# Patient Record
Sex: FEMALE | Race: WHITE | NOT HISPANIC OR LATINO | URBAN - METROPOLITAN AREA
[De-identification: names, ages, dates, MRNs, and addresses within clinical notes are randomized per-mention and may not be internally consistent; named-entity substitution may affect disease eponyms.]

---

## 2017-02-24 ENCOUNTER — INPATIENT (INPATIENT)
Facility: HOSPITAL | Age: 62
LOS: 4 days | Discharge: HOME | DRG: 872 | End: 2017-03-01
Attending: FAMILY MEDICINE | Admitting: FAMILY MEDICINE
Payer: COMMERCIAL

## 2017-02-24 VITALS
OXYGEN SATURATION: 100 % | HEART RATE: 112 BPM | RESPIRATION RATE: 16 BRPM | HEIGHT: 64 IN | SYSTOLIC BLOOD PRESSURE: 131 MMHG | TEMPERATURE: 99 F | DIASTOLIC BLOOD PRESSURE: 73 MMHG | WEIGHT: 240.08 LBS

## 2017-02-24 DIAGNOSIS — Z87.09 PERSONAL HISTORY OF OTHER DISEASES OF THE RESPIRATORY SYSTEM: Chronic | ICD-10-CM

## 2017-02-24 DIAGNOSIS — Z90.13 ACQUIRED ABSENCE OF BILATERAL BREASTS AND NIPPLES: Chronic | ICD-10-CM

## 2017-02-24 DIAGNOSIS — L03.114 CELLULITIS OF LEFT UPPER LIMB: ICD-10-CM

## 2017-02-24 LAB
ALBUMIN SERPL ELPH-MCNC: 3.8 G/DL — SIGNIFICANT CHANGE UP (ref 3.5–5)
ALP SERPL-CCNC: 65 U/L — SIGNIFICANT CHANGE UP (ref 40–120)
ALT FLD-CCNC: 19 U/L DA — SIGNIFICANT CHANGE UP (ref 10–60)
ANION GAP SERPL CALC-SCNC: 9 MMOL/L — SIGNIFICANT CHANGE UP (ref 5–17)
AST SERPL-CCNC: 10 U/L — SIGNIFICANT CHANGE UP (ref 10–40)
BILIRUB SERPL-MCNC: 1.4 MG/DL — HIGH (ref 0.2–1.2)
BUN SERPL-MCNC: 11 MG/DL — SIGNIFICANT CHANGE UP (ref 7–18)
CALCIUM SERPL-MCNC: 9 MG/DL — SIGNIFICANT CHANGE UP (ref 8.4–10.5)
CHLORIDE SERPL-SCNC: 104 MMOL/L — SIGNIFICANT CHANGE UP (ref 96–108)
CO2 SERPL-SCNC: 28 MMOL/L — SIGNIFICANT CHANGE UP (ref 22–31)
CREAT SERPL-MCNC: 0.74 MG/DL — SIGNIFICANT CHANGE UP (ref 0.5–1.3)
EOSINOPHIL NFR BLD AUTO: 2 % — SIGNIFICANT CHANGE UP (ref 0–6)
GLUCOSE SERPL-MCNC: 104 MG/DL — HIGH (ref 70–99)
HCT VFR BLD CALC: 40.6 % — SIGNIFICANT CHANGE UP (ref 34.5–45)
HGB BLD-MCNC: 13.3 G/DL — SIGNIFICANT CHANGE UP (ref 11.5–15.5)
LACTATE SERPL-SCNC: 2.5 MMOL/L — HIGH (ref 0.7–2)
LYMPHOCYTES # BLD AUTO: 18 % — SIGNIFICANT CHANGE UP (ref 13–44)
MCHC RBC-ENTMCNC: 32.8 GM/DL — SIGNIFICANT CHANGE UP (ref 32–36)
MCHC RBC-ENTMCNC: 34 PG — SIGNIFICANT CHANGE UP (ref 27–34)
MCV RBC AUTO: 103.4 FL — HIGH (ref 80–100)
MONOCYTES NFR BLD AUTO: 2 % — SIGNIFICANT CHANGE UP (ref 2–14)
NEUTROPHILS NFR BLD AUTO: 78 % — HIGH (ref 43–77)
PLATELET # BLD AUTO: 236 K/UL — SIGNIFICANT CHANGE UP (ref 150–400)
POTASSIUM SERPL-MCNC: 3.8 MMOL/L — SIGNIFICANT CHANGE UP (ref 3.5–5.3)
POTASSIUM SERPL-SCNC: 3.8 MMOL/L — SIGNIFICANT CHANGE UP (ref 3.5–5.3)
PROT SERPL-MCNC: 8.2 G/DL — SIGNIFICANT CHANGE UP (ref 6–8.3)
RBC # BLD: 3.93 M/UL — SIGNIFICANT CHANGE UP (ref 3.8–5.2)
RBC # FLD: 13.2 % — SIGNIFICANT CHANGE UP (ref 10.3–14.5)
SODIUM SERPL-SCNC: 141 MMOL/L — SIGNIFICANT CHANGE UP (ref 135–145)
WBC # BLD: 2.1 K/UL — LOW (ref 3.8–10.5)
WBC # FLD AUTO: 2.1 K/UL — LOW (ref 3.8–10.5)

## 2017-02-24 PROCEDURE — 93971 EXTREMITY STUDY: CPT | Mod: 26,LT

## 2017-02-24 PROCEDURE — 99285 EMERGENCY DEPT VISIT HI MDM: CPT

## 2017-02-24 RX ORDER — PIPERACILLIN AND TAZOBACTAM 4; .5 G/20ML; G/20ML
3.38 INJECTION, POWDER, LYOPHILIZED, FOR SOLUTION INTRAVENOUS ONCE
Qty: 0 | Refills: 0 | Status: COMPLETED | OUTPATIENT
Start: 2017-02-24 | End: 2017-02-24

## 2017-02-24 RX ORDER — MORPHINE SULFATE 50 MG/1
6 CAPSULE, EXTENDED RELEASE ORAL ONCE
Qty: 0 | Refills: 0 | Status: DISCONTINUED | OUTPATIENT
Start: 2017-02-24 | End: 2017-02-24

## 2017-02-24 RX ORDER — SODIUM CHLORIDE 9 MG/ML
3 INJECTION INTRAMUSCULAR; INTRAVENOUS; SUBCUTANEOUS ONCE
Qty: 0 | Refills: 0 | Status: COMPLETED | OUTPATIENT
Start: 2017-02-24 | End: 2017-02-24

## 2017-02-24 RX ORDER — VANCOMYCIN HCL 1 G
1000 VIAL (EA) INTRAVENOUS ONCE
Qty: 0 | Refills: 0 | Status: COMPLETED | OUTPATIENT
Start: 2017-02-24 | End: 2017-02-24

## 2017-02-24 RX ORDER — ACETAMINOPHEN 500 MG
650 TABLET ORAL ONCE
Qty: 0 | Refills: 0 | Status: COMPLETED | OUTPATIENT
Start: 2017-02-24 | End: 2017-02-24

## 2017-02-24 RX ADMIN — Medication 250 MILLIGRAM(S): at 21:50

## 2017-02-24 RX ADMIN — Medication 650 MILLIGRAM(S): at 20:57

## 2017-02-24 RX ADMIN — SODIUM CHLORIDE 3 MILLILITER(S): 9 INJECTION INTRAMUSCULAR; INTRAVENOUS; SUBCUTANEOUS at 20:38

## 2017-02-24 RX ADMIN — PIPERACILLIN AND TAZOBACTAM 200 GRAM(S): 4; .5 INJECTION, POWDER, LYOPHILIZED, FOR SOLUTION INTRAVENOUS at 20:56

## 2017-02-24 NOTE — ED ADULT NURSE NOTE - OBJECTIVE STATEMENT
C/O PAIN AND SWELLING TO LEFT ARM TODAY.DENIES INJURY TO LEFT ARM. LEFT ARM WITH REDNESS.EDEMA.SEEN ANDEXAMINED BY DR. IBRAHIM

## 2017-02-24 NOTE — ED PROVIDER NOTE - OBJECTIVE STATEMENT
Pt presents with left arm redness, swelling, tenderness and  warm to palpation started today. Pt states had previous hx of arm cellulitis. No reported fever, no chest pain, no shortness of breath.   Pt has hx of breast CA states on Ibrance.

## 2017-02-24 NOTE — ED PROVIDER NOTE - PMH
Breast cancer    Hypothyroidism    Lymphedema of arm  leftarm lymphedema since 20 years , after the breast reconstruction surgery  S/P mastectomy, bilateral    Thyroid condition

## 2017-02-24 NOTE — ED PROVIDER NOTE - MEDICAL DECISION MAKING DETAILS
MAR and Dr. Hein endorsed. Pt agrees with admission for IV abx. I had a detailed discussion with the patient and/or guardian regarding the historical points, exam findings, and any diagnostic results supporting the admit diagnosis.

## 2017-02-24 NOTE — ED ADULT NURSE NOTE - ED STAT RN HANDOFF DETAILS
ENDORSED TO MS. ANNIE JORGE, REPORTS GIVEN, NO COMPLAINTS AT PRESENT. LEFT REMAINS RED , WITH SWELLING.

## 2017-02-25 DIAGNOSIS — C50.919 MALIGNANT NEOPLASM OF UNSPECIFIED SITE OF UNSPECIFIED FEMALE BREAST: ICD-10-CM

## 2017-02-25 DIAGNOSIS — A41.9 SEPSIS, UNSPECIFIED ORGANISM: ICD-10-CM

## 2017-02-25 DIAGNOSIS — Z41.8 ENCOUNTER FOR OTHER PROCEDURES FOR PURPOSES OTHER THAN REMEDYING HEALTH STATE: ICD-10-CM

## 2017-02-25 DIAGNOSIS — E03.9 HYPOTHYROIDISM, UNSPECIFIED: ICD-10-CM

## 2017-02-25 LAB
LACTATE SERPL-SCNC: 1.7 MMOL/L — SIGNIFICANT CHANGE UP (ref 0.7–2)
LACTATE SERPL-SCNC: 2.7 MMOL/L — HIGH (ref 0.7–2)

## 2017-02-25 RX ORDER — SODIUM CHLORIDE 9 MG/ML
1000 INJECTION INTRAMUSCULAR; INTRAVENOUS; SUBCUTANEOUS
Qty: 0 | Refills: 0 | Status: COMPLETED | OUTPATIENT
Start: 2017-02-25 | End: 2017-02-26

## 2017-02-25 RX ORDER — SODIUM CHLORIDE 9 MG/ML
500 INJECTION INTRAMUSCULAR; INTRAVENOUS; SUBCUTANEOUS ONCE
Qty: 0 | Refills: 0 | Status: COMPLETED | OUTPATIENT
Start: 2017-02-25 | End: 2017-02-25

## 2017-02-25 RX ORDER — CEFAZOLIN SODIUM 1 G
2000 VIAL (EA) INJECTION EVERY 8 HOURS
Qty: 0 | Refills: 0 | Status: DISCONTINUED | OUTPATIENT
Start: 2017-02-25 | End: 2017-03-01

## 2017-02-25 RX ORDER — ACETAMINOPHEN 500 MG
325 TABLET ORAL EVERY 4 HOURS
Qty: 0 | Refills: 0 | Status: DISCONTINUED | OUTPATIENT
Start: 2017-02-25 | End: 2017-02-26

## 2017-02-25 RX ORDER — CEFAZOLIN SODIUM 1 G
VIAL (EA) INJECTION
Qty: 0 | Refills: 0 | Status: DISCONTINUED | OUTPATIENT
Start: 2017-02-25 | End: 2017-03-01

## 2017-02-25 RX ORDER — ONDANSETRON 8 MG/1
4 TABLET, FILM COATED ORAL EVERY 6 HOURS
Qty: 0 | Refills: 0 | Status: DISCONTINUED | OUTPATIENT
Start: 2017-02-25 | End: 2017-03-01

## 2017-02-25 RX ORDER — LEVOTHYROXINE SODIUM 125 MCG
50 TABLET ORAL DAILY
Qty: 0 | Refills: 0 | Status: DISCONTINUED | OUTPATIENT
Start: 2017-02-25 | End: 2017-03-01

## 2017-02-25 RX ORDER — SODIUM CHLORIDE 9 MG/ML
1000 INJECTION INTRAMUSCULAR; INTRAVENOUS; SUBCUTANEOUS ONCE
Qty: 0 | Refills: 0 | Status: COMPLETED | OUTPATIENT
Start: 2017-02-25 | End: 2017-02-25

## 2017-02-25 RX ORDER — ALBUTEROL 90 UG/1
2 AEROSOL, METERED ORAL EVERY 6 HOURS
Qty: 0 | Refills: 0 | Status: DISCONTINUED | OUTPATIENT
Start: 2017-02-25 | End: 2017-03-01

## 2017-02-25 RX ORDER — VANCOMYCIN HCL 1 G
1000 VIAL (EA) INTRAVENOUS EVERY 12 HOURS
Qty: 0 | Refills: 0 | Status: DISCONTINUED | OUTPATIENT
Start: 2017-02-25 | End: 2017-02-25

## 2017-02-25 RX ORDER — ALPRAZOLAM 0.25 MG
0.25 TABLET ORAL
Qty: 0 | Refills: 0 | Status: DISCONTINUED | OUTPATIENT
Start: 2017-02-25 | End: 2017-03-01

## 2017-02-25 RX ORDER — MORPHINE SULFATE 50 MG/1
2 CAPSULE, EXTENDED RELEASE ORAL EVERY 6 HOURS
Qty: 0 | Refills: 0 | Status: DISCONTINUED | OUTPATIENT
Start: 2017-02-25 | End: 2017-02-25

## 2017-02-25 RX ORDER — PIPERACILLIN AND TAZOBACTAM 4; .5 G/20ML; G/20ML
3.38 INJECTION, POWDER, LYOPHILIZED, FOR SOLUTION INTRAVENOUS EVERY 8 HOURS
Qty: 0 | Refills: 0 | Status: DISCONTINUED | OUTPATIENT
Start: 2017-02-25 | End: 2017-02-25

## 2017-02-25 RX ORDER — CEFAZOLIN SODIUM 1 G
2000 VIAL (EA) INJECTION ONCE
Qty: 0 | Refills: 0 | Status: COMPLETED | OUTPATIENT
Start: 2017-02-25 | End: 2017-02-25

## 2017-02-25 RX ORDER — ENOXAPARIN SODIUM 100 MG/ML
40 INJECTION SUBCUTANEOUS DAILY
Qty: 0 | Refills: 0 | Status: DISCONTINUED | OUTPATIENT
Start: 2017-02-25 | End: 2017-03-01

## 2017-02-25 RX ADMIN — ALBUTEROL 2 PUFF(S): 90 AEROSOL, METERED ORAL at 17:25

## 2017-02-25 RX ADMIN — ALBUTEROL 2 PUFF(S): 90 AEROSOL, METERED ORAL at 09:09

## 2017-02-25 RX ADMIN — Medication 0.25 MILLIGRAM(S): at 05:48

## 2017-02-25 RX ADMIN — PIPERACILLIN AND TAZOBACTAM 25 GRAM(S): 4; .5 INJECTION, POWDER, LYOPHILIZED, FOR SOLUTION INTRAVENOUS at 13:09

## 2017-02-25 RX ADMIN — Medication 0.25 MILLIGRAM(S): at 13:37

## 2017-02-25 RX ADMIN — SODIUM CHLORIDE 80 MILLILITER(S): 9 INJECTION INTRAMUSCULAR; INTRAVENOUS; SUBCUTANEOUS at 05:57

## 2017-02-25 RX ADMIN — Medication 50 MICROGRAM(S): at 05:49

## 2017-02-25 RX ADMIN — ONDANSETRON 4 MILLIGRAM(S): 8 TABLET, FILM COATED ORAL at 16:02

## 2017-02-25 RX ADMIN — SODIUM CHLORIDE 80 MILLILITER(S): 9 INJECTION INTRAMUSCULAR; INTRAVENOUS; SUBCUTANEOUS at 17:25

## 2017-02-25 RX ADMIN — Medication 250 MILLIGRAM(S): at 05:49

## 2017-02-25 RX ADMIN — ALBUTEROL 2 PUFF(S): 90 AEROSOL, METERED ORAL at 21:24

## 2017-02-25 RX ADMIN — SODIUM CHLORIDE 1000 MILLILITER(S): 9 INJECTION INTRAMUSCULAR; INTRAVENOUS; SUBCUTANEOUS at 09:23

## 2017-02-25 RX ADMIN — PIPERACILLIN AND TAZOBACTAM 25 GRAM(S): 4; .5 INJECTION, POWDER, LYOPHILIZED, FOR SOLUTION INTRAVENOUS at 05:48

## 2017-02-25 RX ADMIN — Medication 100 MILLIGRAM(S): at 21:26

## 2017-02-25 RX ADMIN — SODIUM CHLORIDE 4000 MILLILITER(S): 9 INJECTION INTRAMUSCULAR; INTRAVENOUS; SUBCUTANEOUS at 01:51

## 2017-02-25 RX ADMIN — ENOXAPARIN SODIUM 40 MILLIGRAM(S): 100 INJECTION SUBCUTANEOUS at 12:11

## 2017-02-25 RX ADMIN — Medication 325 MILLIGRAM(S): at 22:08

## 2017-02-25 RX ADMIN — Medication 100 MILLIGRAM(S): at 17:24

## 2017-02-25 NOTE — H&P ADULT. - PROBLEM SELECTOR PLAN 1
Sepsis:  wbc count 2.1 tachy cardiac  on chemo immunocompromised  will start on vancomycin and zosyn  f/u blood cx c/w iv fluids  elevated left arm to aid in healing vascular checks q4 as arm is very swollen and risk of compartment syndrome  lactate 2.5  pain mgmt with morphine  id consult Dr Wolfe

## 2017-02-25 NOTE — H&P ADULT. - PSH
H/O pleural empyema  pleurodesis  S/P bilateral mastectomy    S/P mastectomy, bilateral  s/p implants b/l, no chemo or radiation therapy

## 2017-02-25 NOTE — ED ADULT NURSE REASSESSMENT NOTE - NS ED NURSE REASSESS COMMENT FT1
Diet order updated. Meal provided to patient, well tolerated. Lactate pending collection. Collection attempted but patient refused . Urine specimen also pending. Patient made aware.

## 2017-02-25 NOTE — H&P ADULT. - ASSESSMENT
62 y/o F from home  w/ PMHx of hypothyroidism, HLD and breast CA s/p bilateral mastectomy with augmentation surgery 22 years ago on chemo currently po history of recurrant left arm celluitis VATS procedure 1014 for plurodesis for recurrant malignant pleural effusions p/w L arm swelling today is being admitted for sepsis secondary to left arm cellulitis.  ( med rec not done [phone])

## 2017-02-26 LAB
ALBUMIN SERPL ELPH-MCNC: 2.6 G/DL — LOW (ref 3.5–5)
ALP SERPL-CCNC: 53 U/L — SIGNIFICANT CHANGE UP (ref 40–120)
ALT FLD-CCNC: 15 U/L DA — SIGNIFICANT CHANGE UP (ref 10–60)
ANION GAP SERPL CALC-SCNC: 11 MMOL/L — SIGNIFICANT CHANGE UP (ref 5–17)
AST SERPL-CCNC: 11 U/L — SIGNIFICANT CHANGE UP (ref 10–40)
BASOPHILS # BLD AUTO: 0.2 K/UL — SIGNIFICANT CHANGE UP (ref 0–0.2)
BASOPHILS NFR BLD AUTO: 2.1 % — HIGH (ref 0–2)
BILIRUB SERPL-MCNC: 1.4 MG/DL — HIGH (ref 0.2–1.2)
BUN SERPL-MCNC: 8 MG/DL — SIGNIFICANT CHANGE UP (ref 7–18)
CALCIUM SERPL-MCNC: 8.2 MG/DL — LOW (ref 8.4–10.5)
CHLORIDE SERPL-SCNC: 103 MMOL/L — SIGNIFICANT CHANGE UP (ref 96–108)
CHOLEST SERPL-MCNC: 113 MG/DL — SIGNIFICANT CHANGE UP (ref 10–199)
CO2 SERPL-SCNC: 24 MMOL/L — SIGNIFICANT CHANGE UP (ref 22–31)
CREAT SERPL-MCNC: 0.71 MG/DL — SIGNIFICANT CHANGE UP (ref 0.5–1.3)
EOSINOPHIL # BLD AUTO: 0 K/UL — SIGNIFICANT CHANGE UP (ref 0–0.5)
EOSINOPHIL NFR BLD AUTO: 0.5 % — SIGNIFICANT CHANGE UP (ref 0–6)
GLUCOSE SERPL-MCNC: 131 MG/DL — HIGH (ref 70–99)
HBA1C BLD-MCNC: 6.1 % — HIGH (ref 4–5.6)
HCT VFR BLD CALC: 31.3 % — LOW (ref 34.5–45)
HDLC SERPL-MCNC: 33 MG/DL — LOW (ref 40–125)
HGB BLD-MCNC: 10.8 G/DL — LOW (ref 11.5–15.5)
LACTATE SERPL-SCNC: 1 MMOL/L — SIGNIFICANT CHANGE UP (ref 0.7–2)
LIPID PNL WITH DIRECT LDL SERPL: 64 MG/DL — SIGNIFICANT CHANGE UP
LYMPHOCYTES # BLD AUTO: 0.4 K/UL — LOW (ref 1–3.3)
LYMPHOCYTES # BLD AUTO: 5.9 % — LOW (ref 13–44)
MAGNESIUM SERPL-MCNC: 2 MG/DL — SIGNIFICANT CHANGE UP (ref 1.8–2.4)
MCHC RBC-ENTMCNC: 34.3 GM/DL — SIGNIFICANT CHANGE UP (ref 32–36)
MCHC RBC-ENTMCNC: 34.8 PG — HIGH (ref 27–34)
MCV RBC AUTO: 101.5 FL — HIGH (ref 80–100)
MONOCYTES # BLD AUTO: 0.8 K/UL — SIGNIFICANT CHANGE UP (ref 0–0.9)
MONOCYTES NFR BLD AUTO: 10.2 % — SIGNIFICANT CHANGE UP (ref 2–14)
NEUTROPHILS # BLD AUTO: 6.1 K/UL — SIGNIFICANT CHANGE UP (ref 1.8–7.4)
NEUTROPHILS NFR BLD AUTO: 81.3 % — HIGH (ref 43–77)
PHOSPHATE SERPL-MCNC: 2.3 MG/DL — LOW (ref 2.5–4.5)
PLATELET # BLD AUTO: 188 K/UL — SIGNIFICANT CHANGE UP (ref 150–400)
POTASSIUM SERPL-MCNC: 3.1 MMOL/L — LOW (ref 3.5–5.3)
POTASSIUM SERPL-SCNC: 3.1 MMOL/L — LOW (ref 3.5–5.3)
PROT SERPL-MCNC: 6.7 G/DL — SIGNIFICANT CHANGE UP (ref 6–8.3)
RBC # BLD: 3.09 M/UL — LOW (ref 3.8–5.2)
RBC # FLD: 13.4 % — SIGNIFICANT CHANGE UP (ref 10.3–14.5)
SODIUM SERPL-SCNC: 138 MMOL/L — SIGNIFICANT CHANGE UP (ref 135–145)
TOTAL CHOLESTEROL/HDL RATIO MEASUREMENT: 3.4 RATIO — SIGNIFICANT CHANGE UP (ref 3.3–7.1)
TRIGL SERPL-MCNC: 82 MG/DL — SIGNIFICANT CHANGE UP (ref 10–149)
WBC # BLD: 7.5 K/UL — SIGNIFICANT CHANGE UP (ref 3.8–10.5)
WBC # FLD AUTO: 7.5 K/UL — SIGNIFICANT CHANGE UP (ref 3.8–10.5)

## 2017-02-26 PROCEDURE — 71010: CPT | Mod: 26

## 2017-02-26 RX ORDER — LACTULOSE 10 G/15ML
20 SOLUTION ORAL ONCE
Qty: 0 | Refills: 0 | Status: COMPLETED | OUTPATIENT
Start: 2017-02-26 | End: 2017-02-27

## 2017-02-26 RX ORDER — ACETAMINOPHEN 500 MG
650 TABLET ORAL EVERY 4 HOURS
Qty: 0 | Refills: 0 | Status: DISCONTINUED | OUTPATIENT
Start: 2017-02-26 | End: 2017-03-01

## 2017-02-26 RX ORDER — POTASSIUM CHLORIDE 20 MEQ
40 PACKET (EA) ORAL ONCE
Qty: 0 | Refills: 0 | Status: COMPLETED | OUTPATIENT
Start: 2017-02-26 | End: 2017-02-26

## 2017-02-26 RX ADMIN — Medication 0.25 MILLIGRAM(S): at 17:16

## 2017-02-26 RX ADMIN — Medication 40 MILLIEQUIVALENT(S): at 12:25

## 2017-02-26 RX ADMIN — SODIUM CHLORIDE 80 MILLILITER(S): 9 INJECTION INTRAMUSCULAR; INTRAVENOUS; SUBCUTANEOUS at 13:20

## 2017-02-26 RX ADMIN — Medication 100 MILLIGRAM(S): at 05:25

## 2017-02-26 RX ADMIN — Medication 650 MILLIGRAM(S): at 05:26

## 2017-02-26 RX ADMIN — ENOXAPARIN SODIUM 40 MILLIGRAM(S): 100 INJECTION SUBCUTANEOUS at 12:25

## 2017-02-26 RX ADMIN — ALBUTEROL 2 PUFF(S): 90 AEROSOL, METERED ORAL at 15:17

## 2017-02-26 RX ADMIN — ALBUTEROL 2 PUFF(S): 90 AEROSOL, METERED ORAL at 09:21

## 2017-02-26 RX ADMIN — Medication 50 MICROGRAM(S): at 05:25

## 2017-02-26 RX ADMIN — Medication 0.25 MILLIGRAM(S): at 05:25

## 2017-02-26 RX ADMIN — ALBUTEROL 2 PUFF(S): 90 AEROSOL, METERED ORAL at 21:01

## 2017-02-26 RX ADMIN — Medication 100 MILLIGRAM(S): at 13:20

## 2017-02-26 RX ADMIN — Medication 100 MILLIGRAM(S): at 21:01

## 2017-02-26 RX ADMIN — Medication 650 MILLIGRAM(S): at 20:55

## 2017-02-27 LAB
APPEARANCE UR: CLEAR — SIGNIFICANT CHANGE UP
BILIRUB UR-MCNC: NEGATIVE — SIGNIFICANT CHANGE UP
COLOR SPEC: YELLOW — SIGNIFICANT CHANGE UP
DIFF PNL FLD: ABNORMAL
GLUCOSE UR QL: NEGATIVE — SIGNIFICANT CHANGE UP
KETONES UR-MCNC: NEGATIVE — SIGNIFICANT CHANGE UP
LACTATE SERPL-SCNC: 0.8 MMOL/L — SIGNIFICANT CHANGE UP (ref 0.7–2)
LEUKOCYTE ESTERASE UR-ACNC: ABNORMAL
NITRITE UR-MCNC: NEGATIVE — SIGNIFICANT CHANGE UP
PH UR: 6 — SIGNIFICANT CHANGE UP (ref 4.8–8)
PROT UR-MCNC: 30 MG/DL
SP GR SPEC: 1.01 — SIGNIFICANT CHANGE UP (ref 1.01–1.02)
UROBILINOGEN FLD QL: NEGATIVE — SIGNIFICANT CHANGE UP

## 2017-02-27 RX ADMIN — Medication 100 MILLIGRAM(S): at 06:39

## 2017-02-27 RX ADMIN — Medication 0.25 MILLIGRAM(S): at 09:10

## 2017-02-27 RX ADMIN — Medication 0.25 MILLIGRAM(S): at 20:12

## 2017-02-27 RX ADMIN — Medication 100 MILLIGRAM(S): at 22:11

## 2017-02-27 RX ADMIN — Medication 100 MILLIGRAM(S): at 14:05

## 2017-02-27 RX ADMIN — ALBUTEROL 2 PUFF(S): 90 AEROSOL, METERED ORAL at 08:58

## 2017-02-27 RX ADMIN — LACTULOSE 20 GRAM(S): 10 SOLUTION ORAL at 00:24

## 2017-02-27 RX ADMIN — ALBUTEROL 2 PUFF(S): 90 AEROSOL, METERED ORAL at 14:09

## 2017-02-27 RX ADMIN — Medication 50 MICROGRAM(S): at 06:40

## 2017-02-27 RX ADMIN — ENOXAPARIN SODIUM 40 MILLIGRAM(S): 100 INJECTION SUBCUTANEOUS at 12:10

## 2017-02-28 ENCOUNTER — TRANSCRIPTION ENCOUNTER (OUTPATIENT)
Age: 62
End: 2017-02-28

## 2017-02-28 LAB
ANION GAP SERPL CALC-SCNC: 13 MMOL/L — SIGNIFICANT CHANGE UP (ref 5–17)
BUN SERPL-MCNC: 8 MG/DL — SIGNIFICANT CHANGE UP (ref 7–18)
CALCIUM SERPL-MCNC: 8.9 MG/DL — SIGNIFICANT CHANGE UP (ref 8.4–10.5)
CHLORIDE SERPL-SCNC: 103 MMOL/L — SIGNIFICANT CHANGE UP (ref 96–108)
CO2 SERPL-SCNC: 26 MMOL/L — SIGNIFICANT CHANGE UP (ref 22–31)
CREAT SERPL-MCNC: 0.64 MG/DL — SIGNIFICANT CHANGE UP (ref 0.5–1.3)
GLUCOSE SERPL-MCNC: 144 MG/DL — HIGH (ref 70–99)
HCT VFR BLD CALC: 34.2 % — LOW (ref 34.5–45)
HGB BLD-MCNC: 11.7 G/DL — SIGNIFICANT CHANGE UP (ref 11.5–15.5)
MCHC RBC-ENTMCNC: 34.1 GM/DL — SIGNIFICANT CHANGE UP (ref 32–36)
MCHC RBC-ENTMCNC: 34.9 PG — HIGH (ref 27–34)
MCV RBC AUTO: 102.2 FL — HIGH (ref 80–100)
PLATELET # BLD AUTO: 283 K/UL — SIGNIFICANT CHANGE UP (ref 150–400)
POTASSIUM SERPL-MCNC: 3.3 MMOL/L — LOW (ref 3.5–5.3)
POTASSIUM SERPL-SCNC: 3.3 MMOL/L — LOW (ref 3.5–5.3)
RBC # BLD: 3.35 M/UL — LOW (ref 3.8–5.2)
RBC # FLD: 13.3 % — SIGNIFICANT CHANGE UP (ref 10.3–14.5)
SODIUM SERPL-SCNC: 142 MMOL/L — SIGNIFICANT CHANGE UP (ref 135–145)
WBC # BLD: 5.2 K/UL — SIGNIFICANT CHANGE UP (ref 3.8–10.5)
WBC # FLD AUTO: 5.2 K/UL — SIGNIFICANT CHANGE UP (ref 3.8–10.5)

## 2017-02-28 RX ORDER — POTASSIUM CHLORIDE 20 MEQ
40 PACKET (EA) ORAL ONCE
Qty: 0 | Refills: 0 | Status: COMPLETED | OUTPATIENT
Start: 2017-02-28 | End: 2017-02-28

## 2017-02-28 RX ADMIN — Medication 100 MILLIGRAM(S): at 22:06

## 2017-02-28 RX ADMIN — Medication 50 MICROGRAM(S): at 05:27

## 2017-02-28 RX ADMIN — Medication 40 MILLIEQUIVALENT(S): at 13:55

## 2017-02-28 RX ADMIN — Medication 100 MILLIGRAM(S): at 13:57

## 2017-02-28 RX ADMIN — Medication 100 MILLIGRAM(S): at 06:42

## 2017-02-28 RX ADMIN — ALBUTEROL 2 PUFF(S): 90 AEROSOL, METERED ORAL at 22:06

## 2017-02-28 RX ADMIN — Medication 0.25 MILLIGRAM(S): at 22:06

## 2017-02-28 RX ADMIN — ALBUTEROL 2 PUFF(S): 90 AEROSOL, METERED ORAL at 09:01

## 2017-02-28 RX ADMIN — ENOXAPARIN SODIUM 40 MILLIGRAM(S): 100 INJECTION SUBCUTANEOUS at 12:29

## 2017-02-28 RX ADMIN — ALBUTEROL 2 PUFF(S): 90 AEROSOL, METERED ORAL at 17:20

## 2017-02-28 NOTE — DISCHARGE NOTE ADULT - HOSPITAL COURSE
62 y/o F from home  w/ PMHx of hypothyroidism, HLD and breast CA s/p bilateral mastectomy with augmentation surgery 22 years ago, hx of malignent pleural effusion s/p VATS procedure on oral chemo. p/w L arm swelling, erythema extending to axilla and chest wall. Patient had prior admission in Septmeber 2016 for similar complaints     Admitted for Sepsis secondary to left arm cellulitis   initially treated with vancomycin  and zosyn  ID dr Wolfe consulted  Antibiotic changed to Kefzol IV 2 grams every 8 hours   Left arm kept elevated, no signs of neurovascular deficit, no signs of compartment syndrome  Blood culture negative   WBC 5.2   venous doppler negative for DVT     hypothyroid:  c/w synthroid     breast ca: on chemo   holding chemo now as patient has acute infection  resume chemo as per oncologist recommendation      2/28 left arm swelling and erythema significantly improved   Patient afebrile, verbalizes feeling much better   Cleared to be discharged home with recommendation to continue antibiotics 60 y/o F from home  w/ PMHx of hypothyroidism, HLD and breast CA s/p bilateral mastectomy with augmentation surgery 22 years ago, hx of malignent pleural effusion s/p VATS procedure on oral chemo. p/w L arm swelling, erythema extending to axilla and chest wall. Patient had prior admission in Septmeber 2016 for similar complaints     Admitted for Sepsis secondary to left arm cellulitis   initially treated with vancomycin  and zosyn  ID dr Wolfe consulted  Antibiotic changed to Kefzol IV 2 grams every 8 hours   Left arm kept elevated, no signs of neurovascular deficit, no signs of compartment syndrome  Blood culture negative   WBC 5.2   venous doppler negative for DVT   at discharge continue taking oral antibiotics Keflex     hypothyroid:  c/w synthroid     breast ca: on chemo   holding chemo now as patient has acute infection  resume chemo as per oncologist recommendation      2/28 left arm swelling and erythema significantly improved   Patient afebrile, verbalizes feeling much better   Cleared to be discharged home with recommendation to continue antibiotics 60 y/o F from home  w/ PMHx of hypothyroidism, HLD and breast CA s/p bilateral mastectomy with augmentation surgery 22 years ago, hx of malignent pleural effusion s/p VATS procedure on oral chemo. p/w L arm swelling, erythema extending to axilla and chest wall. Patient had prior admission in Septmeber 2016 for similar complaints     Admitted for Sepsis secondary to left arm cellulitis   initially treated with vancomycin  and zosyn  ID dr Wolfe consulted  Antibiotic changed to Kefzol IV 2 grams every 8 hours   Left arm kept elevated, no signs of neurovascular deficit, no signs of compartment syndrome  Blood culture negative   WBC 5.2   venous doppler negative for DVT   at discharge continue taking oral antibiotics Keflex     hypothyroid:  c/w synthroid     breast ca: on chemo   holding chemo now as patient has acute infection  resume chemo as per oncologist recommendation      2/28 left arm swelling and erythema significantly improved   Patient afebrile, verbalizes feeling much better   Cleared to be discharged home with recommendation to continue antibiotics     DVT & GI prophylaxis continued daily

## 2017-02-28 NOTE — DISCHARGE NOTE ADULT - CARE PLAN
Principal Discharge DX:	Cellulitis of arm, left  Goal:	resolution of infection  Instructions for follow-up, activity and diet:	Please keep your left arm elevated while at rest to decrease swelling and promote healing. Continue the antibiotic therapy as ordered. Please follow up with your doctor in 2-3 days for reevaluation and further management. Please see your doctor immidiately or return to the Emergency Room for high fever, worsening pain, worsening redness, dark or bluish discoloration of your skin, numbness, weakness of the left arm or for any new or concerning symptoms  Secondary Diagnosis:	Hypothyroidism  Instructions for follow-up, activity and diet:	Continue synthroid as prescribed by your doctor  Secondary Diagnosis:	Breast cancer  Instructions for follow-up, activity and diet:	Continue chemotherapy as ordered by your doctor. Follow up with your oncologist for further management Principal Discharge DX:	Cellulitis of arm, left  Goal:	resolution of infection  Instructions for follow-up, activity and diet:	Please keep your left arm elevated while at rest to decrease swelling and promote healing. Continue the antibiotic therapy as ordered. Please follow up with your doctor in 2-3 days for reevaluation and further management. Please see your doctor immediately or return to the Emergency Room for high fever, worsening pain, worsening redness, dark or bluish discoloration of your skin, numbness, weakness of the left arm or for any new or concerning symptoms  Secondary Diagnosis:	Hypothyroidism  Goal:	thyroid function within normal limits  Instructions for follow-up, activity and diet:	Continue synthroid as prescribed by your doctor  Secondary Diagnosis:	Breast cancer  Goal:	Maintain healthy lifestyle, Void of cancerous process  Instructions for follow-up, activity and diet:	Continue chemotherapy as ordered by your doctor.   Follow up with your oncologist for further management

## 2017-02-28 NOTE — DISCHARGE NOTE ADULT - MEDICATION SUMMARY - MEDICATIONS TO STOP TAKING
I will STOP taking the medications listed below when I get home from the hospital:    Keflex 500 mg oral capsule  -- 1 cap(s) by mouth 4 times a day  -- Finish all this medication unless otherwise directed by prescriber.    ibuprofen 600 mg oral tablet  -- 1 tab(s) by mouth every 6 hours as needed for pain. TAKE WITH FOOD.  -- Do not take this drug if you are pregnant.  It is very important that you take or use this exactly as directed.  Do not skip doses or discontinue unless directed by your doctor.  May cause drowsiness or dizziness.  Obtain medical advice before taking any non-prescription drugs as some may affect the action of this medication.  Take with food or milk.

## 2017-02-28 NOTE — DISCHARGE NOTE ADULT - PATIENT PORTAL LINK FT
“You can access the FollowHealth Patient Portal, offered by [facility], by registering with the following website: [email or web address]”

## 2017-02-28 NOTE — DISCHARGE NOTE ADULT - CARE PROVIDER_API CALL
Andrew Hein (DO), Medicine  [address] [place] [address] [address]  [place] [address]  Phone: [phone]  Fax: [phone]

## 2017-02-28 NOTE — DISCHARGE NOTE ADULT - MEDICATION SUMMARY - MEDICATIONS TO TAKE
I will START or STAY ON the medications listed below when I get home from the hospital:    aspirin 325 mg oral tablet  -- 1 tab(s) by mouth once a day  -- Indication: For Need for prophylactic measure    acetaminophen 325 mg oral tablet  -- 2 tab(s) by mouth every 6 hours, As Needed -For Temp greater than 38 C (100.4 F)  -- Indication: For Cellulitis of arm, left    atorvastatin 40 mg oral tablet  -- 1 tab(s) by mouth once a day (at bedtime)  -- Indication: For Need for prophylactic measure    ALPRAZolam 0.25 mg oral tablet  -- 1 tab(s) by mouth 2 times a day, As needed, anxiety  -- Indication: For anxiety    albuterol-ipratropium 2.5 mg-0.5 mg/3 mL inhalation solution  -- 3 milliliter(s) inhaled every 6 hours  -- Indication: For Cough and wheezing    cephalexin 500 mg oral capsule  -- 1 cap(s) by mouth 4 times a day  -- Finish all this medication unless otherwise directed by prescriber.    -- Indication: For Cellulitis of left upper extremity    levothyroxine 50 mcg (0.05 mg) oral tablet  -- 1 tab(s) by mouth once a day  -- Indication: For Hypothyroidism

## 2017-02-28 NOTE — DISCHARGE NOTE ADULT - PLAN OF CARE
resolution of infection Please keep your left arm elevated while at rest to decrease swelling and promote healing. Continue the antibiotic therapy as ordered. Please follow up with your doctor in 2-3 days for reevaluation and further management. Please see your doctor immidiately or return to the Emergency Room for high fever, worsening pain, worsening redness, dark or bluish discoloration of your skin, numbness, weakness of the left arm or for any new or concerning symptoms Continue synthroid as prescribed by your doctor Continue chemotherapy as ordered by your doctor. Follow up with your oncologist for further management thyroid function within normal limits Maintain healthy lifestyle, Void of cancerous process Please keep your left arm elevated while at rest to decrease swelling and promote healing. Continue the antibiotic therapy as ordered. Please follow up with your doctor in 2-3 days for reevaluation and further management. Please see your doctor immediately or return to the Emergency Room for high fever, worsening pain, worsening redness, dark or bluish discoloration of your skin, numbness, weakness of the left arm or for any new or concerning symptoms Continue chemotherapy as ordered by your doctor.   Follow up with your oncologist for further management

## 2017-02-28 NOTE — DISCHARGE NOTE ADULT - INSTRUCTIONS
Eat healthy diet, plenty of fruits and vegetables,   Avoid fried foods  Low salt and low cholesterol - DASH diet    Continue with your out patient physical theraphy Keep your arm hand elevated when possible, avoid trauma to your arm and hand, no heavy lifting on your left arm or hand, do not carry your bag on your  left arm or hand

## 2017-03-01 VITALS
RESPIRATION RATE: 16 BRPM | SYSTOLIC BLOOD PRESSURE: 119 MMHG | DIASTOLIC BLOOD PRESSURE: 65 MMHG | TEMPERATURE: 98 F | OXYGEN SATURATION: 96 % | HEART RATE: 68 BPM

## 2017-03-01 LAB
ANION GAP SERPL CALC-SCNC: 10 MMOL/L — SIGNIFICANT CHANGE UP (ref 5–17)
BUN SERPL-MCNC: 5 MG/DL — LOW (ref 7–18)
CALCIUM SERPL-MCNC: 9.1 MG/DL — SIGNIFICANT CHANGE UP (ref 8.4–10.5)
CHLORIDE SERPL-SCNC: 103 MMOL/L — SIGNIFICANT CHANGE UP (ref 96–108)
CO2 SERPL-SCNC: 29 MMOL/L — SIGNIFICANT CHANGE UP (ref 22–31)
CREAT SERPL-MCNC: 0.62 MG/DL — SIGNIFICANT CHANGE UP (ref 0.5–1.3)
GLUCOSE SERPL-MCNC: 114 MG/DL — HIGH (ref 70–99)
HCT VFR BLD CALC: 34.6 % — SIGNIFICANT CHANGE UP (ref 34.5–45)
HGB BLD-MCNC: 11.6 G/DL — SIGNIFICANT CHANGE UP (ref 11.5–15.5)
MCHC RBC-ENTMCNC: 33.6 GM/DL — SIGNIFICANT CHANGE UP (ref 32–36)
MCHC RBC-ENTMCNC: 34.4 PG — HIGH (ref 27–34)
MCV RBC AUTO: 102.3 FL — HIGH (ref 80–100)
PLATELET # BLD AUTO: 351 K/UL — SIGNIFICANT CHANGE UP (ref 150–400)
POTASSIUM SERPL-MCNC: 3.6 MMOL/L — SIGNIFICANT CHANGE UP (ref 3.5–5.3)
POTASSIUM SERPL-SCNC: 3.6 MMOL/L — SIGNIFICANT CHANGE UP (ref 3.5–5.3)
RBC # BLD: 3.38 M/UL — LOW (ref 3.8–5.2)
RBC # FLD: 13.4 % — SIGNIFICANT CHANGE UP (ref 10.3–14.5)
SODIUM SERPL-SCNC: 142 MMOL/L — SIGNIFICANT CHANGE UP (ref 135–145)
WBC # BLD: 4.8 K/UL — SIGNIFICANT CHANGE UP (ref 3.8–10.5)
WBC # FLD AUTO: 4.8 K/UL — SIGNIFICANT CHANGE UP (ref 3.8–10.5)

## 2017-03-01 PROCEDURE — 87040 BLOOD CULTURE FOR BACTERIA: CPT

## 2017-03-01 PROCEDURE — 80048 BASIC METABOLIC PNL TOTAL CA: CPT

## 2017-03-01 PROCEDURE — 83605 ASSAY OF LACTIC ACID: CPT

## 2017-03-01 PROCEDURE — 80061 LIPID PANEL: CPT

## 2017-03-01 PROCEDURE — 93971 EXTREMITY STUDY: CPT

## 2017-03-01 PROCEDURE — 85027 COMPLETE CBC AUTOMATED: CPT

## 2017-03-01 PROCEDURE — 96375 TX/PRO/DX INJ NEW DRUG ADDON: CPT

## 2017-03-01 PROCEDURE — 71045 X-RAY EXAM CHEST 1 VIEW: CPT

## 2017-03-01 PROCEDURE — 94640 AIRWAY INHALATION TREATMENT: CPT

## 2017-03-01 PROCEDURE — 84100 ASSAY OF PHOSPHORUS: CPT

## 2017-03-01 PROCEDURE — 83735 ASSAY OF MAGNESIUM: CPT

## 2017-03-01 PROCEDURE — 99285 EMERGENCY DEPT VISIT HI MDM: CPT | Mod: 25

## 2017-03-01 PROCEDURE — 83036 HEMOGLOBIN GLYCOSYLATED A1C: CPT

## 2017-03-01 PROCEDURE — 93005 ELECTROCARDIOGRAM TRACING: CPT

## 2017-03-01 PROCEDURE — 81001 URINALYSIS AUTO W/SCOPE: CPT

## 2017-03-01 PROCEDURE — 96374 THER/PROPH/DIAG INJ IV PUSH: CPT

## 2017-03-01 PROCEDURE — 80053 COMPREHEN METABOLIC PANEL: CPT

## 2017-03-01 RX ORDER — CEPHALEXIN 500 MG
1 CAPSULE ORAL
Qty: 28 | Refills: 0
Start: 2017-03-01 | End: 2017-03-08

## 2017-03-01 RX ORDER — CEPHALEXIN 500 MG
1 CAPSULE ORAL
Qty: 28 | Refills: 0
Start: 2017-03-01 | End: 2019-07-22

## 2017-03-01 RX ORDER — ACETAMINOPHEN 500 MG
2 TABLET ORAL
Qty: 0 | Refills: 0 | DISCHARGE
Start: 2017-03-01

## 2017-03-01 RX ADMIN — Medication 100 MILLIGRAM(S): at 05:51

## 2017-03-01 RX ADMIN — Medication 50 MICROGRAM(S): at 05:51

## 2017-03-01 RX ADMIN — ENOXAPARIN SODIUM 40 MILLIGRAM(S): 100 INJECTION SUBCUTANEOUS at 12:04

## 2017-03-01 RX ADMIN — ALBUTEROL 2 PUFF(S): 90 AEROSOL, METERED ORAL at 09:16

## 2017-03-01 RX ADMIN — Medication 100 MILLIGRAM(S): at 12:30

## 2017-03-02 LAB
CULTURE RESULTS: SIGNIFICANT CHANGE UP
CULTURE RESULTS: SIGNIFICANT CHANGE UP
SPECIMEN SOURCE: SIGNIFICANT CHANGE UP
SPECIMEN SOURCE: SIGNIFICANT CHANGE UP

## 2017-03-03 DIAGNOSIS — A41.9 SEPSIS, UNSPECIFIED ORGANISM: ICD-10-CM

## 2017-03-03 DIAGNOSIS — C50.911 MALIGNANT NEOPLASM OF UNSPECIFIED SITE OF RIGHT FEMALE BREAST: ICD-10-CM

## 2017-03-03 DIAGNOSIS — C50.912 MALIGNANT NEOPLASM OF UNSPECIFIED SITE OF LEFT FEMALE BREAST: ICD-10-CM

## 2017-03-03 DIAGNOSIS — L03.114 CELLULITIS OF LEFT UPPER LIMB: ICD-10-CM

## 2017-03-03 DIAGNOSIS — E03.9 HYPOTHYROIDISM, UNSPECIFIED: ICD-10-CM

## 2017-03-03 DIAGNOSIS — I89.0 LYMPHEDEMA, NOT ELSEWHERE CLASSIFIED: ICD-10-CM

## 2017-07-03 ENCOUNTER — EMERGENCY (EMERGENCY)
Facility: HOSPITAL | Age: 62
LOS: 1 days | Discharge: HOME | End: 2017-07-03
Attending: EMERGENCY MEDICINE
Payer: COMMERCIAL

## 2017-07-03 VITALS
OXYGEN SATURATION: 97 % | SYSTOLIC BLOOD PRESSURE: 105 MMHG | TEMPERATURE: 98 F | HEART RATE: 78 BPM | RESPIRATION RATE: 18 BRPM | WEIGHT: 240.08 LBS | DIASTOLIC BLOOD PRESSURE: 67 MMHG

## 2017-07-03 DIAGNOSIS — Z90.13 ACQUIRED ABSENCE OF BILATERAL BREASTS AND NIPPLES: Chronic | ICD-10-CM
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
## 2017-07-03 DIAGNOSIS — S60.221A CONTUSION OF RIGHT HAND, INITIAL ENCOUNTER: ICD-10-CM

## 2017-07-03 DIAGNOSIS — I97.2 POSTMASTECTOMY LYMPHEDEMA SYNDROME: ICD-10-CM

## 2017-07-03 DIAGNOSIS — E03.9 HYPOTHYROIDISM, UNSPECIFIED: ICD-10-CM

## 2017-07-03 DIAGNOSIS — Z87.09 PERSONAL HISTORY OF OTHER DISEASES OF THE RESPIRATORY SYSTEM: Chronic | ICD-10-CM

## 2017-07-03 DIAGNOSIS — S62.316A DISPLACED FRACTURE OF BASE OF FIFTH METACARPAL BONE, RIGHT HAND, INITIAL ENCOUNTER FOR CLOSED FRACTURE: ICD-10-CM

## 2017-07-03 DIAGNOSIS — Z79.82 LONG TERM (CURRENT) USE OF ASPIRIN: ICD-10-CM

## 2017-07-03 DIAGNOSIS — S00.531A CONTUSION OF LIP, INITIAL ENCOUNTER: ICD-10-CM

## 2017-07-03 DIAGNOSIS — Z79.51 LONG TERM (CURRENT) USE OF INHALED STEROIDS: ICD-10-CM
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
## 2017-07-03 DIAGNOSIS — Z79.2 LONG TERM (CURRENT) USE OF ANTIBIOTICS: ICD-10-CM

## 2017-07-03 DIAGNOSIS — S09.90XA UNSPECIFIED INJURY OF HEAD, INITIAL ENCOUNTER: ICD-10-CM

## 2017-07-03 DIAGNOSIS — S00.83XA CONTUSION OF OTHER PART OF HEAD, INITIAL ENCOUNTER: ICD-10-CM

## 2017-07-03 DIAGNOSIS — Z85.3 PERSONAL HISTORY OF MALIGNANT NEOPLASM OF BREAST: ICD-10-CM

## 2017-07-03 PROCEDURE — 70450 CT HEAD/BRAIN W/O DYE: CPT | Mod: 26

## 2017-07-03 PROCEDURE — 73130 X-RAY EXAM OF HAND: CPT | Mod: 26,RT

## 2017-07-03 PROCEDURE — 99284 EMERGENCY DEPT VISIT MOD MDM: CPT | Mod: 25

## 2017-07-03 PROCEDURE — 70450 CT HEAD/BRAIN W/O DYE: CPT

## 2017-07-03 PROCEDURE — 71045 X-RAY EXAM CHEST 1 VIEW: CPT

## 2017-07-03 PROCEDURE — 70486 CT MAXILLOFACIAL W/O DYE: CPT

## 2017-07-03 PROCEDURE — 71010: CPT | Mod: 26

## 2017-07-03 PROCEDURE — 99285 EMERGENCY DEPT VISIT HI MDM: CPT

## 2017-07-03 PROCEDURE — 70486 CT MAXILLOFACIAL W/O DYE: CPT | Mod: 26

## 2017-07-03 PROCEDURE — 73130 X-RAY EXAM OF HAND: CPT

## 2017-07-03 RX ORDER — IBUPROFEN 200 MG
600 TABLET ORAL ONCE
Qty: 0 | Refills: 0 | Status: COMPLETED | OUTPATIENT
Start: 2017-07-03 | End: 2017-07-03

## 2017-07-03 RX ORDER — ACETAMINOPHEN 500 MG
650 TABLET ORAL ONCE
Qty: 0 | Refills: 0 | Status: COMPLETED | OUTPATIENT
Start: 2017-07-03 | End: 2017-07-03

## 2017-07-03 RX ADMIN — Medication 650 MILLIGRAM(S): at 20:54

## 2017-07-03 RX ADMIN — Medication 600 MILLIGRAM(S): at 22:01

## 2017-07-03 NOTE — ED ADULT NURSE NOTE - OBJECTIVE STATEMENT
pt from home c/o of Rt hand pain s/p trip and fall on street today pt sustained Rt eyebrow bruise, broken Rt upper partial denture and Rt hand pain pt is alert awake oriented x3 denies any headache  Rt hand with limited ROM ambulatory with steady gait

## 2017-07-03 NOTE — ED PROVIDER NOTE - PHYSICAL EXAMINATION
contusion to R forehead with supraorbital ecchymosis.  inner lip contusion midline.  R dorsum of hand contusion with tenderness over 4th metarcarpal area.  No breast contusion (exam with RN).  L arm lymphadema.  No bony td of face or arms.

## 2017-07-03 NOTE — ED PROVIDER NOTE - CARE PLAN
Principal Discharge DX:	Contusion of right hand, initial encounter  Secondary Diagnosis:	Closed head injury, initial encounter Principal Discharge DX:	Fracture of metacarpal base of right hand, closed, initial encounter  Goal:	base of 5th metacarpal right hand fracture  Secondary Diagnosis:	Closed head injury, initial encounter

## 2017-07-03 NOTE — ED PROVIDER NOTE - OBJECTIVE STATEMENT
Dr. Hoover Note: 62F with slip and fall, landed on R side of chest and R hand, hit R side of face, no LOC, has bruise to above R eye, cracked the bridge of lower teeth (removed by pt), and bruise of hand and R breast pain with silicone implant s/p breast cancer surgery.

## 2017-07-03 NOTE — ED PROVIDER NOTE - PRINCIPAL DIAGNOSIS
Contusion of right hand, initial encounter Fracture of metacarpal base of right hand, closed, initial encounter

## 2018-06-21 ENCOUNTER — EMERGENCY (EMERGENCY)
Age: 63
LOS: 1 days | Discharge: HOME | End: 2018-06-21
Attending: EMERGENCY MEDICINE | Admitting: EMERGENCY MEDICINE
Payer: COMMERCIAL

## 2018-06-21 VITALS
DIASTOLIC BLOOD PRESSURE: 69 MMHG | OXYGEN SATURATION: 100 % | HEART RATE: 74 BPM | RESPIRATION RATE: 20 BRPM | SYSTOLIC BLOOD PRESSURE: 155 MMHG | TEMPERATURE: 98 F

## 2018-06-21 DIAGNOSIS — Z90.13 ACQUIRED ABSENCE OF BILATERAL BREASTS AND NIPPLES: Chronic | ICD-10-CM

## 2018-06-21 DIAGNOSIS — Z87.09 PERSONAL HISTORY OF OTHER DISEASES OF THE RESPIRATORY SYSTEM: Chronic | ICD-10-CM

## 2018-06-21 LAB
ALBUMIN SERPL ELPH-MCNC: 4.2 G/DL — SIGNIFICANT CHANGE UP (ref 3.3–5)
ALP SERPL-CCNC: 64 U/L — SIGNIFICANT CHANGE UP (ref 40–120)
ALT FLD-CCNC: 16 U/L — SIGNIFICANT CHANGE UP (ref 4–33)
AST SERPL-CCNC: 33 U/L — HIGH (ref 4–32)
BASE EXCESS BLDV CALC-SCNC: 3.2 MMOL/L — SIGNIFICANT CHANGE UP
BASOPHILS # BLD AUTO: 0.07 K/UL — SIGNIFICANT CHANGE UP (ref 0–0.2)
BASOPHILS NFR BLD AUTO: 1.1 % — SIGNIFICANT CHANGE UP (ref 0–2)
BILIRUB SERPL-MCNC: 0.7 MG/DL — SIGNIFICANT CHANGE UP (ref 0.2–1.2)
BLOOD GAS VENOUS - CREATININE: SIGNIFICANT CHANGE UP MG/DL (ref 0.5–1.3)
BUN SERPL-MCNC: 12 MG/DL — SIGNIFICANT CHANGE UP (ref 7–23)
CALCIUM SERPL-MCNC: 9.3 MG/DL — SIGNIFICANT CHANGE UP (ref 8.4–10.5)
CHLORIDE BLDV-SCNC: 102 MMOL/L — SIGNIFICANT CHANGE UP (ref 96–108)
CHLORIDE SERPL-SCNC: 100 MMOL/L — SIGNIFICANT CHANGE UP (ref 98–107)
CO2 SERPL-SCNC: 21 MMOL/L — LOW (ref 22–31)
CREAT SERPL-MCNC: 0.6 MG/DL — SIGNIFICANT CHANGE UP (ref 0.5–1.3)
EOSINOPHIL # BLD AUTO: 0.11 K/UL — SIGNIFICANT CHANGE UP (ref 0–0.5)
EOSINOPHIL NFR BLD AUTO: 1.8 % — SIGNIFICANT CHANGE UP (ref 0–6)
GAS PNL BLDV: 136 MMOL/L — SIGNIFICANT CHANGE UP (ref 136–146)
GLUCOSE BLDV-MCNC: 119 — HIGH (ref 70–99)
GLUCOSE SERPL-MCNC: 114 MG/DL — HIGH (ref 70–99)
HCO3 BLDV-SCNC: 26 MMOL/L — SIGNIFICANT CHANGE UP (ref 20–27)
HCT VFR BLD CALC: 37.8 % — SIGNIFICANT CHANGE UP (ref 34.5–45)
HCT VFR BLDV CALC: 38.7 % — SIGNIFICANT CHANGE UP (ref 34.5–45)
HGB BLD-MCNC: 12.5 G/DL — SIGNIFICANT CHANGE UP (ref 11.5–15.5)
HGB BLDV-MCNC: 12.6 G/DL — SIGNIFICANT CHANGE UP (ref 11.5–15.5)
IMM GRANULOCYTES # BLD AUTO: 0.03 # — SIGNIFICANT CHANGE UP
IMM GRANULOCYTES NFR BLD AUTO: 0.5 % — SIGNIFICANT CHANGE UP (ref 0–1.5)
LACTATE BLDV-MCNC: 2.3 MMOL/L — HIGH (ref 0.5–2)
LIDOCAIN IGE QN: 21.7 U/L — SIGNIFICANT CHANGE UP (ref 7–60)
LYMPHOCYTES # BLD AUTO: 1.63 K/UL — SIGNIFICANT CHANGE UP (ref 1–3.3)
LYMPHOCYTES # BLD AUTO: 26 % — SIGNIFICANT CHANGE UP (ref 13–44)
MCHC RBC-ENTMCNC: 33.1 % — SIGNIFICANT CHANGE UP (ref 32–36)
MCHC RBC-ENTMCNC: 34.2 PG — HIGH (ref 27–34)
MCV RBC AUTO: 103.6 FL — HIGH (ref 80–100)
MONOCYTES # BLD AUTO: 0.88 K/UL — SIGNIFICANT CHANGE UP (ref 0–0.9)
MONOCYTES NFR BLD AUTO: 14 % — SIGNIFICANT CHANGE UP (ref 2–14)
NEUTROPHILS # BLD AUTO: 3.55 K/UL — SIGNIFICANT CHANGE UP (ref 1.8–7.4)
NEUTROPHILS NFR BLD AUTO: 56.6 % — SIGNIFICANT CHANGE UP (ref 43–77)
NRBC # FLD: 0 — SIGNIFICANT CHANGE UP
PCO2 BLDV: 49 MMHG — SIGNIFICANT CHANGE UP (ref 41–51)
PH BLDV: 7.38 PH — SIGNIFICANT CHANGE UP (ref 7.32–7.43)
PLATELET # BLD AUTO: 311 K/UL — SIGNIFICANT CHANGE UP (ref 150–400)
PMV BLD: 9.7 FL — SIGNIFICANT CHANGE UP (ref 7–13)
PO2 BLDV: 43 MMHG — HIGH (ref 35–40)
POTASSIUM BLDV-SCNC: 4.3 MMOL/L — SIGNIFICANT CHANGE UP (ref 3.4–4.5)
POTASSIUM SERPL-MCNC: 5.5 MMOL/L — HIGH (ref 3.5–5.3)
POTASSIUM SERPL-SCNC: 5.5 MMOL/L — HIGH (ref 3.5–5.3)
PROT SERPL-MCNC: 7.3 G/DL — SIGNIFICANT CHANGE UP (ref 6–8.3)
RBC # BLD: 3.65 M/UL — LOW (ref 3.8–5.2)
RBC # FLD: 13.4 % — SIGNIFICANT CHANGE UP (ref 10.3–14.5)
SAO2 % BLDV: 78 % — SIGNIFICANT CHANGE UP (ref 60–85)
SODIUM SERPL-SCNC: 137 MMOL/L — SIGNIFICANT CHANGE UP (ref 135–145)
TROPONIN T, HIGH SENSITIVITY: < 6 NG/L — SIGNIFICANT CHANGE UP (ref ?–14)
WBC # BLD: 6.27 K/UL — SIGNIFICANT CHANGE UP (ref 3.8–10.5)
WBC # FLD AUTO: 6.27 K/UL — SIGNIFICANT CHANGE UP (ref 3.8–10.5)

## 2018-06-21 PROCEDURE — 73080 X-RAY EXAM OF ELBOW: CPT | Mod: 26,RT

## 2018-06-21 PROCEDURE — 73060 X-RAY EXAM OF HUMERUS: CPT | Mod: 26,RT

## 2018-06-21 PROCEDURE — 99284 EMERGENCY DEPT VISIT MOD MDM: CPT

## 2018-06-21 RX ORDER — OXYCODONE AND ACETAMINOPHEN 5; 325 MG/1; MG/1
1 TABLET ORAL ONCE
Qty: 0 | Refills: 0 | Status: DISCONTINUED | OUTPATIENT
Start: 2018-06-21 | End: 2018-06-21

## 2018-06-21 RX ORDER — MORPHINE SULFATE 50 MG/1
4 CAPSULE, EXTENDED RELEASE ORAL ONCE
Qty: 0 | Refills: 0 | Status: DISCONTINUED | OUTPATIENT
Start: 2018-06-21 | End: 2018-06-21

## 2018-06-21 RX ORDER — ACETAMINOPHEN 500 MG
650 TABLET ORAL ONCE
Qty: 0 | Refills: 0 | Status: DISCONTINUED | OUTPATIENT
Start: 2018-06-21 | End: 2018-06-21

## 2018-06-21 RX ADMIN — OXYCODONE AND ACETAMINOPHEN 1 TABLET(S): 5; 325 TABLET ORAL at 22:25

## 2018-06-21 NOTE — ED PROVIDER NOTE - PMH
Breast cancer    HLD (hyperlipidemia)    Hypothyroidism    Lymphedema of arm  leftarm lymphedema since 20 years , after the breast reconstruction surgery  S/P mastectomy, bilateral    Thyroid condition

## 2018-06-21 NOTE — ED PROVIDER NOTE - PHYSICAL EXAMINATION
obese, non-toxic appearing  (++) TTP over Lt lower ribs  RUE - (++) ecchymoses and TTP over distal radiu with FROM of the elbow joint, also TTP over proximal humerus, no deformity noted. FROM of the shoulder joint, good , nvi, 2+ radial pulse  (+0 LUE lymphedema, unchanged as per pt, no focal MSK TTP
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
## 2018-06-22 VITALS
TEMPERATURE: 97 F | SYSTOLIC BLOOD PRESSURE: 108 MMHG | DIASTOLIC BLOOD PRESSURE: 67 MMHG | HEART RATE: 70 BPM | OXYGEN SATURATION: 99 % | RESPIRATION RATE: 12 BRPM

## 2018-06-22 PROCEDURE — 71260 CT THORAX DX C+: CPT | Mod: 26

## 2018-06-22 PROCEDURE — 72125 CT NECK SPINE W/O DYE: CPT | Mod: 26

## 2018-06-22 PROCEDURE — 74177 CT ABD & PELVIS W/CONTRAST: CPT | Mod: 26

## 2018-06-22 PROCEDURE — 70450 CT HEAD/BRAIN W/O DYE: CPT | Mod: 26

## 2018-06-22 RX ORDER — IBUPROFEN 200 MG
1 TABLET ORAL
Qty: 20 | Refills: 0
Start: 2018-06-22 | End: 2018-06-26

## 2018-06-22 RX ORDER — IBUPROFEN 200 MG
600 TABLET ORAL ONCE
Qty: 0 | Refills: 0 | Status: COMPLETED | OUTPATIENT
Start: 2018-06-22 | End: 2018-06-22

## 2018-06-22 RX ADMIN — Medication 600 MILLIGRAM(S): at 03:55

## 2018-06-22 NOTE — ED ADULT NURSE REASSESSMENT NOTE - NS ED NURSE REASSESS COMMENT FT1
Pt received on stretcher from intake to 20A. Awake, A&Ox3, ambulatory without assistance, received with C-collar in place, respirations even and unlabored on room air. Pt awaiting CT scan results.

## 2018-07-27 PROBLEM — E78.5 HYPERLIPIDEMIA, UNSPECIFIED: Chronic | Status: ACTIVE | Noted: 2018-06-21

## 2018-08-15 ENCOUNTER — OUTPATIENT (OUTPATIENT)
Dept: OUTPATIENT SERVICES | Facility: HOSPITAL | Age: 63
LOS: 1 days | End: 2018-08-15
Payer: COMMERCIAL

## 2018-08-15 ENCOUNTER — APPOINTMENT (OUTPATIENT)
Dept: ULTRASOUND IMAGING | Facility: HOSPITAL | Age: 63
End: 2018-08-15
Payer: COMMERCIAL

## 2018-08-15 DIAGNOSIS — Z90.13 ACQUIRED ABSENCE OF BILATERAL BREASTS AND NIPPLES: Chronic | ICD-10-CM

## 2018-08-15 DIAGNOSIS — E78.5 HYPERLIPIDEMIA, UNSPECIFIED: ICD-10-CM

## 2018-08-15 DIAGNOSIS — Z87.09 PERSONAL HISTORY OF OTHER DISEASES OF THE RESPIRATORY SYSTEM: Chronic | ICD-10-CM

## 2018-08-15 DIAGNOSIS — C50.912 MALIGNANT NEOPLASM OF UNSPECIFIED SITE OF LEFT FEMALE BREAST: ICD-10-CM

## 2018-08-15 PROCEDURE — 76536 US EXAM OF HEAD AND NECK: CPT

## 2018-08-15 PROCEDURE — 76536 US EXAM OF HEAD AND NECK: CPT | Mod: 26

## 2018-12-20 NOTE — H&P ADULT. - HISTORY OF PRESENT ILLNESS
independent
62 y/o F from home  w/ PMHx of hypothyroidism, HLD and breast CA s/p bilateral mastectomy with augmentation surgery 22 years ago on chemo currently po history of recurrant left arm celluitis VATS procedure 1014 for plurodesis for recurrant malignant pleural effusions p/w L arm swelling today. Patient had prior admission in Septmeber 2016 for similar complaints presented with left arm swelling and erythema that started today , patient states she has been advised by her doctor to not lift heavy things and keep arm elevated but she is not compliant with this , today when she was shopping for groceries she felt pain in her left arm , she rolled up her sleeve and noticed the area was very red and swollen , she also had subjective fever with chills and decided to come to the hospital for iv antibiotics.patient denies any diarrhea , tick bite or trauma to the site .ROS is negative except above.    In the ED patient VS 99.2 , hr 112 , /73 RR 16 pulse 100 , patient on labs was found to have wbc count of 2.1 , with elevated lactate of 2.5 ,was given one dose of vancomycin and 1 dose of zosyn. Us doppler done showed no evidence of any DVT

## 2019-01-01 ENCOUNTER — EMERGENCY (EMERGENCY)
Facility: HOSPITAL | Age: 64
LOS: 1 days | Discharge: HOME | End: 2019-01-01
Attending: EMERGENCY MEDICINE
Payer: COMMERCIAL

## 2019-01-01 VITALS
OXYGEN SATURATION: 98 % | DIASTOLIC BLOOD PRESSURE: 82 MMHG | WEIGHT: 240.08 LBS | TEMPERATURE: 98 F | HEART RATE: 76 BPM | SYSTOLIC BLOOD PRESSURE: 137 MMHG | RESPIRATION RATE: 18 BRPM | HEIGHT: 63 IN

## 2019-01-01 DIAGNOSIS — Z87.09 PERSONAL HISTORY OF OTHER DISEASES OF THE RESPIRATORY SYSTEM: Chronic | ICD-10-CM

## 2019-01-01 DIAGNOSIS — Z90.13 ACQUIRED ABSENCE OF BILATERAL BREASTS AND NIPPLES: Chronic | ICD-10-CM

## 2019-01-01 PROCEDURE — 99283 EMERGENCY DEPT VISIT LOW MDM: CPT

## 2019-01-01 RX ORDER — CEPHALEXIN 500 MG
1 CAPSULE ORAL
Qty: 40 | Refills: 0
Start: 2019-01-01 | End: 2019-01-10

## 2019-01-01 NOTE — ED PROVIDER NOTE - OBJECTIVE STATEMENT
62 y/o F pt with PMHx chronic lymphedema presents to ED with lymphedema of the LUE that began a few hours PTA. Last infection was 1-2years ago that was treated with Keflex. Pt reports that she has not been wearing the sleeve. Patient denies fever.

## 2019-01-30 ENCOUNTER — OUTPATIENT (OUTPATIENT)
Dept: OUTPATIENT SERVICES | Facility: HOSPITAL | Age: 64
LOS: 1 days | End: 2019-01-30
Payer: COMMERCIAL

## 2019-01-30 ENCOUNTER — APPOINTMENT (OUTPATIENT)
Dept: ULTRASOUND IMAGING | Facility: HOSPITAL | Age: 64
End: 2019-01-30
Payer: COMMERCIAL

## 2019-01-30 DIAGNOSIS — Z90.13 ACQUIRED ABSENCE OF BILATERAL BREASTS AND NIPPLES: Chronic | ICD-10-CM

## 2019-01-30 DIAGNOSIS — C50.912 MALIGNANT NEOPLASM OF UNSPECIFIED SITE OF LEFT FEMALE BREAST: ICD-10-CM

## 2019-01-30 DIAGNOSIS — C50.911 MALIGNANT NEOPLASM OF UNSPECIFIED SITE OF RIGHT FEMALE BREAST: ICD-10-CM

## 2019-01-30 DIAGNOSIS — Z87.09 PERSONAL HISTORY OF OTHER DISEASES OF THE RESPIRATORY SYSTEM: Chronic | ICD-10-CM

## 2019-01-30 PROCEDURE — 76536 US EXAM OF HEAD AND NECK: CPT | Mod: 26,59

## 2019-01-30 PROCEDURE — 76536 US EXAM OF HEAD AND NECK: CPT

## 2019-04-22 ENCOUNTER — OUTPATIENT (OUTPATIENT)
Dept: OUTPATIENT SERVICES | Facility: HOSPITAL | Age: 64
LOS: 1 days | End: 2019-04-22
Payer: COMMERCIAL

## 2019-04-22 ENCOUNTER — APPOINTMENT (OUTPATIENT)
Dept: ULTRASOUND IMAGING | Facility: HOSPITAL | Age: 64
End: 2019-04-22

## 2019-04-22 DIAGNOSIS — Z87.09 PERSONAL HISTORY OF OTHER DISEASES OF THE RESPIRATORY SYSTEM: Chronic | ICD-10-CM

## 2019-04-22 DIAGNOSIS — Z90.13 ACQUIRED ABSENCE OF BILATERAL BREASTS AND NIPPLES: Chronic | ICD-10-CM

## 2019-04-22 DIAGNOSIS — C50.912 MALIGNANT NEOPLASM OF UNSPECIFIED SITE OF LEFT FEMALE BREAST: ICD-10-CM

## 2019-04-22 PROCEDURE — 76536 US EXAM OF HEAD AND NECK: CPT

## 2019-04-22 PROCEDURE — 76536 US EXAM OF HEAD AND NECK: CPT | Mod: 26

## 2019-04-25 NOTE — ED ADULT NURSE NOTE - NS ED NOTE  TALK SOMEONE YN
FAMILY HISTORY:  Father  Still living? No  Family history of emphysema, Age at diagnosis: Age Unknown  Family history of heart disease, Age at diagnosis: Age Unknown    Mother  Still living? No  Family history of diabetes mellitus, Age at diagnosis: Age Unknown  Family history of uterine cancer, Age at diagnosis: Age Unknown    Sibling  Still living? No  Family history of uterine cancer, Age at diagnosis: Age Unknown    Child  Still living? Yes, Estimated age: 61-70  Family history of uterine cancer, Age at diagnosis: Age Unknown no

## 2019-07-16 ENCOUNTER — EMERGENCY (EMERGENCY)
Facility: HOSPITAL | Age: 64
LOS: 1 days | Discharge: HOME | End: 2019-07-16
Attending: EMERGENCY MEDICINE
Payer: COMMERCIAL

## 2019-07-16 VITALS
HEIGHT: 63.5 IN | TEMPERATURE: 98 F | DIASTOLIC BLOOD PRESSURE: 81 MMHG | WEIGHT: 229.94 LBS | RESPIRATION RATE: 20 BRPM | SYSTOLIC BLOOD PRESSURE: 125 MMHG | OXYGEN SATURATION: 98 % | HEART RATE: 88 BPM

## 2019-07-16 DIAGNOSIS — Z87.09 PERSONAL HISTORY OF OTHER DISEASES OF THE RESPIRATORY SYSTEM: Chronic | ICD-10-CM

## 2019-07-16 DIAGNOSIS — Z90.13 ACQUIRED ABSENCE OF BILATERAL BREASTS AND NIPPLES: Chronic | ICD-10-CM

## 2019-07-16 LAB
ALBUMIN SERPL ELPH-MCNC: 3.7 G/DL — SIGNIFICANT CHANGE UP (ref 3.5–5)
ALP SERPL-CCNC: 72 U/L — SIGNIFICANT CHANGE UP (ref 40–120)
ALT FLD-CCNC: 21 U/L DA — SIGNIFICANT CHANGE UP (ref 10–60)
ANION GAP SERPL CALC-SCNC: 7 MMOL/L — SIGNIFICANT CHANGE UP (ref 5–17)
AST SERPL-CCNC: 18 U/L — SIGNIFICANT CHANGE UP (ref 10–40)
BASOPHILS # BLD AUTO: 0.07 K/UL — SIGNIFICANT CHANGE UP (ref 0–0.2)
BASOPHILS NFR BLD AUTO: 1.8 % — SIGNIFICANT CHANGE UP (ref 0–2)
BILIRUB SERPL-MCNC: 0.8 MG/DL — SIGNIFICANT CHANGE UP (ref 0.2–1.2)
BUN SERPL-MCNC: 17 MG/DL — SIGNIFICANT CHANGE UP (ref 7–18)
CALCIUM SERPL-MCNC: 9 MG/DL — SIGNIFICANT CHANGE UP (ref 8.4–10.5)
CHLORIDE SERPL-SCNC: 107 MMOL/L — SIGNIFICANT CHANGE UP (ref 96–108)
CO2 SERPL-SCNC: 27 MMOL/L — SIGNIFICANT CHANGE UP (ref 22–31)
CREAT SERPL-MCNC: 0.84 MG/DL — SIGNIFICANT CHANGE UP (ref 0.5–1.3)
EOSINOPHIL # BLD AUTO: 0.11 K/UL — SIGNIFICANT CHANGE UP (ref 0–0.5)
EOSINOPHIL NFR BLD AUTO: 2.8 % — SIGNIFICANT CHANGE UP (ref 0–6)
GLUCOSE SERPL-MCNC: 111 MG/DL — HIGH (ref 70–99)
HCT VFR BLD CALC: 35.6 % — SIGNIFICANT CHANGE UP (ref 34.5–45)
HGB BLD-MCNC: 11.9 G/DL — SIGNIFICANT CHANGE UP (ref 11.5–15.5)
IMM GRANULOCYTES NFR BLD AUTO: 0.5 % — SIGNIFICANT CHANGE UP (ref 0–1.5)
LYMPHOCYTES # BLD AUTO: 1.3 K/UL — SIGNIFICANT CHANGE UP (ref 1–3.3)
LYMPHOCYTES # BLD AUTO: 33 % — SIGNIFICANT CHANGE UP (ref 13–44)
MACROCYTES BLD QL: SLIGHT — SIGNIFICANT CHANGE UP
MANUAL SMEAR VERIFICATION: SIGNIFICANT CHANGE UP
MCHC RBC-ENTMCNC: 33.4 GM/DL — SIGNIFICANT CHANGE UP (ref 32–36)
MCHC RBC-ENTMCNC: 35.1 PG — HIGH (ref 27–34)
MCV RBC AUTO: 105 FL — HIGH (ref 80–100)
MONOCYTES # BLD AUTO: 0.48 K/UL — SIGNIFICANT CHANGE UP (ref 0–0.9)
MONOCYTES NFR BLD AUTO: 12.2 % — SIGNIFICANT CHANGE UP (ref 2–14)
NEUTROPHILS # BLD AUTO: 1.96 K/UL — SIGNIFICANT CHANGE UP (ref 1.8–7.4)
NEUTROPHILS NFR BLD AUTO: 49.7 % — SIGNIFICANT CHANGE UP (ref 43–77)
NRBC # BLD: 0 /100 WBCS — SIGNIFICANT CHANGE UP (ref 0–0)
OVALOCYTES BLD QL SMEAR: SLIGHT — SIGNIFICANT CHANGE UP
PLAT MORPH BLD: NORMAL — SIGNIFICANT CHANGE UP
PLATELET # BLD AUTO: 312 K/UL — SIGNIFICANT CHANGE UP (ref 150–400)
POTASSIUM SERPL-MCNC: 3.9 MMOL/L — SIGNIFICANT CHANGE UP (ref 3.5–5.3)
POTASSIUM SERPL-SCNC: 3.9 MMOL/L — SIGNIFICANT CHANGE UP (ref 3.5–5.3)
PROT SERPL-MCNC: 8.5 G/DL — HIGH (ref 6–8.3)
RBC # BLD: 3.39 M/UL — LOW (ref 3.8–5.2)
RBC # FLD: 13.8 % — SIGNIFICANT CHANGE UP (ref 10.3–14.5)
RBC BLD AUTO: ABNORMAL
SODIUM SERPL-SCNC: 141 MMOL/L — SIGNIFICANT CHANGE UP (ref 135–145)
WBC # BLD: 4.11 K/UL — SIGNIFICANT CHANGE UP (ref 3.8–10.5)
WBC # FLD AUTO: 4.11 K/UL — SIGNIFICANT CHANGE UP (ref 3.8–10.5)

## 2019-07-16 PROCEDURE — 93971 EXTREMITY STUDY: CPT | Mod: 26,LT

## 2019-07-16 PROCEDURE — 36415 COLL VENOUS BLD VENIPUNCTURE: CPT

## 2019-07-16 PROCEDURE — 80053 COMPREHEN METABOLIC PANEL: CPT

## 2019-07-16 PROCEDURE — 73130 X-RAY EXAM OF HAND: CPT

## 2019-07-16 PROCEDURE — 99284 EMERGENCY DEPT VISIT MOD MDM: CPT

## 2019-07-16 PROCEDURE — 96374 THER/PROPH/DIAG INJ IV PUSH: CPT

## 2019-07-16 PROCEDURE — 85027 COMPLETE CBC AUTOMATED: CPT

## 2019-07-16 PROCEDURE — 93971 EXTREMITY STUDY: CPT

## 2019-07-16 PROCEDURE — 73130 X-RAY EXAM OF HAND: CPT | Mod: 26,LT

## 2019-07-16 PROCEDURE — 99284 EMERGENCY DEPT VISIT MOD MDM: CPT | Mod: 25

## 2019-07-16 PROCEDURE — 93005 ELECTROCARDIOGRAM TRACING: CPT

## 2019-07-16 RX ORDER — OXYCODONE AND ACETAMINOPHEN 5; 325 MG/1; MG/1
1 TABLET ORAL ONCE
Refills: 0 | Status: DISCONTINUED | OUTPATIENT
Start: 2019-07-16 | End: 2019-07-16

## 2019-07-16 RX ORDER — CEFTRIAXONE 500 MG/1
1000 INJECTION, POWDER, FOR SOLUTION INTRAMUSCULAR; INTRAVENOUS ONCE
Refills: 0 | Status: COMPLETED | OUTPATIENT
Start: 2019-07-16 | End: 2019-07-16

## 2019-07-16 RX ADMIN — OXYCODONE AND ACETAMINOPHEN 1 TABLET(S): 5; 325 TABLET ORAL at 22:15

## 2019-07-16 RX ADMIN — CEFTRIAXONE 100 MILLIGRAM(S): 500 INJECTION, POWDER, FOR SOLUTION INTRAMUSCULAR; INTRAVENOUS at 23:21

## 2019-07-16 NOTE — ED PROVIDER NOTE - OBJECTIVE STATEMENT
h/o Lt breast reconstruction with chronic left UE lymphedema p/w slightly worsening swelling x 2 days accompanied with left hand pain. Pain at thumb area with sl redness noted. Pt has required PO abx for early cellulitis in the past. No trauma, fever, chills, cough, SOB, dizziness or chest pain. Took motrin to little relief.

## 2019-07-16 NOTE — ED ADULT TRIAGE NOTE - NSWEIGHTCALCTOOLDRUG_GEN_A_CORE
bilateral upper extremity ROM was WFL (within functional limits)/bilateral lower extremity ROM was WFL (within functional limits)  used

## 2019-07-16 NOTE — ED PROVIDER NOTE - PHYSICAL EXAMINATION
generalized nonpitting edema to left arm  sl ttp 1st MCP left with trace erythema to dorsal aspect  radial/ulnar pulses 2+  5/5 /biceps

## 2019-07-16 NOTE — ED PROVIDER NOTE - CLINICAL SUMMARY MEDICAL DECISION MAKING FREE TEXT BOX
chronic UE lymphedema here for hand pain and increased swelling  concern for cellulitis, DVT  will do labs, doppler LUE, pain control and reassess

## 2019-07-16 NOTE — ED PROVIDER NOTE - PROGRESS NOTE DETAILS
Educated pt on results, plan. Pt insisting that her pain is not controlled. Will provide limited amount of narcotics for breakthrough pain until pt can f/u PMD .

## 2019-07-17 VITALS
DIASTOLIC BLOOD PRESSURE: 78 MMHG | SYSTOLIC BLOOD PRESSURE: 120 MMHG | OXYGEN SATURATION: 99 % | TEMPERATURE: 98 F | RESPIRATION RATE: 18 BRPM | HEART RATE: 77 BPM

## 2019-07-17 NOTE — ED ADULT NURSE NOTE - NSIMPLEMENTINTERV_GEN_ALL_ED
Implemented All Universal Safety Interventions:  [place] to call system. Call bell, personal items and telephone within reach. Instruct patient to call for assistance. Room bathroom lighting operational. Non-slip footwear when patient is off stretcher. Physically safe environment: no spills, clutter or unnecessary equipment. Stretcher in lowest position, wheels locked, appropriate side rails in place.

## 2019-10-24 ENCOUNTER — APPOINTMENT (OUTPATIENT)
Dept: ULTRASOUND IMAGING | Facility: HOSPITAL | Age: 64
End: 2019-10-24
Payer: COMMERCIAL

## 2019-11-18 ENCOUNTER — APPOINTMENT (OUTPATIENT)
Dept: ULTRASOUND IMAGING | Facility: HOSPITAL | Age: 64
End: 2019-11-18

## 2019-11-29 ENCOUNTER — EMERGENCY (EMERGENCY)
Facility: HOSPITAL | Age: 64
LOS: 1 days | Discharge: HOME | End: 2019-11-29
Attending: EMERGENCY MEDICINE
Payer: COMMERCIAL

## 2019-11-29 VITALS
TEMPERATURE: 97 F | SYSTOLIC BLOOD PRESSURE: 111 MMHG | WEIGHT: 233.03 LBS | OXYGEN SATURATION: 98 % | HEART RATE: 101 BPM | RESPIRATION RATE: 16 BRPM | DIASTOLIC BLOOD PRESSURE: 72 MMHG | HEIGHT: 62 IN

## 2019-11-29 DIAGNOSIS — Z87.09 PERSONAL HISTORY OF OTHER DISEASES OF THE RESPIRATORY SYSTEM: Chronic | ICD-10-CM

## 2019-11-29 DIAGNOSIS — Z90.13 ACQUIRED ABSENCE OF BILATERAL BREASTS AND NIPPLES: Chronic | ICD-10-CM

## 2019-11-29 LAB
ALBUMIN SERPL ELPH-MCNC: 3.5 G/DL — SIGNIFICANT CHANGE UP (ref 3.5–5)
ALP SERPL-CCNC: 70 U/L — SIGNIFICANT CHANGE UP (ref 40–120)
ALT FLD-CCNC: 20 U/L DA — SIGNIFICANT CHANGE UP (ref 10–60)
ANION GAP SERPL CALC-SCNC: 5 MMOL/L — SIGNIFICANT CHANGE UP (ref 5–17)
APPEARANCE UR: CLEAR — SIGNIFICANT CHANGE UP
AST SERPL-CCNC: 13 U/L — SIGNIFICANT CHANGE UP (ref 10–40)
BASOPHILS # BLD AUTO: 0.06 K/UL — SIGNIFICANT CHANGE UP (ref 0–0.2)
BASOPHILS NFR BLD AUTO: 1.6 % — SIGNIFICANT CHANGE UP (ref 0–2)
BILIRUB SERPL-MCNC: 0.8 MG/DL — SIGNIFICANT CHANGE UP (ref 0.2–1.2)
BILIRUB UR-MCNC: NEGATIVE — SIGNIFICANT CHANGE UP
BUN SERPL-MCNC: 16 MG/DL — SIGNIFICANT CHANGE UP (ref 7–18)
CALCIUM SERPL-MCNC: 9.1 MG/DL — SIGNIFICANT CHANGE UP (ref 8.4–10.5)
CHLORIDE SERPL-SCNC: 104 MMOL/L — SIGNIFICANT CHANGE UP (ref 96–108)
CO2 SERPL-SCNC: 29 MMOL/L — SIGNIFICANT CHANGE UP (ref 22–31)
COLOR SPEC: YELLOW — SIGNIFICANT CHANGE UP
CREAT SERPL-MCNC: 0.8 MG/DL — SIGNIFICANT CHANGE UP (ref 0.5–1.3)
DIFF PNL FLD: ABNORMAL
EOSINOPHIL # BLD AUTO: 0.08 K/UL — SIGNIFICANT CHANGE UP (ref 0–0.5)
EOSINOPHIL NFR BLD AUTO: 2.1 % — SIGNIFICANT CHANGE UP (ref 0–6)
EPI CELLS # UR: SIGNIFICANT CHANGE UP /HPF
GLUCOSE SERPL-MCNC: 98 MG/DL — SIGNIFICANT CHANGE UP (ref 70–99)
GLUCOSE UR QL: NEGATIVE — SIGNIFICANT CHANGE UP
HCT VFR BLD CALC: 36.6 % — SIGNIFICANT CHANGE UP (ref 34.5–45)
HGB BLD-MCNC: 12 G/DL — SIGNIFICANT CHANGE UP (ref 11.5–15.5)
IMM GRANULOCYTES NFR BLD AUTO: 0.3 % — SIGNIFICANT CHANGE UP (ref 0–1.5)
KETONES UR-MCNC: NEGATIVE — SIGNIFICANT CHANGE UP
LEUKOCYTE ESTERASE UR-ACNC: ABNORMAL
LYMPHOCYTES # BLD AUTO: 0.83 K/UL — LOW (ref 1–3.3)
LYMPHOCYTES # BLD AUTO: 22.2 % — SIGNIFICANT CHANGE UP (ref 13–44)
MCHC RBC-ENTMCNC: 32.8 GM/DL — SIGNIFICANT CHANGE UP (ref 32–36)
MCHC RBC-ENTMCNC: 34.9 PG — HIGH (ref 27–34)
MCV RBC AUTO: 106.4 FL — HIGH (ref 80–100)
MONOCYTES # BLD AUTO: 0.32 K/UL — SIGNIFICANT CHANGE UP (ref 0–0.9)
MONOCYTES NFR BLD AUTO: 8.6 % — SIGNIFICANT CHANGE UP (ref 2–14)
NEUTROPHILS # BLD AUTO: 2.44 K/UL — SIGNIFICANT CHANGE UP (ref 1.8–7.4)
NEUTROPHILS NFR BLD AUTO: 65.2 % — SIGNIFICANT CHANGE UP (ref 43–77)
NITRITE UR-MCNC: NEGATIVE — SIGNIFICANT CHANGE UP
NRBC # BLD: 0 /100 WBCS — SIGNIFICANT CHANGE UP (ref 0–0)
PH UR: 5 — SIGNIFICANT CHANGE UP (ref 5–8)
PLATELET # BLD AUTO: 338 K/UL — SIGNIFICANT CHANGE UP (ref 150–400)
POTASSIUM SERPL-MCNC: 4.4 MMOL/L — SIGNIFICANT CHANGE UP (ref 3.5–5.3)
POTASSIUM SERPL-SCNC: 4.4 MMOL/L — SIGNIFICANT CHANGE UP (ref 3.5–5.3)
PROT SERPL-MCNC: 8.4 G/DL — HIGH (ref 6–8.3)
PROT UR-MCNC: 15
RBC # BLD: 3.44 M/UL — LOW (ref 3.8–5.2)
RBC # FLD: 12.8 % — SIGNIFICANT CHANGE UP (ref 10.3–14.5)
RBC CASTS # UR COMP ASSIST: SIGNIFICANT CHANGE UP /HPF (ref 0–2)
SODIUM SERPL-SCNC: 138 MMOL/L — SIGNIFICANT CHANGE UP (ref 135–145)
SP GR SPEC: 1.02 — SIGNIFICANT CHANGE UP (ref 1.01–1.02)
UROBILINOGEN FLD QL: NEGATIVE — SIGNIFICANT CHANGE UP
WBC # BLD: 3.74 K/UL — LOW (ref 3.8–10.5)
WBC # FLD AUTO: 3.74 K/UL — LOW (ref 3.8–10.5)
WBC UR QL: SIGNIFICANT CHANGE UP /HPF (ref 0–5)

## 2019-11-29 PROCEDURE — 74176 CT ABD & PELVIS W/O CONTRAST: CPT | Mod: 26

## 2019-11-29 PROCEDURE — 74176 CT ABD & PELVIS W/O CONTRAST: CPT

## 2019-11-29 PROCEDURE — 87086 URINE CULTURE/COLONY COUNT: CPT

## 2019-11-29 PROCEDURE — 99285 EMERGENCY DEPT VISIT HI MDM: CPT

## 2019-11-29 PROCEDURE — 96374 THER/PROPH/DIAG INJ IV PUSH: CPT

## 2019-11-29 PROCEDURE — 80053 COMPREHEN METABOLIC PANEL: CPT

## 2019-11-29 PROCEDURE — 85027 COMPLETE CBC AUTOMATED: CPT

## 2019-11-29 PROCEDURE — 81001 URINALYSIS AUTO W/SCOPE: CPT

## 2019-11-29 PROCEDURE — 99284 EMERGENCY DEPT VISIT MOD MDM: CPT | Mod: 25

## 2019-11-29 PROCEDURE — 36415 COLL VENOUS BLD VENIPUNCTURE: CPT

## 2019-11-29 RX ORDER — FLUCONAZOLE 150 MG/1
150 TABLET ORAL ONCE
Refills: 0 | Status: COMPLETED | OUTPATIENT
Start: 2019-11-29 | End: 2019-11-29

## 2019-11-29 RX ORDER — CEFTRIAXONE 500 MG/1
1000 INJECTION, POWDER, FOR SOLUTION INTRAMUSCULAR; INTRAVENOUS ONCE
Refills: 0 | Status: COMPLETED | OUTPATIENT
Start: 2019-11-29 | End: 2019-11-29

## 2019-11-29 RX ORDER — IBUPROFEN 200 MG
1 TABLET ORAL
Qty: 15 | Refills: 0
Start: 2019-11-29 | End: 2019-12-03

## 2019-11-29 RX ORDER — SODIUM CHLORIDE 9 MG/ML
1000 INJECTION INTRAMUSCULAR; INTRAVENOUS; SUBCUTANEOUS ONCE
Refills: 0 | Status: COMPLETED | OUTPATIENT
Start: 2019-11-29 | End: 2019-11-29

## 2019-11-29 RX ADMIN — SODIUM CHLORIDE 1000 MILLILITER(S): 9 INJECTION INTRAMUSCULAR; INTRAVENOUS; SUBCUTANEOUS at 11:38

## 2019-11-29 RX ADMIN — CEFTRIAXONE 100 MILLIGRAM(S): 500 INJECTION, POWDER, FOR SOLUTION INTRAMUSCULAR; INTRAVENOUS at 11:39

## 2019-11-29 NOTE — ED PROVIDER NOTE - OBJECTIVE STATEMENT
Pertinent PMH/PSH/FHx/SHx and Review of Systems contained within:  64f hx breast ca on ibrance pw urinary freq and left flank pain. notes that symptoms began x1 day. no fever, chills, nausea, vomiting, dysuria. denies trauma or strain. ROS neg for ha, vision loss, cp, sob, rash, bleeding, numbness, rhinorrhea. she has not taken anything for symptoms  Fh and Sh not otherwise contributory  ROS otherwise negative

## 2019-11-29 NOTE — ED ADULT NURSE NOTE - OBJECTIVE STATEMENT
pt is here for  symptoms.  pt stated that frequent urination and lower  back pain, burning sensation with urination, denied N/V/D or fever, pt calm at this time.

## 2019-11-29 NOTE — ED PROVIDER NOTE - PATIENT PORTAL LINK FT
You can access the FollowMyHealth Patient Portal offered by [facility] by registering at the following website: [email or web address]. By joining [employer]’s FollowMyHealth portal, you will also be able to view your health information using other applications (apps) compatible with our system.

## 2019-11-29 NOTE — ED PROVIDER NOTE - CLINICAL SUMMARY MEDICAL DECISION MAKING FREE TEXT BOX
pt pw left flank pain and polyuria. likely pyelo. she is on Palbociclib and is therefore immunocompromised. will check labs. give iv abx. pt pw left flank pain and polyuria. likely pyelo. she is on Palbociclib and is therefore immunocompromised. will check labs. give iv abx.   lab, ct and ua does not show evidence of pyelo. ok for dc home as pain is more likely msk. will give fluconazole as pt now worried she might have a yeast infection. ok for dc home.

## 2019-11-29 NOTE — ED ADULT NURSE NOTE - NS ED NURSE LEVEL OF CONSCIOUSNESS AFFECT
Jenna Mims is 3 months post rectovaginal fistula repair and doing really well. She'll follow with you for gyne. She can see me prn. I appreciate the opportunity to participate in her care.  Thank you, Carlee Emerson Calm

## 2019-12-01 LAB
CULTURE RESULTS: SIGNIFICANT CHANGE UP
SPECIMEN SOURCE: SIGNIFICANT CHANGE UP

## 2020-01-31 ENCOUNTER — OUTPATIENT (OUTPATIENT)
Dept: OUTPATIENT SERVICES | Facility: HOSPITAL | Age: 65
LOS: 1 days | End: 2020-01-31
Payer: COMMERCIAL

## 2020-01-31 ENCOUNTER — APPOINTMENT (OUTPATIENT)
Dept: ULTRASOUND IMAGING | Facility: HOSPITAL | Age: 65
End: 2020-01-31

## 2020-01-31 DIAGNOSIS — Z87.09 PERSONAL HISTORY OF OTHER DISEASES OF THE RESPIRATORY SYSTEM: Chronic | ICD-10-CM

## 2020-01-31 DIAGNOSIS — Z90.13 ACQUIRED ABSENCE OF BILATERAL BREASTS AND NIPPLES: Chronic | ICD-10-CM

## 2020-01-31 DIAGNOSIS — C50.912 MALIGNANT NEOPLASM OF UNSPECIFIED SITE OF LEFT FEMALE BREAST: ICD-10-CM

## 2020-01-31 DIAGNOSIS — C50.911 MALIGNANT NEOPLASM OF UNSPECIFIED SITE OF RIGHT FEMALE BREAST: ICD-10-CM

## 2020-01-31 PROCEDURE — 76536 US EXAM OF HEAD AND NECK: CPT

## 2020-01-31 PROCEDURE — 76536 US EXAM OF HEAD AND NECK: CPT | Mod: 26

## 2020-02-14 ENCOUNTER — INPATIENT (INPATIENT)
Facility: HOSPITAL | Age: 65
LOS: 5 days | Discharge: HOME | DRG: 194 | End: 2020-02-20
Attending: FAMILY MEDICINE | Admitting: FAMILY MEDICINE
Payer: MEDICARE

## 2020-02-14 VITALS
SYSTOLIC BLOOD PRESSURE: 135 MMHG | RESPIRATION RATE: 24 BRPM | HEIGHT: 63 IN | HEART RATE: 92 BPM | DIASTOLIC BLOOD PRESSURE: 82 MMHG | WEIGHT: 229.94 LBS | OXYGEN SATURATION: 100 % | TEMPERATURE: 100 F

## 2020-02-14 DIAGNOSIS — Z29.9 ENCOUNTER FOR PROPHYLACTIC MEASURES, UNSPECIFIED: ICD-10-CM

## 2020-02-14 DIAGNOSIS — R68.83 CHILLS (WITHOUT FEVER): ICD-10-CM

## 2020-02-14 DIAGNOSIS — Z90.13 ACQUIRED ABSENCE OF BILATERAL BREASTS AND NIPPLES: Chronic | ICD-10-CM

## 2020-02-14 DIAGNOSIS — Z87.09 PERSONAL HISTORY OF OTHER DISEASES OF THE RESPIRATORY SYSTEM: Chronic | ICD-10-CM

## 2020-02-14 DIAGNOSIS — E03.9 HYPOTHYROIDISM, UNSPECIFIED: ICD-10-CM

## 2020-02-14 DIAGNOSIS — E78.5 HYPERLIPIDEMIA, UNSPECIFIED: ICD-10-CM

## 2020-02-14 DIAGNOSIS — C50.919 MALIGNANT NEOPLASM OF UNSPECIFIED SITE OF UNSPECIFIED FEMALE BREAST: ICD-10-CM

## 2020-02-14 DIAGNOSIS — J18.9 PNEUMONIA, UNSPECIFIED ORGANISM: ICD-10-CM

## 2020-02-14 LAB
ALBUMIN SERPL ELPH-MCNC: 3.8 G/DL — SIGNIFICANT CHANGE UP (ref 3.5–5)
ALP SERPL-CCNC: 75 U/L — SIGNIFICANT CHANGE UP (ref 40–120)
ALT FLD-CCNC: 25 U/L DA — SIGNIFICANT CHANGE UP (ref 10–60)
ANION GAP SERPL CALC-SCNC: 7 MMOL/L — SIGNIFICANT CHANGE UP (ref 5–17)
APPEARANCE UR: CLEAR — SIGNIFICANT CHANGE UP
AST SERPL-CCNC: 28 U/L — SIGNIFICANT CHANGE UP (ref 10–40)
BACTERIA # UR AUTO: ABNORMAL /HPF
BASOPHILS # BLD AUTO: 0.02 K/UL — SIGNIFICANT CHANGE UP (ref 0–0.2)
BASOPHILS NFR BLD AUTO: 0.4 % — SIGNIFICANT CHANGE UP (ref 0–2)
BILIRUB SERPL-MCNC: 0.9 MG/DL — SIGNIFICANT CHANGE UP (ref 0.2–1.2)
BILIRUB UR-MCNC: NEGATIVE — SIGNIFICANT CHANGE UP
BUN SERPL-MCNC: 10 MG/DL — SIGNIFICANT CHANGE UP (ref 7–18)
CALCIUM SERPL-MCNC: 8.9 MG/DL — SIGNIFICANT CHANGE UP (ref 8.4–10.5)
CHLORIDE SERPL-SCNC: 101 MMOL/L — SIGNIFICANT CHANGE UP (ref 96–108)
CO2 SERPL-SCNC: 27 MMOL/L — SIGNIFICANT CHANGE UP (ref 22–31)
COLOR SPEC: YELLOW — SIGNIFICANT CHANGE UP
CREAT SERPL-MCNC: 0.67 MG/DL — SIGNIFICANT CHANGE UP (ref 0.5–1.3)
DIFF PNL FLD: ABNORMAL
EOSINOPHIL # BLD AUTO: 0.03 K/UL — SIGNIFICANT CHANGE UP (ref 0–0.5)
EOSINOPHIL NFR BLD AUTO: 0.6 % — SIGNIFICANT CHANGE UP (ref 0–6)
EPI CELLS # UR: ABNORMAL /HPF
FLU A RESULT: DETECTED
FLU A RESULT: DETECTED
FLUAV AG NPH QL: DETECTED
FLUBV AG NPH QL: SIGNIFICANT CHANGE UP
GLUCOSE SERPL-MCNC: 93 MG/DL — SIGNIFICANT CHANGE UP (ref 70–99)
GLUCOSE UR QL: NEGATIVE — SIGNIFICANT CHANGE UP
HCT VFR BLD CALC: 36.8 % — SIGNIFICANT CHANGE UP (ref 34.5–45)
HGB BLD-MCNC: 12.5 G/DL — SIGNIFICANT CHANGE UP (ref 11.5–15.5)
IMM GRANULOCYTES NFR BLD AUTO: 0.6 % — SIGNIFICANT CHANGE UP (ref 0–1.5)
KETONES UR-MCNC: NEGATIVE — SIGNIFICANT CHANGE UP
LACTATE SERPL-SCNC: 0.8 MMOL/L — SIGNIFICANT CHANGE UP (ref 0.7–2)
LEUKOCYTE ESTERASE UR-ACNC: NEGATIVE — SIGNIFICANT CHANGE UP
LYMPHOCYTES # BLD AUTO: 0.63 K/UL — LOW (ref 1–3.3)
LYMPHOCYTES # BLD AUTO: 12.2 % — LOW (ref 13–44)
MAGNESIUM SERPL-MCNC: 2.3 MG/DL — SIGNIFICANT CHANGE UP (ref 1.6–2.6)
MCHC RBC-ENTMCNC: 34 GM/DL — SIGNIFICANT CHANGE UP (ref 32–36)
MCHC RBC-ENTMCNC: 34.9 PG — HIGH (ref 27–34)
MCV RBC AUTO: 102.8 FL — HIGH (ref 80–100)
MONOCYTES # BLD AUTO: 0.63 K/UL — SIGNIFICANT CHANGE UP (ref 0–0.9)
MONOCYTES NFR BLD AUTO: 12.2 % — SIGNIFICANT CHANGE UP (ref 2–14)
NEUTROPHILS # BLD AUTO: 3.84 K/UL — SIGNIFICANT CHANGE UP (ref 1.8–7.4)
NEUTROPHILS NFR BLD AUTO: 74 % — SIGNIFICANT CHANGE UP (ref 43–77)
NITRITE UR-MCNC: NEGATIVE — SIGNIFICANT CHANGE UP
NRBC # BLD: 0 /100 WBCS — SIGNIFICANT CHANGE UP (ref 0–0)
PH UR: 7 — SIGNIFICANT CHANGE UP (ref 5–8)
PLATELET # BLD AUTO: 223 K/UL — SIGNIFICANT CHANGE UP (ref 150–400)
POTASSIUM SERPL-MCNC: 4.1 MMOL/L — SIGNIFICANT CHANGE UP (ref 3.5–5.3)
POTASSIUM SERPL-SCNC: 4.1 MMOL/L — SIGNIFICANT CHANGE UP (ref 3.5–5.3)
PROT SERPL-MCNC: 8.7 G/DL — HIGH (ref 6–8.3)
PROT UR-MCNC: 30 MG/DL
RBC # BLD: 3.58 M/UL — LOW (ref 3.8–5.2)
RBC # FLD: 13.1 % — SIGNIFICANT CHANGE UP (ref 10.3–14.5)
RBC CASTS # UR COMP ASSIST: ABNORMAL /HPF (ref 0–2)
RSV RESULT: SIGNIFICANT CHANGE UP
RSV RNA RESP QL NAA+PROBE: SIGNIFICANT CHANGE UP
SODIUM SERPL-SCNC: 135 MMOL/L — SIGNIFICANT CHANGE UP (ref 135–145)
SP GR SPEC: 1.01 — SIGNIFICANT CHANGE UP (ref 1.01–1.02)
UROBILINOGEN FLD QL: NEGATIVE — SIGNIFICANT CHANGE UP
WBC # BLD: 5.18 K/UL — SIGNIFICANT CHANGE UP (ref 3.8–10.5)
WBC # FLD AUTO: 5.18 K/UL — SIGNIFICANT CHANGE UP (ref 3.8–10.5)
WBC UR QL: SIGNIFICANT CHANGE UP /HPF (ref 0–5)

## 2020-02-14 PROCEDURE — 93010 ELECTROCARDIOGRAM REPORT: CPT

## 2020-02-14 PROCEDURE — 99285 EMERGENCY DEPT VISIT HI MDM: CPT

## 2020-02-14 PROCEDURE — 71045 X-RAY EXAM CHEST 1 VIEW: CPT | Mod: 26

## 2020-02-14 RX ORDER — AZITHROMYCIN 500 MG/1
500 TABLET, FILM COATED ORAL ONCE
Refills: 0 | Status: COMPLETED | OUTPATIENT
Start: 2020-02-14 | End: 2020-02-14

## 2020-02-14 RX ORDER — PALBOCICLIB 100 MG/1
125 CAPSULE ORAL DAILY
Refills: 0 | Status: DISCONTINUED | OUTPATIENT
Start: 2020-02-14 | End: 2020-02-14

## 2020-02-14 RX ORDER — AZITHROMYCIN 500 MG/1
500 TABLET, FILM COATED ORAL EVERY 24 HOURS
Refills: 0 | Status: DISCONTINUED | OUTPATIENT
Start: 2020-02-15 | End: 2020-02-16

## 2020-02-14 RX ORDER — LETROZOLE 2.5 MG/1
2.5 TABLET, FILM COATED ORAL DAILY
Refills: 0 | Status: COMPLETED | OUTPATIENT
Start: 2020-02-14 | End: 2020-02-17

## 2020-02-14 RX ORDER — SODIUM CHLORIDE 9 MG/ML
1000 INJECTION INTRAMUSCULAR; INTRAVENOUS; SUBCUTANEOUS ONCE
Refills: 0 | Status: COMPLETED | OUTPATIENT
Start: 2020-02-14 | End: 2020-02-14

## 2020-02-14 RX ORDER — CEFTRIAXONE 500 MG/1
1000 INJECTION, POWDER, FOR SOLUTION INTRAMUSCULAR; INTRAVENOUS ONCE
Refills: 0 | Status: COMPLETED | OUTPATIENT
Start: 2020-02-14 | End: 2020-02-14

## 2020-02-14 RX ORDER — CEFTRIAXONE 500 MG/1
1000 INJECTION, POWDER, FOR SOLUTION INTRAMUSCULAR; INTRAVENOUS EVERY 24 HOURS
Refills: 0 | Status: DISCONTINUED | OUTPATIENT
Start: 2020-02-15 | End: 2020-02-16

## 2020-02-14 RX ORDER — ENOXAPARIN SODIUM 100 MG/ML
40 INJECTION SUBCUTANEOUS DAILY
Refills: 0 | Status: DISCONTINUED | OUTPATIENT
Start: 2020-02-14 | End: 2020-02-20

## 2020-02-14 RX ORDER — LEVOTHYROXINE SODIUM 125 MCG
50 TABLET ORAL DAILY
Refills: 0 | Status: DISCONTINUED | OUTPATIENT
Start: 2020-02-14 | End: 2020-02-20

## 2020-02-14 RX ORDER — ACETAMINOPHEN 500 MG
650 TABLET ORAL DAILY
Refills: 0 | Status: DISCONTINUED | OUTPATIENT
Start: 2020-02-14 | End: 2020-02-15

## 2020-02-14 RX ADMIN — Medication 75 MILLIGRAM(S): at 22:04

## 2020-02-14 RX ADMIN — SODIUM CHLORIDE 1000 MILLILITER(S): 9 INJECTION INTRAMUSCULAR; INTRAVENOUS; SUBCUTANEOUS at 19:58

## 2020-02-14 RX ADMIN — AZITHROMYCIN 255 MILLIGRAM(S): 500 TABLET, FILM COATED ORAL at 19:58

## 2020-02-14 RX ADMIN — CEFTRIAXONE 100 MILLIGRAM(S): 500 INJECTION, POWDER, FOR SOLUTION INTRAMUSCULAR; INTRAVENOUS at 19:58

## 2020-02-14 NOTE — H&P ADULT - NSHPLABSRESULTS_GEN_ALL_CORE
LABS:                        12.5   5.18  )-----------( 223      ( 2020 19:03 )             36.8     02-14    135  |  101  |  10  ----------------------------<  93  4.1   |  27  |  0.67    Ca    8.9      2020 19:03  Mg     2.3         TPro  8.7<H>  /  Alb  3.8  /  TBili  0.9  /  DBili  x   /  AST  28  /  ALT  25  /  AlkPhos  75        Urinalysis Basic - ( 2020 20:12 )    Color: Yellow / Appearance: Clear / S.010 / pH: x  Gluc: x / Ketone: Negative  / Bili: Negative / Urobili: Negative   Blood: x / Protein: 30 mg/dL / Nitrite: Negative   Leuk Esterase: Negative / RBC: 10-25 /HPF / WBC 0-2 /HPF   Sq Epi: x / Non Sq Epi: Occasional /HPF / Bacteria: Trace /HPF      LIVER FUNCTIONS - ( 2020 19:03 )  Alb: 3.8 g/dL / Pro: 8.7 g/dL / ALK PHOS: 75 U/L / ALT: 25 U/L DA / AST: 28 U/L / GGT: x           Lactate, Blood: 0.8 mmol/L (20 @ 19:37)

## 2020-02-14 NOTE — H&P ADULT - PROBLEM SELECTOR PLAN 3
Breast cancer responded well to medications.  c/w Ibrance and Letrozole  [facility]eme onc Dr Lala is consulted

## 2020-02-14 NOTE — ED PROVIDER NOTE - CARE PLAN
Principal Discharge DX:	Chills Principal Discharge DX:	Chills  Secondary Diagnosis:	CAP (community acquired pneumonia)

## 2020-02-14 NOTE — H&P ADULT - NSHPREVIEWOFSYSTEMS_GEN_ALL_CORE
REVIEW OF SYSTEMS:    CONSTITUTIONAL: No weakness, fevers or chills  EYES/ENT: No visual changes;  No vertigo or throat pain   NECK: No pain or stiffness  RESPIRATORY: Cough is present  CARDIOVASCULAR: No chest pain or palpitations  GASTROINTESTINAL: No abdominal or epigastric pain. No nausea, vomiting, or hematemesis; No diarrhea or constipation. No melena or hematochezia.  GENITOURINARY: No dysuria, frequency or hematuria  NEUROLOGICAL: No numbness or weakness  SKIN: No itching, burning, rashes, or lesions   All other review of systems is negative unless indicated above.

## 2020-02-14 NOTE — H&P ADULT - ASSESSMENT
64 y/o F from home, walks independently, lives with  with PMH of (Breast ca, s/p Bilateral mastectomy on ibrance), Malignant pleural effusion s/p VATS procedure, Chronic Left upper extremity Lymphedema, Hypothyroidism presented to ED with Subjective fever, chills x2 days.   ED course: Vitals are stable except for  Fever of 100.4,  Flu A positive, CXR showed Patchy air opacity in the left upper lobe suspicious for pneumonia. Admitted to medicine for Pneumonia.

## 2020-02-14 NOTE — H&P ADULT - HISTORY OF PRESENT ILLNESS
64 y/o F from home, walks independently, lives with  with PMH of (Breast ca, s/p Bilateral mastectomy on ibrance), Malignant pleural effusion s/p VATS procedure, Chronic Left upper extremity Lymphedema, Hypothyroidism presented to ED with Subjective fever, chills x2 days. .It is a/w Non Productive Cough, Headache and  some weakness. Pt denies  cp, sob, ha, vision loss, rhinorrhea, dysuria, numbness, rash, bleeding, sick contacts, Recent travel.  [facility] Full code

## 2020-02-14 NOTE — ED ADULT NURSE NOTE - OBJECTIVE STATEMENT
Pt presented to the ED with c/o chills , fever, and cough, Pt states the symptoms started 2 days ago.  Pt states she has past medical history of bresast cancer B/L breast. Pt presented to the ED with c/o chills , fever, and cough, Pt states the symptoms started 2 days ago.  Pt states she has past medical history of bresast cancer B/L breast, Pt also has hx of pneumonia.  Left arm swollen pt states it has been like that forever.

## 2020-02-14 NOTE — H&P ADULT - ATTENDING COMMENTS
Patient seen and examined earlier after admission.  Case fully discussed with  ER attending and medical resident. Reviewed chief complaint. Reviewed review of systems. Reviewed list of medications.  Reviewed physical exam.  Reviewed assessment and plan. agree with full H and P. Will follow up clinically. Will continue antibiotics for pneumonia. Will follow up with pulmonary. Follow up with oncology, Dr. Lala.

## 2020-02-14 NOTE — H&P ADULT - NSHPPHYSICALEXAM_GEN_ALL_CORE
CONSTITUTIONAL: Well appearing, well nourished, awake, alert and in no apparent distress  ENT: Airway patent, Nasal mucosa clear. Mouth with normal mucosa. Throat has no vesicles, no oropharyngeal exudates and uvula is midline.  EYES: Clear bilaterally, pupils equal, round and reactive to light. EOMI.  CARDIAC: Normal rate, regular rhythm.  Heart sounds S1, S2.  No murmurs, rubs or gallops   RESPIRATORY: Breath sounds clear and equal bilaterally. No wheezes, rales or rhonchi  MUSCULOSKELETAL: Spine appears normal, range of motion is not limited, no muscle or joint tenderness  Gastrointestinal Abdomen is distended.  EXTREMITIES: RIGHT UPPER EXTREMITY IS SWOLLEN  NEUROLOGICAL: Alert and oriented, no focal deficits, no motor or sensory deficits.  SKIN: No rash, skin turgor

## 2020-02-14 NOTE — H&P ADULT - NSICDXPASTSURGICALHX_GEN_ALL_CORE_FT
PAST SURGICAL HISTORY:  H/O pleural empyema pleurodesis    S/P bilateral mastectomy     S/P mastectomy, bilateral s/p implants b/l, no chemo or radiation therapy

## 2020-02-14 NOTE — ED PROVIDER NOTE - OBJECTIVE STATEMENT
Pertinent PMH/PSH/FHx/SHx and Review of Systems contained within:  65F hx breast ca on ibrance and hypothyroid pw chills x2 days. pt notes subjective fevers and chills in the past couple of days. assd with cough, non prod. notes some weakness. no cp, sob, ha, vision loss, rhinorrhea, dysuria, numbness, rash, bleeding. has swelling of the lue which is chronic. didn't take anything for symptoms. has been tolerating po   Fh and Sh not otherwise contributory  ROS otherwise negative

## 2020-02-14 NOTE — ED PROVIDER NOTE - PHYSICAL EXAMINATION
Gen: Alert, NAD, obese  Head: NC, AT   Eyes: PERRL, EOMI, normal lids/conjunctiva  ENT: normal hearing, patent oropharynx without erythema/exudate, uvula midline  Neck: supple, no tenderness, Trachea midline  Pulm: Bilateral BS, normal resp effort, no wheeze/stridor/retractions  CV: RRR, no M/R/G, 2+ radial and dp pulses bl, lue edema   Abd: soft, NT/ND, +BS, no hepatosplenomegaly  Mskel: extremities x4 with normal ROM and no joint effusions. no ctl spine ttp.   Skin: no rash, no bruising   Neuro: AAOx3, no sensory/motor deficits, CN 2-12 intact

## 2020-02-14 NOTE — H&P ADULT - NSICDXPASTMEDICALHX_GEN_ALL_CORE_FT
PAST MEDICAL HISTORY:  Breast cancer     HLD (hyperlipidemia)     Hypothyroidism     Lymphedema of arm leftarm lymphedema since 20 years , after the breast reconstruction surgery    S/P mastectomy, bilateral     Thyroid condition

## 2020-02-14 NOTE — H&P ADULT - PROBLEM SELECTOR PLAN 1
·  Problem: Pneumonia of left lower lobe due to infectious organism.    Plan: p/w productive cough, fever, chills  temp 100.4, WBC 5K  CXR shows new left upper lobe infiltrates  s/p rocephin and zithromax in ED  c/w rocephin and zithromax   Flu positive A   Blood culture, sputum culture

## 2020-02-14 NOTE — ED PROVIDER NOTE - CLINICAL SUMMARY MEDICAL DECISION MAKING FREE TEXT BOX
chills, rule out sepsis, flu, pna chills, rule out sepsis, flu, pna  labs note flu, xray shows pna. treat for both

## 2020-02-15 DIAGNOSIS — I89.0 LYMPHEDEMA, NOT ELSEWHERE CLASSIFIED: ICD-10-CM

## 2020-02-15 LAB
-  STREPTOCOCCUS SP. (NOT GRP A, B OR S PNEUMONIAE): SIGNIFICANT CHANGE UP
ALBUMIN SERPL ELPH-MCNC: 3.3 G/DL — LOW (ref 3.5–5)
ALP SERPL-CCNC: 62 U/L — SIGNIFICANT CHANGE UP (ref 40–120)
ALT FLD-CCNC: 20 U/L DA — SIGNIFICANT CHANGE UP (ref 10–60)
ANION GAP SERPL CALC-SCNC: 8 MMOL/L — SIGNIFICANT CHANGE UP (ref 5–17)
AST SERPL-CCNC: 16 U/L — SIGNIFICANT CHANGE UP (ref 10–40)
BASOPHILS # BLD AUTO: 0 K/UL — SIGNIFICANT CHANGE UP (ref 0–0.2)
BASOPHILS NFR BLD AUTO: 0 % — SIGNIFICANT CHANGE UP (ref 0–2)
BILIRUB SERPL-MCNC: 0.8 MG/DL — SIGNIFICANT CHANGE UP (ref 0.2–1.2)
BUN SERPL-MCNC: 7 MG/DL — SIGNIFICANT CHANGE UP (ref 7–18)
CALCIUM SERPL-MCNC: 8 MG/DL — LOW (ref 8.4–10.5)
CHLORIDE SERPL-SCNC: 101 MMOL/L — SIGNIFICANT CHANGE UP (ref 96–108)
CHOLEST SERPL-MCNC: 141 MG/DL — SIGNIFICANT CHANGE UP (ref 10–199)
CO2 SERPL-SCNC: 25 MMOL/L — SIGNIFICANT CHANGE UP (ref 22–31)
CREAT SERPL-MCNC: 0.62 MG/DL — SIGNIFICANT CHANGE UP (ref 0.5–1.3)
EOSINOPHIL # BLD AUTO: 0 K/UL — SIGNIFICANT CHANGE UP (ref 0–0.5)
EOSINOPHIL NFR BLD AUTO: 0 % — SIGNIFICANT CHANGE UP (ref 0–6)
FOLATE SERPL-MCNC: 14.8 NG/ML — SIGNIFICANT CHANGE UP
GLUCOSE SERPL-MCNC: 124 MG/DL — HIGH (ref 70–99)
GRAM STN FLD: SIGNIFICANT CHANGE UP
HBA1C BLD-MCNC: 6.1 % — HIGH (ref 4–5.6)
HCT VFR BLD CALC: 33.8 % — LOW (ref 34.5–45)
HCV AB S/CO SERPL IA: 0.3 S/CO — SIGNIFICANT CHANGE UP (ref 0–0.99)
HCV AB SERPL-IMP: SIGNIFICANT CHANGE UP
HDLC SERPL-MCNC: 30 MG/DL — LOW
HGB BLD-MCNC: 11.2 G/DL — LOW (ref 11.5–15.5)
LIPID PNL WITH DIRECT LDL SERPL: 98 MG/DL — SIGNIFICANT CHANGE UP
LYMPHOCYTES # BLD AUTO: 0.61 K/UL — LOW (ref 1–3.3)
LYMPHOCYTES # BLD AUTO: 11 % — LOW (ref 13–44)
MAGNESIUM SERPL-MCNC: 2 MG/DL — SIGNIFICANT CHANGE UP (ref 1.6–2.6)
MCHC RBC-ENTMCNC: 33.1 GM/DL — SIGNIFICANT CHANGE UP (ref 32–36)
MCHC RBC-ENTMCNC: 34.1 PG — HIGH (ref 27–34)
MCV RBC AUTO: 103 FL — HIGH (ref 80–100)
METHOD TYPE: SIGNIFICANT CHANGE UP
MONOCYTES # BLD AUTO: 0.66 K/UL — SIGNIFICANT CHANGE UP (ref 0–0.9)
MONOCYTES NFR BLD AUTO: 12 % — SIGNIFICANT CHANGE UP (ref 2–14)
NEUTROPHILS # BLD AUTO: 4.24 K/UL — SIGNIFICANT CHANGE UP (ref 1.8–7.4)
NEUTROPHILS NFR BLD AUTO: 77 % — SIGNIFICANT CHANGE UP (ref 43–77)
PHOSPHATE SERPL-MCNC: 2.6 MG/DL — SIGNIFICANT CHANGE UP (ref 2.5–4.5)
PLATELET # BLD AUTO: 198 K/UL — SIGNIFICANT CHANGE UP (ref 150–400)
POTASSIUM SERPL-MCNC: 3.1 MMOL/L — LOW (ref 3.5–5.3)
POTASSIUM SERPL-SCNC: 3.1 MMOL/L — LOW (ref 3.5–5.3)
PROT SERPL-MCNC: 7.6 G/DL — SIGNIFICANT CHANGE UP (ref 6–8.3)
RBC # BLD: 3.28 M/UL — LOW (ref 3.8–5.2)
RBC # FLD: 13.2 % — SIGNIFICANT CHANGE UP (ref 10.3–14.5)
SODIUM SERPL-SCNC: 134 MMOL/L — LOW (ref 135–145)
SPECIMEN SOURCE: SIGNIFICANT CHANGE UP
SPECIMEN SOURCE: SIGNIFICANT CHANGE UP
TOTAL CHOLESTEROL/HDL RATIO MEASUREMENT: 4.7 RATIO — SIGNIFICANT CHANGE UP (ref 3.3–7.1)
TRIGL SERPL-MCNC: 63 MG/DL — SIGNIFICANT CHANGE UP (ref 10–149)
TSH SERPL-MCNC: 0.92 UU/ML — SIGNIFICANT CHANGE UP (ref 0.34–4.82)
VIT B12 SERPL-MCNC: 234 PG/ML — SIGNIFICANT CHANGE UP (ref 232–1245)
WBC # BLD: 5.5 K/UL — SIGNIFICANT CHANGE UP (ref 3.8–10.5)
WBC # FLD AUTO: 5.5 K/UL — SIGNIFICANT CHANGE UP (ref 3.8–10.5)

## 2020-02-15 RX ORDER — ACETAMINOPHEN 500 MG
1000 TABLET ORAL ONCE
Refills: 0 | Status: COMPLETED | OUTPATIENT
Start: 2020-02-15 | End: 2020-02-15

## 2020-02-15 RX ORDER — ALPRAZOLAM 0.25 MG
0.5 TABLET ORAL ONCE
Refills: 0 | Status: DISCONTINUED | OUTPATIENT
Start: 2020-02-15 | End: 2020-02-15

## 2020-02-15 RX ORDER — ACETAMINOPHEN 500 MG
650 TABLET ORAL EVERY 6 HOURS
Refills: 0 | Status: DISCONTINUED | OUTPATIENT
Start: 2020-02-15 | End: 2020-02-20

## 2020-02-15 RX ORDER — POTASSIUM CHLORIDE 20 MEQ
40 PACKET (EA) ORAL EVERY 4 HOURS
Refills: 0 | Status: DISCONTINUED | OUTPATIENT
Start: 2020-02-15 | End: 2020-02-15

## 2020-02-15 RX ORDER — POTASSIUM CHLORIDE 20 MEQ
40 PACKET (EA) ORAL EVERY 4 HOURS
Refills: 0 | Status: COMPLETED | OUTPATIENT
Start: 2020-02-15 | End: 2020-02-15

## 2020-02-15 RX ADMIN — Medication 650 MILLIGRAM(S): at 17:17

## 2020-02-15 RX ADMIN — Medication 1000 MILLIGRAM(S): at 08:49

## 2020-02-15 RX ADMIN — Medication 650 MILLIGRAM(S): at 02:36

## 2020-02-15 RX ADMIN — CEFTRIAXONE 100 MILLIGRAM(S): 500 INJECTION, POWDER, FOR SOLUTION INTRAMUSCULAR; INTRAVENOUS at 17:44

## 2020-02-15 RX ADMIN — Medication 50 MICROGRAM(S): at 07:10

## 2020-02-15 RX ADMIN — Medication 400 MILLIGRAM(S): at 07:48

## 2020-02-15 RX ADMIN — Medication 40 MILLIEQUIVALENT(S): at 21:48

## 2020-02-15 RX ADMIN — LETROZOLE 2.5 MILLIGRAM(S): 2.5 TABLET, FILM COATED ORAL at 13:51

## 2020-02-15 RX ADMIN — Medication 650 MILLIGRAM(S): at 01:23

## 2020-02-15 RX ADMIN — Medication 0.5 MILLIGRAM(S): at 21:48

## 2020-02-15 RX ADMIN — AZITHROMYCIN 255 MILLIGRAM(S): 500 TABLET, FILM COATED ORAL at 17:30

## 2020-02-15 RX ADMIN — Medication 75 MILLIGRAM(S): at 17:48

## 2020-02-15 RX ADMIN — Medication 75 MILLIGRAM(S): at 07:10

## 2020-02-15 RX ADMIN — Medication 650 MILLIGRAM(S): at 18:24

## 2020-02-15 RX ADMIN — Medication 40 MILLIEQUIVALENT(S): at 17:42

## 2020-02-15 NOTE — CONSULT NOTE ADULT - SKIN COMMENTS
left arm lymph edema , erythema ++ and warmth of left arm from her wrist to beyond her elbow - Cellulitis

## 2020-02-15 NOTE — CHART NOTE - NSCHARTNOTEFT_GEN_A_CORE
Notified by RN for positive BCx for G+cocci in pairs and chains aerobic and anearobic. Identified as Strep sp.   Pt on rocephin azithromycin  Pending sensitivities

## 2020-02-15 NOTE — CONSULT NOTE ADULT - GASTROINTESTINAL DETAILS
no distention/nontender/bowel sounds normal/no organomegaly/no rigidity/no masses palpable/no guarding/soft

## 2020-02-15 NOTE — CONSULT NOTE ADULT - ASSESSMENT
Bacteremia - will likely turn out to be Group C/G from her cellulitis  Left arm cellulitis  Influenza A infection  Fevers  ??pneumonia    Plan - increase Rocephin to 2 gms iv q24hrs  DC azithromycin  Cont Tamiflu 75 mgs po bid x 5 days total   cont isolation  will repeat blood cultures tomorrow.

## 2020-02-15 NOTE — CONSULT NOTE ADULT - SUBJECTIVE AND OBJECTIVE BOX
PULMONARY CONSULT NOTE      ANA JAUREGUI  MRN-[id number]    Patient is a 65y old  Female who presents with a chief complaint of Fever and chills (15 Feb 2020 10:04)    History of Present Illness:  Reason for Admission: Fever and chills	  History of Present Illness: 	  66 y/o F from home, walks independently, lives with  with PMH of (Breast ca, s/p Bilateral mastectomy on ibrance), Malignant pleural effusion s/p VATS procedure, Chronic Left upper extremity Lymphedema, Hypothyroidism presented to ED with Subjective fever, chills x2 days. .It is a/w Non Productive Cough, Headache and  some weakness. Pt denies  cp, sob, ha, vision loss, rhinorrhea, dysuria, numbness, rash, bleeding, sick contacts, Recent travel.  [facility] Full code        HISTORY OF PRESENT ILLNESS: As above. Awake, alert, comfortable in bed in NAD    MEDICATIONS  (STANDING):  azithromycin  IVPB 500 milliGRAM(s) IV Intermittent every 24 hours  cefTRIAXone   IVPB 1000 milliGRAM(s) IV Intermittent every 24 hours  enoxaparin Injectable 40 milliGRAM(s) SubCutaneous daily  letrozole 2.5 milliGRAM(s) Oral daily  levothyroxine 50 MICROGram(s) Oral daily  oseltamivir 75 milliGRAM(s) Oral two times a day  potassium chloride   Powder 40 milliEquivalent(s) Oral every 4 hours      MEDICATIONS  (PRN):  acetaminophen   Tablet .. 650 milliGRAM(s) Oral daily PRN Mild Pain (1 - 3)      Allergies    No Known Allergies    Intolerances        PAST MEDICAL & SURGICAL HISTORY:  HLD (hyperlipidemia)  Breast cancer  Hypothyroidism  S/P mastectomy, bilateral  Lymphedema of arm: leftarm lymphedema since 20 years , after the breast reconstruction surgery  Thyroid condition  H/O pleural empyema: pleurodesis  S/P bilateral mastectomy  S/P mastectomy, bilateral: s/p implants b/l, no chemo or radiation therapy      FAMILY HISTORY:  No pertinent family history in first degree relatives      SOCIAL HISTORY  Smoking History:     REVIEW OF SYSTEMS:    CONSTITUTIONAL:  No fevers, chills, sweats    HEENT:  Eyes:  No diplopia or blurred vision. ENT:  No earache, sore throat or runny nose.    CARDIOVASCULAR:  No pressure, squeezing, tightness, or heaviness about the chest; no palpitations.    RESPIRATORY:  Per HPI    GASTROINTESTINAL:  No abdominal pain, nausea, vomiting or diarrhea.    GENITOURINARY:  No dysuria, frequency or urgency.    NEUROLOGIC:  No paresthesias, fasciculations, seizures or weakness.    PSYCHIATRIC:  No disorder of thought or mood.    Vital Signs Last 24 Hrs  T(C): 38.7 (15 Feb 2020 06:05), Max: 38.7 (15 Feb 2020 06:05)  T(F): 101.6 (15 Feb 2020 06:05), Max: 101.6 (15 Feb 2020 06:05)  HR: 86 (15 Feb 2020 06:05) (86 - 105)  BP: 136/62 (15 Feb 2020 06:05) (133/56 - 143/71)  BP(mean): --  RR: 18 (15 Feb 2020 06:05) (18 - 24)  SpO2: 94% (15 Feb 2020 06:05) (94% - 100%)  I&O's Detail      PHYSICAL EXAMINATION:    GENERAL: The patient is a well-developed, well-nourished _____in no apparent distress.     HEENT: Head is normocephalic and atraumatic. Extraocular muscles are intact. Mucous membranes are moist.     NECK: Supple.     LUNGS: Clear to auscultation without wheezing, rales, or rhonchi. Respirations unlabored    HEART: Regular rate and rhythm without murmur.    ABDOMEN: Soft, nontender, and nondistended.  No hepatosplenomegaly is noted.    EXTREMITIES: Without any cyanosis, clubbing, rash, lesions + edema.    NEUROLOGIC: Grossly intact.      LABS:                        11.2   5.50  )-----------( 198      ( 15 Feb 2020 07:11 )             33.8     02-15    134<L>  |  101  |  7   ----------------------------<  124<H>  3.1<L>   |  25  |  0.62    Ca    8.0<L>      15 Feb 2020 07:11  Phos  2.6     02-15  Mg     2.0     02-15    TPro  7.6  /  Alb  3.3<L>  /  TBili  0.8  /  DBili  x   /  AST  16  /  ALT  20  /  AlkPhos  62  02-15      Urinalysis Basic - ( 2020 20:12 )    Color: Yellow / Appearance: Clear / S.010 / pH: x  Gluc: x / Ketone: Negative  / Bili: Negative / Urobili: Negative   Blood: x / Protein: 30 mg/dL / Nitrite: Negative   Leuk Esterase: Negative / RBC: 10-25 /HPF / WBC 0-2 /HPF   Sq Epi: x / Non Sq Epi: Occasional /HPF / Bacteria: Trace /HPF                Lactate, Blood: 0.8 mmol/L (20 @ 19:37)        MICROBIOLOGY:    RADIOLOGY & ADDITIONAL STUDIES:    CXR:  < from: Xray Chest 1 View-PORTABLE IMMEDIATE (20 @ 18:45) >  IMPRESSION: Patchy airspace opacity is seen in the left upper lobe suspicious for pneumonia.    < end of copied text >    Ct scan chest:    ekg;    echo:

## 2020-02-15 NOTE — CONSULT NOTE ADULT - RS GEN PE MLT RESP DETAILS PC
breath sounds equal/good air movement/no rhonchi/no wheezes/no rales/clear to auscultation bilaterally

## 2020-02-15 NOTE — PROGRESS NOTE ADULT - SUBJECTIVE AND OBJECTIVE BOX
PGY1 Note discussed with Supervising Resident and Primary Attending.    Patient is a 65y old  Female who presents with a chief complaint of Fever and chills (2020 20:25)      INTERVAL HPI/OVERNIGHT EVENTS :    ***********************************************************************************************************    MEDICATIONS  (STANDING):  azithromycin  IVPB 500 milliGRAM(s) IV Intermittent every 24 hours  cefTRIAXone   IVPB 1000 milliGRAM(s) IV Intermittent every 24 hours  enoxaparin Injectable 40 milliGRAM(s) SubCutaneous daily  letrozole 2.5 milliGRAM(s) Oral daily  levothyroxine 50 MICROGram(s) Oral daily  oseltamivir 75 milliGRAM(s) Oral two times a day    MEDICATIONS  (PRN):  acetaminophen   Tablet .. 650 milliGRAM(s) Oral daily PRN Mild Pain (1 - 3)      ***********************************************************************************************************    Allergies    No Known Allergies    Intolerances        ***********************************************************************************************************    REVIEW OF SYSTEMS :  * CONSTITUTIONAL      : No Fever, Weight loss, or Fatigue  * EYES                             : No eye pain , Visual disturbances or Discharge  * RESPIRATORY             : No Cough, Wheezing, Chills or Hemoptysis; No shortness of breath  * CARDIOVASCULAR     : No Chest pain, Palpitations, Dizziness, or Leg swelling  * GASTROINTESTINAL  : No Abdominal or Epigastric pain. No Nausea, Vomiting or Hematemesis; No Diarrhea or Constipation. No Melena or Hematochezia.  * GENITOURINARY        : No Dysuria , Frequency , Haematuria   * NEUROLOGICAL          : No Headaches, Memory loss, Loss of trength, Numbness, or Tremors  * MUSCULOSKELETAL   : No Joint pain  * PSYCHIATRY                 : No Depression or Anxiety   * HEME/LYMPH              : No Easy Bruising or Bleeding gums  * SKIN                               : No Itching, Burning, Rashes, or Lesions     ***********************************************************************************************************    Vital Signs Last 24 Hrs  T(C): 38.7 (15 Feb 2020 06:05), Max: 38.7 (15 Feb 2020 06:05)  T(F): 101.6 (15 Feb 2020 06:05), Max: 101.6 (15 Feb 2020 06:05)  HR: 86 (15 Feb 2020 06:05) (86 - 105)  BP: 136/62 (15 Feb 2020 06:05) (133/56 - 143/71)  BP(mean): --  RR: 18 (15 Feb 2020 06:05) (18 - 24)  SpO2: 94% (15 Feb 2020 06:05) (94% - 100%)    ***********************************************************************************************************    PHYSICAL EXAM :  * GENERAL                 : NAD, Well-groomed, Well-developed  * HEAD                       :  Atraumatic, Normocephalic  * EYES                         : EOMI, PERRLA, Conjunctiva and Sclera clear  * ENT                           : Moist Mucous Membranes  * NECK                         : Supple, No JVD, Normal Thyroid  * CHEST/LUNG           : Clear to Auscultation bilaterally; No Rales, Rhonchi, Wheezing or Rubs  * HEART                       : Regular Rate and Rhythm; No murmurs, Rubs or gallops  * ABDOMEN                : Soft, Non-tender, Non-distended; Bowel Sounds present  * NERVOUS SYSTEM  :  Alert & Oriented X3, Good Concentration; Motor Strength 5/5 B/L UL LL ; DTRs 2+ Intact and Symmetric  * EXTREMITIES            :  2+ Peripheral Pulses, No clubbing, cyanosis, or edema  * SKIN                           : No Rashes or Lesions    **********************************************************************************************************  LABS:                          11.2   5.50  )-----------( 198      ( 15 Feb 2020 07:11 )             33.8     02-15    134<L>  |  101  |  7   ----------------------------<  124<H>  3.1<L>   |  25  |  0.62    Ca    8.0<L>      15 Feb 2020 07:11  Phos  2.6     -15  Mg     2.0     -15    TPro  7.6  /  Alb  3.3<L>  /  TBili  0.8  /  DBili  x   /  AST  16  /  ALT  20  /  AlkPhos  62  02-15      Urinalysis Basic - ( 2020 20:12 )    Color: Yellow / Appearance: Clear / S.010 / pH: x  Gluc: x / Ketone: Negative  / Bili: Negative / Urobili: Negative   Blood: x / Protein: 30 mg/dL / Nitrite: Negative   Leuk Esterase: Negative / RBC: 10-25 /HPF / WBC 0-2 /HPF   Sq Epi: x / Non Sq Epi: Occasional /HPF / Bacteria: Trace /HPF      CAPILLARY BLOOD GLUCOSE          **********************************************************************************************************    RADIOLOGY & ADDITIONAL TESTS:   No radiological imaging was required    Imaging Personally Reviewed   :  [ ] YES  [ ] NO    Consultant(s) Notes Reviewed :  [ ] YES  [ ] NO PGY1 Note discussed with Supervising Resident and Primary Attending.    Patient is a 65y old  Female who presents with a chief complaint of Fever and chills (2020 20:25)      INTERVAL HPI/OVERNIGHT EVENTS :  No acute events reported overnight.    Pt is seen at the bedside. Pt is found resting comfortably in bed in no acute distress, reports feeling well and denies any new complaints today. Patient denies nausea, vomiting, diarrhea, chest pain, SOB, headache, dizziness.   ***********************************************************************************************************    MEDICATIONS  (STANDING):  azithromycin  IVPB 500 milliGRAM(s) IV Intermittent every 24 hours  cefTRIAXone   IVPB 1000 milliGRAM(s) IV Intermittent every 24 hours  enoxaparin Injectable 40 milliGRAM(s) SubCutaneous daily  letrozole 2.5 milliGRAM(s) Oral daily  levothyroxine 50 MICROGram(s) Oral daily  oseltamivir 75 milliGRAM(s) Oral two times a day    MEDICATIONS  (PRN):  acetaminophen   Tablet .. 650 milliGRAM(s) Oral daily PRN Mild Pain (1 - 3)      ***********************************************************************************************************    Allergies    No Known Allergies    Intolerances        ***********************************************************************************************************    REVIEW OF SYSTEMS :  * CONSTITUTIONAL      : No Fever, Weight loss, or Fatigue  * EYES                             : No eye pain , Visual disturbances or Discharge  * RESPIRATORY             : No Cough, Wheezing, Chills or Hemoptysis; No shortness of breath  * CARDIOVASCULAR     : No Chest pain, Palpitations, Dizziness, or Leg swelling  * GASTROINTESTINAL  : No Abdominal or Epigastric pain. No Nausea, Vomiting or Hematemesis; No Diarrhea or Constipation. No Melena or Hematochezia.  * GENITOURINARY        : No Dysuria , Frequency , Haematuria   * NEUROLOGICAL          : No Headaches, Memory loss, Loss of trength, Numbness, or Tremors  * MUSCULOSKELETAL   : No Joint pain  * PSYCHIATRY                 : No Depression or Anxiety   * HEME/LYMPH              : No Easy Bruising or Bleeding gums  * SKIN                               : No Itching, Burning, Rashes, or Lesions     ***********************************************************************************************************    Vital Signs Last 24 Hrs  T(C): 38.7 (15 Feb 2020 06:05), Max: 38.7 (15 Feb 2020 06:05)  T(F): 101.6 (15 Feb 2020 06:05), Max: 101.6 (15 Feb 2020 06:05)  HR: 86 (15 Feb 2020 06:05) (86 - 105)  BP: 136/62 (15 Feb 2020 06:05) (133/56 - 143/71)  BP(mean): --  RR: 18 (15 Feb 2020 06:05) (18 - 24)  SpO2: 94% (15 Feb 2020 06:05) (94% - 100%)    ***********************************************************************************************************    PHYSICAL EXAM :  * GENERAL                 : NAD, Well-groomed, Well-developed  * HEAD                       :  Atraumatic, Normocephalic  * EYES                         : EOMI, PERRLA, Conjunctiva and Sclera clear  * ENT                           : Moist Mucous Membranes  * NECK                         : Supple, No JVD, Normal Thyroid  * CHEST/LUNG           : Clear to Auscultation bilaterally; No Rales, Rhonchi, Wheezing or Rubs  * HEART                       : Regular Rate and Rhythm; No murmurs, Rubs or gallops  * ABDOMEN                : Soft, Non-tender, Non-distended; Bowel Sounds present  * NERVOUS SYSTEM  :  Alert & Oriented X3, Good Concentration; Motor Strength 5/5 B/L UL LL ; DTRs 2+ Intact and Symmetric  * EXTREMITIES            :  2+ Peripheral Pulses, No clubbing, cyanosis, or edema  * SKIN                           : No Rashes or Lesions    **********************************************************************************************************  LABS:                          11.2   5.50  )-----------( 198      ( 15 Feb 2020 07:11 )             33.8     02-15    134<L>  |  101  |  7   ----------------------------<  124<H>  3.1<L>   |  25  |  0.62    Ca    8.0<L>      15 Feb 2020 07:11  Phos  2.6     02-15  Mg     2.0     02-15    TPro  7.6  /  Alb  3.3<L>  /  TBili  0.8  /  DBili  x   /  AST  16  /  ALT  20  /  AlkPhos  62  02-15      Urinalysis Basic - ( 2020 20:12 )    Color: Yellow / Appearance: Clear / S.010 / pH: x  Gluc: x / Ketone: Negative  / Bili: Negative / Urobili: Negative   Blood: x / Protein: 30 mg/dL / Nitrite: Negative   Leuk Esterase: Negative / RBC: 10-25 /HPF / WBC 0-2 /HPF   Sq Epi: x / Non Sq Epi: Occasional /HPF / Bacteria: Trace /HPF      CAPILLARY BLOOD GLUCOSE          **********************************************************************************************************    RADIOLOGY & ADDITIONAL TESTS:   No radiological imaging was required    Imaging Personally Reviewed   :  [ ] YES  [ ] NO    Consultant(s) Notes Reviewed :  [ ] YES  [ ] NO

## 2020-02-16 DIAGNOSIS — R78.81 BACTEREMIA: ICD-10-CM

## 2020-02-16 DIAGNOSIS — L03.114 CELLULITIS OF LEFT UPPER LIMB: ICD-10-CM

## 2020-02-16 LAB
ALBUMIN SERPL ELPH-MCNC: 3.1 G/DL — LOW (ref 3.5–5)
ALP SERPL-CCNC: 60 U/L — SIGNIFICANT CHANGE UP (ref 40–120)
ALT FLD-CCNC: 22 U/L DA — SIGNIFICANT CHANGE UP (ref 10–60)
ANION GAP SERPL CALC-SCNC: 7 MMOL/L — SIGNIFICANT CHANGE UP (ref 5–17)
AST SERPL-CCNC: 15 U/L — SIGNIFICANT CHANGE UP (ref 10–40)
BASOPHILS # BLD AUTO: 0.03 K/UL — SIGNIFICANT CHANGE UP (ref 0–0.2)
BASOPHILS NFR BLD AUTO: 0.6 % — SIGNIFICANT CHANGE UP (ref 0–2)
BILIRUB SERPL-MCNC: 0.7 MG/DL — SIGNIFICANT CHANGE UP (ref 0.2–1.2)
BUN SERPL-MCNC: 8 MG/DL — SIGNIFICANT CHANGE UP (ref 7–18)
CALCIUM SERPL-MCNC: 8.6 MG/DL — SIGNIFICANT CHANGE UP (ref 8.4–10.5)
CHLORIDE SERPL-SCNC: 104 MMOL/L — SIGNIFICANT CHANGE UP (ref 96–108)
CO2 SERPL-SCNC: 28 MMOL/L — SIGNIFICANT CHANGE UP (ref 22–31)
CREAT SERPL-MCNC: 0.6 MG/DL — SIGNIFICANT CHANGE UP (ref 0.5–1.3)
CULTURE RESULTS: SIGNIFICANT CHANGE UP
EOSINOPHIL # BLD AUTO: 0.01 K/UL — SIGNIFICANT CHANGE UP (ref 0–0.5)
EOSINOPHIL NFR BLD AUTO: 0.2 % — SIGNIFICANT CHANGE UP (ref 0–6)
GLUCOSE SERPL-MCNC: 103 MG/DL — HIGH (ref 70–99)
HCT VFR BLD CALC: 37.1 % — SIGNIFICANT CHANGE UP (ref 34.5–45)
HGB BLD-MCNC: 12.4 G/DL — SIGNIFICANT CHANGE UP (ref 11.5–15.5)
IMM GRANULOCYTES NFR BLD AUTO: 0.4 % — SIGNIFICANT CHANGE UP (ref 0–1.5)
LYMPHOCYTES # BLD AUTO: 1.16 K/UL — SIGNIFICANT CHANGE UP (ref 1–3.3)
LYMPHOCYTES # BLD AUTO: 23.3 % — SIGNIFICANT CHANGE UP (ref 13–44)
MAGNESIUM SERPL-MCNC: 2.7 MG/DL — HIGH (ref 1.6–2.6)
MCHC RBC-ENTMCNC: 33.4 GM/DL — SIGNIFICANT CHANGE UP (ref 32–36)
MCHC RBC-ENTMCNC: 34.9 PG — HIGH (ref 27–34)
MCV RBC AUTO: 104.5 FL — HIGH (ref 80–100)
MONOCYTES # BLD AUTO: 0.85 K/UL — SIGNIFICANT CHANGE UP (ref 0–0.9)
MONOCYTES NFR BLD AUTO: 17.1 % — HIGH (ref 2–14)
NEUTROPHILS # BLD AUTO: 2.91 K/UL — SIGNIFICANT CHANGE UP (ref 1.8–7.4)
NEUTROPHILS NFR BLD AUTO: 58.4 % — SIGNIFICANT CHANGE UP (ref 43–77)
NRBC # BLD: 0 /100 WBCS — SIGNIFICANT CHANGE UP (ref 0–0)
PHOSPHATE SERPL-MCNC: 2.6 MG/DL — SIGNIFICANT CHANGE UP (ref 2.5–4.5)
PLATELET # BLD AUTO: 217 K/UL — SIGNIFICANT CHANGE UP (ref 150–400)
POTASSIUM SERPL-MCNC: 3.5 MMOL/L — SIGNIFICANT CHANGE UP (ref 3.5–5.3)
POTASSIUM SERPL-SCNC: 3.5 MMOL/L — SIGNIFICANT CHANGE UP (ref 3.5–5.3)
PROT SERPL-MCNC: 7.8 G/DL — SIGNIFICANT CHANGE UP (ref 6–8.3)
RBC # BLD: 3.55 M/UL — LOW (ref 3.8–5.2)
RBC # FLD: 13 % — SIGNIFICANT CHANGE UP (ref 10.3–14.5)
SODIUM SERPL-SCNC: 139 MMOL/L — SIGNIFICANT CHANGE UP (ref 135–145)
SPECIMEN SOURCE: SIGNIFICANT CHANGE UP
WBC # BLD: 4.98 K/UL — SIGNIFICANT CHANGE UP (ref 3.8–10.5)
WBC # FLD AUTO: 4.98 K/UL — SIGNIFICANT CHANGE UP (ref 3.8–10.5)

## 2020-02-16 RX ORDER — CEFTRIAXONE 500 MG/1
INJECTION, POWDER, FOR SOLUTION INTRAMUSCULAR; INTRAVENOUS
Refills: 0 | Status: DISCONTINUED | OUTPATIENT
Start: 2020-02-16 | End: 2020-02-20

## 2020-02-16 RX ORDER — CEFTRIAXONE 500 MG/1
2000 INJECTION, POWDER, FOR SOLUTION INTRAMUSCULAR; INTRAVENOUS EVERY 24 HOURS
Refills: 0 | Status: DISCONTINUED | OUTPATIENT
Start: 2020-02-17 | End: 2020-02-20

## 2020-02-16 RX ORDER — CEFTRIAXONE 500 MG/1
2000 INJECTION, POWDER, FOR SOLUTION INTRAMUSCULAR; INTRAVENOUS ONCE
Refills: 0 | Status: COMPLETED | OUTPATIENT
Start: 2020-02-16 | End: 2020-02-16

## 2020-02-16 RX ADMIN — LETROZOLE 2.5 MILLIGRAM(S): 2.5 TABLET, FILM COATED ORAL at 12:49

## 2020-02-16 RX ADMIN — Medication 50 MICROGRAM(S): at 05:59

## 2020-02-16 RX ADMIN — Medication 75 MILLIGRAM(S): at 17:35

## 2020-02-16 RX ADMIN — Medication 75 MILLIGRAM(S): at 05:59

## 2020-02-16 RX ADMIN — CEFTRIAXONE 100 MILLIGRAM(S): 500 INJECTION, POWDER, FOR SOLUTION INTRAMUSCULAR; INTRAVENOUS at 14:00

## 2020-02-16 NOTE — CONSULT NOTE ADULT - SUBJECTIVE AND OBJECTIVE BOX
MEDICAL ONCOLOGY CONSULT (for Dr. Lala)    CC: Fever    HPI: Patient is well known to outpatient oncology service. 64 y/o F from home, walks independently, lives with  with PMH of Stage IV breast ca, s/p Bilateral mastectomy on ibrance/letrozole, malignant pleural effusion s/p VATS procedure, chronic LUE Lymphedema, hypothyroidism presented to ED with fever and chills x2 days as well as  non-productive cough, SOB, HA and malaise and fund with influenza A. Pt denies CP, rhinorrhea, dysuria, numbness, rash, bleeding, or sick contacts, or recent travels.    ALL: NKDA  PMH as above  PSH as above  Soc Hx NC; lives at home  FM HX NC  ROS: CONSTITUTIONAL: +Fever, no weight loss, + Fatigue; EYES: No eye pain , Visual disturbances or Discharge  RESPIRATORY: + Cough, no wheezing, + chills, no hemoptysis; no shortness of breath: CARDIOVASCULAR: No Chest pain, Palpitations, Dizziness, or Leg swelling; GASTROINTESTINAL: No Abdominal or Epigastric pain. No Nausea, Vomiting or Hematemesis; No Diarrhea or Constipation. No Melena or Hematochezia.; GENITOURINARY: No Dysuria , Frequency , Haematuria; NEUROLOGICAL: No Headaches, Memory loss, Loss of strength, Numbness, or Tremors; MUSCULOSKELETAL   : No Joint pain; PSYCHIATRY: No Depression or Anxiety; HEME/LYMPH: No Easy Bruising or Bleeding gums; SKIN                          : No Itching, Burning, Rashes, or Lesions    MEDICATIONS  (STANDING):  azithromycin  IVPB 500 milliGRAM(s) IV Intermittent every 24 hours  cefTRIAXone   IVPB 1000 milliGRAM(s) IV Intermittent every 24 hours  enoxaparin Injectable 40 milliGRAM(s) SubCutaneous daily  letrozole 2.5 milliGRAM(s) Oral daily  levothyroxine 50 MICROGram(s) Oral daily  oseltamivir 75 milliGRAM(s) Oral two times a day    MEDICATIONS  (PRN):  acetaminophen   Tablet .. 650 milliGRAM(s) Oral every 6 hours PRN Temp greater or equal to 38C (100.4F), Moderate Pain (4 - 6)    Vital Signs Last 24 Hrs  T(C): 36.8 (16 Feb 2020 05:36), Max: 37.7 (15 Feb 2020 16:25)  T(F): 98.3 (16 Feb 2020 05:36), Max: 99.8 (15 Feb 2020 16:25)  HR: 86 (16 Feb 2020 05:36) (86 - 97)  BP: 128/65 (16 Feb 2020 05:36) (107/71 - 137/58)  RR: 18 (16 Feb 2020 05:36) (18 - 18)  SpO2: 96% (16 Feb 2020 05:36) (95% - 97%)    NAD    LABS             12.4   4.98  )-----------( 217      ( 16 Feb 2020 07:38 )             37.1   02-16    139  |  104  |  8   ----------------------------<  103<H>  3.5   |  28  |  0.60    Ca    8.6      16 Feb 2020 07:38  Phos  2.6     02-16  Mg     2.7     02-16    TPro  7.8  /  Alb  3.1<L>  /  TBili  0.7  /  DBili  x   /  AST  15  /  ALT  22  /  AlkPhos  60  02-16    < from: Xray Chest 1 View-PORTABLE IMMEDIATE (02.14.20 @ 18:45) >  EXAM:  XR CHEST PORTABLE IMMED 1V                        FINDINGS:  Prior study dated 7/3/2017 was available for review.  The lungs demonstrate patchy airspace opacity in the left upper lobe suspicious for pneumonia. Chronic pleural thickening/fibrotic scarring is seen in the left lower lobe. No pleural effusion is seen.  The heart and mediastinum appear intact.  IMPRESSION: Patchy airspace opacity is seen in the left upper lobe suspicious for pneumonia.  < end of copied text > MEDICAL ONCOLOGY CONSULT (for Dr. Lala)    CC: Fever    HPI: Patient is well known to outpatient oncology service. 64 y/o F from home, walks independently, lives with  with PMH of Stage IV breast ca, s/p b/l mastectomy on palbociclib/letrozole, malignant pleural effusion s/p VATS procedure, chronic LUE Lymphedema, hypothyroidism presented to ED with fever and chills x2 days as well as  non-productive cough, SOB, HA and malaise and fund with influenza A and GPC in blood culture. Pt denies CP, rhinorrhea, dysuria, numbness, rash, bleeding, or sick contacts, or recent travels.    Pt currently feels weak.     ALL: NKDA  PMH as above  PSH as above  Soc Hx NC; lives at home  FM HX NC  ROS: CONSTITUTIONAL: +Fever, no weight loss, + Fatigue; EYES: No eye pain , Visual disturbances or Discharge  RESPIRATORY: + Cough, no wheezing, + chills, no hemoptysis; no shortness of breath: CARDIOVASCULAR: No Chest pain, Palpitations, Dizziness, or Leg swelling; GASTROINTESTINAL: No Abdominal or Epigastric pain. No Nausea, Vomiting or Hematemesis; No Diarrhea or Constipation. No Melena or Hematochezia.; GENITOURINARY: No Dysuria , Frequency , Haematuria; NEUROLOGICAL: No Headaches, Memory loss, Loss of strength, Numbness, or Tremors; MUSCULOSKELETAL   : No Joint pain; PSYCHIATRY: No Depression or Anxiety; HEME/LYMPH: No Easy Bruising or Bleeding gums; SKIN                          : No Itching, Burning, Rashes, or Lesions    MEDICATIONS  (STANDING):  azithromycin  IVPB 500 milliGRAM(s) IV Intermittent every 24 hours  cefTRIAXone   IVPB 1000 milliGRAM(s) IV Intermittent every 24 hours  enoxaparin Injectable 40 milliGRAM(s) SubCutaneous daily  letrozole 2.5 milliGRAM(s) Oral daily  levothyroxine 50 MICROGram(s) Oral daily  oseltamivir 75 milliGRAM(s) Oral two times a day    MEDICATIONS  (PRN):  acetaminophen   Tablet .. 650 milliGRAM(s) Oral every 6 hours PRN Temp greater or equal to 38C (100.4F), Moderate Pain (4 - 6)    Vital Signs Last 24 Hrs  T(C): 36.8 (16 Feb 2020 05:36), Max: 37.7 (15 Feb 2020 16:25)  T(F): 98.3 (16 Feb 2020 05:36), Max: 99.8 (15 Feb 2020 16:25)  HR: 86 (16 Feb 2020 05:36) (86 - 97)  BP: 128/65 (16 Feb 2020 05:36) (107/71 - 137/58)  RR: 18 (16 Feb 2020 05:36) (18 - 18)  SpO2: 96% (16 Feb 2020 05:36) (95% - 97%)    Mild distress; PERRLA; EOMI  supple; MMM  A and O x 3; obeys simple commands  + BS b/l; decreased BS on R lung base; no W  Nl S1 S2 RR; no M  Ab obese; no guarding  LUE lymphedema  warm and dry      LABS             12.4   4.98  )-----------( 217      ( 16 Feb 2020 07:38 )             37.1   02-16    139  |  104  |  8   ----------------------------<  103<H>  3.5   |  28  |  0.60    Ca    8.6      16 Feb 2020 07:38  Phos  2.6     02-16  Mg     2.7     02-16    TPro  7.8  /  Alb  3.1<L>  /  TBili  0.7  /  DBili  x   /  AST  15  /  ALT  22  /  AlkPhos  60  02-16    < from: Xray Chest 1 View-PORTABLE IMMEDIATE (02.14.20 @ 18:45) >  EXAM:  XR CHEST PORTABLE IMMED 1V                        FINDINGS:  Prior study dated 7/3/2017 was available for review.  The lungs demonstrate patchy airspace opacity in the left upper lobe suspicious for pneumonia. Chronic pleural thickening/fibrotic scarring is seen in the left lower lobe. No pleural effusion is seen.  The heart and mediastinum appear intact.  IMPRESSION: Patchy airspace opacity is seen in the left upper lobe suspicious for pneumonia.  < end of copied text >

## 2020-02-16 NOTE — PROGRESS NOTE ADULT - SUBJECTIVE AND OBJECTIVE BOX
CHIEF COMPLAINT:Patient is a 65y old  Female who presents with a chief complaint of Fever and chills (2020 14:55)    	  REVIEW OF SYSTEMS:  CONSTITUTIONAL: No fever, weight loss, or fatigue  EYES: No eye pain, visual disturbances, or discharge  ENMT:  No difficulty hearing, tinnitus, vertigo; No sinus or throat pain  NECK: No pain or stiffness  RESPIRATORY: No cough, wheezing, chills or hemoptysis; No Shortness of Breath  CARDIOVASCULAR: No chest pain, palpitations, passing out, dizziness, or leg swelling  GASTROINTESTINAL: No abdominal or epigastric pain. No nausea, vomiting, or hematemesis; No diarrhea or constipation. No melena or hematochezia.  GENITOURINARY: No dysuria, frequency, hematuria, or incontinence  NEUROLOGICAL: No headaches, memory loss, loss of strength, numbness, or tremors  SKIN: No itching, burning, rashes, or lesions   LYMPH Nodes: No enlarged glands  ENDOCRINE: No heat or cold intolerance; No hair loss  MUSCULOSKELETAL: No joint pain or swelling; No muscle, back, or extremity pain  PSYCHIATRIC: No depression, anxiety, mood swings, or difficulty sleeping  HEME/LYMPH: No easy bruising, or bleeding gums  ALLERY AND IMMUNOLOGIC: No hives or eczema	    [ ] All others negative	  [ ] Unable to obtain    PHYSICAL EXAM:  T(C): 36.4 (20 @ 13:30), Max: 37.1 (02-15-20 @ 21:11)  HR: 77 (20 @ 13:30) (77 - 97)  BP: 121/58 (20 @ 13:30) (107/71 - 128/65)  RR: 18 (20 @ 13:30) (18 - 18)  SpO2: 95% (20 @ 13:30) (95% - 96%)  Wt(kg): --  I&O's Summary      Appearance: Normal	  HEENT:   Normal oral mucosa, PERRL, EOMI	  Lymphatic: No lymphadenopathy  Cardiovascular: Normal S1 S2, No JVD, No murmurs, No edema  Respiratory: Lungs clear to auscultation	  Psychiatry: A & O x 3, Mood & affect appropriate  Gastrointestinal:  Soft, Non-tender, + BS	  Skin: No rashes, No ecchymoses, No cyanosis	  Neurologic: Non-focal  Extremities: Normal range of motion, No clubbing, left arm chronic edema with redness and warmth improved  Vascular: Peripheral pulses palpable 2+ bilaterally    MEDICATIONS  (STANDING):  cefTRIAXone   IVPB      enoxaparin Injectable 40 milliGRAM(s) SubCutaneous daily  letrozole 2.5 milliGRAM(s) Oral daily  levothyroxine 50 MICROGram(s) Oral daily  oseltamivir 75 milliGRAM(s) Oral two times a day      TELEMETRY: 	    ECG:  	  RADIOLOGY:  OTHER: 	  	  CBC Full  -  ( 2020 07:38 )  WBC Count : 4.98 K/uL  Hemoglobin : 12.4 g/dL  Hematocrit : 37.1 %  Platelet Count - Automated : 217 K/uL  Mean Cell Volume : 104.5 fl  Mean Cell Hemoglobin : 34.9 pg  Mean Cell Hemoglobin Concentration : 33.4 gm/dL  Auto Neutrophil # : 2.91 K/uL  Auto Lymphocyte # : 1.16 K/uL  Auto Monocyte # : 0.85 K/uL  Auto Eosinophil # : 0.01 K/uL  Auto Basophil # : 0.03 K/uL  Auto Neutrophil % : 58.4 %  Auto Lymphocyte % : 23.3 %  Auto Monocyte % : 17.1 %  Auto Eosinophil % : 0.2 %  Auto Basophil % : 0.6 %        CARDIAC MARKERS:                              12.4   4.98  )-----------( 217      ( 2020 07:38 )             37.1       02-16    139  |  104  |  8   ----------------------------<  103<H>  3.5   |  28  |  0.60    Ca    8.6      2020 07:38  Phos  2.6     02-16  Mg     2.7     02-16    TPro  7.8  /  Alb  3.1<L>  /  TBili  0.7  /  DBili  x   /  AST  15  /  ALT  22  /  AlkPhos  60  02-16          Urinalysis Basic - ( 2020 20:12 )    Color: Yellow / Appearance: Clear / S.010 / pH: x  Gluc: x / Ketone: Negative  / Bili: Negative / Urobili: Negative   Blood: x / Protein: 30 mg/dL / Nitrite: Negative   Leuk Esterase: Negative / RBC: 10-25 /HPF / WBC 0-2 /HPF   Sq Epi: x / Non Sq Epi: Occasional /HPF / Bacteria: Trace /HPF      proBNP:   Lipid Profile: Cholesterol 141  LDL 98  HDL 30  TG 63    HgA1c: Hemoglobin A1C, Whole Blood: 6.1 % (02-15 @ 10:59)    TSH: Thyroid Stimulating Hormone, Serum: 0.92 uU/mL (02-15 @ 07:11)

## 2020-02-16 NOTE — PROGRESS NOTE ADULT - SUBJECTIVE AND OBJECTIVE BOX
65y Female    Meds:  cefTRIAXone   IVPB      oseltamivir 75 milliGRAM(s) Oral two times a day    Allergies    No Known Allergies    Intolerances        VITALS:  Vital Signs Last 24 Hrs  T(C): 36.4 (16 Feb 2020 13:30), Max: 37.7 (15 Feb 2020 16:25)  T(F): 97.6 (16 Feb 2020 13:30), Max: 99.8 (15 Feb 2020 16:25)  HR: 77 (16 Feb 2020 13:30) (77 - 97)  BP: 121/58 (16 Feb 2020 13:30) (107/71 - 137/58)  BP(mean): --  RR: 18 (16 Feb 2020 13:30) (18 - 18)  SpO2: 95% (16 Feb 2020 13:30) (95% - 97%)    LABS/DIAGNOSTIC TESTS:                          12.4   4.98  )-----------( 217      ( 16 Feb 2020 07:38 )             37.1         02-16    139  |  104  |  8   ----------------------------<  103<H>  3.5   |  28  |  0.60    Ca    8.6      16 Feb 2020 07:38  Phos  2.6     02-16  Mg     2.7     02-16    TPro  7.8  /  Alb  3.1<L>  /  TBili  0.7  /  DBili  x   /  AST  15  /  ALT  22  /  AlkPhos  60  02-16      LIVER FUNCTIONS - ( 16 Feb 2020 07:38 )  Alb: 3.1 g/dL / Pro: 7.8 g/dL / ALK PHOS: 60 U/L / ALT: 22 U/L DA / AST: 15 U/L / GGT: x             CULTURES: .Urine  02-15 @ 01:43   >=3 organisms. Probable collection contamination.  --  --      .Blood  02-15 @ 01:22   Growth in aerobic bottle: Gram Positive Cocci in Pairs and Chains  Growth in anaerobic bottle: Gram Positive Cocci in Pairs and Chains  --  Blood Culture PCR      .Urine  11-30 @ 01:07   >=3 organisms. Probable collection contamination.  --  --            RADIOLOGY:      ROS:  [  ] UNABLE TO ELICIT 65y Female who is doing better, her family is at the bedside, she has less coughing and her fevers have improved , her cultures have not been identified as yet but likely will be Strep C/G , her left arm cellulitis is much better today. She denies having any bacteria.    Meds:  cefTRIAXone   IVPB      oseltamivir 75 milliGRAM(s) Oral two times a day    Allergies    No Known Allergies    Intolerances        VITALS:  Vital Signs Last 24 Hrs  T(C): 36.4 (16 Feb 2020 13:30), Max: 37.7 (15 Feb 2020 16:25)  T(F): 97.6 (16 Feb 2020 13:30), Max: 99.8 (15 Feb 2020 16:25)  HR: 77 (16 Feb 2020 13:30) (77 - 97)  BP: 121/58 (16 Feb 2020 13:30) (107/71 - 137/58)  BP(mean): --  RR: 18 (16 Feb 2020 13:30) (18 - 18)  SpO2: 95% (16 Feb 2020 13:30) (95% - 97%)    LABS/DIAGNOSTIC TESTS:                          12.4   4.98  )-----------( 217      ( 16 Feb 2020 07:38 )             37.1         02-16    139  |  104  |  8   ----------------------------<  103<H>  3.5   |  28  |  0.60    Ca    8.6      16 Feb 2020 07:38  Phos  2.6     02-16  Mg     2.7     02-16    TPro  7.8  /  Alb  3.1<L>  /  TBili  0.7  /  DBili  x   /  AST  15  /  ALT  22  /  AlkPhos  60  02-16      LIVER FUNCTIONS - ( 16 Feb 2020 07:38 )  Alb: 3.1 g/dL / Pro: 7.8 g/dL / ALK PHOS: 60 U/L / ALT: 22 U/L DA / AST: 15 U/L / GGT: x             CULTURES: .Urine  02-15 @ 01:43   >=3 organisms. Probable collection contamination.  --  --      .Blood  02-15 @ 01:22   Growth in aerobic bottle: Gram Positive Cocci in Pairs and Chains  Growth in anaerobic bottle: Gram Positive Cocci in Pairs and Chains  --  Blood Culture PCR      .Urine  11-30 @ 01:07   >=3 organisms. Probable collection contamination.  --  --            RADIOLOGY:      ROS:  [  ] UNABLE TO ELICIT

## 2020-02-16 NOTE — CONSULT NOTE ADULT - ASSESSMENT
64 y/o F from home, walks independently, lives with  with PMH of Stage IV breast ca, s/p Bilateral mastectomy on ibrance/letrozole, malignant pleural effusion s/p VATS procedure, chronic LUE Lymphedema, hypothyroidism p/w influenza A.    PROBLEM #1 ID      PROBLEM #2 Breast cancer    Full consult to follow; call with questions [phone] 64 y/o F from home, walks independently, lives with  with PMH of Stage IV breast ca, s/p B/L mastectomy on palbociclib/letrozole, malignant pleural effusion s/p VATS procedure, chronic LUE Lymphedema, hypothyroidism p/w influenza A and GPC bacteremia.    PROBLEM #1 ID - p/w influenza A pneumonia and GPC bacteremia on oseltamivir and antibiotics  -continue with supportive therapy including pulmonary toileting and supplemental O2   -f/up final culture results    PROBLEM #2 Breast cancer - continue with letrozole alone; continue to hold palbociclib during acute active infections given it can cause neutropenia     Full consult to follow; call with questions [phone]; Dr. Lala to return on Tuesday

## 2020-02-16 NOTE — PROGRESS NOTE ADULT - SUBJECTIVE AND OBJECTIVE BOX
Patient is a 65y old  Female who presents with a chief complaint of Fever and chills (2020 08:43)  Awake, alert, comfortable in bed in NAD. NO cough or sob    INTERVAL HPI/OVERNIGHT EVENTS:      VITAL SIGNS:  T(F): 98.3 (20 @ 05:36)  HR: 86 (20 @ 05:36)  BP: 128/65 (20 @ 05:36)  RR: 18 (20 @ 05:36)  SpO2: 96% (20 @ 05:36)  Wt(kg): --  I&O's Detail          REVIEW OF SYSTEMS:    CONSTITUTIONAL:  No fevers, chills, sweats    HEENT:  Eyes:  No diplopia or blurred vision. ENT:  No earache, sore throat or runny nose.    CARDIOVASCULAR:  No pressure, squeezing, tightness, or heaviness about the chest; no palpitations.    RESPIRATORY:  Per HPI    GASTROINTESTINAL:  No abdominal pain, nausea, vomiting or diarrhea.    GENITOURINARY:  No dysuria, frequency or urgency.    NEUROLOGIC:  No paresthesias, fasciculations, seizures or weakness.    PSYCHIATRIC:  No disorder of thought or mood.      PHYSICAL EXAM:    Constitutional: Well developed and nourished  Eyes:Perrla  ENMT: normal  Neck:supple  Respiratory: good air entry  Cardiovascular: S1 S2 regular  Gastrointestinal: Soft, Non tender  Extremities: No edema  Vascular:normal  Neurological:Awake, alert,Ox3  Musculoskeletal:Normal      MEDICATIONS  (STANDING):  cefTRIAXone   IVPB      enoxaparin Injectable 40 milliGRAM(s) SubCutaneous daily  letrozole 2.5 milliGRAM(s) Oral daily  levothyroxine 50 MICROGram(s) Oral daily  oseltamivir 75 milliGRAM(s) Oral two times a day    MEDICATIONS  (PRN):  acetaminophen   Tablet .. 650 milliGRAM(s) Oral every 6 hours PRN Temp greater or equal to 38C (100.4F), Moderate Pain (4 - 6)      Allergies    No Known Allergies    Intolerances        LABS:                        12.4   4.98  )-----------( 217      ( 2020 07:38 )             37.1         139  |  104  |  8   ----------------------------<  103<H>  3.5   |  28  |  0.60    Ca    8.6      2020 07:38  Phos  2.6     02-16  Mg     2.7     02-16    TPro  7.8  /  Alb  3.1<L>  /  TBili  0.7  /  DBili  x   /  AST  15  /  ALT  22  /  AlkPhos  60  02-16      Urinalysis Basic - ( 2020 20:12 )    Color: Yellow / Appearance: Clear / S.010 / pH: x  Gluc: x / Ketone: Negative  / Bili: Negative / Urobili: Negative   Blood: x / Protein: 30 mg/dL / Nitrite: Negative   Leuk Esterase: Negative / RBC: 10-25 /HPF / WBC 0-2 /HPF   Sq Epi: x / Non Sq Epi: Occasional /HPF / Bacteria: Trace /HPF            CAPILLARY BLOOD GLUCOSE            RADIOLOGY & ADDITIONAL TESTS:    CXR:    Ct scan chest:    ekg;    echo:

## 2020-02-17 LAB
-  CEFTRIAXONE: SIGNIFICANT CHANGE UP
-  CLINDAMYCIN: SIGNIFICANT CHANGE UP
-  ERYTHROMYCIN: SIGNIFICANT CHANGE UP
-  LEVOFLOXACIN: SIGNIFICANT CHANGE UP
-  PENICILLIN: SIGNIFICANT CHANGE UP
-  VANCOMYCIN: SIGNIFICANT CHANGE UP
ALBUMIN SERPL ELPH-MCNC: 3.1 G/DL — LOW (ref 3.5–5)
ALP SERPL-CCNC: 57 U/L — SIGNIFICANT CHANGE UP (ref 40–120)
ALT FLD-CCNC: 23 U/L DA — SIGNIFICANT CHANGE UP (ref 10–60)
ANION GAP SERPL CALC-SCNC: 8 MMOL/L — SIGNIFICANT CHANGE UP (ref 5–17)
AST SERPL-CCNC: 18 U/L — SIGNIFICANT CHANGE UP (ref 10–40)
BASOPHILS # BLD AUTO: 0.04 K/UL — SIGNIFICANT CHANGE UP (ref 0–0.2)
BASOPHILS NFR BLD AUTO: 1 % — SIGNIFICANT CHANGE UP (ref 0–2)
BILIRUB SERPL-MCNC: 0.5 MG/DL — SIGNIFICANT CHANGE UP (ref 0.2–1.2)
BUN SERPL-MCNC: 10 MG/DL — SIGNIFICANT CHANGE UP (ref 7–18)
CALCIUM SERPL-MCNC: 8.9 MG/DL — SIGNIFICANT CHANGE UP (ref 8.4–10.5)
CHLORIDE SERPL-SCNC: 104 MMOL/L — SIGNIFICANT CHANGE UP (ref 96–108)
CO2 SERPL-SCNC: 27 MMOL/L — SIGNIFICANT CHANGE UP (ref 22–31)
CREAT SERPL-MCNC: 0.59 MG/DL — SIGNIFICANT CHANGE UP (ref 0.5–1.3)
CULTURE RESULTS: SIGNIFICANT CHANGE UP
EOSINOPHIL # BLD AUTO: 0.07 K/UL — SIGNIFICANT CHANGE UP (ref 0–0.5)
EOSINOPHIL NFR BLD AUTO: 1.8 % — SIGNIFICANT CHANGE UP (ref 0–6)
ERYTHROCYTE [SEDIMENTATION RATE] IN BLOOD: 76 MM/HR — HIGH (ref 0–20)
GLUCOSE SERPL-MCNC: 120 MG/DL — HIGH (ref 70–99)
HCT VFR BLD CALC: 37 % — SIGNIFICANT CHANGE UP (ref 34.5–45)
HGB BLD-MCNC: 12.4 G/DL — SIGNIFICANT CHANGE UP (ref 11.5–15.5)
IMM GRANULOCYTES NFR BLD AUTO: 0.8 % — SIGNIFICANT CHANGE UP (ref 0–1.5)
LEGIONELLA AG UR QL: NEGATIVE — SIGNIFICANT CHANGE UP
LYMPHOCYTES # BLD AUTO: 1.13 K/UL — SIGNIFICANT CHANGE UP (ref 1–3.3)
LYMPHOCYTES # BLD AUTO: 29.7 % — SIGNIFICANT CHANGE UP (ref 13–44)
MAGNESIUM SERPL-MCNC: 2.6 MG/DL — SIGNIFICANT CHANGE UP (ref 1.6–2.6)
MCHC RBC-ENTMCNC: 33.5 GM/DL — SIGNIFICANT CHANGE UP (ref 32–36)
MCHC RBC-ENTMCNC: 34.8 PG — HIGH (ref 27–34)
MCV RBC AUTO: 103.9 FL — HIGH (ref 80–100)
METHOD TYPE: SIGNIFICANT CHANGE UP
METHOD TYPE: SIGNIFICANT CHANGE UP
MONOCYTES # BLD AUTO: 0.73 K/UL — SIGNIFICANT CHANGE UP (ref 0–0.9)
MONOCYTES NFR BLD AUTO: 19.2 % — HIGH (ref 2–14)
NEUTROPHILS # BLD AUTO: 1.81 K/UL — SIGNIFICANT CHANGE UP (ref 1.8–7.4)
NEUTROPHILS NFR BLD AUTO: 47.5 % — SIGNIFICANT CHANGE UP (ref 43–77)
NRBC # BLD: 0 /100 WBCS — SIGNIFICANT CHANGE UP (ref 0–0)
ORGANISM # SPEC MICROSCOPIC CNT: SIGNIFICANT CHANGE UP
PHOSPHATE SERPL-MCNC: 3.6 MG/DL — SIGNIFICANT CHANGE UP (ref 2.5–4.5)
PLATELET # BLD AUTO: 235 K/UL — SIGNIFICANT CHANGE UP (ref 150–400)
POTASSIUM SERPL-MCNC: 3.6 MMOL/L — SIGNIFICANT CHANGE UP (ref 3.5–5.3)
POTASSIUM SERPL-SCNC: 3.6 MMOL/L — SIGNIFICANT CHANGE UP (ref 3.5–5.3)
PROT SERPL-MCNC: 7.7 G/DL — SIGNIFICANT CHANGE UP (ref 6–8.3)
RBC # BLD: 3.56 M/UL — LOW (ref 3.8–5.2)
RBC # FLD: 13 % — SIGNIFICANT CHANGE UP (ref 10.3–14.5)
SODIUM SERPL-SCNC: 139 MMOL/L — SIGNIFICANT CHANGE UP (ref 135–145)
SPECIMEN SOURCE: SIGNIFICANT CHANGE UP
SPECIMEN SOURCE: SIGNIFICANT CHANGE UP
WBC # BLD: 3.81 K/UL — SIGNIFICANT CHANGE UP (ref 3.8–10.5)
WBC # FLD AUTO: 3.81 K/UL — SIGNIFICANT CHANGE UP (ref 3.8–10.5)

## 2020-02-17 RX ORDER — CHLORHEXIDINE GLUCONATE 213 G/1000ML
1 SOLUTION TOPICAL DAILY
Refills: 0 | Status: DISCONTINUED | OUTPATIENT
Start: 2020-02-17 | End: 2020-02-20

## 2020-02-17 RX ADMIN — CEFTRIAXONE 100 MILLIGRAM(S): 500 INJECTION, POWDER, FOR SOLUTION INTRAMUSCULAR; INTRAVENOUS at 12:20

## 2020-02-17 RX ADMIN — Medication 75 MILLIGRAM(S): at 17:31

## 2020-02-17 RX ADMIN — Medication 75 MILLIGRAM(S): at 06:44

## 2020-02-17 RX ADMIN — LETROZOLE 2.5 MILLIGRAM(S): 2.5 TABLET, FILM COATED ORAL at 12:20

## 2020-02-17 RX ADMIN — Medication 50 MICROGRAM(S): at 06:44

## 2020-02-17 RX ADMIN — CHLORHEXIDINE GLUCONATE 1 APPLICATION(S): 213 SOLUTION TOPICAL at 12:20

## 2020-02-17 NOTE — PROGRESS NOTE ADULT - SUBJECTIVE AND OBJECTIVE BOX
CHIEF COMPLAINT:Patient is a 65y old  Female who presents with a chief complaint of Fever and chills (17 Feb 2020 12:56)    	  REVIEW OF SYSTEMS:  CONSTITUTIONAL: No fever, weight loss, or fatigue  EYES: No eye pain, visual disturbances, or discharge  ENMT:  No difficulty hearing, tinnitus, vertigo; No sinus or throat pain  NECK: No pain or stiffness  RESPIRATORY: No cough, wheezing, chills or hemoptysis; No Shortness of Breath  CARDIOVASCULAR: No chest pain, palpitations, passing out, dizziness, or leg swelling  GASTROINTESTINAL: No abdominal or epigastric pain. No nausea, vomiting, or hematemesis; No diarrhea or constipation. No melena or hematochezia.  GENITOURINARY: No dysuria, frequency, hematuria, or incontinence  NEUROLOGICAL: No headaches, memory loss, loss of strength, numbness, or tremors  SKIN: No itching, burning, rashes, or lesions   LYMPH Nodes: No enlarged glands  ENDOCRINE: No heat or cold intolerance; No hair loss  MUSCULOSKELETAL: No joint pain or swelling; No muscle, back, or extremity pain  PSYCHIATRIC: No depression, anxiety, mood swings, or difficulty sleeping  HEME/LYMPH: No easy bruising, or bleeding gums  ALLERY AND IMMUNOLOGIC: No hives or eczema	    [ ] All others negative	  [ ] Unable to obtain    PHYSICAL EXAM:  T(C): 36.8 (02-17-20 @ 13:19), Max: 37.1 (02-17-20 @ 06:34)  HR: 77 (02-17-20 @ 13:19) (75 - 77)  BP: 133/83 (02-17-20 @ 13:19) (130/74 - 133/83)  RR: 18 (02-17-20 @ 13:19) (18 - 18)  SpO2: 95% (02-17-20 @ 13:19) (95% - 98%)  Wt(kg): --  I&O's Summary      Appearance: Normal	  HEENT:   Normal oral mucosa, PERRL, EOMI	  Lymphatic: No lymphadenopathy  Cardiovascular: Normal S1 S2, No JVD, No murmurs, No edema  Respiratory: Lungs clear to auscultation	  Psychiatry: A & O x 3, Mood & affect appropriate  Gastrointestinal:  Soft, Non-tender, + BS	  Skin: No rashes, No ecchymoses, No cyanosis	  Neurologic: Non-focal  Extremities: Normal range of motion, No clubbing, cyanosis or edema  Vascular: Peripheral pulses palpable 2+ bilaterally    MEDICATIONS  (STANDING):  cefTRIAXone   IVPB      cefTRIAXone   IVPB 2000 milliGRAM(s) IV Intermittent every 24 hours  chlorhexidine 2% Cloths 1 Application(s) Topical daily  enoxaparin Injectable 40 milliGRAM(s) SubCutaneous daily  levothyroxine 50 MICROGram(s) Oral daily  oseltamivir 75 milliGRAM(s) Oral two times a day      TELEMETRY: 	    ECG:  	  RADIOLOGY:  OTHER: 	  	  CBC Full  -  ( 17 Feb 2020 07:31 )  WBC Count : 3.81 K/uL  Hemoglobin : 12.4 g/dL  Hematocrit : 37.0 %  Platelet Count - Automated : 235 K/uL  Mean Cell Volume : 103.9 fl  Mean Cell Hemoglobin : 34.8 pg  Mean Cell Hemoglobin Concentration : 33.5 gm/dL  Auto Neutrophil # : 1.81 K/uL  Auto Lymphocyte # : 1.13 K/uL  Auto Monocyte # : 0.73 K/uL  Auto Eosinophil # : 0.07 K/uL  Auto Basophil # : 0.04 K/uL  Auto Neutrophil % : 47.5 %  Auto Lymphocyte % : 29.7 %  Auto Monocyte % : 19.2 %  Auto Eosinophil % : 1.8 %  Auto Basophil % : 1.0 %        CARDIAC MARKERS:                              12.4   3.81  )-----------( 235      ( 17 Feb 2020 07:31 )             37.0       02-17    139  |  104  |  10  ----------------------------<  120<H>  3.6   |  27  |  0.59    Ca    8.9      17 Feb 2020 07:31  Phos  3.6     02-17  Mg     2.6     02-17    TPro  7.7  /  Alb  3.1<L>  /  TBili  0.5  /  DBili  x   /  AST  18  /  ALT  23  /  AlkPhos  57  02-17            proBNP:   Lipid Profile: Cholesterol 141  LDL 98  HDL 30  TG 63    HgA1c: Hemoglobin A1C, Whole Blood: 6.1 % (02-15 @ 10:59)    TSH: Thyroid Stimulating Hormone, Serum: 0.92 uU/mL (02-15 @ 07:11)

## 2020-02-17 NOTE — PROGRESS NOTE ADULT - SUBJECTIVE AND OBJECTIVE BOX
65y Female who is growing out Strep Mitis/Oralis , called core lab to speciate it and they are running sensitivities.     Meds:  cefTRIAXone   IVPB      cefTRIAXone   IVPB 2000 milliGRAM(s) IV Intermittent every 24 hours  oseltamivir 75 milliGRAM(s) Oral two times a day    Allergies    No Known Allergies    Intolerances        VITALS:  Vital Signs Last 24 Hrs  T(C): 37.1 (17 Feb 2020 06:34), Max: 37.1 (17 Feb 2020 06:34)  T(F): 98.8 (17 Feb 2020 06:34), Max: 98.8 (17 Feb 2020 06:34)  HR: 75 (17 Feb 2020 06:34) (75 - 80)  BP: 130/74 (17 Feb 2020 06:34) (121/58 - 131/65)  BP(mean): --  RR: 18 (17 Feb 2020 06:34) (18 - 18)  SpO2: 98% (17 Feb 2020 06:34) (95% - 98%)    LABS/DIAGNOSTIC TESTS:                          12.4   3.81  )-----------( 235      ( 17 Feb 2020 07:31 )             37.0         02-17    139  |  104  |  10  ----------------------------<  120<H>  3.6   |  27  |  0.59    Ca    8.9      17 Feb 2020 07:31  Phos  3.6     02-17  Mg     2.6     02-17    TPro  7.7  /  Alb  3.1<L>  /  TBili  0.5  /  DBili  x   /  AST  18  /  ALT  23  /  AlkPhos  57  02-17      LIVER FUNCTIONS - ( 17 Feb 2020 07:31 )  Alb: 3.1 g/dL / Pro: 7.7 g/dL / ALK PHOS: 57 U/L / ALT: 23 U/L DA / AST: 18 U/L / GGT: x             CULTURES: .Urine  02-15 @ 01:43   >=3 organisms. Probable collection contamination.  --  --      .Blood  02-15 @ 01:22   Growth in aerobic bottle: Gram Positive Cocci in Pairs and Chains  Growth in anaerobic bottle: Gram Positive Cocci in Pairs and Chains  --  Blood Culture PCR      .Urine  11-30 @ 01:07   >=3 organisms. Probable collection contamination.  --  --            RADIOLOGY:      ROS:  [  ] UNABLE TO ELICIT 65y Female who is growing out Strep Mitis/Oralis , called core lab to speciate it and they are running sensitivities. She is clinically much better, her left arm cellulitis is much better, but given that she grew out a Viridans Strep in her blood cultures it will not be coming from her arm and should be from her mouth and so on further examination I see that her bottom teeth both existing and prior broken teeth are horrible and are the likely source of her Bacteremia. I have tried explaining this to her and the serious nature of the organism and that she could have a heart valve infection but seems more interested when she can go home.  Her cough is dramatically better.    Meds:  cefTRIAXone   IVPB      cefTRIAXone   IVPB 2000 milliGRAM(s) IV Intermittent every 24 hours  oseltamivir 75 milliGRAM(s) Oral two times a day    Allergies    No Known Allergies    Intolerances        VITALS:  Vital Signs Last 24 Hrs  T(C): 37.1 (17 Feb 2020 06:34), Max: 37.1 (17 Feb 2020 06:34)  T(F): 98.8 (17 Feb 2020 06:34), Max: 98.8 (17 Feb 2020 06:34)  HR: 75 (17 Feb 2020 06:34) (75 - 80)  BP: 130/74 (17 Feb 2020 06:34) (121/58 - 131/65)  BP(mean): --  RR: 18 (17 Feb 2020 06:34) (18 - 18)  SpO2: 98% (17 Feb 2020 06:34) (95% - 98%)    LABS/DIAGNOSTIC TESTS:                          12.4   3.81  )-----------( 235      ( 17 Feb 2020 07:31 )             37.0         02-17    139  |  104  |  10  ----------------------------<  120<H>  3.6   |  27  |  0.59    Ca    8.9      17 Feb 2020 07:31  Phos  3.6     02-17  Mg     2.6     02-17    TPro  7.7  /  Alb  3.1<L>  /  TBili  0.5  /  DBili  x   /  AST  18  /  ALT  23  /  AlkPhos  57  02-17      LIVER FUNCTIONS - ( 17 Feb 2020 07:31 )  Alb: 3.1 g/dL / Pro: 7.7 g/dL / ALK PHOS: 57 U/L / ALT: 23 U/L DA / AST: 18 U/L / GGT: x             CULTURES: .Urine  02-15 @ 01:43   >=3 organisms. Probable collection contamination.  --  --      .Blood  02-15 @ 01:22   Growth in aerobic bottle: Gram Positive Cocci in Pairs and Chains  Growth in anaerobic bottle: Gram Positive Cocci in Pairs and Chains  --  Blood Culture PCR      .Urine  11-30 @ 01:07   >=3 organisms. Probable collection contamination.  --  --            RADIOLOGY:      ROS:  [  ] UNABLE TO ELICIT

## 2020-02-17 NOTE — CHART NOTE - NSCHARTNOTEFT_GEN_A_CORE
EVENT: blood cultures positive see below      OBJECTIVE:  Vital Signs Last 24 Hrs  T(C): 37.1 (17 Feb 2020 06:34), Max: 37.1 (17 Feb 2020 06:34)  T(F): 98.8 (17 Feb 2020 06:34), Max: 98.8 (17 Feb 2020 06:34)  HR: 75 (17 Feb 2020 06:34) (75 - 80)  BP: 130/74 (17 Feb 2020 06:34) (121/58 - 131/65)  BP(mean): --  RR: 18 (17 Feb 2020 06:34) (18 - 18)  SpO2: 98% (17 Feb 2020 06:34) (95% - 98%)    LABS:                        12.4   3.81  )-----------( 235      ( 17 Feb 2020 07:31 )             37.0     02-17    139  |  104  |  10  ----------------------------<  120<H>  3.6   |  27  |  0.59    Ca    8.9      17 Feb 2020 07:31  Phos  3.6     02-17  Mg     2.6     02-17    TPro  7.7  /  Alb  3.1<L>  /  TBili  0.5  /  DBili  x   /  AST  18  /  ALT  23  /  AlkPhos  57  02-17      Culture - Blood (02.15.20 @ 01:22)    Gram Stain:   Growth in aerobic bottle: Gram Positive Cocci in Pairs and Chains  Growth in anaerobic bottle: Gram Positive Cocci in Pairs and Chains    -  Streptococcus sp. (Not Grp A, B or S pneumoniae): Detec    Specimen Source: .Blood    Organism: Blood Culture PCR    Culture Results:   Growth in aerobic and anaerobic bottles: Streptococcus mitis/oralis group  "Susceptibilities not performed"  ***Blood Panel PCR results on this specimen are available  approximately 3 hours after the Gram stain result.***  Gram stain, PCR, and/or culture results may not always  correspond due to difference in methodologies.  ************************************************************  This PCR assay was performed using [employer].  The following targets are tested for: Enterococcus,  vancomycin resistant enterococci, Listeria monocytogenes,  coagulase negative staphylococci, S. aureus,  methicillin resistant S. aureus, Streptococcus agalactiae  (Group B), S. pneumoniae, S. pyogenes (Group A),  Acinetobacter baumannii, Enterobacter cloacae, E. coli,  Klebsiella oxytoca, K. pneumoniae, Proteus sp.,  Serratia marcescens, Haemophilus influenzae,  Neisseria meningitidis, Pseudomonas aeruginosa, Candida  albicans, C. glabrata, C krusei, C parapsilosis,  C. tropicalis and the KPC resistance gene.    Organism Identification: Blood Culture PCR    Method Type: PCR      EKG:   IMGAGING:    ASSESSMENT:  HPI:  66 y/o F from home, walks independently, lives with  with PMH of (Breast ca, s/p Bilateral mastectomy on ibrance), Malignant pleural effusion s/p VATS procedure, Chronic Left upper extremity Lymphedema, Hypothyroidism presented to ED with Subjective fever, chills x2 days. .It is a/w Non Productive Cough, Headache and  some weakness. Pt denies  cp, sob, ha, vision loss, rhinorrhea, dysuria, numbness, rash, bleeding, sick contacts, Recent travel.  [facility] Full code (14 Feb 2020 20:25)      PLAN:   1. Strep bacteremia  - c/w Rocephin 2gm D#2  - follow up sensitivities  - follow up repeat blood cultures  - ID dr. Wolfe  - TTE to r/o vegetation  -ESR  2. influenza A  -tamiflu  -droplet precautions  - supportive care  rest per primary team

## 2020-02-17 NOTE — PROGRESS NOTE ADULT - SUBJECTIVE AND OBJECTIVE BOX
Patient is a 65y old  Female who presents with a chief complaint of Fever and chills (16 Feb 2020 19:30)  Awake, alert, comfortable in bed in NAD    INTERVAL HPI/OVERNIGHT EVENTS:      VITAL SIGNS:  T(F): 98.8 (02-17-20 @ 06:34)  HR: 75 (02-17-20 @ 06:34)  BP: 130/74 (02-17-20 @ 06:34)  RR: 18 (02-17-20 @ 06:34)  SpO2: 98% (02-17-20 @ 06:34)  Wt(kg): --  I&O's Detail          REVIEW OF SYSTEMS:    CONSTITUTIONAL:  No fevers, chills, sweats    HEENT:  Eyes:  No diplopia or blurred vision. ENT:  No earache, sore throat or runny nose.    CARDIOVASCULAR:  No pressure, squeezing, tightness, or heaviness about the chest; no palpitations.    RESPIRATORY:  Per HPI    GASTROINTESTINAL:  No abdominal pain, nausea, vomiting or diarrhea.    GENITOURINARY:  No dysuria, frequency or urgency.    NEUROLOGIC:  No paresthesias, fasciculations, seizures or weakness.    PSYCHIATRIC:  No disorder of thought or mood.      PHYSICAL EXAM:    Constitutional: Well developed and nourished  Eyes:Perrla  ENMT: normal  Neck:supple  Respiratory: good air entry  Cardiovascular: S1 S2 regular  Gastrointestinal: Soft, Non tender  Extremities: + edema  Vascular:normal  Neurological:Awake, alert,Ox3  Musculoskeletal:Normal      MEDICATIONS  (STANDING):  cefTRIAXone   IVPB      cefTRIAXone   IVPB 2000 milliGRAM(s) IV Intermittent every 24 hours  chlorhexidine 2% Cloths 1 Application(s) Topical daily  enoxaparin Injectable 40 milliGRAM(s) SubCutaneous daily  letrozole 2.5 milliGRAM(s) Oral daily  levothyroxine 50 MICROGram(s) Oral daily  oseltamivir 75 milliGRAM(s) Oral two times a day    MEDICATIONS  (PRN):  acetaminophen   Tablet .. 650 milliGRAM(s) Oral every 6 hours PRN Temp greater or equal to 38C (100.4F), Moderate Pain (4 - 6)      Allergies    No Known Allergies    Intolerances        LABS:                        12.4   3.81  )-----------( 235      ( 17 Feb 2020 07:31 )             37.0     02-17    139  |  104  |  10  ----------------------------<  120<H>  3.6   |  27  |  0.59    Ca    8.9      17 Feb 2020 07:31  Phos  3.6     02-17  Mg     2.6     02-17    TPro  7.7  /  Alb  3.1<L>  /  TBili  0.5  /  DBili  x   /  AST  18  /  ALT  23  /  AlkPhos  57  02-17              CAPILLARY BLOOD GLUCOSE    Culture - Blood (02.15.20 @ 01:22)    -  Streptococcus sp. (Not Grp A, B or S pneumoniae): Detec    Gram Stain:   Growth in aerobic bottle: Gram Positive Cocci in Pairs and Chains  Growth in anaerobic bottle: Gram Positive Cocci in Pairs and Chains    Specimen Source: .Blood    Organism: Blood Culture PCR    Culture Results:   Growth in aerobic and anaerobic bottles: Alpha hemolytic strep  (not Strep. pneumoniae or Enterococcus)  Single set isolate, possible contaminant. Contact  Microbiology if susceptibility testing clinically  indicated.  ***Blood Panel PCR results onthis specimen are available  approximately 3 hours after the Gram stain result.***  Gram stain, PCR, and/or culture results may not always  correspond due to difference in methodologies.  ************************************************************  This PCR assay was performed using [employer].  The following targets are tested for: Enterococcus,  vancomycin resistant enterococci, Listeria monocytogenes,  coagulase negative staphylococci, S. aureus,  methicillin resistant S. aureus, Streptococcus agalactiae  (Group B), S. pneumoniae, S. pyogenes (Group A),  Acinetobacter baumannii, Enterobacter cloacae, E. coli,  Klebsiella oxytoca, K. pneumoniae, Proteus sp.,  Serratia marcescens, Haemophilus influenzae,  Neisseria meningitidis, Pseudomonas aeruginosa, Candida  albicans, C. glabrata, C krusei, C parapsilosis,  C. tropicalis and the KPC resistance gene.    Organism Identification: Blood Culture PCR    Method Type: PCR    Culture - Urine (02.15.20 @ 01:43)    Specimen Source: .Urine    Culture Results:   >=3 organisms. Probable collection contamination.            RADIOLOGY & ADDITIONAL TESTS:    CXR:    Ct scan chest:    ekg;    echo:

## 2020-02-18 DIAGNOSIS — J11.1 INFLUENZA DUE TO UNIDENTIFIED INFLUENZA VIRUS WITH OTHER RESPIRATORY MANIFESTATIONS: ICD-10-CM

## 2020-02-18 DIAGNOSIS — Z02.9 ENCOUNTER FOR ADMINISTRATIVE EXAMINATIONS, UNSPECIFIED: ICD-10-CM

## 2020-02-18 DIAGNOSIS — Z71.89 OTHER SPECIFIED COUNSELING: ICD-10-CM

## 2020-02-18 LAB
ANION GAP SERPL CALC-SCNC: 5 MMOL/L — SIGNIFICANT CHANGE UP (ref 5–17)
BUN SERPL-MCNC: 11 MG/DL — SIGNIFICANT CHANGE UP (ref 7–18)
CALCIUM SERPL-MCNC: 8.8 MG/DL — SIGNIFICANT CHANGE UP (ref 8.4–10.5)
CHLORIDE SERPL-SCNC: 106 MMOL/L — SIGNIFICANT CHANGE UP (ref 96–108)
CO2 SERPL-SCNC: 26 MMOL/L — SIGNIFICANT CHANGE UP (ref 22–31)
CREAT SERPL-MCNC: 0.66 MG/DL — SIGNIFICANT CHANGE UP (ref 0.5–1.3)
GLUCOSE SERPL-MCNC: 133 MG/DL — HIGH (ref 70–99)
HCT VFR BLD CALC: 36.5 % — SIGNIFICANT CHANGE UP (ref 34.5–45)
HGB BLD-MCNC: 11.9 G/DL — SIGNIFICANT CHANGE UP (ref 11.5–15.5)
MCHC RBC-ENTMCNC: 32.6 GM/DL — SIGNIFICANT CHANGE UP (ref 32–36)
MCHC RBC-ENTMCNC: 33.6 PG — SIGNIFICANT CHANGE UP (ref 27–34)
MCV RBC AUTO: 103.1 FL — HIGH (ref 80–100)
MRSA PCR RESULT.: SIGNIFICANT CHANGE UP
NRBC # BLD: 0 /100 WBCS — SIGNIFICANT CHANGE UP (ref 0–0)
PLATELET # BLD AUTO: 316 K/UL — SIGNIFICANT CHANGE UP (ref 150–400)
POTASSIUM SERPL-MCNC: 3.5 MMOL/L — SIGNIFICANT CHANGE UP (ref 3.5–5.3)
POTASSIUM SERPL-SCNC: 3.5 MMOL/L — SIGNIFICANT CHANGE UP (ref 3.5–5.3)
RBC # BLD: 3.54 M/UL — LOW (ref 3.8–5.2)
RBC # FLD: 12.7 % — SIGNIFICANT CHANGE UP (ref 10.3–14.5)
S AUREUS DNA NOSE QL NAA+PROBE: SIGNIFICANT CHANGE UP
SODIUM SERPL-SCNC: 137 MMOL/L — SIGNIFICANT CHANGE UP (ref 135–145)
WBC # BLD: 3.42 K/UL — LOW (ref 3.8–10.5)
WBC # FLD AUTO: 3.42 K/UL — LOW (ref 3.8–10.5)

## 2020-02-18 RX ADMIN — Medication 75 MILLIGRAM(S): at 06:26

## 2020-02-18 RX ADMIN — CEFTRIAXONE 100 MILLIGRAM(S): 500 INJECTION, POWDER, FOR SOLUTION INTRAMUSCULAR; INTRAVENOUS at 13:23

## 2020-02-18 RX ADMIN — Medication 75 MILLIGRAM(S): at 17:12

## 2020-02-18 RX ADMIN — CHLORHEXIDINE GLUCONATE 1 APPLICATION(S): 213 SOLUTION TOPICAL at 13:24

## 2020-02-18 RX ADMIN — Medication 50 MICROGRAM(S): at 06:26

## 2020-02-18 RX ADMIN — Medication 650 MILLIGRAM(S): at 06:57

## 2020-02-18 RX ADMIN — Medication 650 MILLIGRAM(S): at 06:27

## 2020-02-18 NOTE — PROGRESS NOTE ADULT - SUBJECTIVE AND OBJECTIVE BOX
Patient is a 65y old  Female who presents with a chief complaint of Fever and chills (17 Feb 2020 22:16)  Awake, alert, comfortable in bed in NAD    INTERVAL HPI/OVERNIGHT EVENTS:      VITAL SIGNS:  T(F): 98.2 (02-18-20 @ 05:11)  HR: 75 (02-18-20 @ 05:11)  BP: 132/76 (02-18-20 @ 05:11)  RR: 18 (02-18-20 @ 05:11)  SpO2: 95% (02-18-20 @ 05:11)  Wt(kg): --  I&O's Detail          REVIEW OF SYSTEMS:    CONSTITUTIONAL:  No fevers, chills, sweats    HEENT:  Eyes:  No diplopia or blurred vision. ENT:  No earache, sore throat or runny nose.    CARDIOVASCULAR:  No pressure, squeezing, tightness, or heaviness about the chest; no palpitations.    RESPIRATORY:  Per HPI    GASTROINTESTINAL:  No abdominal pain, nausea, vomiting or diarrhea.    GENITOURINARY:  No dysuria, frequency or urgency.    NEUROLOGIC:  No paresthesias, fasciculations, seizures or weakness.    PSYCHIATRIC:  No disorder of thought or mood.      PHYSICAL EXAM:    Constitutional: Well developed and nourished  Eyes:Perrla  ENMT: normal  Neck:supple  Respiratory: good air entry  Cardiovascular: S1 S2 regular  Gastrointestinal: Soft, Non tender  Extremities: + edema  Vascular:normal  Neurological:Awake, alert,Ox3  Musculoskeletal:Normal      MEDICATIONS  (STANDING):  cefTRIAXone   IVPB      cefTRIAXone   IVPB 2000 milliGRAM(s) IV Intermittent every 24 hours  chlorhexidine 2% Cloths 1 Application(s) Topical daily  enoxaparin Injectable 40 milliGRAM(s) SubCutaneous daily  levothyroxine 50 MICROGram(s) Oral daily  oseltamivir 75 milliGRAM(s) Oral two times a day    MEDICATIONS  (PRN):  acetaminophen   Tablet .. 650 milliGRAM(s) Oral every 6 hours PRN Temp greater or equal to 38C (100.4F), Moderate Pain (4 - 6)      Allergies    No Known Allergies    Intolerances        LABS:                        12.4   3.81  )-----------( 235      ( 17 Feb 2020 07:31 )             37.0     02-17    139  |  104  |  10  ----------------------------<  120<H>  3.6   |  27  |  0.59    Ca    8.9      17 Feb 2020 07:31  Phos  3.6     02-17  Mg     2.6     02-17    TPro  7.7  /  Alb  3.1<L>  /  TBili  0.5  /  DBili  x   /  AST  18  /  ALT  23  /  AlkPhos  57  02-17              CAPILLARY BLOOD GLUCOSE            RADIOLOGY & ADDITIONAL TESTS:    CXR:    Ct scan chest:    ekg;    echo:

## 2020-02-18 NOTE — PROGRESS NOTE ADULT - PROBLEM SELECTOR PLAN 10
Discharge planning pending SEAN, repeat bl culture result to be negative   for PICC line to be inserted to complete antibiotics course for prophylaxis/ treatment of endocarditis

## 2020-02-18 NOTE — PROGRESS NOTE ADULT - SUBJECTIVE AND OBJECTIVE BOX
NP Note discussed with  Primary Attending    Patient is a 65y old  Female who presents with a chief complaint of Fever and chills (18 Feb 2020 10:54)    66 y/o F from home, walks independently, lives with  with PMH of (Breast ca, s/p Bilateral mastectomy on ibrance), Malignant pleural effusion s/p VATS procedure, Chronic Left upper extremity Lymphedema, Hypothyroidism presented to ED with Subjective fever, chills x2 days, non Productive Cough, Headache and  some weakness.    2/18/2020 Pt seen and examined. Pt is alert, oriented, nad. Pt refused lab work in am including blood culture, agreed later. Pt and family at bedside were explained about diagnostic tests and treatment plan. Pt and family verbalized understanding.        INTERVAL HPI/OVERNIGHT EVENTS: no new complaints    MEDICATIONS  (STANDING):  cefTRIAXone   IVPB      cefTRIAXone   IVPB 2000 milliGRAM(s) IV Intermittent every 24 hours  chlorhexidine 2% Cloths 1 Application(s) Topical daily  enoxaparin Injectable 40 milliGRAM(s) SubCutaneous daily  levothyroxine 50 MICROGram(s) Oral daily  oseltamivir 75 milliGRAM(s) Oral two times a day    MEDICATIONS  (PRN):  acetaminophen   Tablet .. 650 milliGRAM(s) Oral every 6 hours PRN Temp greater or equal to 38C (100.4F), Moderate Pain (4 - 6)      __________________________________________________  REVIEW OF SYSTEMS:    CONSTITUTIONAL: No fever,   EYES: no acute visual disturbances  NECK: No pain or stiffness  RESPIRATORY: No cough; No shortness of breath  CARDIOVASCULAR: No chest pain, no palpitations  GASTROINTESTINAL: No pain. No nausea or vomiting; No diarrhea   NEUROLOGICAL: No headache or numbness, no tremors  MUSCULOSKELETAL: No joint pain, no muscle pain  GENITOURINARY: no dysuria, no frequency, no hesitancy  PSYCHIATRY: no depression , no anxiety  Pt stated she feels better overall   Vital Signs Last 24 Hrs  T(C): 36.5 (18 Feb 2020 12:55), Max: 36.8 (18 Feb 2020 05:11)  T(F): 97.7 (18 Feb 2020 12:55), Max: 98.2 (18 Feb 2020 05:11)  HR: 68 (18 Feb 2020 12:55) (65 - 75)  BP: 132/84 (18 Feb 2020 12:55) (132/76 - 163/78)  BP(mean): --  RR: 18 (18 Feb 2020 12:55) (18 - 18)  SpO2: 96% (18 Feb 2020 12:55) (95% - 96%)    ________________________________________________  PHYSICAL EXAM:  GENERAL: NAD  HEENT: Normocephalic;  conjunctivae and sclerae clear; moist mucous membranes;   NECK : supple  CHEST/LUNG: Clear to auscultation bilaterally with good air entry   HEART: S1 S2  regular; no murmurs, gallops or rubs  ABDOMEN: Soft, Nontender, Nondistended; Bowel sounds present  EXTREMITIES: Left upper extremety has chronic lymphedema.  SKIN: warm and dry; no rash  NERVOUS SYSTEM:  Awake and alert; Oriented  to place, person and time ; no new deficits    _________________________________________________  LABS:                        11.9   3.42  )-----------( 316      ( 18 Feb 2020 14:15 )             36.5     02-18    137  |  106  |  11  ----------------------------<  133<H>  3.5   |  26  |  0.66    Ca    8.8      18 Feb 2020 14:15  Phos  3.6     02-17  Mg     2.6     02-17    TPro  7.7  /  Alb  3.1<L>  /  TBili  0.5  /  DBili  x   /  AST  18  /  ALT  23  /  AlkPhos  57  02-17        CAPILLARY BLOOD GLUCOSE            RADIOLOGY & ADDITIONAL TESTS:    Imaging  Reviewed:  YES/NO    Consultant(s) Notes Reviewed:   YES/ No      Plan of care was discussed with patient and /or primary care giver; all questions and concerns were addressed

## 2020-02-18 NOTE — CONSULT NOTE ADULT - SUBJECTIVE AND OBJECTIVE BOX
CHIEF COMPLAINT:Patient is a 65y old  Female who presents with a chief complaint of Fever and chills.      HPI:  66 y/o F from home, walks independently, lives with  with PMH of (Breast ca, s/p Bilateral mastectomy on ibrance), Malignant pleural effusion s/p VATS procedure, Chronic Left upper extremity Lymphedema, Hypothyroidism presented to ED with Subjective fever, chills x2 days. .It is a/w Non Productive Cough, Headache and  some weakness. Pt denies  cp, sob, ha, vision loss, rhinorrhea, dysuria, numbness, rash, bleeding, sick contacts, Recent travel. Pt admitted with flu, pneumonia but now positive blood culture for strep oralis, called to evaluate for possible endocarditis.      PAST MEDICAL & SURGICAL HISTORY:  HLD (hyperlipidemia)  Breast cancer  Hypothyroidism  S/P mastectomy, bilateral  Lymphedema of arm: leftarm lymphedema since 20 years , after the breast reconstruction surgery  Thyroid condition  H/O pleural empyema: pleurodesis  S/P bilateral mastectomy  S/P mastectomy, bilateral: s/p implants b/l, no chemo or radiation therapy      MEDICATIONS  (STANDING):  cefTRIAXone   IVPB      cefTRIAXone   IVPB 2000 milliGRAM(s) IV Intermittent every 24 hours  chlorhexidine 2% Cloths 1 Application(s) Topical daily  enoxaparin Injectable 40 milliGRAM(s) SubCutaneous daily  levothyroxine 50 MICROGram(s) Oral daily  oseltamivir 75 milliGRAM(s) Oral two times a day    MEDICATIONS  (PRN):  acetaminophen   Tablet .. 650 milliGRAM(s) Oral every 6 hours PRN Temp greater or equal to 38C (100.4F), Moderate Pain (4 - 6)      FAMILY HISTORY:  No hx of CAD      SOCIAL HISTORY:    [ x] Non-smoker    [x ] Alcohol-denies    Allergies    No Known Allergies    Intolerances    	    REVIEW OF SYSTEMS:  CONSTITUTIONAL: No fever, weight loss, or fatigue  EYES: No eye pain, visual disturbances, or discharge  ENT:  No difficulty hearing, tinnitus, vertigo; No sinus or throat pain  NECK: No pain or stiffness  RESPIRATORY: No cough, wheezing, chills or hemoptysis; No Shortness of Breath  CARDIOVASCULAR: No chest pain, palpitations, passing out, dizziness, or leg swelling  GASTROINTESTINAL: No abdominal or epigastric pain. No nausea, vomiting, or hematemesis; No diarrhea or constipation. No melena or hematochezia.  GENITOURINARY: No dysuria, frequency, hematuria, or incontinence  NEUROLOGICAL: No headaches, memory loss, loss of strength, numbness, or tremors  SKIN: No itching, burning, rashes, or lesions   LYMPH Nodes: No enlarged glands  ENDOCRINE: No heat or cold intolerance; No hair loss  MUSCULOSKELETAL: No joint pain or swelling; No muscle, back, or extremity pain  PSYCHIATRIC: No depression, anxiety, mood swings, or difficulty sleeping  HEME/LYMPH: No easy bruising, or bleeding gums  ALLERGY AND IMMUNOLOGIC: No hives or eczema	      PHYSICAL EXAM:  T(C): 36.8 (02-18-20 @ 05:11), Max: 36.8 (02-17-20 @ 13:19)  HR: 75 (02-18-20 @ 05:11) (65 - 77)  BP: 132/76 (02-18-20 @ 05:11) (132/76 - 163/78)  RR: 18 (02-18-20 @ 05:11) (18 - 18)  SpO2: 95% (02-18-20 @ 05:11) (95% - 95%)    Appearance: Normal	  HEENT:   Normal oral mucosa, PERRL, EOMI	  Lymphatic: No lymphadenopathy  Cardiovascular: Normal S1 S2, No JVD, No murmurs, No edema  Respiratory: Lungs clear to auscultation	  Psychiatry: A & O x 3, Mood & affect appropriate  Gastrointestinal:  Soft, Non-tender, + BS	  Skin: No rashes, No ecchymoses, No cyanosis	  Neurologic: Non-focal  Extremities: Normal range of motion, No clubbing, cyanosis or edema  Vascular: Peripheral pulses palpable 2+ bilaterally    	    ECG:  	Normal sinus rhythm  Minimal voltage criteria for LVH, may be normal variant      	  LABS:	 	                        12.4   3.81  )-----------( 235      ( 17 Feb 2020 07:31 )             37.0     02-17    139  |  104  |  10  ----------------------------<  120<H>  3.6   |  27  |  0.59    Ca    8.9      17 Feb 2020 07:31  Phos  3.6     02-17  Mg     2.6     02-17    TPro  7.7  /  Alb  3.1<L>  /  TBili  0.5  /  DBili  x   /  AST  18  /  ALT  23  /  AlkPhos  57  02-17    Culture - Urine (02.15.20 @ 01:43)    Specimen Source: .Urine    Culture Results:   >=3 organisms. Probable collection contamination.    Culture - Blood (02.15.20 @ 01:22)    Gram Stain:   Growth in aerobic bottle: Gram Positive Cocci in Pairs and Chains  Growth in anaerobic bottle: Gram Positive Cocci in Pairs and Chains    -  Streptococcus sp. (Not Grp A, B or S pneumoniae): Detec    Specimen Source: .Blood    Organism: Blood Culture PCR    Culture Results:   Growth in aerobic and anaerobic bottles: Streptococcus mitis/oralis group  "Susceptibilities not performed"  ***Blood Panel PCR results on this specimen are available  approximately 3 hours after the Gram stain result.***  Gram stain, PCR, and/or culture results may not always  correspond due to difference in methodologies.  FLU A B RSV Detection by PCR (02.14.20 @ 19:22)    Flu A Result: Detected: The Flu A B RSV assay is a Real-Time PCR test for the qualitative  detection and differentiation of Influenza A, Influenza B, and  Respiratory Syncytial Virus on nasopharyngeal swabs. The results should  be interpreted in the context of all clinical and laboratory findings.    Flu B Result: NotDetec    RSV Result: NotDetec    ************************************************************  This PCR assay was performed using [employer].  The following targets are tested for: Enterococcus,  vancomycin resistant enterococci, Listeria monocytogenes,  coagulase negative staphylococci, S. aureus,  methicillin resistant S. aureus, Streptococcus agalactiae  (Group B), S. pneumoniae, S. pyogenes (Group A),  Acinetobacter baumannii, Enterobacter cloacae, E. coli,  Klebsiella oxytoca, K. pneumoniae, Proteus sp.,  Serratia marcescens, Haemophilus influenzae,  Neisseria meningitidis, Pseudomonas aeruginosa, Candida  albicans, C. glabrata, C krusei, C parapsilosis,  C. tropicalis and the KPC resistance gene.    Organism Identification: Blood Culture PCR    Method Type: PCR    EXAM:  XR CHEST PORTABLE IMMED 1V                            PROCEDURE DATE:  02/14/2020          INTERPRETATION:  Chest radiograph (one view)     CPT 83425    CLINICAL INFORMATION:  Patient is unable to communicate.  Short of breath.     TECHNIQUE:  Single frontal view of the chest was obtained.    FINDINGS:  Prior study dated 7/3/2017 was available for review.    The lungs demonstrate patchy airspace opacity in the left upper lobe suspicious for pneumonia. Chronic pleural thickening/fibrotic scarring is seen in the left lower lobe. No pleural effusion is seen.    The heart and mediastinum appear intact.      IMPRESSION: Patchy airspace opacity is seen in the left upper lobe suspicious for pneumonia.

## 2020-02-18 NOTE — PROGRESS NOTE ADULT - SUBJECTIVE AND OBJECTIVE BOX
65y Female    Meds:  cefTRIAXone   IVPB      cefTRIAXone   IVPB 2000 milliGRAM(s) IV Intermittent every 24 hours  oseltamivir 75 milliGRAM(s) Oral two times a day    Allergies    No Known Allergies    Intolerances        VITALS:  Vital Signs Last 24 Hrs  T(C): 36.5 (18 Feb 2020 12:55), Max: 36.8 (18 Feb 2020 05:11)  T(F): 97.7 (18 Feb 2020 12:55), Max: 98.2 (18 Feb 2020 05:11)  HR: 68 (18 Feb 2020 12:55) (65 - 75)  BP: 132/84 (18 Feb 2020 12:55) (132/76 - 163/78)  BP(mean): --  RR: 18 (18 Feb 2020 12:55) (18 - 18)  SpO2: 96% (18 Feb 2020 12:55) (95% - 96%)    LABS/DIAGNOSTIC TESTS:                          11.9   3.42  )-----------( 316      ( 18 Feb 2020 14:15 )             36.5         02-18    137  |  106  |  11  ----------------------------<  133<H>  3.5   |  26  |  0.66    Ca    8.8      18 Feb 2020 14:15  Phos  3.6     02-17  Mg     2.6     02-17    TPro  7.7  /  Alb  3.1<L>  /  TBili  0.5  /  DBili  x   /  AST  18  /  ALT  23  /  AlkPhos  57  02-17      LIVER FUNCTIONS - ( 17 Feb 2020 07:31 )  Alb: 3.1 g/dL / Pro: 7.7 g/dL / ALK PHOS: 57 U/L / ALT: 23 U/L DA / AST: 18 U/L / GGT: x             CULTURES: .Urine  02-15 @ 01:43   >=3 organisms. Probable collection contamination.  --  --      .Blood  02-15 @ 01:22   Growth in aerobic and anaerobic bottles: Streptococcus mitis/oralis group  --  Streptococcus mitis/oralis group      .Urine  11-30 @ 01:07   >=3 organisms. Probable collection contamination.  --  --        < from: Transthoracic Echocardiogram (02.18.20 @ 13:25) >  Patient name: ANA JAUREGUI  YOB: 1955   Age: 65 (F)   MR#: [id number]  Study Date: 2/18/2020  Location: [address] [place], [place] [address]Sonographer: Louie Zhou [facility]  Study quality: Technically difficult  Referring Physician: Andrew Hein MD  Blood Pressure: 132/76 mmHg  Height: 160 cm  Weight: 104 kg  BSA: 2.1 m2  ------------------------------------------------------------------------    PROCEDURE: Transthoracic echocardiogram with 2-D, M-Mode  and complete spectral and color flow Doppler.  INDICATION: Bacteremia (R78.81)  HISTORY:  ------------------------------------------------------------------------  DIMENSIONS:  Dimensions:     Normal Values:  LA:     3.6 cm    2.0 - 4.0 cm  Ao:     3.1 cm    2.0 - 3.8 cm  SEPTUM: 0.9 cm    0.6 - 1.2 cm  PWT:    0.9 cm    0.6 - 1.1 cm  LVIDd:  5.3 cm    3.0 - 5.6 cm  LVIDs:  3.9 cm    1.8 - 4.0 cm      Derived Variables:  LVMI: 85 g/m2  RWT: 0.33  Ejection Fraction Visual Estimate: >55 %    ------------------------------------------------------------------------  OBSERVATIONS:  Mitral Valve: Normal mitral valve. Mild mitral  regurgitation.  Aortic Root: Normal aortic root.  Aortic Valve: Normal trileaflet aortic valve.  Left Atrium: LA volume index = 12 cc/m2.  Left Ventricle: Endocardium not well visualized; grossly  normal left ventricular systolic function. Mild diastolic  dysfunction (stage I). Normal left ventricular internal  dimensions and wall thicknesses.  Right Heart: Normal right atrium. Normal right ventricular  function.   Off axis images preclude accurate assessment of  right ventricular size. There is moderate tricuspid  regurgitation. There is mild pulmonic regurgitation.  Pericardium/PleuraNormal pericardium with no pericardial  effusion.  Hemodynamic: RA Pressure is 8 mmHg. RV systolic pressure  is 35 mm Hg.  ------------------------------------------------------------------------  CONCLUSIONS:  1. Mild mitral regurgitation.  2. Normal left ventricular internal dimensions and wall  thicknesses.  3. Endocardium not well visualized; grossly normal left  ventricular systolic function. Mild diastolic dysfunction  (stage I).  4. Normal right ventricular function.   Off axis images  preclude accurate assessment of right ventricular size.  5. No obvious vegetations noted on this technically  difficult study.  Consider SEAN if clinically appropriate.    ------------------------------------------------------------------------  Confirmed on  2/18/2020 - 12:56:34 by Wesley Klein MD  ------------------------------------------------------------------------    < end of copied text >      RADIOLOGY:      ROS:  [  ] UNABLE TO ELICIT 65y Female who is clinically doing well, she has no fevers or chills, her left arm cellulitis has almost totally resolved, her repeat blood culture was taken today, she has no diarrhea or other complaints. She has a negative transthoracic ECHO and so will get a SEAN tomorrow. Her last dose of Tamiflu is tomorrow am. She has a slight cough still but no other symptoms.    Meds:  cefTRIAXone   IVPB      cefTRIAXone   IVPB 2000 milliGRAM(s) IV Intermittent every 24 hours  oseltamivir 75 milliGRAM(s) Oral two times a day    Allergies    No Known Allergies    Intolerances        VITALS:  Vital Signs Last 24 Hrs  T(C): 36.5 (18 Feb 2020 12:55), Max: 36.8 (18 Feb 2020 05:11)  T(F): 97.7 (18 Feb 2020 12:55), Max: 98.2 (18 Feb 2020 05:11)  HR: 68 (18 Feb 2020 12:55) (65 - 75)  BP: 132/84 (18 Feb 2020 12:55) (132/76 - 163/78)  BP(mean): --  RR: 18 (18 Feb 2020 12:55) (18 - 18)  SpO2: 96% (18 Feb 2020 12:55) (95% - 96%)    LABS/DIAGNOSTIC TESTS:                          11.9   3.42  )-----------( 316      ( 18 Feb 2020 14:15 )             36.5         02-18    137  |  106  |  11  ----------------------------<  133<H>  3.5   |  26  |  0.66    Ca    8.8      18 Feb 2020 14:15  Phos  3.6     02-17  Mg     2.6     02-17    TPro  7.7  /  Alb  3.1<L>  /  TBili  0.5  /  DBili  x   /  AST  18  /  ALT  23  /  AlkPhos  57  02-17      LIVER FUNCTIONS - ( 17 Feb 2020 07:31 )  Alb: 3.1 g/dL / Pro: 7.7 g/dL / ALK PHOS: 57 U/L / ALT: 23 U/L DA / AST: 18 U/L / GGT: x             CULTURES: .Urine  02-15 @ 01:43   >=3 organisms. Probable collection contamination.  --  --      .Blood  02-15 @ 01:22   Growth in aerobic and anaerobic bottles: Streptococcus mitis/oralis group  --  Streptococcus mitis/oralis group      .Urine  11-30 @ 01:07   >=3 organisms. Probable collection contamination.  --  --        < from: Transthoracic Echocardiogram (02.18.20 @ 13:25) >  Patient name: ANA JAUREGUI  YOB: 1955   Age: 65 (F)   MR#: [id number]  Study Date: 2/18/2020  Location: [address] [place], [place] [address]Sonographer: Louie Zhou [facility]  Study quality: Technically difficult  Referring Physician: Andrew Hein MD  Blood Pressure: 132/76 mmHg  Height: 160 cm  Weight: 104 kg  BSA: 2.1 m2  ------------------------------------------------------------------------    PROCEDURE: Transthoracic echocardiogram with 2-D, M-Mode  and complete spectral and color flow Doppler.  INDICATION: Bacteremia (R78.81)  HISTORY:  ------------------------------------------------------------------------  DIMENSIONS:  Dimensions:     Normal Values:  LA:     3.6 cm    2.0 - 4.0 cm  Ao:     3.1 cm    2.0 - 3.8 cm  SEPTUM: 0.9 cm    0.6 - 1.2 cm  PWT:    0.9 cm    0.6 - 1.1 cm  LVIDd:  5.3 cm    3.0 - 5.6 cm  LVIDs:  3.9 cm    1.8 - 4.0 cm      Derived Variables:  LVMI: 85 g/m2  RWT: 0.33  Ejection Fraction Visual Estimate: >55 %    ------------------------------------------------------------------------  OBSERVATIONS:  Mitral Valve: Normal mitral valve. Mild mitral  regurgitation.  Aortic Root: Normal aortic root.  Aortic Valve: Normal trileaflet aortic valve.  Left Atrium: LA volume index = 12 cc/m2.  Left Ventricle: Endocardium not well visualized; grossly  normal left ventricular systolic function. Mild diastolic  dysfunction (stage I). Normal left ventricular internal  dimensions and wall thicknesses.  Right Heart: Normal right atrium. Normal right ventricular  function.   Off axis images preclude accurate assessment of  right ventricular size. There is moderate tricuspid  regurgitation. There is mild pulmonic regurgitation.  Pericardium/PleuraNormal pericardium with no pericardial  effusion.  Hemodynamic: RA Pressure is 8 mmHg. RV systolic pressure  is 35 mm Hg.  ------------------------------------------------------------------------  CONCLUSIONS:  1. Mild mitral regurgitation.  2. Normal left ventricular internal dimensions and wall  thicknesses.  3. Endocardium not well visualized; grossly normal left  ventricular systolic function. Mild diastolic dysfunction  (stage I).  4. Normal right ventricular function.   Off axis images  preclude accurate assessment of right ventricular size.  5. No obvious vegetations noted on this technically  difficult study.  Consider SEAN if clinically appropriate.    ------------------------------------------------------------------------  Confirmed on  2/18/2020 - 12:56:34 by Wesley Klein MD  ------------------------------------------------------------------------    < end of copied text >      RADIOLOGY:      ROS:  [  ] UNABLE TO ELICIT

## 2020-02-19 LAB
ANION GAP SERPL CALC-SCNC: 4 MMOL/L — LOW (ref 5–17)
BUN SERPL-MCNC: 8 MG/DL — SIGNIFICANT CHANGE UP (ref 7–18)
CALCIUM SERPL-MCNC: 8.9 MG/DL — SIGNIFICANT CHANGE UP (ref 8.4–10.5)
CHLORIDE SERPL-SCNC: 105 MMOL/L — SIGNIFICANT CHANGE UP (ref 96–108)
CO2 SERPL-SCNC: 29 MMOL/L — SIGNIFICANT CHANGE UP (ref 22–31)
CREAT SERPL-MCNC: 0.63 MG/DL — SIGNIFICANT CHANGE UP (ref 0.5–1.3)
GLUCOSE SERPL-MCNC: 118 MG/DL — HIGH (ref 70–99)
HCT VFR BLD CALC: 40.3 % — SIGNIFICANT CHANGE UP (ref 34.5–45)
HGB BLD-MCNC: 13.3 G/DL — SIGNIFICANT CHANGE UP (ref 11.5–15.5)
MCHC RBC-ENTMCNC: 33 GM/DL — SIGNIFICANT CHANGE UP (ref 32–36)
MCHC RBC-ENTMCNC: 34.1 PG — HIGH (ref 27–34)
MCV RBC AUTO: 103.3 FL — HIGH (ref 80–100)
NRBC # BLD: 0 /100 WBCS — SIGNIFICANT CHANGE UP (ref 0–0)
PLATELET # BLD AUTO: 331 K/UL — SIGNIFICANT CHANGE UP (ref 150–400)
POTASSIUM SERPL-MCNC: 3.6 MMOL/L — SIGNIFICANT CHANGE UP (ref 3.5–5.3)
POTASSIUM SERPL-SCNC: 3.6 MMOL/L — SIGNIFICANT CHANGE UP (ref 3.5–5.3)
RBC # BLD: 3.9 M/UL — SIGNIFICANT CHANGE UP (ref 3.8–5.2)
RBC # FLD: 12.8 % — SIGNIFICANT CHANGE UP (ref 10.3–14.5)
SODIUM SERPL-SCNC: 138 MMOL/L — SIGNIFICANT CHANGE UP (ref 135–145)
WBC # BLD: 4.27 K/UL — SIGNIFICANT CHANGE UP (ref 3.8–10.5)
WBC # FLD AUTO: 4.27 K/UL — SIGNIFICANT CHANGE UP (ref 3.8–10.5)

## 2020-02-19 RX ADMIN — Medication 50 MICROGRAM(S): at 06:26

## 2020-02-19 RX ADMIN — CEFTRIAXONE 100 MILLIGRAM(S): 500 INJECTION, POWDER, FOR SOLUTION INTRAMUSCULAR; INTRAVENOUS at 12:39

## 2020-02-19 RX ADMIN — CHLORHEXIDINE GLUCONATE 1 APPLICATION(S): 213 SOLUTION TOPICAL at 12:40

## 2020-02-19 RX ADMIN — Medication 75 MILLIGRAM(S): at 05:20

## 2020-02-19 RX ADMIN — Medication 75 MILLIGRAM(S): at 17:51

## 2020-02-19 NOTE — PROGRESS NOTE ADULT - SKIN COMMENTS
left arm cellulitis almost gone, lymphedema of left arm
left arm cellulitis resolved
left arm redness and warmth almost totally gone
left arm is dramatically less red and less warm, lymphedema swelling is unchanged

## 2020-02-19 NOTE — PROGRESS NOTE ADULT - PROBLEM SELECTOR PROBLEM 5
HLD (hyperlipidemia)
Prophylactic measure
HLD (hyperlipidemia)

## 2020-02-19 NOTE — PROGRESS NOTE ADULT - CONSTITUTIONAL DETAILS
Detail Level: Zone
Detail Level: Detailed
no distress
no distress/obese
obese/no distress
no distress

## 2020-02-19 NOTE — PROGRESS NOTE ADULT - SUBJECTIVE AND OBJECTIVE BOX
CHIEF COMPLAINT:Patient is a 65y old  Female who presents with a chief complaint of Fever and chills.Pt appears comfortable.    	  REVIEW OF SYSTEMS:  CONSTITUTIONAL: No fever, weight loss, or fatigue  EYES: No eye pain, visual disturbances, or discharge  ENT:  No difficulty hearing, tinnitus, vertigo; No sinus or throat pain  NECK: No pain or stiffness  RESPIRATORY: No cough, wheezing, chills or hemoptysis; No Shortness of Breath  CARDIOVASCULAR: No chest pain, palpitations, passing out, dizziness, or leg swelling  GASTROINTESTINAL: No abdominal or epigastric pain. No nausea, vomiting, or hematemesis; No diarrhea or constipation. No melena or hematochezia.  GENITOURINARY: No dysuria, frequency, hematuria, or incontinence  NEUROLOGICAL: No headaches, memory loss, loss of strength, numbness, or tremors  SKIN: No itching, burning, rashes, or lesions   LYMPH Nodes: No enlarged glands  ENDOCRINE: No heat or cold intolerance; No hair loss  MUSCULOSKELETAL: No joint pain or swelling; No muscle, back, or extremity pain  PSYCHIATRIC: No depression, anxiety, mood swings, or difficulty sleeping  HEME/LYMPH: No easy bruising, or bleeding gums  ALLERGY AND IMMUNOLOGIC: No hives or eczema	    PHYSICAL EXAM:  T(C): 36.5 (02-19-20 @ 05:28), Max: 36.7 (02-18-20 @ 21:00)  HR: 75 (02-19-20 @ 05:28) (68 - 75)  BP: 118/59 (02-19-20 @ 05:28) (118/59 - 132/84)  RR: 18 (02-19-20 @ 05:28) (18 - 18)  SpO2: 98% (02-19-20 @ 05:28) (96% - 98%)  Wt(kg): --  I&O's Summary      Appearance: Normal	  HEENT:   Normal oral mucosa, PERRL, EOMI	  Lymphatic: No lymphadenopathy  Cardiovascular: Normal S1 S2, No JVD, No murmurs, No edema  Respiratory: Lungs clear to auscultation	  Psychiatry: A & O x 3, Mood & affect appropriate  Gastrointestinal:  Soft, Non-tender, + BS	  Skin: No rashes, No ecchymoses, No cyanosis	  Neurologic: Non-focal  Extremities: Normal range of motion, No clubbing, cyanosis or edema  Vascular: Peripheral pulses palpable 2+ bilaterally    MEDICATIONS  (STANDING):  cefTRIAXone   IVPB      cefTRIAXone   IVPB 2000 milliGRAM(s) IV Intermittent every 24 hours  chlorhexidine 2% Cloths 1 Application(s) Topical daily  enoxaparin Injectable 40 milliGRAM(s) SubCutaneous daily  levothyroxine 50 MICROGram(s) Oral daily  oseltamivir 75 milliGRAM(s) Oral two times a day        	  LABS:	 	                         11.9   3.42  )-----------( 316      ( 18 Feb 2020 14:15 )             36.5     02-18    137  |  106  |  11  ----------------------------<  133<H>  3.5   |  26  |  0.66    Ca    8.8      18 Feb 2020 14:15        Lipid Profile: Cholesterol 141  LDL 98  HDL 30  TG 63    HgA1c: Hemoglobin A1C, Whole Blood: 6.1 % (02-15 @ 10:59)    TSH: Thyroid Stimulating Hormone, Serum: 0.92 uU/mL (02-15 @ 07:11)      	    CONCLUSIONS:  1. Normal mitral valve. Mild mitral regurgitation.  2. Calcified trileaflet aortic valve with normal opening.  3. Aortic Root: 3.3 cm.Normal aortic root, aortic arch and  descending thoracic aorta.Grade I atheroma in descending  aorta.  4.Normal left atrium.  No left atrium or left atrial  appendage thrombus.  5. Normal left ventricular internal dimensions and wall  thicknesses.  6. Normal Left Ventricular Systolic Function,  (EF = 55 to  60%)  7. Normal right atrium.  8. Normal rightventricular size and function.  9. There is mild tricuspid regurgitation.  10. Normal pulmonic valve.  11. Contrast injection demonstrates evidence of a patent  foramen ovale.  12. Normal pericardium with no pericardial effusion.  13.No vegetations seen on this study.

## 2020-02-19 NOTE — PROGRESS NOTE ADULT - PROBLEM SELECTOR PLAN 5
continue with statin
IMPROVE VTE Individual Risk Assessment    RISK                                                                Points  [  ] Previous VTE                                                  3  [  ] Thrombophilia                                               2  [  ] Lower limb paralysis                                      2        (unable to hold up >15 seconds)    [  ] Current Cancer                                              2         (within 6 months)  [x ] Immobilization > 24 hrs                                1  [  ] ICU/CCU stay > 24 hours                              1  [x  ] Age > 60                                                      1  IMPROVE VTE Score _2__DVT ppx Lovenox 40 sub q______  )
continue with statin

## 2020-02-19 NOTE — PROGRESS NOTE ADULT - SUBJECTIVE AND OBJECTIVE BOX
65y Female    Meds:  cefTRIAXone   IVPB      cefTRIAXone   IVPB 2000 milliGRAM(s) IV Intermittent every 24 hours  oseltamivir 75 milliGRAM(s) Oral two times a day    Allergies    No Known Allergies    Intolerances        VITALS:  Vital Signs Last 24 Hrs  T(C): 36.4 (19 Feb 2020 12:49), Max: 36.7 (18 Feb 2020 21:00)  T(F): 97.6 (19 Feb 2020 12:49), Max: 98.1 (18 Feb 2020 21:00)  HR: 80 (19 Feb 2020 12:49) (75 - 80)  BP: 135/63 (19 Feb 2020 12:49) (118/59 - 135/63)  BP(mean): --  RR: 19 (19 Feb 2020 12:49) (18 - 19)  SpO2: 99% (19 Feb 2020 12:49) (97% - 99%)    LABS/DIAGNOSTIC TESTS:                          13.3   4.27  )-----------( 331      ( 19 Feb 2020 10:29 )             40.3         02-19    138  |  105  |  8   ----------------------------<  118<H>  3.6   |  29  |  0.63    Ca    8.9      19 Feb 2020 10:29            CULTURES: .Urine  02-15 @ 01:43   >=3 organisms. Probable collection contamination.  --  --      .Blood  02-15 @ 01:22   Growth in aerobic and anaerobic bottles: Streptococcus mitis/oralis group  --  Streptococcus mitis/oralis group      .Urine  11-30 @ 01:07   >=3 organisms. Probable collection contamination.  --  --            RADIOLOGY:      ROS:  [  ] UNABLE TO ELICIT 65y Female who is doing well , she had her SEAN today and was negative for vegetations, she has no fevers or chills, no diarrhea or other symptoms. She has no redness or warmth of her left all now. Her cough has mostly resolved.    Meds:  cefTRIAXone   IVPB      cefTRIAXone   IVPB 2000 milliGRAM(s) IV Intermittent every 24 hours  oseltamivir 75 milliGRAM(s) Oral two times a day    Allergies    No Known Allergies    Intolerances        VITALS:  Vital Signs Last 24 Hrs  T(C): 36.4 (19 Feb 2020 12:49), Max: 36.7 (18 Feb 2020 21:00)  T(F): 97.6 (19 Feb 2020 12:49), Max: 98.1 (18 Feb 2020 21:00)  HR: 80 (19 Feb 2020 12:49) (75 - 80)  BP: 135/63 (19 Feb 2020 12:49) (118/59 - 135/63)  BP(mean): --  RR: 19 (19 Feb 2020 12:49) (18 - 19)  SpO2: 99% (19 Feb 2020 12:49) (97% - 99%)    LABS/DIAGNOSTIC TESTS:                          13.3   4.27  )-----------( 331      ( 19 Feb 2020 10:29 )             40.3         02-19    138  |  105  |  8   ----------------------------<  118<H>  3.6   |  29  |  0.63    Ca    8.9      19 Feb 2020 10:29            CULTURES: .Urine  02-15 @ 01:43   >=3 organisms. Probable collection contamination.  --  --      .Blood  02-15 @ 01:22   Growth in aerobic and anaerobic bottles: Streptococcus mitis/oralis group  --  Streptococcus mitis/oralis group      .Urine  11-30 @ 01:07   >=3 organisms. Probable collection contamination.  --  --            RADIOLOGY:      ROS:  [  ] UNABLE TO ELICIT

## 2020-02-19 NOTE — PROGRESS NOTE ADULT - ATTENDING COMMENTS
Patient is seen and examined. Case reviewed with the medical team. Above note is appreciated. Will follow up clinically. Continue DVT prophylaxis. Case discussed with pulmonary. Continue antibiotics. Will follow up With ID for bacteremia.
Patient is seen and examined. Case reviewed with the medical team. Above note is appreciated. Will follow up clinically. Continue DVT prophylaxis. Case discussed with Cardiology, and ID. Follow up repeat cultures. Will place picc line when repeat cultures are negative, and treat for likely endocarditis. Follow up SEAN.
Patient is seen and examined. Case reviewed with the medical team. Above note is appreciated. Will follow up clinically. Continue DVT prophylaxis. Case discussed with Cardiology, Pulmonary and ID. For Picc line and IV antibiotics as per ID. SEAN negative for vegatations.

## 2020-02-19 NOTE — PROGRESS NOTE ADULT - PROBLEM SELECTOR PLAN 10
Discharge planning pending SEAN, repeat bl culture result to be negative   for PICC line to be inserted to complete antibiotics course for prophylaxis of   endocarditis Discharge planning pending SEAN, repeat bl culture result to be negative   for PICC line to be inserted to complete antibiotics course for prophylaxis of   endocarditis to continue antibiotics as an outpatient. Discharge planning pending repeat bl culture result to be negative   for PICC line to be inserted to complete antibiotics course for  bacteremia treatment and  prophylaxis of   endocarditis to continue antibiotics as an outpatient.

## 2020-02-19 NOTE — PROGRESS NOTE ADULT - SUBJECTIVE AND OBJECTIVE BOX
Patient is a 65y old  Female who presents with a chief complaint of Fever and chills (19 Feb 2020 11:10)  Awake, alert, comfortable in bed in NAD. S/p SEAN    INTERVAL HPI/OVERNIGHT EVENTS:      VITAL SIGNS:  T(F): 97.7 (02-19-20 @ 05:28)  HR: 75 (02-19-20 @ 05:28)  BP: 118/59 (02-19-20 @ 05:28)  RR: 18 (02-19-20 @ 05:28)  SpO2: 98% (02-19-20 @ 05:28)  Wt(kg): --  I&O's Detail          REVIEW OF SYSTEMS:    CONSTITUTIONAL:  No fevers, chills, sweats    HEENT:  Eyes:  No diplopia or blurred vision. ENT:  No earache, sore throat or runny nose.    CARDIOVASCULAR:  No pressure, squeezing, tightness, or heaviness about the chest; no palpitations.    RESPIRATORY:  Per HPI    GASTROINTESTINAL:  No abdominal pain, nausea, vomiting or diarrhea.    GENITOURINARY:  No dysuria, frequency or urgency.    NEUROLOGIC:  No paresthesias, fasciculations, seizures or weakness.    PSYCHIATRIC:  No disorder of thought or mood.      PHYSICAL EXAM:    Constitutional: Well developed and nourished  Eyes:Perrla  ENMT: normal  Neck:supple  Respiratory: good air entry  Cardiovascular: S1 S2 regular  Gastrointestinal: Soft, Non tender  Extremities: + edema  Vascular:normal  Neurological:Awake, alert,Ox3  Musculoskeletal:Normal      MEDICATIONS  (STANDING):  cefTRIAXone   IVPB      cefTRIAXone   IVPB 2000 milliGRAM(s) IV Intermittent every 24 hours  chlorhexidine 2% Cloths 1 Application(s) Topical daily  enoxaparin Injectable 40 milliGRAM(s) SubCutaneous daily  levothyroxine 50 MICROGram(s) Oral daily  oseltamivir 75 milliGRAM(s) Oral two times a day    MEDICATIONS  (PRN):  acetaminophen   Tablet .. 650 milliGRAM(s) Oral every 6 hours PRN Temp greater or equal to 38C (100.4F), Moderate Pain (4 - 6)      Allergies    No Known Allergies    Intolerances        LABS:                        13.3   4.27  )-----------( 331      ( 19 Feb 2020 10:29 )             40.3     02-19    138  |  105  |  8   ----------------------------<  118<H>  3.6   |  29  |  0.63    Ca    8.9      19 Feb 2020 10:29                CAPILLARY BLOOD GLUCOSE            RADIOLOGY & ADDITIONAL TESTS:    CXR:    Ct scan chest:    ekg;    echo:

## 2020-02-19 NOTE — PROGRESS NOTE ADULT - PROBLEM SELECTOR PLAN 8
IMPROVE VTE Individual Risk Assessment    RISK                                                                Points  [  ] Previous VTE                                                  3  [  ] Thrombophilia                                               2  [  ] Lower limb paralysis                                      2        (unable to hold up >15 seconds)    [  ] Current Cancer                                              2         (within 6 months)  [x ] Immobilization > 24 hrs                                1  [  ] ICU/CCU stay > 24 hours                              1  [x  ] Age > 60                                                      1  IMPROVE VTE Score _2__DVT ppx Lovenox 40 sub q______  )
IMPROVE VTE Individual Risk Assessment    RISK                                                                Points  [  ] Previous VTE                                                  3  [  ] Thrombophilia                                               2  [  ] Lower limb paralysis                                      2        (unable to hold up >15 seconds)    [  ] Current Cancer                                              2         (within 6 months)  [x ] Immobilization > 24 hrs                                1  [  ] ICU/CCU stay > 24 hours                              1  [x  ] Age > 60                                                      1  IMPROVE VTE Score _2__DVT ppx Lovenox 40 sub q______  )

## 2020-02-19 NOTE — PROGRESS NOTE ADULT - MS EXT PE MLT D E PC
no cyanosis/no pedal edema

## 2020-02-19 NOTE — PROGRESS NOTE ADULT - GASTROINTESTINAL DETAILS
no guarding/no masses palpable/bowel sounds normal/no rigidity/nontender/soft/no distention/no organomegaly
nontender/no rigidity/no distention/no masses palpable/bowel sounds normal/no guarding/soft
soft/nontender/no distention/bowel sounds normal/no masses palpable/no guarding/no rebound tenderness/no rigidity
nontender/no guarding/no distention/bowel sounds normal/no organomegaly/soft/no rigidity

## 2020-02-19 NOTE — PROGRESS NOTE ADULT - NEGATIVE GASTROINTESTINAL SYMPTOMS
no nausea/no abdominal pain/no vomiting/no diarrhea
no diarrhea
no diarrhea
no diarrhea/no vomiting/no nausea

## 2020-02-19 NOTE — PROGRESS NOTE ADULT - PROBLEM SELECTOR PLAN 7
c/w home meds synthroid
Improved. continue Rocephin.
Improved. continue Rocephin.
c/w home meds synthroid

## 2020-02-19 NOTE — PROGRESS NOTE ADULT - RS GEN PE MLT RESP DETAILS PC
no wheezes/no rales/no rhonchi/clear to auscultation bilaterally/good air movement
breath sounds equal/good air movement/no rales/no wheezes/clear to auscultation bilaterally/no rhonchi
no rales/clear to auscultation bilaterally/no wheezes/good air movement/no rhonchi
rhonchi/no rales/good air movement/clear to auscultation bilaterally/no wheezes

## 2020-02-19 NOTE — CHART NOTE - NSCHARTNOTEFT_GEN_A_CORE
ANA JAUREGUI  [id number]    After risks, benefits and alternatives of the procedure were explained, consent was signed and placed in the medical record.  Procedural timeout was taken.  Sedation was administered by anesthesia.  SEAN probe inserted without complication and SEAN performed.   Patient tolerated the procedure well without complication.  See full report for findings.

## 2020-02-19 NOTE — PROGRESS NOTE ADULT - PROBLEM SELECTOR PLAN 6
Breast cancer responded well to medications.  c/w Ibrance and Letrozole  Heme onc Dr Lala is consulted
Cultures grew strep Viridans. Likely source can be poor dental care as per ID, Dr. Gonzalez. Need to rule out endocarditis. Will likely need PICC line once cultures are negative and patient is taken off isolation for influenza. Dr. Gonzalez fully explained to patient need for Picc line and IV antibiotics for at least four weeks. Spoke with cardiology, Dr. Wsie who will see the patient and follow up SEAN.
Likely from arm cellulitis as per ID. Will follow up sensativities.
Breast cancer responded well to medications.  c/w Ibrance and Letrozole  Heme onc Dr Lala is consulted  Pt will f/up with oncologist as an outpatient

## 2020-02-19 NOTE — PROGRESS NOTE ADULT - SUBJECTIVE AND OBJECTIVE BOX
NP Note discussed with  Primary Attending    Patient is a 65y old  Female who presents with a chief complaint of Fever and chills (19 Feb 2020 08:33)    64 y/o F from home, walks independently, lives with  with PMH of (Breast ca, s/p Bilateral mastectomy on ibrance), Malignant pleural effusion s/p VATS procedure, Chronic Left upper extremity Lymphedema, Hypothyroidism presented to ED with Subjective fever, chills x2 days. .It is a/w Non Productive Cough, Headache and  some weakness.     Pt seen and examined. Pt is alert, oriented, nad. Contact isolation dc/d. SEAN negative  for endocarditis. Awaiting blood culture result. If negative picc line will be inserted in IR to continue IV antib for few weeks for  prophylaxis of endocarditis.  INTERVAL HPI/OVERNIGHT EVENTS: no new complaints      MEDICATIONS  (STANDING):  cefTRIAXone   IVPB      cefTRIAXone   IVPB 2000 milliGRAM(s) IV Intermittent every 24 hours  chlorhexidine 2% Cloths 1 Application(s) Topical daily  enoxaparin Injectable 40 milliGRAM(s) SubCutaneous daily  levothyroxine 50 MICROGram(s) Oral daily  oseltamivir 75 milliGRAM(s) Oral two times a day    MEDICATIONS  (PRN):  acetaminophen   Tablet .. 650 milliGRAM(s) Oral every 6 hours PRN Temp greater or equal to 38C (100.4F), Moderate Pain (4 - 6)      __________________________________________________  REVIEW OF SYSTEMS:    CONSTITUTIONAL: No fever,   EYES: no acute visual disturbances  NECK: No pain or stiffness  RESPIRATORY: No cough; No shortness of breath  CARDIOVASCULAR: No chest pain, no palpitations  GASTROINTESTINAL: No pain. No nausea or vomiting; No diarrhea   NEUROLOGICAL: No headache or numbness, no tremors  MUSCULOSKELETAL: No joint pain, no muscle pain  GENITOURINARY: no dysuria, no frequency, no hesitancy  PSYCHIATRY: no depression , no anxiety  ALL OTHER  ROS negative        Vital Signs Last 24 Hrs  T(C): 36.5 (19 Feb 2020 05:28), Max: 36.7 (18 Feb 2020 21:00)  T(F): 97.7 (19 Feb 2020 05:28), Max: 98.1 (18 Feb 2020 21:00)  HR: 75 (19 Feb 2020 05:28) (68 - 75)  BP: 118/59 (19 Feb 2020 05:28) (118/59 - 132/84)  BP(mean): --  RR: 18 (19 Feb 2020 05:28) (18 - 18)  SpO2: 98% (19 Feb 2020 05:28) (96% - 98%)    ________________________________________________  PHYSICAL EXAM:  GENERAL: NAD  HEENT: Normocephalic;  conjunctivae and sclerae clear; moist mucous membranes;   NECK : supple  CHEST/LUNG: Clear to auscultation bilaterally with good air entry   HEART: S1 S2  regular; no murmurs, gallops or rubs  ABDOMEN: Soft, Nontender, Nondistended; Bowel sounds present  EXTREMITIES: no cyanosis; no edema; no calf tenderness  SKIN: warm and dry; no rash  NERVOUS SYSTEM:  Awake and alert; Oriented  to place, person and time ; no new deficits    _________________________________________________  LABS:                        13.3   4.27  )-----------( 331      ( 19 Feb 2020 10:29 )             40.3     02-19    138  |  105  |  8   ----------------------------<  118<H>  3.6   |  29  |  0.63    Ca    8.9      19 Feb 2020 10:29          CAPILLARY BLOOD GLUCOSE            RADIOLOGY & ADDITIONAL TESTS:    Imaging  Reviewed:  YES/NO    Consultant(s) Notes Reviewed:   YES/ No      Plan of care was discussed with patient and /or primary care giver; all questions and concerns were addressed NP Note discussed with  Primary Attending    Patient is a 65y old  Female who presents with a chief complaint of Fever and chills (19 Feb 2020 08:33)    64 y/o F from home, walks independently, lives with  with PMH of (Breast ca, s/p Bilateral mastectomy on ibrance), Malignant pleural effusion s/p VATS procedure, Chronic Left upper extremity Lymphedema, Hypothyroidism presented to ED with Subjective fever, chills x2 days. .It is a/w Non Productive Cough, Headache and  some weakness.     Pt seen and examined. Pt is alert, oriented, nad. Contact isolation dc/d. SEAN negative  for endocarditis. Awaiting blood culture result. If negative picc line will be inserted in IR to continue IV antib for few weeks for  prophylaxis of endocarditis.  INTERVAL HPI/OVERNIGHT EVENTS: no new complaints      MEDICATIONS  (STANDING):  cefTRIAXone   IVPB      cefTRIAXone   IVPB 2000 milliGRAM(s) IV Intermittent every 24 hours  chlorhexidine 2% Cloths 1 Application(s) Topical daily  enoxaparin Injectable 40 milliGRAM(s) SubCutaneous daily  levothyroxine 50 MICROGram(s) Oral daily  oseltamivir 75 milliGRAM(s) Oral two times a day    MEDICATIONS  (PRN):  acetaminophen   Tablet .. 650 milliGRAM(s) Oral every 6 hours PRN Temp greater or equal to 38C (100.4F), Moderate Pain (4 - 6)      __________________________________________________  REVIEW OF SYSTEMS:    CONSTITUTIONAL: No fever,   EYES: no acute visual disturbances  NECK: No pain or stiffness  RESPIRATORY: No cough; No shortness of breath  CARDIOVASCULAR: No chest pain, no palpitations  GASTROINTESTINAL: No pain. No nausea or vomiting; No diarrhea   NEUROLOGICAL: No headache or numbness, no tremors  MUSCULOSKELETAL: No joint pain, no muscle pain  GENITOURINARY: no dysuria, no frequency, no hesitancy  PSYCHIATRY: no depression , no anxiety  ALL OTHER  ROS negative        Vital Signs Last 24 Hrs  T(C): 36.5 (19 Feb 2020 05:28), Max: 36.7 (18 Feb 2020 21:00)  T(F): 97.7 (19 Feb 2020 05:28), Max: 98.1 (18 Feb 2020 21:00)  HR: 75 (19 Feb 2020 05:28) (68 - 75)  BP: 118/59 (19 Feb 2020 05:28) (118/59 - 132/84)  BP(mean): --  RR: 18 (19 Feb 2020 05:28) (18 - 18)  SpO2: 98% (19 Feb 2020 05:28) (96% - 98%)    ________________________________________________  PHYSICAL EXAM:  GENERAL: NAD  HEENT: Normocephalic;  conjunctivae and sclerae clear; moist mucous membranes;   NECK : supple  CHEST/LUNG: Clear to auscultation bilaterally with good air entry   HEART: S1 S2  regular; no murmurs, gallops or rubs  ABDOMEN: Soft, Nontender, Nondistended; Bowel sounds present  EXTREMITIES: left arm chronic lymphedema  SKIN: warm and dry; no rash  NERVOUS SYSTEM:  Awake and alert; Oriented  to place, person and time ; no new deficits    _________________________________________________  LABS:                        13.3   4.27  )-----------( 331      ( 19 Feb 2020 10:29 )             40.3     02-19    138  |  105  |  8   ----------------------------<  118<H>  3.6   |  29  |  0.63    Ca    8.9      19 Feb 2020 10:29          CAPILLARY BLOOD GLUCOSE            RADIOLOGY & ADDITIONAL TESTS:    Imaging  Reviewed:  YES    < from: SEAN w/Doppler (02.19.20 @ 10:56) >  CONCLUSIONS:  1. Normal mitral valve. Mild mitral regurgitation.  2. Calcified trileaflet aortic valve with normal opening.  3. Aortic Root: 3.3 cm.Normal aortic root, aortic arch and  descending thoracic aorta.Grade I atheroma in descending  aorta.  4.Normal left atrium.  No left atrium or left atrial  appendage thrombus.  5. Normal left ventricular internal dimensions and wall  thicknesses.  6. Normal Left Ventricular Systolic Function,  (EF = 55 to  60%)  7. Normal right atrium.  8. Normal rightventricular size and function.  9. There is mild tricuspid regurgitation.  10. Normal pulmonic valve.  11. Contrast injection demonstrates evidence of a patent  foramen ovale.  12. Normal pericardium with no pericardial effusion.  13.No vegetations seen on this study.  ------------------------------------------------------------------------  Confirmed on  2/19/2020 - 08:33:18 by Alexandrea Wise MD    < end of copied text >    Consultant(s) Notes Reviewed:   YES    Plan of care was discussed with patient and /or primary care giver; all questions and concerns were addressed

## 2020-02-19 NOTE — PROGRESS NOTE ADULT - PROBLEM SELECTOR PLAN 4
CXR shows new left upper lobe infiltrates, ? pneumonia  Cough resolved, afebrile, no leucocytosis  ID   antibiotic per ID recommendations

## 2020-02-20 ENCOUNTER — TRANSCRIPTION ENCOUNTER (OUTPATIENT)
Age: 65
End: 2020-02-20

## 2020-02-20 VITALS
SYSTOLIC BLOOD PRESSURE: 133 MMHG | HEART RATE: 80 BPM | OXYGEN SATURATION: 98 % | DIASTOLIC BLOOD PRESSURE: 82 MMHG | RESPIRATION RATE: 16 BRPM | TEMPERATURE: 98 F

## 2020-02-20 LAB
ANION GAP SERPL CALC-SCNC: 7 MMOL/L — SIGNIFICANT CHANGE UP (ref 5–17)
BUN SERPL-MCNC: 11 MG/DL — SIGNIFICANT CHANGE UP (ref 7–18)
CALCIUM SERPL-MCNC: 9.1 MG/DL — SIGNIFICANT CHANGE UP (ref 8.4–10.5)
CHLORIDE SERPL-SCNC: 103 MMOL/L — SIGNIFICANT CHANGE UP (ref 96–108)
CO2 SERPL-SCNC: 28 MMOL/L — SIGNIFICANT CHANGE UP (ref 22–31)
CREAT SERPL-MCNC: 0.62 MG/DL — SIGNIFICANT CHANGE UP (ref 0.5–1.3)
GLUCOSE SERPL-MCNC: 115 MG/DL — HIGH (ref 70–99)
HCT VFR BLD CALC: 38.2 % — SIGNIFICANT CHANGE UP (ref 34.5–45)
HGB BLD-MCNC: 12.7 G/DL — SIGNIFICANT CHANGE UP (ref 11.5–15.5)
MCHC RBC-ENTMCNC: 33.2 GM/DL — SIGNIFICANT CHANGE UP (ref 32–36)
MCHC RBC-ENTMCNC: 34 PG — SIGNIFICANT CHANGE UP (ref 27–34)
MCV RBC AUTO: 102.4 FL — HIGH (ref 80–100)
NRBC # BLD: 0 /100 WBCS — SIGNIFICANT CHANGE UP (ref 0–0)
PLATELET # BLD AUTO: 371 K/UL — SIGNIFICANT CHANGE UP (ref 150–400)
POTASSIUM SERPL-MCNC: 3.6 MMOL/L — SIGNIFICANT CHANGE UP (ref 3.5–5.3)
POTASSIUM SERPL-SCNC: 3.6 MMOL/L — SIGNIFICANT CHANGE UP (ref 3.5–5.3)
RBC # BLD: 3.73 M/UL — LOW (ref 3.8–5.2)
RBC # FLD: 12.8 % — SIGNIFICANT CHANGE UP (ref 10.3–14.5)
SODIUM SERPL-SCNC: 138 MMOL/L — SIGNIFICANT CHANGE UP (ref 135–145)
WBC # BLD: 4.71 K/UL — SIGNIFICANT CHANGE UP (ref 3.8–10.5)
WBC # FLD AUTO: 4.71 K/UL — SIGNIFICANT CHANGE UP (ref 3.8–10.5)

## 2020-02-20 PROCEDURE — 96375 TX/PRO/DX INJ NEW DRUG ADDON: CPT

## 2020-02-20 PROCEDURE — 85027 COMPLETE CBC AUTOMATED: CPT

## 2020-02-20 PROCEDURE — 87631 RESP VIRUS 3-5 TARGETS: CPT

## 2020-02-20 PROCEDURE — 93005 ELECTROCARDIOGRAM TRACING: CPT

## 2020-02-20 PROCEDURE — 80053 COMPREHEN METABOLIC PANEL: CPT

## 2020-02-20 PROCEDURE — 84443 ASSAY THYROID STIM HORMONE: CPT

## 2020-02-20 PROCEDURE — 93312 ECHO TRANSESOPHAGEAL: CPT

## 2020-02-20 PROCEDURE — 83735 ASSAY OF MAGNESIUM: CPT

## 2020-02-20 PROCEDURE — 87449 NOS EACH ORGANISM AG IA: CPT

## 2020-02-20 PROCEDURE — 87150 DNA/RNA AMPLIFIED PROBE: CPT

## 2020-02-20 PROCEDURE — 82746 ASSAY OF FOLIC ACID SERUM: CPT

## 2020-02-20 PROCEDURE — 86803 HEPATITIS C AB TEST: CPT

## 2020-02-20 PROCEDURE — 99285 EMERGENCY DEPT VISIT HI MDM: CPT

## 2020-02-20 PROCEDURE — 81001 URINALYSIS AUTO W/SCOPE: CPT

## 2020-02-20 PROCEDURE — 80061 LIPID PANEL: CPT

## 2020-02-20 PROCEDURE — 83036 HEMOGLOBIN GLYCOSYLATED A1C: CPT

## 2020-02-20 PROCEDURE — 85652 RBC SED RATE AUTOMATED: CPT

## 2020-02-20 PROCEDURE — 99238 HOSP IP/OBS DSCHRG MGMT 30/<: CPT

## 2020-02-20 PROCEDURE — 87899 AGENT NOS ASSAY W/OPTIC: CPT

## 2020-02-20 PROCEDURE — 96374 THER/PROPH/DIAG INJ IV PUSH: CPT

## 2020-02-20 PROCEDURE — 87086 URINE CULTURE/COLONY COUNT: CPT

## 2020-02-20 PROCEDURE — 87641 MR-STAPH DNA AMP PROBE: CPT

## 2020-02-20 PROCEDURE — 87640 STAPH A DNA AMP PROBE: CPT

## 2020-02-20 PROCEDURE — 84100 ASSAY OF PHOSPHORUS: CPT

## 2020-02-20 PROCEDURE — 36415 COLL VENOUS BLD VENIPUNCTURE: CPT

## 2020-02-20 PROCEDURE — 71045 X-RAY EXAM CHEST 1 VIEW: CPT

## 2020-02-20 PROCEDURE — 93320 DOPPLER ECHO COMPLETE: CPT

## 2020-02-20 PROCEDURE — 93306 TTE W/DOPPLER COMPLETE: CPT

## 2020-02-20 PROCEDURE — 87040 BLOOD CULTURE FOR BACTERIA: CPT

## 2020-02-20 PROCEDURE — 82607 VITAMIN B-12: CPT

## 2020-02-20 PROCEDURE — 87184 SC STD DISK METHOD PER PLATE: CPT

## 2020-02-20 PROCEDURE — 93325 DOPPLER ECHO COLOR FLOW MAPG: CPT

## 2020-02-20 PROCEDURE — 83605 ASSAY OF LACTIC ACID: CPT

## 2020-02-20 PROCEDURE — 80048 BASIC METABOLIC PNL TOTAL CA: CPT

## 2020-02-20 RX ORDER — CEFTRIAXONE 500 MG/1
2 INJECTION, POWDER, FOR SOLUTION INTRAMUSCULAR; INTRAVENOUS
Qty: 42 | Refills: 0
Start: 2020-02-20 | End: 2020-03-11

## 2020-02-20 RX ORDER — PALBOCICLIB 100 MG/1
1 CAPSULE ORAL
Qty: 0 | Refills: 0 | DISCHARGE

## 2020-02-20 RX ORDER — SODIUM CHLORIDE 9 MG/ML
10 INJECTION INTRAMUSCULAR; INTRAVENOUS; SUBCUTANEOUS
Qty: 210 | Refills: 0
Start: 2020-02-20 | End: 2020-03-11

## 2020-02-20 RX ADMIN — Medication 50 MICROGRAM(S): at 06:02

## 2020-02-20 RX ADMIN — Medication 650 MILLIGRAM(S): at 10:44

## 2020-02-20 RX ADMIN — Medication 650 MILLIGRAM(S): at 11:50

## 2020-02-20 RX ADMIN — CEFTRIAXONE 100 MILLIGRAM(S): 500 INJECTION, POWDER, FOR SOLUTION INTRAMUSCULAR; INTRAVENOUS at 13:57

## 2020-02-20 NOTE — PROGRESS NOTE ADULT - SUBJECTIVE AND OBJECTIVE BOX
Patient was brought to IR suite for extended dwell catheter . Patient has history of bilateral breast cancer with lymph nodes removal . left arm lymphoedema present. Spoke to IR attending  (Jorge Combs ) ok to use right arm for above procedure. 6 FR extended dwell catheter placed in basilic vein . dressing applied . good blood return

## 2020-02-20 NOTE — PROGRESS NOTE ADULT - PROBLEM SELECTOR PROBLEM 4
CAP (community acquired pneumonia)
Lymphedema of arm
Hypothyroidism
CAP (community acquired pneumonia)

## 2020-02-20 NOTE — DIETITIAN INITIAL EVALUATION ADULT. - PERTINENT MEDS FT
MEDICATIONS  (STANDING):  cefTRIAXone   IVPB      cefTRIAXone   IVPB 2000 milliGRAM(s) IV Intermittent every 24 hours  chlorhexidine 2% Cloths 1 Application(s) Topical daily  enoxaparin Injectable 40 milliGRAM(s) SubCutaneous daily  levothyroxine 50 MICROGram(s) Oral daily    MEDICATIONS  (PRN):  acetaminophen   Tablet .. 650 milliGRAM(s) Oral every 6 hours PRN Temp greater or equal to 38C (100.4F), Moderate Pain (4 - 6)

## 2020-02-20 NOTE — PROGRESS NOTE ADULT - PROBLEM SELECTOR PLAN 3
Pt is FLU A+ on 2/14  Continue with tamiflu, last dose on 2/19  afebrile
TFTs  cont meds
Breast cancer responded well to medications.  c/w Ibrance and Letrozole  Heme onc Dr Lala is consulted
Pt is FLU A+ on 2/14  RESOLVED  asymptomatic  afebrile  no leucocytosis  Isolation dc/d  2/19 Tamflu to be completed today

## 2020-02-20 NOTE — PROGRESS NOTE ADULT - PROBLEM SELECTOR PLAN 1
Cultures grew strep Viridans. Likely source can be poor dental care as per ID, Dr. Gonzalez. Need to rule out endocarditis. Will likely need PICC line once cultures are negative and patient is taken off isolation for influenza.  Dr. Wolfe fully explained to patient need for Picc line and IV antibiotics for at least four weeks.   Cardiology  Dr. Wise   Echo results noted as above  SEAN ordered   2/18 Pt and family were explained plan today per family request
cont antibiotics  Check pending cultures  follow up CXR   Monitor CBC and temp  ID follow up
cont antibiotics  Cultures results noted  follow up CXR   Monitor CBC and temp  ID follow up  Repeat Blood culture neg  S/p SEAN  Prolonged antibiotics tx  Picc line as per ID
cont antibiotics  Cultures results noted  follow up CXR   Monitor CBC and temp  ID follow up  Repeat cultures
cont antibiotics  Cultures results noted  follow up CXR   Monitor CBC and temp  ID follow up  Repeat cultures  S/p SEAN  Prolonged antibiotics tx  Picc line as per ID
cont antibiotics  Cultures results noted  follow up CXR   Monitor CBC and temp  ID follow up  Repeat cultures  Will need SEAN to rule out Endocarditis in view of +cultures  Picc line as per ID
? Pneumonia of left lower lobe    productive cough, fever, chills  CXR shows new left upper lobe infiltrates  s/p rocephin and zithromax in ED  c/w rocephin and zithromax   Flu positive A   Blood culture, sputum culture, Continue Tamiflu.
? Pneumonia of left lower lobe    productive cough, fever, chills  CXR shows new left upper lobe infiltrates  s/p rocephin and zithromax in ED  c/w rocephin and zithromax   Flu positive A   Blood culture, sputum culture, Continue Tamiflu.
·  Problem: Pneumonia of left lower lobe due to infectious organism.    Plan: p/w productive cough, fever, chills  temp 100.4, WBC 5K  CXR shows new left upper lobe infiltrates  s/p rocephin and zithromax in ED  c/w rocephin and zithromax   Flu positive A   Blood culture, sputum culture
Cultures grew strep Viridans. Likely source can be poor dental care as per ID, Dr. Gonzalez. Endocarditis ruled out. Will likely need PICC line for prophylaxis of endocarditis once blood cultures are negative.  ECHO, SEAN not showing endocarditis.   Cardiology  Dr. Wise

## 2020-02-20 NOTE — DISCHARGE NOTE PROVIDER - NSDCMRMEDTOKEN_GEN_ALL_CORE_FT
atorvastatin 40 mg oral tablet: 1 tab(s) orally once a day (at bedtime)  cefTRIAXone 2 g injection: 2 gram(s) intravenously once a day   PLEASE REMOVE EXTENDED DWELL CATHETER UPON COMPLETION OF IV ANTIBIOTICS ON 3/14/2020.  THANK YOU  letrozole 2.5 mg oral tablet: 1 tab(s) orally once a day  levothyroxine 50 mcg (0.05 mg) oral tablet: 1 tab(s) orally once a day  Normal Saline Flush 0.9% injectable solution: 10 milliliter(s) injectable once a day   PLEASE FLUSH CATHETER  BEFORE AND AFTER ANTIBIOTIC ADMINISTRATION.

## 2020-02-20 NOTE — PROGRESS NOTE ADULT - SUBJECTIVE AND OBJECTIVE BOX
CHIEF COMPLAINT:Patient is a 65y old  Female who presents with a chief complaint of Fever and chills .Pt appears comfortable.    	  REVIEW OF SYSTEMS:  CONSTITUTIONAL: No fever, weight loss, or fatigue  EYES: No eye pain, visual disturbances, or discharge  ENT:  No difficulty hearing, tinnitus, vertigo; No sinus or throat pain  NECK: No pain or stiffness  RESPIRATORY: No cough, wheezing, chills or hemoptysis; No Shortness of Breath  CARDIOVASCULAR: No chest pain, palpitations, passing out, dizziness, or leg swelling  GASTROINTESTINAL: No abdominal or epigastric pain. No nausea, vomiting, or hematemesis; No diarrhea or constipation. No melena or hematochezia.  GENITOURINARY: No dysuria, frequency, hematuria, or incontinence  NEUROLOGICAL: No headaches, memory loss, loss of strength, numbness, or tremors  SKIN: No itching, burning, rashes, or lesions   LYMPH Nodes: No enlarged glands  ENDOCRINE: No heat or cold intolerance; No hair loss  MUSCULOSKELETAL: No joint pain or swelling; No muscle, back, or extremity pain  PSYCHIATRIC: No depression, anxiety, mood swings, or difficulty sleeping  HEME/LYMPH: No easy bruising, or bleeding gums  ALLERGY AND IMMUNOLOGIC: No hives or eczema	      PHYSICAL EXAM:  T(C): 36.4 (02-20-20 @ 05:38), Max: 36.6 (02-19-20 @ 20:10)  HR: 78 (02-20-20 @ 05:38) (78 - 80)  BP: 131/76 (02-20-20 @ 05:38) (119/68 - 135/63)  RR: 18 (02-20-20 @ 05:38) (18 - 19)  SpO2: 98% (02-20-20 @ 05:38) (97% - 99%)  Wt(kg): --  I&O's Summary      Appearance: Normal	  HEENT:   Normal oral mucosa, PERRL, EOMI	  Lymphatic: No lymphadenopathy  Cardiovascular: Normal S1 S2, No JVD, No murmurs, No edema  Respiratory: Lungs clear to auscultation	  Psychiatry: A & O x 3, Mood & affect appropriate  Gastrointestinal:  Soft, Non-tender, + BS	  Skin: No rashes, No ecchymoses, No cyanosis	  Neurologic: Non-focal  Extremities: Normal range of motion, No clubbing, cyanosis or edema  Vascular: Peripheral pulses palpable 2+ bilaterally    MEDICATIONS  (STANDING):  cefTRIAXone   IVPB      cefTRIAXone   IVPB 2000 milliGRAM(s) IV Intermittent every 24 hours  chlorhexidine 2% Cloths 1 Application(s) Topical daily  enoxaparin Injectable 40 milliGRAM(s) SubCutaneous daily  levothyroxine 50 MICROGram(s) Oral daily      LABS:	 	                        12.7   4.71  )-----------( 371      ( 20 Feb 2020 07:01 )             38.2     02-20    138  |  103  |  11  ----------------------------<  115<H>  3.6   |  28  |  0.62    Ca    9.1      20 Feb 2020 07:01    Lipid Profile: Cholesterol 141  LDL 98  HDL 30  TG 63    HgA1c: Hemoglobin A1C, Whole Blood: 6.1 % (02-15 @ 10:59)    TSH: Thyroid Stimulating Hormone, Serum: 0.92 uU/mL (02-15 @ 07:11)      Culture - Blood (02.18.20 @ 18:45)    Specimen Source: .Blood    Culture Results:   No growth to date.

## 2020-02-20 NOTE — PROGRESS NOTE ADULT - PROBLEM SELECTOR PROBLEM 3
Flu
Hypothyroidism
Breast cancer
Flu

## 2020-02-20 NOTE — PROGRESS NOTE ADULT - ASSESSMENT
64 y/o F from home, walks independently, lives with  with PMH of (Breast ca, s/p Bilateral mastectomy on ibrance), Malignant pleural effusion s/p VATS procedure, Chronic Left upper extremity Lymphedema, Hypothyroidism admitted with FLU, pneumonia, Strep bacteremia.  1.ITEE is negative for vegetations..  2.ABX as per ID.  3.Hypothyroidism-synthroid.  4.Breast ca-Heme/Onc.  5.GI and DVT prophylaxis.
66 y/o F from home, walks independently, lives with  with PMH of (Breast ca, s/p Bilateral mastectomy on ibrance), Malignant pleural effusion s/p VATS procedure, Chronic Left upper extremity Lymphedema, Hypothyroidism admitted with FLU, pneumonia, Strep bacteremia.  1.SEAN is negative for vegetations..  2.ABX as per ID,await PICC.  3.Hypothyroidism-synthroid.  4.Breast ca-Heme/Onc.  5.GI and DVT prophylaxis.
66 y/o F from home, walks independently, lives with  with PMH of (Breast ca, s/p Bilateral mastectomy on ibrance), Malignant pleural effusion s/p VATS procedure, Chronic Left upper extremity Lymphedema, Hypothyroidism presented to ED with Subjective fever, chills x2 days.   ED course: Vitals are stable except for  Fever of 100.4,  Flu A positive, CXR showed Patchy air opacity in the left upper lobe suspicious for pneumonia. Admitted to medicine for Pneumonia.
Bacteremia - with Strep Mitis/Oralis ,  no evidence of  Endocarditis based on SEAN  Left arm Cellulitis  - resolved  Influenza A - improving    Plan - cont Rocephin 2 gms iv q24hrs will need 3 weeks more of abxs , till 3/14/2020 to complete a total of 4 weeks of Rocephin  DC Tamiflu   get Extended Dwell or PICC line on patient (tomorrow) if an extended dwell can not be placed because of her size.
Bacteremia - with Strep Mitis/Oralis , need to R/O Endocarditis  Left arm Cellulitis  - dramatically  better  Influenza A - improving    Plan - cont Rocephin 2 gms iv q24hrs  Cont Tamiflu 75mgs po BID x 5 days in total  Awaiting repeat blood culture results  will get TTE and ESR  will need SEAN  will need A PICC line to treat with IV abxs for 4 weeks most likely.
Bacteremia - with Strep Mitis/Oralis , need to R/O Endocarditis  Left arm Cellulitis  - dramatically  better  Influenza A - improving    Plan - cont Rocephin 2 gms iv q24hrs  Cont Tamiflu 75mgs po BID x 5 days in total (tomorrow am is last dose)  Awaiting repeat blood culture results  will need SEAN  will need A PICC line to treat with IV abxs for 4 weeks most likely.
Bacteremia - will likely turn out to be Group C/G from her cellulitis  Left arm cellulitis - much better  Influenza A infection  Fevers - improved  ??pneumonia - doubt    Plan - cont  Rocephin to 2 gms iv q24hrs  Cont Tamiflu 75 mgs po bid x 5 days total   cont isolation

## 2020-02-20 NOTE — DISCHARGE NOTE NURSING/CASE MANAGEMENT/SOCIAL WORK - PATIENT PORTAL LINK FT
You can access the FollowMyHealth Patient Portal offered by [facility] by registering at the following website: [email or web address]. By joining [facility]’s FollowMyHealth portal, you will also be able to view your health information using other applications (apps) compatible with our system.

## 2020-02-20 NOTE — PROGRESS NOTE ADULT - PROBLEM SELECTOR PROBLEM 1
Bacteremia
CAP (community acquired pneumonia)
Bacteremia

## 2020-02-20 NOTE — PROGRESS NOTE ADULT - PROBLEM SELECTOR PROBLEM 2
Breast cancer
Left arm cellulitis
HLD (hyperlipidemia)
Left arm cellulitis

## 2020-02-20 NOTE — DISCHARGE NOTE NURSING/CASE MANAGEMENT/SOCIAL WORK - NSDCPEEMAIL_GEN_ALL_CORE
[facility] for Tobacco Control email [email or web address]

## 2020-02-20 NOTE — DIETITIAN INITIAL EVALUATION ADULT. - FACTORS AFF FOOD INTAKE
other (specify)/weakness, cough/chills, PNA, HLD, breast cancer, lymphedema of arm, Bacteremia, discharge planning issues

## 2020-02-20 NOTE — DISCHARGE NOTE PROVIDER - HOSPITAL COURSE
66 y/o F from home, walks independently, lives with  with PMH of (Breast ca, s/p Bilateral mastectomy on ibrance), Malignant pleural effusion s/p VATS procedure, Chronic Left upper extremity Lymphedema, Hypothyroidism presented to ED with Subjective fever, chills, non-productive cough.  Pt  was tested flu A positive, CXR showed Patchy air opacity in the left upper lobe suspicious for pneumonia. Admitted to medicine for Pneumonia. Pt also was found to have left arm cellulitis. Pt 's Blood culture showed streptococcus mitis/oralis sensitive to Ceftriaxone. Pt has been 1 week on Ceftriaxone here inpatient. Echo, SEAN was ordered to R/O endocarditis. No endocarditis found. Repeated blood culture is negative on 02/20/2020. Pt will be discharge with extended dwell catheter to continue Ceftriaxone for 3 weeks until 3/14/2020 for continued treatment streptococcus mitis/oralis, for prophylaxis of endocarditis. 64 y/o F from home, walks independently, lives with  with PMH of (Breast ca, s/p Bilateral mastectomy on ibrance), Malignant pleural effusion s/p VATS procedure, Chronic Left upper extremity Lymphedema, Hypothyroidism presented to ED with Subjective fever, chills, non-productive cough.  Pt  was tested flu A positive, CXR showed Patchy air opacity in the left upper lobe suspicious for pneumonia. Admitted to medicine for Pneumonia. Pt also was found to have left arm cellulitis. Pt 's Blood culture showed streptococcus mitis/oralis sensitive to Ceftriaxone. Pt has been 1 week on Ceftriaxone here inpatient. Echo, SEAN was ordered to R/O endocarditis. No vegetations found. Repeated blood culture is negative on 02/20/2020. Pt will be discharge with extended dwell catheter to continue Ceftriaxone for 3 weeks until 3/14/2020 for continued treatment streptococcus mitis/oralis, for prophylaxis of endocarditis.

## 2020-02-20 NOTE — DISCHARGE NOTE PROVIDER - NSDCCPCAREPLAN_GEN_ALL_CORE_FT
PRINCIPAL DISCHARGE DIAGNOSIS  Diagnosis: Bacteremia  Assessment and Plan of Treatment: You  were found to have organizm streptococcus mitis/oralis growing your blood, likely related to your dental problems. This organizm can  cause problem in your heart (endocarditis), you were placed on iv  antibiotic and had imaging tests for your heart done to  exclude  damage to your heart.  Imaging of the heart did not show changes, however you need to continue iv antibiotic for 3 more weeks to  complete treatment of  streptococcus mitis/oralisfor the prophylaxis of endocarditis. You will be discharge with IV access for antibiotic to continue as an outpatient.  You will be having blood work done weekly, follow up with your pcp.      SECONDARY DISCHARGE DIAGNOSES  Diagnosis: Flu  Assessment and Plan of Treatment: You were diagnosed with FLU A+ and completed treatment with tamiflu.    Diagnosis: CAP (community acquired pneumonia)  Assessment and Plan of Treatment: You were diagnosed with community acquired  pneumonia and completed treatment with antibiotics.  Follow up with pcp    Diagnosis: Breast cancer  Assessment and Plan of Treatment: You were consulted by hematologist and recommended to continue latrozole and hold palbociclib when having infection. Please follow up with your oncologist within 1 week regarding your chemotherapy.    Diagnosis: Left arm cellulitis  Assessment and Plan of Treatment: You were found to have left arm cellulitis. You are discharged on IV antibiotics, f/up with your pcp.

## 2020-02-20 NOTE — DISCHARGE NOTE NURSING/CASE MANAGEMENT/SOCIAL WORK - NSDCPEWEB_GEN_ALL_CORE
[place] website --- [email or web address] for Tobacco Control website --- [email or web address]

## 2020-02-20 NOTE — DISCHARGE NOTE PROVIDER - CARE PROVIDER_API CALL
Andrew Hein (DO)  Medicine  [address] [place] [address], [address] [place] [address]  Phone: [phone]  Fax: [phone]  Follow Up Time: 1 week

## 2020-02-20 NOTE — PROGRESS NOTE ADULT - SUBJECTIVE AND OBJECTIVE BOX
Patient is a 65y old  Female who presents with a chief complaint of Fever and chills (19 Feb 2020 14:58)  Awake, alert, comfortable in bed in NAD. Awaiting for Picc line placement    INTERVAL HPI/OVERNIGHT EVENTS:      VITAL SIGNS:  T(F): 97.6 (02-20-20 @ 05:38)  HR: 78 (02-20-20 @ 05:38)  BP: 131/76 (02-20-20 @ 05:38)  RR: 18 (02-20-20 @ 05:38)  SpO2: 98% (02-20-20 @ 05:38)  Wt(kg): --  I&O's Detail          REVIEW OF SYSTEMS:    CONSTITUTIONAL:  No fevers, chills, sweats    HEENT:  Eyes:  No diplopia or blurred vision. ENT:  No earache, sore throat or runny nose.    CARDIOVASCULAR:  No pressure, squeezing, tightness, or heaviness about the chest; no palpitations.    RESPIRATORY:  Per HPI    GASTROINTESTINAL:  No abdominal pain, nausea, vomiting or diarrhea.    GENITOURINARY:  No dysuria, frequency or urgency.    NEUROLOGIC:  No paresthesias, fasciculations, seizures or weakness.    PSYCHIATRIC:  No disorder of thought or mood.      PHYSICAL EXAM:    Constitutional: Well developed and nourished  Eyes:Perrla  ENMT: normal  Neck:supple  Respiratory: good air entry  Cardiovascular: S1 S2 regular  Gastrointestinal: Soft, Non tender  Extremities: + edema  Vascular:normal  Neurological:Awake, alert,Ox3  Musculoskeletal:Normal      MEDICATIONS  (STANDING):  cefTRIAXone   IVPB      cefTRIAXone   IVPB 2000 milliGRAM(s) IV Intermittent every 24 hours  chlorhexidine 2% Cloths 1 Application(s) Topical daily  enoxaparin Injectable 40 milliGRAM(s) SubCutaneous daily  levothyroxine 50 MICROGram(s) Oral daily    MEDICATIONS  (PRN):  acetaminophen   Tablet .. 650 milliGRAM(s) Oral every 6 hours PRN Temp greater or equal to 38C (100.4F), Moderate Pain (4 - 6)      Allergies    No Known Allergies    Intolerances        LABS:                        12.7   4.71  )-----------( 371      ( 20 Feb 2020 07:01 )             38.2     02-20    138  |  103  |  11  ----------------------------<  115<H>  3.6   |  28  |  0.62    Ca    9.1      20 Feb 2020 07:01                CAPILLARY BLOOD GLUCOSE    Culture - Blood (02.18.20 @ 18:45)    Specimen Source: .Blood    Culture Results:   No growth to date.            RADIOLOGY & ADDITIONAL TESTS:    CXR:    Ct scan chest:    ekg;    echo:

## 2020-02-20 NOTE — DIETITIAN INITIAL EVALUATION ADULT. - PERTINENT LABORATORY DATA
02-20 Na138 mmol/L Glu 115 mg/dL<H> K+ 3.6 mmol/L Cr  0.62 mg/dL BUN 11 mg/dL 02-17 Phos 3.6 mg/dL 02-17 Alb 3.1 g/dL<L> 02-15 [id number] 6.1 %<H> 02-15 Chol 141 mg/dL LDL 98 mg/dL HDL 30 mg/dL<L> Trig 63 mg/dL

## 2020-02-20 NOTE — DIETITIAN INITIAL EVALUATION ADULT. - OTHER INFO
Patient from home lives with . Visited pt. alert, reports po intake "fair past 3-4days, was eating 2-3 meals since having the "Flu", denies nausea/vomiting or diarrhea PTA, stated unsure of UBW but has gained "fluids" in her arms? Presently pt. consuming 40-50% of meals & tolerating, provided food preferences, updated kitchen, encouraged po intake, offered pt. nutrition education but presently not interested, awaiting PICC line placement today, left arm edema 3+ noted, discussed with RN.

## 2020-02-23 LAB
CULTURE RESULTS: SIGNIFICANT CHANGE UP
SPECIMEN SOURCE: SIGNIFICANT CHANGE UP

## 2020-03-02 ENCOUNTER — INPATIENT (INPATIENT)
Facility: HOSPITAL | Age: 65
LOS: 10 days | Discharge: HOME | DRG: 315 | End: 2020-03-13
Attending: FAMILY MEDICINE | Admitting: FAMILY MEDICINE
Payer: MEDICARE

## 2020-03-02 VITALS
WEIGHT: 223.11 LBS | TEMPERATURE: 99 F | OXYGEN SATURATION: 99 % | HEART RATE: 84 BPM | SYSTOLIC BLOOD PRESSURE: 137 MMHG | DIASTOLIC BLOOD PRESSURE: 84 MMHG | HEIGHT: 63 IN | RESPIRATION RATE: 20 BRPM

## 2020-03-02 DIAGNOSIS — E03.9 HYPOTHYROIDISM, UNSPECIFIED: ICD-10-CM

## 2020-03-02 DIAGNOSIS — T82.898A OTHER SPECIFIED COMPLICATION OF VASCULAR PROSTHETIC DEVICES, IMPLANTS AND GRAFTS, INITIAL ENCOUNTER: ICD-10-CM

## 2020-03-02 DIAGNOSIS — Z90.13 ACQUIRED ABSENCE OF BILATERAL BREASTS AND NIPPLES: Chronic | ICD-10-CM

## 2020-03-02 DIAGNOSIS — Z87.09 PERSONAL HISTORY OF OTHER DISEASES OF THE RESPIRATORY SYSTEM: Chronic | ICD-10-CM

## 2020-03-02 DIAGNOSIS — C50.919 MALIGNANT NEOPLASM OF UNSPECIFIED SITE OF UNSPECIFIED FEMALE BREAST: ICD-10-CM

## 2020-03-02 DIAGNOSIS — E78.5 HYPERLIPIDEMIA, UNSPECIFIED: ICD-10-CM

## 2020-03-02 DIAGNOSIS — R78.81 BACTEREMIA: ICD-10-CM

## 2020-03-02 DIAGNOSIS — Z29.9 ENCOUNTER FOR PROPHYLACTIC MEASURES, UNSPECIFIED: ICD-10-CM

## 2020-03-02 LAB
ANION GAP SERPL CALC-SCNC: 4 MMOL/L — LOW (ref 5–17)
BASOPHILS # BLD AUTO: 0.06 K/UL — SIGNIFICANT CHANGE UP (ref 0–0.2)
BASOPHILS NFR BLD AUTO: 1.1 % — SIGNIFICANT CHANGE UP (ref 0–2)
BUN SERPL-MCNC: 12 MG/DL — SIGNIFICANT CHANGE UP (ref 7–18)
CALCIUM SERPL-MCNC: 9 MG/DL — SIGNIFICANT CHANGE UP (ref 8.4–10.5)
CHLORIDE SERPL-SCNC: 102 MMOL/L — SIGNIFICANT CHANGE UP (ref 96–108)
CO2 SERPL-SCNC: 30 MMOL/L — SIGNIFICANT CHANGE UP (ref 22–31)
CREAT SERPL-MCNC: 0.66 MG/DL — SIGNIFICANT CHANGE UP (ref 0.5–1.3)
EOSINOPHIL # BLD AUTO: 0.2 K/UL — SIGNIFICANT CHANGE UP (ref 0–0.5)
EOSINOPHIL NFR BLD AUTO: 3.5 % — SIGNIFICANT CHANGE UP (ref 0–6)
GLUCOSE SERPL-MCNC: 106 MG/DL — HIGH (ref 70–99)
HCT VFR BLD CALC: 36.8 % — SIGNIFICANT CHANGE UP (ref 34.5–45)
HGB BLD-MCNC: 12.1 G/DL — SIGNIFICANT CHANGE UP (ref 11.5–15.5)
IMM GRANULOCYTES NFR BLD AUTO: 0.4 % — SIGNIFICANT CHANGE UP (ref 0–1.5)
LYMPHOCYTES # BLD AUTO: 1.33 K/UL — SIGNIFICANT CHANGE UP (ref 1–3.3)
LYMPHOCYTES # BLD AUTO: 23.5 % — SIGNIFICANT CHANGE UP (ref 13–44)
MCHC RBC-ENTMCNC: 32.9 GM/DL — SIGNIFICANT CHANGE UP (ref 32–36)
MCHC RBC-ENTMCNC: 34.1 PG — HIGH (ref 27–34)
MCV RBC AUTO: 103.7 FL — HIGH (ref 80–100)
MONOCYTES # BLD AUTO: 0.5 K/UL — SIGNIFICANT CHANGE UP (ref 0–0.9)
MONOCYTES NFR BLD AUTO: 8.8 % — SIGNIFICANT CHANGE UP (ref 2–14)
NEUTROPHILS # BLD AUTO: 3.54 K/UL — SIGNIFICANT CHANGE UP (ref 1.8–7.4)
NEUTROPHILS NFR BLD AUTO: 62.7 % — SIGNIFICANT CHANGE UP (ref 43–77)
NRBC # BLD: 0 /100 WBCS — SIGNIFICANT CHANGE UP (ref 0–0)
PLATELET # BLD AUTO: 478 K/UL — HIGH (ref 150–400)
POTASSIUM SERPL-MCNC: 4.9 MMOL/L — SIGNIFICANT CHANGE UP (ref 3.5–5.3)
POTASSIUM SERPL-SCNC: 4.9 MMOL/L — SIGNIFICANT CHANGE UP (ref 3.5–5.3)
RBC # BLD: 3.55 M/UL — LOW (ref 3.8–5.2)
RBC # FLD: 12.7 % — SIGNIFICANT CHANGE UP (ref 10.3–14.5)
SODIUM SERPL-SCNC: 136 MMOL/L — SIGNIFICANT CHANGE UP (ref 135–145)
WBC # BLD: 5.65 K/UL — SIGNIFICANT CHANGE UP (ref 3.8–10.5)
WBC # FLD AUTO: 5.65 K/UL — SIGNIFICANT CHANGE UP (ref 3.8–10.5)

## 2020-03-02 PROCEDURE — 99285 EMERGENCY DEPT VISIT HI MDM: CPT

## 2020-03-02 RX ORDER — SODIUM CHLORIDE 9 MG/ML
10 INJECTION INTRAMUSCULAR; INTRAVENOUS; SUBCUTANEOUS DAILY
Refills: 0 | Status: COMPLETED | OUTPATIENT
Start: 2020-03-02 | End: 2020-03-13

## 2020-03-02 RX ORDER — OXYCODONE AND ACETAMINOPHEN 5; 325 MG/1; MG/1
1 TABLET ORAL ONCE
Refills: 0 | Status: DISCONTINUED | OUTPATIENT
Start: 2020-03-02 | End: 2020-03-02

## 2020-03-02 RX ORDER — LETROZOLE 2.5 MG/1
2.5 TABLET, FILM COATED ORAL DAILY
Refills: 0 | Status: DISCONTINUED | OUTPATIENT
Start: 2020-03-02 | End: 2020-03-13

## 2020-03-02 RX ORDER — CEFTRIAXONE 500 MG/1
2000 INJECTION, POWDER, FOR SOLUTION INTRAMUSCULAR; INTRAVENOUS EVERY 24 HOURS
Refills: 0 | Status: COMPLETED | OUTPATIENT
Start: 2020-03-02 | End: 2020-03-13

## 2020-03-02 RX ORDER — LEVOTHYROXINE SODIUM 125 MCG
50 TABLET ORAL DAILY
Refills: 0 | Status: DISCONTINUED | OUTPATIENT
Start: 2020-03-02 | End: 2020-03-13

## 2020-03-02 RX ORDER — ATORVASTATIN CALCIUM 80 MG/1
40 TABLET, FILM COATED ORAL AT BEDTIME
Refills: 0 | Status: DISCONTINUED | OUTPATIENT
Start: 2020-03-02 | End: 2020-03-13

## 2020-03-02 RX ORDER — CEFTRIAXONE 500 MG/1
2000 INJECTION, POWDER, FOR SOLUTION INTRAMUSCULAR; INTRAVENOUS ONCE
Refills: 0 | Status: COMPLETED | OUTPATIENT
Start: 2020-03-02 | End: 2020-03-02

## 2020-03-02 RX ADMIN — CEFTRIAXONE 100 MILLIGRAM(S): 500 INJECTION, POWDER, FOR SOLUTION INTRAMUSCULAR; INTRAVENOUS at 17:06

## 2020-03-02 RX ADMIN — OXYCODONE AND ACETAMINOPHEN 1 TABLET(S): 5; 325 TABLET ORAL at 17:34

## 2020-03-02 RX ADMIN — OXYCODONE AND ACETAMINOPHEN 1 TABLET(S): 5; 325 TABLET ORAL at 16:51

## 2020-03-02 NOTE — ED PROVIDER NOTE - PROGRESS NOTE DETAILS
IR staff were unable to put in the extended catheter. Will put peripheral IV and admit for IV antibiotics and  IR tomorrow for different access. Discussed with Dr Hein and patient.

## 2020-03-02 NOTE — ED PROVIDER NOTE - CONSTITUTIONAL, MLM
Spoke to patient regarding below. Patient verbalized understanding and had no further questions.    - - -

## 2020-03-02 NOTE — ED ADULT NURSE NOTE - OBJECTIVE STATEMENT
Pt. c/o medline removal today at home. Pt. blood culture tested positive and had a medline to take IV antibiotics at home. Today it became dislodge. Pt. denies any pain. Pt. has hx of breast mastectomy.

## 2020-03-02 NOTE — ED PROVIDER NOTE - OBJECTIVE STATEMENT
66 y/o F with PMHx of breast cancer s/p bilateral mastectomy on Ibrance, malignant pleural effusion s/p VATS procedure, chronic left upper extremity lymphedema, hypothyroidism presents to ED complaining of clogged right sided PICC line since this morning. Pt was originally admitted for suspicious pneumonia and left arm cellulitis with positive blood cultures. Pt is sensitive to Ceftriaxone. Pt is currently on Ceftriaxone 2g daily for another 10d. Admitting doctor is Dr. Hein. Pt denies new complaints since discharge. NKDA. 66 y/o F with PMHx of breast cancer s/p bilateral mastectomy on Ibrance, malignant pleural effusion s/p VATS procedure, chronic left upper extremity lymphedema, hypothyroidism presents to ED complaining of clogged right sided extended dwell catheter line since this morning. Pt was originally admitted for suspicious pneumonia and left arm cellulitis with positive blood cultures. Pt is sensitive to Ceftriaxone. Pt is currently on Ceftriaxone 2g daily for another 10d. Admitting doctor is Dr. Hein. Pt denies new complaints since discharge. NKDA.

## 2020-03-02 NOTE — H&P ADULT - PROBLEM SELECTOR PLAN 5
IMPROVE VTE Individual Risk Assessment  RISK                                                                Points  [  ] Previous VTE                                                  3  [  ] Thrombophilia                                               2  [  ] Lower limb paralysis                                      2        (unable to hold up >15 seconds)    [x] Current Cancer                                              2         (within 6 months)  [] Immobilization > 24 hrs                                1    [  ] ICU/CCU stay > 24 hours                              1  [x] Age > 60                                                      1  IMPROVE VTE Score 3  will hold Lovenox for now as pt will need IR procedure tomorrow possibly

## 2020-03-02 NOTE — ED PROVIDER NOTE - ATTENDING CONTRIBUTION TO CARE
pt comes in c/o clogged indwelling catheter      admitted for pneumonia    PIIC line clogged   contact IR

## 2020-03-02 NOTE — H&P ADULT - NSHPLABSRESULTS_GEN_ALL_CORE
12.1   5.65  )-----------( 478      ( 02 Mar 2020 17:06 )             36.8       03-02    136  |  102  |  12  ----------------------------<  106<H>  4.9   |  30  |  0.66    Ca    9.0      02 Mar 2020 17:06                        Lactate Trend            CAPILLARY BLOOD GLUCOSE            Culture Results:   No growth at 5 days. (02-18 @ 18:45)  Culture Results:   >=3 organisms. Probable collection contamination. (02-15 @ 01:43)  Culture Results:   Growth in aerobic and anaerobic bottles: Streptococcus mitis/oralis group (02-15 @ 01:22)  Culture Results:   Growth in aerobic and anaerobic bottles: Streptococcus mitis/oralis group  "Susceptibilities not performed"  ***Blood Panel PCR results on this specimen are available  approximately 3 hours after the Gram stain result.***  Gram stain, PCR, and/or culture results may not always  correspond due to difference in methodologies.  ************************************************************  This PCR assay was performed using [employer].  The following targets are tested for: Enterococcus,  vancomycin resistant enterococci, Listeria monocytogenes,  coagulase negative staphylococci, S. aureus,  methicillin resistant S. aureus, Streptococcus agalactiae  (Group B), S. pneumoniae, S. pyogenes (Group A),  Acinetobacter baumannii, Enterobacter cloacae, E. coli,  Klebsiella oxytoca, K. pneumoniae, Proteus sp.,  Serratia marcescens, Haemophilus influenzae,  Neisseria meningitidis, Pseudomonas aeruginosa, Candida  albicans, C. glabrata, C krusei, C parapsilosis,  C. tropicalis and the KPC resistance gene. (02-15 @ 01:22)

## 2020-03-02 NOTE — H&P ADULT - PROBLEM SELECTOR PLAN 1
Pt has come with infiltration of PICC line.   Pt was recently discharged with PICC line for IV Rocephin till 03/13 but today when the nurse came to give her medications, she was unable to flush  Pt is currently asymptomatic   Will consult IR for PICC line

## 2020-03-02 NOTE — ED ADULT NURSE NOTE - ED STAT RN HANDOFF DETAILS
endorsed to LUISITO Crow in ED hold for continued care. Pt. is refusing EKG at this time. ANTELMO Crook.

## 2020-03-02 NOTE — H&P ADULT - HISTORY OF PRESENT ILLNESS
64 y/o F with PMHx of breast cancer s/p bilateral mastectomy on Ibrance, malignant pleural effusion s/p VATS procedure, chronic left upper extremity lymphedema, hypothyroidism presents to ED complaining of clogged right sided extended dwell catheter line since this morning. Pt was originally recently admitted for suspicious pneumonia and left arm cellulitis with positive blood cultures Streptococcus Mitis. SEAN was negative for endocarditis. Pt was discharged on Ceftriaxone 2gm daily via PICC line. pt is due for  another 10d.   Pt denies any nausea vomiting, skin discoloration, fever, chest pain, bruises  Pt is anxious about the fact the she will have to get IV access in her neck  ED course:  /60  temp 97  HR 71  RR 18  SpO2 99

## 2020-03-02 NOTE — ED PROVIDER NOTE - CLINICAL SUMMARY MEDICAL DECISION MAKING FREE TEXT BOX
64 y/o F presents to ED complaining of clogged PICC line. Will contact IR in attempt to place PICC line today so patient does not need to be admitted. 66 y/o F presents to ED complaining of clogged extended IV cath. Will contact IR in attempt to place PICC line today so patient does not need to be admitted.

## 2020-03-03 DIAGNOSIS — Z02.9 ENCOUNTER FOR ADMINISTRATIVE EXAMINATIONS, UNSPECIFIED: ICD-10-CM

## 2020-03-03 DIAGNOSIS — R78.81 BACTEREMIA: ICD-10-CM

## 2020-03-03 DIAGNOSIS — I89.0 LYMPHEDEMA, NOT ELSEWHERE CLASSIFIED: ICD-10-CM

## 2020-03-03 DIAGNOSIS — R30.0 DYSURIA: ICD-10-CM

## 2020-03-03 LAB
ANION GAP SERPL CALC-SCNC: 7 MMOL/L — SIGNIFICANT CHANGE UP (ref 5–17)
APPEARANCE UR: CLEAR — SIGNIFICANT CHANGE UP
BILIRUB UR-MCNC: NEGATIVE — SIGNIFICANT CHANGE UP
BUN SERPL-MCNC: 12 MG/DL — SIGNIFICANT CHANGE UP (ref 7–18)
CALCIUM SERPL-MCNC: 9.1 MG/DL — SIGNIFICANT CHANGE UP (ref 8.4–10.5)
CHLORIDE SERPL-SCNC: 103 MMOL/L — SIGNIFICANT CHANGE UP (ref 96–108)
CO2 SERPL-SCNC: 28 MMOL/L — SIGNIFICANT CHANGE UP (ref 22–31)
COLOR SPEC: YELLOW — SIGNIFICANT CHANGE UP
CREAT SERPL-MCNC: 0.67 MG/DL — SIGNIFICANT CHANGE UP (ref 0.5–1.3)
DIFF PNL FLD: NEGATIVE — SIGNIFICANT CHANGE UP
GLUCOSE SERPL-MCNC: 112 MG/DL — HIGH (ref 70–99)
GLUCOSE UR QL: NEGATIVE — SIGNIFICANT CHANGE UP
HCT VFR BLD CALC: 36 % — SIGNIFICANT CHANGE UP (ref 34.5–45)
HGB BLD-MCNC: 11.8 G/DL — SIGNIFICANT CHANGE UP (ref 11.5–15.5)
KETONES UR-MCNC: NEGATIVE — SIGNIFICANT CHANGE UP
LEUKOCYTE ESTERASE UR-ACNC: NEGATIVE — SIGNIFICANT CHANGE UP
MAGNESIUM SERPL-MCNC: 2.5 MG/DL — SIGNIFICANT CHANGE UP (ref 1.6–2.6)
MCHC RBC-ENTMCNC: 32.8 GM/DL — SIGNIFICANT CHANGE UP (ref 32–36)
MCHC RBC-ENTMCNC: 33.6 PG — SIGNIFICANT CHANGE UP (ref 27–34)
MCV RBC AUTO: 102.6 FL — HIGH (ref 80–100)
NITRITE UR-MCNC: NEGATIVE — SIGNIFICANT CHANGE UP
NRBC # BLD: 0 /100 WBCS — SIGNIFICANT CHANGE UP (ref 0–0)
PH UR: 8 — SIGNIFICANT CHANGE UP (ref 5–8)
PHOSPHATE SERPL-MCNC: 4.1 MG/DL — SIGNIFICANT CHANGE UP (ref 2.5–4.5)
PLATELET # BLD AUTO: 431 K/UL — HIGH (ref 150–400)
POTASSIUM SERPL-MCNC: 4.1 MMOL/L — SIGNIFICANT CHANGE UP (ref 3.5–5.3)
POTASSIUM SERPL-SCNC: 4.1 MMOL/L — SIGNIFICANT CHANGE UP (ref 3.5–5.3)
PROT UR-MCNC: NEGATIVE — SIGNIFICANT CHANGE UP
RBC # BLD: 3.51 M/UL — LOW (ref 3.8–5.2)
RBC # FLD: 12.7 % — SIGNIFICANT CHANGE UP (ref 10.3–14.5)
SODIUM SERPL-SCNC: 138 MMOL/L — SIGNIFICANT CHANGE UP (ref 135–145)
SP GR SPEC: 1.01 — SIGNIFICANT CHANGE UP (ref 1.01–1.02)
UROBILINOGEN FLD QL: NEGATIVE — SIGNIFICANT CHANGE UP
WBC # BLD: 5.1 K/UL — SIGNIFICANT CHANGE UP (ref 3.8–10.5)
WBC # FLD AUTO: 5.1 K/UL — SIGNIFICANT CHANGE UP (ref 3.8–10.5)

## 2020-03-03 RX ORDER — ALPRAZOLAM 0.25 MG
0.25 TABLET ORAL
Refills: 0 | Status: DISCONTINUED | OUTPATIENT
Start: 2020-03-03 | End: 2020-03-04

## 2020-03-03 RX ORDER — ENOXAPARIN SODIUM 100 MG/ML
40 INJECTION SUBCUTANEOUS DAILY
Refills: 0 | Status: DISCONTINUED | OUTPATIENT
Start: 2020-03-03 | End: 2020-03-13

## 2020-03-03 RX ADMIN — SODIUM CHLORIDE 10 MILLILITER(S): 9 INJECTION INTRAMUSCULAR; INTRAVENOUS; SUBCUTANEOUS at 13:25

## 2020-03-03 RX ADMIN — LETROZOLE 2.5 MILLIGRAM(S): 2.5 TABLET, FILM COATED ORAL at 13:24

## 2020-03-03 RX ADMIN — Medication 50 MICROGRAM(S): at 05:24

## 2020-03-03 RX ADMIN — CEFTRIAXONE 100 MILLIGRAM(S): 500 INJECTION, POWDER, FOR SOLUTION INTRAMUSCULAR; INTRAVENOUS at 13:24

## 2020-03-03 RX ADMIN — Medication 0.25 MILLIGRAM(S): at 17:30

## 2020-03-03 NOTE — PROGRESS NOTE ADULT - SUBJECTIVE AND OBJECTIVE BOX
Extended dwell catheter was placed on 2/20/2020 on right arm . On 3/2/2020 pt presents to ER that extended dwell catheter was dislodged . .Patient was brought to IR suite yesterday to place the extended catheter, upon examining the vessels basilic vein was clotted throughout arm , brachial vein was to small .  Therefore, cephalic vein was prepped however, it was unsuccessful. We are limited to one arm only . She is not a candidate for  Picc line nor for extended catheter at this  time .

## 2020-03-03 NOTE — PROGRESS NOTE ADULT - SUBJECTIVE AND OBJECTIVE BOX
NP Note discussed with  Primary Attending    Patient is a 65y old  Female who presents with a chief complaint of Clogged Picc line (03 Mar 2020 08:48)    HPI    64 y/o F with PMHx of breast cancer s/p bilateral mastectomy on Ibrance, malignant pleural effusion s/p VATS procedure, chronic left upper extremity lymphedema, hypothyroidism, recent hospitalization  for PNA  left arm cellulitis, and Bacteremia for which she was discharged on Ceftriaxone . Pt presented  to ED for IV access for  Ceftriaxone 2gm daily for 10 more days for treatment of bacteremia, Streptococcus Mitis. Pt reports she had RUE extended dwell which was clogged, and "came out." Pt is not a candidate for PICC nor extended dwell catheter in setting of basilic vein clotted throughout arm, small brachial vein, and failed attempt via cephalic vein in IR. Follow-up ID consult with Dr. Wolfe.              INTERVAL HPI/OVERNIGHT EVENTS: Pt seen and examined this morning. Pt reports she is anxious about getting another IV, and suggests (antibiotic) tablets for shorter course of therapy.     MEDICATIONS  (STANDING):  atorvastatin 40 milliGRAM(s) Oral at bedtime  cefTRIAXone   IVPB 2000 milliGRAM(s) IV Intermittent every 24 hours  letrozole 2.5 milliGRAM(s) Oral daily  levothyroxine 50 MICROGram(s) Oral daily  sodium chloride 0.9% lock flush 10 milliLiter(s) IV Push daily    MEDICATIONS  (PRN):      __________________________________________________  REVIEW OF SYSTEMS:    CONSTITUTIONAL: No fever,   EYES: no acute visual disturbances  NECK: No pain or stiffness  RESPIRATORY: No cough; No shortness of breath  CARDIOVASCULAR: No chest pain, no palpitations  GASTROINTESTINAL: No pain. No nausea or vomiting; No diarrhea   NEUROLOGICAL: No headache or numbness, no tremors  MUSCULOSKELETAL: No joint pain, no muscle pain  GENITOURINARY:(+) dysuria, no frequency, no hesitancy  PSYCHIATRY: no depression , no anxiety  ALL OTHER  ROS negative        Vital Signs Last 24 Hrs  T(C): 36.8 (03 Mar 2020 11:23), Max: 37.1 (02 Mar 2020 14:46)  T(F): 98.2 (03 Mar 2020 11:23), Max: 98.7 (02 Mar 2020 14:46)  HR: 69 (03 Mar 2020 11:23) (69 - 84)  BP: 120/56 (03 Mar 2020 11:23) (118/58 - 137/84)  BP(mean): --  RR: 17 (03 Mar 2020 11:23) (17 - 20)  SpO2: 99% (03 Mar 2020 11:23) (96% - 99%)    ________________________________________________  PHYSICAL EXAM:  GENERAL: NAD  HEENT: Normocephalic;  conjunctivae and sclerae clear; moist mucous membranes;   NECK : supple  CHEST/LUNG: Effort normal. Clear to auscultation bilaterally with good air entry   HEART: S1 S2  regular; no murmurs, gallops or rubs  ABDOMEN: Bowel sounds present, Soft, Nontender, Nondistended;  EXTREMITIES : LUE with chronic lympedema, no cyanosis;  no calf tenderness  SKIN: warm and dry; no rash  NERVOUS SYSTEM:  Awake and alert; Oriented  to place, person and time ; no new deficits    _________________________________________________  LABS:                        11.8   5.10  )-----------( 431      ( 03 Mar 2020 05:50 )             36.0     03-03    138  |  103  |  12  ----------------------------<  112<H>  4.1   |  28  |  0.67    Ca    9.1      03 Mar 2020 05:50  Phos  4.1     03-03  Mg     2.5     03-03          CAPILLARY BLOOD GLUCOSE            RADIOLOGY & ADDITIONAL TESTS:        Consultant(s) Notes Reviewed:   YES/ No    Care Discussed with Consultants :     Plan of care was discussed with patient and /or primary care giver; all questions and concerns were addressed and care was aligned with patient's wishes. NP Note discussed with  Primary Attending    Patient is a 65y old  Female who presents with a chief complaint of Clogged Picc line (03 Mar 2020 08:48)    HPI    64 y/o F with PMHx of breast cancer s/p bilateral mastectomy on Ibrance, malignant pleural effusion s/p VATS procedure, chronic left upper extremity lymphedema, hypothyroidism, recent hospitalization  for PNA  left arm cellulitis, and Bacteremia for which she was discharged on Ceftriaxone . Pt presented  to ED for IV access for  Ceftriaxone 2gm daily for 10 more days for treatment of bacteremia, Streptococcus Mitis. Pt reports she had RUE extended dwell which was clogged, and "came out." Pt is not a candidate for PICC nor extended dwell catheter in setting of basilic vein clotted throughout arm, small brachial vein, and failed attempt via cephalic vein in IR. Follow-up ID consult with Dr. Wolfe.          INTERVAL HPI/OVERNIGHT EVENTS: Pt seen and examined this morning. Pt reports she is anxious about getting another IV, and suggests (antibiotic) tablets for shorter course of therapy.     MEDICATIONS  (STANDING):  atorvastatin 40 milliGRAM(s) Oral at bedtime  cefTRIAXone   IVPB 2000 milliGRAM(s) IV Intermittent every 24 hours  letrozole 2.5 milliGRAM(s) Oral daily  levothyroxine 50 MICROGram(s) Oral daily  sodium chloride 0.9% lock flush 10 milliLiter(s) IV Push daily    MEDICATIONS  (PRN):      __________________________________________________  REVIEW OF SYSTEMS:    CONSTITUTIONAL: No fever,   EYES: no acute visual disturbances  NECK: No pain or stiffness  RESPIRATORY: No cough; No shortness of breath  CARDIOVASCULAR: No chest pain, no palpitations  GASTROINTESTINAL: No pain. No nausea or vomiting; No diarrhea   NEUROLOGICAL: No headache or numbness, no tremors  MUSCULOSKELETAL: No joint pain, no muscle pain  GENITOURINARY:(+) dysuria, no frequency, no hesitancy  PSYCHIATRY: no depression , no anxiety  ALL OTHER  ROS negative        Vital Signs Last 24 Hrs  T(C): 36.8 (03 Mar 2020 11:23), Max: 37.1 (02 Mar 2020 14:46)  T(F): 98.2 (03 Mar 2020 11:23), Max: 98.7 (02 Mar 2020 14:46)  HR: 69 (03 Mar 2020 11:23) (69 - 84)  BP: 120/56 (03 Mar 2020 11:23) (118/58 - 137/84)  BP(mean): --  RR: 17 (03 Mar 2020 11:23) (17 - 20)  SpO2: 99% (03 Mar 2020 11:23) (96% - 99%)    ________________________________________________  PHYSICAL EXAM:  GENERAL: NAD  HEENT: Normocephalic;  conjunctivae and sclerae clear; moist mucous membranes;   NECK : supple  CHEST/LUNG: Effort normal. Clear to auscultation bilaterally with good air entry   HEART: S1 S2  regular; no murmurs, gallops or rubs  ABDOMEN: Bowel sounds present, Soft, Nontender, Nondistended;  EXTREMITIES : LUE with chronic lympedema, no cyanosis;  no calf tenderness  SKIN: warm and dry; no rash  NERVOUS SYSTEM:  Awake and alert; Oriented  to place, person and time ; no new deficits    _________________________________________________  LABS:                        11.8   5.10  )-----------( 431      ( 03 Mar 2020 05:50 )             36.0     03-03    138  |  103  |  12  ----------------------------<  112<H>  4.1   |  28  |  0.67    Ca    9.1      03 Mar 2020 05:50  Phos  4.1     03-03  Mg     2.5     03-03          CAPILLARY BLOOD GLUCOSE            RADIOLOGY & ADDITIONAL TESTS:        Consultant(s) Notes Reviewed:   YES/ No    Care Discussed with Consultants :     Plan of care was discussed with patient and /or primary care giver; all questions and concerns were addressed and care was aligned with patient's wishes.

## 2020-03-03 NOTE — PROGRESS NOTE ADULT - PROBLEM SELECTOR PLAN 2
Extended dwell was clogged and became dislodged  Not a candidate for PICC or another extended dwell in setting basilic vein clotted throughout arm, small brachial vein, and failed attempt via cephalic vein in IR.  f/u ID consult with Dr. Wolfe

## 2020-03-04 DIAGNOSIS — R06.02 SHORTNESS OF BREATH: ICD-10-CM

## 2020-03-04 DIAGNOSIS — Z71.89 OTHER SPECIFIED COUNSELING: ICD-10-CM

## 2020-03-04 DIAGNOSIS — J15.9 UNSPECIFIED BACTERIAL PNEUMONIA: ICD-10-CM

## 2020-03-04 DIAGNOSIS — J90 PLEURAL EFFUSION, NOT ELSEWHERE CLASSIFIED: ICD-10-CM

## 2020-03-04 LAB
ANION GAP SERPL CALC-SCNC: 6 MMOL/L — SIGNIFICANT CHANGE UP (ref 5–17)
BUN SERPL-MCNC: 13 MG/DL — SIGNIFICANT CHANGE UP (ref 7–18)
CALCIUM SERPL-MCNC: 9.2 MG/DL — SIGNIFICANT CHANGE UP (ref 8.4–10.5)
CHLORIDE SERPL-SCNC: 105 MMOL/L — SIGNIFICANT CHANGE UP (ref 96–108)
CO2 SERPL-SCNC: 28 MMOL/L — SIGNIFICANT CHANGE UP (ref 22–31)
CREAT SERPL-MCNC: 0.72 MG/DL — SIGNIFICANT CHANGE UP (ref 0.5–1.3)
GLUCOSE SERPL-MCNC: 106 MG/DL — HIGH (ref 70–99)
HCT VFR BLD CALC: 37.6 % — SIGNIFICANT CHANGE UP (ref 34.5–45)
HGB BLD-MCNC: 12.3 G/DL — SIGNIFICANT CHANGE UP (ref 11.5–15.5)
MCHC RBC-ENTMCNC: 32.7 GM/DL — SIGNIFICANT CHANGE UP (ref 32–36)
MCHC RBC-ENTMCNC: 33.9 PG — SIGNIFICANT CHANGE UP (ref 27–34)
MCV RBC AUTO: 103.6 FL — HIGH (ref 80–100)
NRBC # BLD: 0 /100 WBCS — SIGNIFICANT CHANGE UP (ref 0–0)
PLATELET # BLD AUTO: 403 K/UL — HIGH (ref 150–400)
POTASSIUM SERPL-MCNC: 4.3 MMOL/L — SIGNIFICANT CHANGE UP (ref 3.5–5.3)
POTASSIUM SERPL-SCNC: 4.3 MMOL/L — SIGNIFICANT CHANGE UP (ref 3.5–5.3)
RBC # BLD: 3.63 M/UL — LOW (ref 3.8–5.2)
RBC # FLD: 12.6 % — SIGNIFICANT CHANGE UP (ref 10.3–14.5)
SODIUM SERPL-SCNC: 139 MMOL/L — SIGNIFICANT CHANGE UP (ref 135–145)
WBC # BLD: 5.54 K/UL — SIGNIFICANT CHANGE UP (ref 3.8–10.5)
WBC # FLD AUTO: 5.54 K/UL — SIGNIFICANT CHANGE UP (ref 3.8–10.5)

## 2020-03-04 PROCEDURE — 71045 X-RAY EXAM CHEST 1 VIEW: CPT | Mod: 26

## 2020-03-04 PROCEDURE — 99232 SBSQ HOSP IP/OBS MODERATE 35: CPT

## 2020-03-04 RX ORDER — IBUPROFEN 200 MG
400 TABLET ORAL EVERY 6 HOURS
Refills: 0 | Status: DISCONTINUED | OUTPATIENT
Start: 2020-03-04 | End: 2020-03-05

## 2020-03-04 RX ORDER — ACETAMINOPHEN 500 MG
650 TABLET ORAL EVERY 4 HOURS
Refills: 0 | Status: DISCONTINUED | OUTPATIENT
Start: 2020-03-04 | End: 2020-03-13

## 2020-03-04 RX ORDER — OXYCODONE AND ACETAMINOPHEN 5; 325 MG/1; MG/1
1 TABLET ORAL EVERY 6 HOURS
Refills: 0 | Status: DISCONTINUED | OUTPATIENT
Start: 2020-03-04 | End: 2020-03-05

## 2020-03-04 RX ORDER — ALPRAZOLAM 0.25 MG
0.5 TABLET ORAL EVERY 12 HOURS
Refills: 0 | Status: DISCONTINUED | OUTPATIENT
Start: 2020-03-04 | End: 2020-03-11

## 2020-03-04 RX ADMIN — SODIUM CHLORIDE 10 MILLILITER(S): 9 INJECTION INTRAMUSCULAR; INTRAVENOUS; SUBCUTANEOUS at 11:56

## 2020-03-04 RX ADMIN — LETROZOLE 2.5 MILLIGRAM(S): 2.5 TABLET, FILM COATED ORAL at 11:57

## 2020-03-04 RX ADMIN — CEFTRIAXONE 100 MILLIGRAM(S): 500 INJECTION, POWDER, FOR SOLUTION INTRAMUSCULAR; INTRAVENOUS at 13:44

## 2020-03-04 RX ADMIN — Medication 50 MICROGRAM(S): at 06:15

## 2020-03-04 RX ADMIN — ATORVASTATIN CALCIUM 40 MILLIGRAM(S): 80 TABLET, FILM COATED ORAL at 00:19

## 2020-03-04 RX ADMIN — OXYCODONE AND ACETAMINOPHEN 1 TABLET(S): 5; 325 TABLET ORAL at 17:40

## 2020-03-04 RX ADMIN — Medication 0.5 MILLIGRAM(S): at 17:40

## 2020-03-04 RX ADMIN — OXYCODONE AND ACETAMINOPHEN 1 TABLET(S): 5; 325 TABLET ORAL at 23:21

## 2020-03-04 RX ADMIN — OXYCODONE AND ACETAMINOPHEN 1 TABLET(S): 5; 325 TABLET ORAL at 18:40

## 2020-03-04 NOTE — CONSULT NOTE ADULT - SUBJECTIVE AND OBJECTIVE BOX
HPI:  ID consult was called to evaluate this patient for ongoing treatment of bacteremia from prior admission, but presents on this admission due to clotted extended dwell cath.     As per H&P:  64 y/o F with PMHx of breast cancer s/p bilateral mastectomy on Ibrance, malignant pleural effusion s/p VATS procedure, chronic left upper extremity lymphedema, hypothyroidism presents to ED complaining of clogged right sided extended dwell catheter line since this morning. Pt was originally recently admitted for suspicious pneumonia and left arm cellulitis with positive blood cultures Streptococcus Mitis. SEAN was negative for endocarditis. Pt was discharged on Ceftriaxone 2gm daily via PICC line. pt is due for  another 10d.   Pt denies any nausea vomiting, skin discoloration, fever, chest pain, bruises. Pt is anxious about the fact the she will have to get IV access in her neck  (02 Mar 2020 21:53)    REVIEW OF SYSTEMS:  [  ] Not able to illicit  General:	  Chest:	  GI:	  :  Skin:	  Musculoskeletal:	  Neuro:    PAST MEDICAL & SURGICAL HISTORY:  HLD (hyperlipidemia)  Breast cancer  Hypothyroidism  S/P mastectomy, bilateral  Lymphedema of arm: left arm lymphedema since 20 years , after the breast reconstruction surgery  Thyroid condition  H/O pleural empyema: pleurodesis  S/P bilateral mastectomy  S/P mastectomy, bilateral: s/p implants b/l, no chemo or radiation therapy    ALLERGIES: No Known Allergies    MEDS:  ALPRAZolam 0.25 milliGRAM(s) Oral two times a day PRN  atorvastatin 40 milliGRAM(s) Oral at bedtime  cefTRIAXone   IVPB 2000 milliGRAM(s) IV Intermittent every 24 hours  enoxaparin Injectable 40 milliGRAM(s) SubCutaneous daily  letrozole 2.5 milliGRAM(s) Oral daily  levothyroxine 50 MICROGram(s) Oral daily  sodium chloride 0.9% lock flush 10 milliLiter(s) IV Push daily    SOCIAL HISTORY:  Smoker:  none    FAMILY HISTORY:  No pertinent family history in first degree relatives    VITALS:  Vital Signs Last 24 Hrs  T(C): 37 (04 Mar 2020 08:41), Max: 37 (04 Mar 2020 08:41)  T(F): 98.6 (04 Mar 2020 08:41), Max: 98.6 (04 Mar 2020 08:41)  HR: 78 (04 Mar 2020 08:41) (69 - 87)  BP: 152/87 (04 Mar 2020 08:41) (106/61 - 152/87)  BP(mean): 93 (03 Mar 2020 20:00) (82 - 93)  RR: 18 (04 Mar 2020 08:41) (17 - 18)  SpO2: 97% (04 Mar 2020 08:41) (97% - 100%)      PHYSICAL EXAM:  Constitutional:  HEENT:  Neck:  Respiratory:  Cardiovascular:  Gastrointestinal:  Extremities:  Skin:  Ortho:  Neuro:      LABS/DIAGNOSTIC TESTS:                        12.3   5.54  )-----------( 403      ( 04 Mar 2020 06:08 )             37.6     WBC Count: 5.54 K/uL ( @ 06:08)  WBC Count: 5.10 K/uL ( @ 05:50)  WBC Count: 5.65 K/uL ( @ 17:06)    04    139  |  105  |  13  ----------------------------<  106<H>  4.3   |  28  |  0.72    Ca    9.2      04 Mar 2020 06:08  Phos  4.1     03-03  Mg     2.5     03-03    Urinalysis Basic - ( 03 Mar 2020 13:48 )  Color: Yellow / Appearance: Clear / S.010 / pH: x  Gluc: x / Ketone: Negative  / Bili: Negative / Urobili: Negative   Blood: x / Protein: Negative / Nitrite: Negative   Leuk Esterase: Negative / RBC: x / WBC x   Sq Epi: x / Non Sq Epi: x / Bacteria: x        CULTURES:   .Blood   @ 18:45   No growth at 5 days.  --  --      .Urine  02-15 @ 01:43   >=3 organisms. Probable collection contamination.  --  --      .Blood  02-15 @ 01:22   Growth in aerobic and anaerobic bottles: Streptococcus mitis/oralis group  --  Streptococcus mitis/oralis group          RADIOLOGY:  No new studies HPI:  ID consult was called to evaluate this patient for ongoing treatment of bacteremia from prior admission, but presents on this admission due to clotted extended dwell cath. Patient has no new complaints but is eager to go home. IR will attempt placing another extended dwell cath tomorrow.    As per H&P:  66 y/o F with PMHx of breast cancer s/p bilateral mastectomy on Ibrance, malignant pleural effusion s/p VATS procedure, chronic left upper extremity lymphedema, hypothyroidism presents to ED complaining of clogged right sided extended dwell catheter line since this morning. Pt was originally recently admitted for suspicious pneumonia and left arm cellulitis with positive blood cultures Streptococcus Mitis. SEAN was negative for endocarditis. Pt was discharged on Ceftriaxone 2gm daily via PICC line. pt is due for  another 10d.   Pt denies any nausea vomiting, skin discoloration, fever, chest pain, bruises. Pt is anxious about the fact the she will have to get IV access in her neck  (02 Mar 2020 21:53)    REVIEW OF SYSTEMS:  [  ] Not able to illicit  General: no fevers no malaise   Chest: no cough no sob no CP  GI: no nvd no abdominal pain  : no urinary symptoms   Skin: no rashes no cyanosis  Musculoskeletal: no trauma no LBP  Neuro: no ha's no dizziness     PAST MEDICAL & SURGICAL HISTORY:  HLD (hyperlipidemia)  Breast cancer  Hypothyroidism  S/P mastectomy, bilateral  Lymphedema of arm: left arm lymphedema since 20 years , after the breast reconstruction surgery  Thyroid condition  H/O pleural empyema: pleurodesis  S/P bilateral mastectomy  S/P mastectomy, bilateral: s/p implants b/l, no chemo or radiation therapy    ALLERGIES: No Known Allergies    MEDS:  ALPRAZolam 0.25 milliGRAM(s) Oral two times a day PRN  atorvastatin 40 milliGRAM(s) Oral at bedtime  cefTRIAXone   IVPB 2000 milliGRAM(s) IV Intermittent every 24 hours  enoxaparin Injectable 40 milliGRAM(s) SubCutaneous daily  letrozole 2.5 milliGRAM(s) Oral daily  levothyroxine 50 MICROGram(s) Oral daily  sodium chloride 0.9% lock flush 10 milliLiter(s) IV Push daily    SOCIAL HISTORY:  Smoker:  none    FAMILY HISTORY:  No pertinent family history in first degree relatives    VITALS:  Vital Signs Last 24 Hrs  T(C): 37 (04 Mar 2020 08:41), Max: 37 (04 Mar 2020 08:41)  T(F): 98.6 (04 Mar 2020 08:41), Max: 98.6 (04 Mar 2020 08:41)  HR: 78 (04 Mar 2020 08:41) (69 - 87)  BP: 152/87 (04 Mar 2020 08:41) (106/61 - 152/87)  BP(mean): 93 (03 Mar 2020 20:00) (82 - 93)  RR: 18 (04 Mar 2020 08:41) (17 - 18)  SpO2: 97% (04 Mar 2020 08:41) (97% - 100%)      PHYSICAL EXAM:  Constitutional: obese female that shows aggravation and some mild aggression  HEENT: normocephalic with moist oral mucosa  Neck: supple no LN's no JVD  Respiratory: lungs clear no rales no rhonchi  Cardiovascular: S1 S2 reg no murmurs  Gastrointestinal: +BS with soft, large abdominal pannus; nontender  Extremities: Left arm lymphedema no cyanosis  Skin: no rashes  Ortho: no jt swelling  Neuro: AAO x 3      LABS/DIAGNOSTIC TESTS:                        12.3   5.54  )-----------( 403      ( 04 Mar 2020 06:08 )             37.6     WBC Count: 5.54 K/uL ( @ 06:08)  WBC Count: 5.10 K/uL ( @ 05:50)  WBC Count: 5.65 K/uL ( @ 17:06)        139  |  105  |  13  ----------------------------<  106<H>  4.3   |  28  |  0.72    Ca    9.2      04 Mar 2020 06:08  Phos  4.1     -  Mg     2.5     03-03    Urinalysis Basic - ( 03 Mar 2020 13:48 )  Color: Yellow / Appearance: Clear / S.010 / pH: x  Gluc: x / Ketone: Negative  / Bili: Negative / Urobili: Negative   Blood: x / Protein: Negative / Nitrite: Negative   Leuk Esterase: Negative / RBC: x / WBC x   Sq Epi: x / Non Sq Epi: x / Bacteria: x        CULTURES:   .Blood   @ 18:45   No growth at 5 days.  --  --      .Urine  02-15 @ 01:43   >=3 organisms. Probable collection contamination.  --  --      .Blood  02-15 @ 01:22   Growth in aerobic and anaerobic bottles: Streptococcus mitis/oralis group  --  Streptococcus mitis/oralis group          RADIOLOGY:  No new studies

## 2020-03-04 NOTE — PROGRESS NOTE ADULT - SUBJECTIVE AND OBJECTIVE BOX
HPI:  66 y/o F with PMHx of breast cancer s/p bilateral mastectomy on Ibrance, malignant pleural effusion s/p VATS procedure, chronic left upper extremity lymphedema, hypothyroidism, recent hospitalization  for PNA  left arm cellulitis, and Bacteremia for which she was discharged on Ceftriaxone . Pt presented  to ED for IV access for  Ceftriaxone 2gm daily for 10 more days for treatment of bacteremia, Streptococcus Mitis. Pt reports she had RUE extended dwell which was clogged, and "came out." Pt is not a candidate for PICC nor extended dwell catheter in setting of basilic vein clotted throughout arm, small brachial vein, and failed attempt via cephalic vein in IR.  Pt to have IV Abx until 3/14.  Patient prefers ER[facility] over semi-private room.    OVERNIGHT EVENTS:  No new overnight events     REVIEW OF SYSTEMS:    CONSTITUTIONAL: No fever,   EYES: no acute visual disturbances  NECK: No pain or stiffness  RESPIRATORY: No cough; No shortness of breath  CARDIOVASCULAR: No chest pain, no palpitations  GASTROINTESTINAL: No pain. No nausea, vomiting or diarrhea   NEUROLOGICAL: No headache or numbness, no tremors  MUSCULOSKELETAL: No joint pain, no muscle pain  GENITOURINARY: no dysuria, no frequency, no hesitancy  PSYCHIATRY: no depression , no anxiety  ALL OTHER  ROS negative        Vital Signs Last 24 Hrs  T(C): 37 (04 Mar 2020 08:41), Max: 37 (04 Mar 2020 08:41)  T(F): 98.6 (04 Mar 2020 08:41), Max: 98.6 (04 Mar 2020 08:41)  HR: 78 (04 Mar 2020 08:41) (69 - 87)  BP: 152/87 (04 Mar 2020 08:41) (106/61 - 152/87)  BP(mean): 93 (03 Mar 2020 20:00) (82 - 93)  RR: 18 (04 Mar 2020 08:41) (17 - 18)  SpO2: 97% (04 Mar 2020 08:41) (97% - 100%)    ________________________________________________  PHYSICAL EXAM:    GENERAL: NAD  HEENT: Normocephalic; conjunctivae and sclerae clear; moist mucous membranes;   NECK : supple, no JVD  CHEST/LUNG: Clear to auscultation bilaterally   HEART: S1 S2  regular  ABDOMEN: Soft, Nontender, Nondistended; Bowel sounds present  EXTREMITIES: no cyanosis; no LE edema; no calf tenderness  SKIN: warm and dry; no rash  NERVOUS SYSTEM:  Alert & Oriented x3; no new deficits    _________________________________________________  CURRENT MEDICATIONS:    MEDICATIONS  (STANDING):  atorvastatin 40 milliGRAM(s) Oral at bedtime  cefTRIAXone   IVPB 2000 milliGRAM(s) IV Intermittent every 24 hours  enoxaparin Injectable 40 milliGRAM(s) SubCutaneous daily  letrozole 2.5 milliGRAM(s) Oral daily  levothyroxine 50 MICROGram(s) Oral daily  sodium chloride 0.9% lock flush 10 milliLiter(s) IV Push daily    MEDICATIONS  (PRN):  ALPRAZolam 0.25 milliGRAM(s) Oral two times a day PRN anxiety      __________________________________________________  LABS:                          12.3   5.54  )-----------( 403      ( 04 Mar 2020 06:08 )             37.6     03-04    139  |  105  |  13  ----------------------------<  106<H>  4.3   |  28  |  0.72    Ca    9.2      04 Mar 2020 06:08  Phos  4.1     03-03  Mg     2.5     03-03        Urinalysis Basic - ( 03 Mar 2020 13:48 )    Color: Yellow / Appearance: Clear / S.010 / pH: x  Gluc: x / Ketone: Negative  / Bili: Negative / Urobili: Negative   Blood: x / Protein: Negative / Nitrite: Negative   Leuk Esterase: Negative / RBC: x / WBC x   Sq Epi: x / Non Sq Epi: x / Bacteria: x      CAPILLARY BLOOD GLUCOSE          __________________________________________________  RADIOLOGY & ADDITIONAL TESTS:    Imaging Personally Reviewed:  NONE    Consultant(s) Notes Reviewed:   YES     Plan of care was discussed with patient and /or primary care giver; all questions and concerns were addressed and care was aligned with patient's wishes.

## 2020-03-04 NOTE — PATIENT PROFILE ADULT - ABILITY TO HEAR (WITH HEARING AID OR HEARING APPLIANCE IF NORMALLY USED):
Adequate: hears normal conversation without difficulty Mildly to Moderately Impaired: difficulty hearing in some environments or speaker may need to increase volume or speak distinctly/right ear impaired

## 2020-03-04 NOTE — CONSULT NOTE ADULT - SUBJECTIVE AND OBJECTIVE BOX
PULMONARY CONSULT NOTE      ANA JAUREGUI  MRN-[id number]    Patient is a 65y old  Female who presents with a chief complaint of Clogged Picc line (04 Mar 2020 10:05)    History of Present Illness:  Reason for Admission: Clogged Picc line	  History of Present Illness: 	  66 y/o F with PMHx of breast cancer s/p bilateral mastectomy on Ibrance, malignant pleural effusion s/p VATS procedure, chronic left upper extremity lymphedema, hypothyroidism presents to ED complaining of clogged right sided extended dwell catheter line since this morning. Pt was originally recently admitted for suspicious pneumonia and left arm cellulitis with positive blood cultures Streptococcus Mitis. SEAN was negative for endocarditis. Pt was discharged on Ceftriaxone 2gm daily via PICC line. pt is due for  another 10d.   Pt denies any nausea vomiting, skin discoloration, fever, chest pain, bruises  Pt is anxious about the fact the she will have to get IV access in her neck      HISTORY OF PRESENT ILLNESS: As above. Awake, alert, comfortable in bed in NAD. Still has occasional sob and Salazar    MEDICATIONS  (STANDING):  atorvastatin 40 milliGRAM(s) Oral at bedtime  cefTRIAXone   IVPB 2000 milliGRAM(s) IV Intermittent every 24 hours  enoxaparin Injectable 40 milliGRAM(s) SubCutaneous daily  letrozole 2.5 milliGRAM(s) Oral daily  levothyroxine 50 MICROGram(s) Oral daily  sodium chloride 0.9% lock flush 10 milliLiter(s) IV Push daily      MEDICATIONS  (PRN):  ALPRAZolam 0.25 milliGRAM(s) Oral two times a day PRN anxiety      Allergies    No Known Allergies    Intolerances        PAST MEDICAL & SURGICAL HISTORY:  HLD (hyperlipidemia)  Breast cancer  Hypothyroidism  S/P mastectomy, bilateral  Lymphedema of arm: leftarm lymphedema since 20 years , after the breast reconstruction surgery  Thyroid condition  H/O pleural empyema: pleurodesis  S/P bilateral mastectomy  S/P mastectomy, bilateral: s/p implants b/l, no chemo or radiation therapy      FAMILY HISTORY:  No pertinent family history in first degree relatives      SOCIAL HISTORY  Smoking History:     REVIEW OF SYSTEMS:    CONSTITUTIONAL:  No fevers, chills, sweats    HEENT:  Eyes:  No diplopia or blurred vision. ENT:  No earache, sore throat or runny nose.    CARDIOVASCULAR:  No pressure, squeezing, tightness, or heaviness about the chest; no palpitations.    RESPIRATORY:  Per HPI    GASTROINTESTINAL:  No abdominal pain, nausea, vomiting or diarrhea.    GENITOURINARY:  No dysuria, frequency or urgency.    NEUROLOGIC:  No paresthesias, fasciculations, seizures or weakness.    PSYCHIATRIC:  No disorder of thought or mood.    Vital Signs Last 24 Hrs  T(C): 36.3 (04 Mar 2020 11:27), Max: 37 (04 Mar 2020 08:41)  T(F): 97.4 (04 Mar 2020 11:27), Max: 98.6 (04 Mar 2020 08:41)  HR: 78 (04 Mar 2020 11:27) (71 - 87)  BP: 125/63 (04 Mar 2020 11:) (106/61 - 152/87)  BP(mean): 93 (03 Mar 2020 20:00) (82 - 93)  RR: 18 (04 Mar 2020 11:27) (17 - 18)  SpO2: 99% (04 Mar 2020 11:) (97% - 100%)  I&O's Detail      PHYSICAL EXAMINATION:    GENERAL: The patient is a well-developed, well-nourished _____in no apparent distress.     HEENT: Head is normocephalic and atraumatic. Extraocular muscles are intact. Mucous membranes are moist.     NECK: Supple.     LUNGS: Clear to auscultation without wheezing, rales, or rhonchi. Respirations unlabored    HEART: Regular rate and rhythm without murmur.    ABDOMEN: Soft, nontender, and nondistended.  No hepatosplenomegaly is noted.    EXTREMITIES: Without any cyanosis, clubbing, rash, lesions + edema.    NEUROLOGIC: Grossly intact.      LABS:                        12.3   5.54  )-----------( 403      ( 04 Mar 2020 06:08 )             37.6     03-04    139  |  105  |  13  ----------------------------<  106<H>  4.3   |  28  |  0.72    Ca    9.2      04 Mar 2020 06:08  Phos  4.1     03-03  Mg     2.5     03-03        Urinalysis Basic - ( 03 Mar 2020 13:48 )    Color: Yellow / Appearance: Clear / S.010 / pH: x  Gluc: x / Ketone: Negative  / Bili: Negative / Urobili: Negative   Blood: x / Protein: Negative / Nitrite: Negative   Leuk Esterase: Negative / RBC: x / WBC x   Sq Epi: x / Non Sq Epi: x / Bacteria: x                      MICROBIOLOGY:    RADIOLOGY & ADDITIONAL STUDIES:    CXR:    Ct scan chest:    ekg;    echo:

## 2020-03-05 DIAGNOSIS — R35.0 FREQUENCY OF MICTURITION: ICD-10-CM

## 2020-03-05 DIAGNOSIS — M54.5 LOW BACK PAIN: ICD-10-CM
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
## 2020-03-05 LAB
ANION GAP SERPL CALC-SCNC: 4 MMOL/L — LOW (ref 5–17)
BUN SERPL-MCNC: 13 MG/DL — SIGNIFICANT CHANGE UP (ref 7–18)
CALCIUM SERPL-MCNC: 9.5 MG/DL — SIGNIFICANT CHANGE UP (ref 8.4–10.5)
CHLORIDE SERPL-SCNC: 103 MMOL/L — SIGNIFICANT CHANGE UP (ref 96–108)
CO2 SERPL-SCNC: 29 MMOL/L — SIGNIFICANT CHANGE UP (ref 22–31)
CREAT SERPL-MCNC: 0.74 MG/DL — SIGNIFICANT CHANGE UP (ref 0.5–1.3)
GLUCOSE SERPL-MCNC: 112 MG/DL — HIGH (ref 70–99)
HCT VFR BLD CALC: 37.3 % — SIGNIFICANT CHANGE UP (ref 34.5–45)
HGB BLD-MCNC: 12.2 G/DL — SIGNIFICANT CHANGE UP (ref 11.5–15.5)
MCHC RBC-ENTMCNC: 32.7 GM/DL — SIGNIFICANT CHANGE UP (ref 32–36)
MCHC RBC-ENTMCNC: 33.5 PG — SIGNIFICANT CHANGE UP (ref 27–34)
MCV RBC AUTO: 102.5 FL — HIGH (ref 80–100)
MRSA PCR RESULT.: SIGNIFICANT CHANGE UP
NRBC # BLD: 0 /100 WBCS — SIGNIFICANT CHANGE UP (ref 0–0)
PLATELET # BLD AUTO: 408 K/UL — HIGH (ref 150–400)
POTASSIUM SERPL-MCNC: 3.8 MMOL/L — SIGNIFICANT CHANGE UP (ref 3.5–5.3)
POTASSIUM SERPL-SCNC: 3.8 MMOL/L — SIGNIFICANT CHANGE UP (ref 3.5–5.3)
RBC # BLD: 3.64 M/UL — LOW (ref 3.8–5.2)
RBC # FLD: 12.5 % — SIGNIFICANT CHANGE UP (ref 10.3–14.5)
S AUREUS DNA NOSE QL NAA+PROBE: SIGNIFICANT CHANGE UP
SODIUM SERPL-SCNC: 136 MMOL/L — SIGNIFICANT CHANGE UP (ref 135–145)
WBC # BLD: 3.93 K/UL — SIGNIFICANT CHANGE UP (ref 3.8–10.5)
WBC # FLD AUTO: 3.93 K/UL — SIGNIFICANT CHANGE UP (ref 3.8–10.5)

## 2020-03-05 RX ORDER — LIDOCAINE 4 G/100G
1 CREAM TOPICAL EVERY 24 HOURS
Refills: 0 | Status: DISCONTINUED | OUTPATIENT
Start: 2020-03-05 | End: 2020-03-13

## 2020-03-05 RX ORDER — ACETAMINOPHEN 500 MG
650 TABLET ORAL EVERY 4 HOURS
Refills: 0 | Status: DISCONTINUED | OUTPATIENT
Start: 2020-03-05 | End: 2020-03-13

## 2020-03-05 RX ORDER — OXYCODONE AND ACETAMINOPHEN 5; 325 MG/1; MG/1
1 TABLET ORAL EVERY 12 HOURS
Refills: 0 | Status: DISCONTINUED | OUTPATIENT
Start: 2020-03-05 | End: 2020-03-10

## 2020-03-05 RX ORDER — ACETAMINOPHEN 500 MG
1000 TABLET ORAL EVERY 6 HOURS
Refills: 0 | Status: DISCONTINUED | OUTPATIENT
Start: 2020-03-05 | End: 2020-03-13

## 2020-03-05 RX ADMIN — OXYCODONE AND ACETAMINOPHEN 1 TABLET(S): 5; 325 TABLET ORAL at 05:39

## 2020-03-05 RX ADMIN — OXYCODONE AND ACETAMINOPHEN 1 TABLET(S): 5; 325 TABLET ORAL at 06:23

## 2020-03-05 RX ADMIN — OXYCODONE AND ACETAMINOPHEN 1 TABLET(S): 5; 325 TABLET ORAL at 22:08

## 2020-03-05 RX ADMIN — Medication 0.5 MILLIGRAM(S): at 07:23

## 2020-03-05 RX ADMIN — OXYCODONE AND ACETAMINOPHEN 1 TABLET(S): 5; 325 TABLET ORAL at 23:00

## 2020-03-05 RX ADMIN — Medication 0.5 MILLIGRAM(S): at 17:51

## 2020-03-05 RX ADMIN — OXYCODONE AND ACETAMINOPHEN 1 TABLET(S): 5; 325 TABLET ORAL at 11:37

## 2020-03-05 RX ADMIN — CEFTRIAXONE 100 MILLIGRAM(S): 500 INJECTION, POWDER, FOR SOLUTION INTRAMUSCULAR; INTRAVENOUS at 15:51

## 2020-03-05 RX ADMIN — LETROZOLE 2.5 MILLIGRAM(S): 2.5 TABLET, FILM COATED ORAL at 11:38

## 2020-03-05 RX ADMIN — SODIUM CHLORIDE 10 MILLILITER(S): 9 INJECTION INTRAMUSCULAR; INTRAVENOUS; SUBCUTANEOUS at 11:39

## 2020-03-05 RX ADMIN — OXYCODONE AND ACETAMINOPHEN 1 TABLET(S): 5; 325 TABLET ORAL at 00:26

## 2020-03-05 RX ADMIN — OXYCODONE AND ACETAMINOPHEN 1 TABLET(S): 5; 325 TABLET ORAL at 12:35

## 2020-03-05 RX ADMIN — Medication 50 MICROGRAM(S): at 05:39

## 2020-03-05 NOTE — CHART NOTE - NSCHARTNOTEFT_GEN_A_CORE
Informed by RN that pt requested xanax for anxiety. When pt was given the requested medication, as per primary RN, pt pretended she took said pills but quietly slipped them under a paper she had on bedside table. When told by primary RN she didn't take them and she need to pt replied she will take them later; as per RN pt was then instructed she needs to take them now, which she did    ISTOP Reference #: [id number]  patient Name: Ave Lugo Date: 1955  Address: [address] [place] [id number]: Female  Rx Written	Rx Dispensed	Drug	Quantity	Days Supply	Prescriber Name  2020/02/26	2020/02/26	alprazolam 0.5 mg tablet	60	30	Chiki-Deejay, [facility]  2019/12/28	2019/12/28	alprazolam 0.5 mg tablet	60	30	Chiki-Deejay, [facility]  2019/12/02	2019/12/05	alprazolam 0.5 mg tablet	60	30	Chiki-Deejay, [facility]  2019/11/02	2019/11/04	alprazolam 0.5 mg tablet	60	30	Chiki-Deejay, [facility]  2019/10/05	2019/10/05	alprazolam 0.5 mg tablet	60	30	Chiki-Deejay, [facility]  2019/09/07	2019/09/12	alprazolam 0.5 mg tablet	60	30	Chiki-Deejay, [facility]  2019/08/10	2019/08/10	alprazolam 0.5 mg tablet	60	30	Chiki-Deejay, [facility]  2019/07/16	2019/07/17	oxycodone-acetaminophen 5-325 mg tab	12	3	Slim Valle  2019/07/13	2019/07/13	alprazolam 0.5 mg tablet	60	30	Chiki-Deejay, [facility]  2019/06/15	2019/06/15	alprazolam 0.5 mg tablet	60	30	Chiki-Deejay, [facility] Informed by RN that pt requested xanax for anxiety. When pt was given the requested medication, as per primary RN, pt pretended she took said pills but quietly slipped them under a paper she had on bedside table. When told by primary RN she didn't take them and she need to pt replied she will take them later; as per RN pt was then instructed she needs to take them now, which she did.  Staff/ RN is to do tongue/cheek check with medication administration.     ISTOP Reference #: [id number]  patient Name: Ave Lugo Date: 1955  [address] [place], [place] [address]B[place]: Female  Rx Written	Rx Dispensed	Drug	Quantity	Days Supply	Prescriber Name  2020/02/26	2020/02/26	alprazolam 0.5 mg tablet	60	30	Chiki-Deejay, [facility]  2019/12/28	2019/12/28	alprazolam 0.5 mg tablet	60	30	Chiki-Deejay, [facility]  2019/12/02	2019/12/05	alprazolam 0.5 mg tablet	60	30	Chiki-Deejay, [facility]  2019/11/02	2019/11/04	alprazolam 0.5 mg tablet	60	30	Chiki-Deejay, [facility]  2019/10/05	2019/10/05	alprazolam 0.5 mg tablet	60	30	Chiki-Deejay, [facility]  2019/09/07	2019/09/12	alprazolam 0.5 mg tablet	60	30	Chiki-Deejay, [facility]  2019/08/10	2019/08/10	alprazolam 0.5 mg tablet	60	30	Chiki-Deejay, [facility]  2019/07/16	2019/07/17	oxycodone-acetaminophen 5-325 mg tab	12	3	Slim Valle  2019/07/13	2019/07/13	alprazolam 0.5 mg tablet	60	30	Chiki-Deejay, [facility]  2019/06/15	2019/06/15	alprazolam 0.5 mg tablet	60	30	Chiki-Deejay, [facility]

## 2020-03-05 NOTE — PROGRESS NOTE ADULT - SUBJECTIVE AND OBJECTIVE BOX
Chart reviewed.  Patient seen and examined.    CC: Patient is a 65y old  Female who presents with a chief complaint of Clogged Picc line (05 Mar 2020 10:54)    HPI: 64 y/o F with PMHx of breast cancer s/p bilateral mastectomy on Ibrance, malignant pleural effusion s/p VATS procedure, chronic left upper extremity lymphedema, hypothyroidism presents to ED complaining of clogged right sided extended dwell catheter line since this morning. Pt was originally recently admitted for suspicious pneumonia and left arm cellulitis with positive blood cultures Streptococcus Mitis. SEAN was negative for endocarditis. Pt was discharged on Ceftriaxone 2gm daily via PICC line. pt is due for  another 10d.   Pt denies any nausea vomiting, skin discoloration, fever, chest pain, bruises  Pt is anxious about the fact the she will have to get IV access in her neck  ED course:  /60  temp 97  HR 71  RR 18  SpO2 99 (02 Mar 2020 21:53)    SUBJECTIVE/ROS: No significant issues overnight; feels like she is urinating "every hour since down in the ED"; denies urgency/dysuria/fever/chills. Denies CP/palpitation/SOB/HA/dizziness/abd pain/n/v/d. Slowly slided her buttocks down on the floor in Nursing station yesterday - no injury    Allergies  No Known Allergies    MEDICATIONS  (STANDING):  ALPRAZolam 0.5 milliGRAM(s) Oral every 12 hours  atorvastatin 40 milliGRAM(s) Oral at bedtime  cefTRIAXone   IVPB 2000 milliGRAM(s) IV Intermittent every 24 hours  enoxaparin Injectable 40 milliGRAM(s) SubCutaneous daily  letrozole 2.5 milliGRAM(s) Oral daily  levothyroxine 50 MICROGram(s) Oral daily  sodium chloride 0.9% lock flush 10 milliLiter(s) IV Push daily    MEDICATIONS  (PRN):  acetaminophen   Tablet .. 650 milliGRAM(s) Oral every 4 hours PRN Mild Pain (1 - 3)  ibuprofen  Tablet. 400 milliGRAM(s) Oral every 6 hours PRN Moderate Pain (4 - 6)  oxycodone    5 mG/acetaminophen 325 mG 1 Tablet(s) Oral every 6 hours PRN Severe Pain (7 - 10)      VITALS:  Vital Signs Last 24 Hrs  T(C): 36.6 (05 Mar 2020 12:05), Max: 36.9 (04 Mar 2020 13:43)  T(F): 97.9 (05 Mar 2020 12:05), Max: 98.5 (04 Mar 2020 13:43)  HR: 78 (05 Mar 2020 12:05) (71 - 84)  BP: 133/66 (05 Mar 2020 12:05) (113/42 - 133/66)  BP(mean): --  RR: 16 (05 Mar 2020 12:05) (16 - 18)  SpO2: 97% (05 Mar 2020 12:05) (96% - 99%)    PHYSICAL EXAM:    GENERAL: NAD    HEENT:  pupils equal and reactive, EOMI, no oropharyngeal lesions, erythema, exudates, oral thrush    NECK:   supple, no carotid bruits, no palpable lymph nodes, no thyromegaly    CV:  +S1, +S2, regular, no murmurs or rubs    RESP:   lungs clear to auscultation bilaterally, no wheezing, rales, rhonchi, good air entry bilaterally    BREAST:  not examined    GI:  abdomen soft, non-tender, non-distended, normal BS, no bruits, no abdominal masses, no palpable masses    RECTAL:  not examined    :  not examined    MSK:   normal muscle tone, no atrophy, no rigidity, no contractions    EXT: LUE lymphedema otherwise no c/c/e, no calf pain    VASCULAR:  pulses equal and symmetric in the upper and lower extremities    NEURO:  AAOX3, no focal neurological deficits, follows all commands, able to move extremities spontaneously    SKIN:  no bruising/ulcers/ lesions or rashes    LABS:                      12.2   3.93  )-----------( 408      ( 05 Mar 2020 06:47 )             37.3     03-    136  |  103  |  13  ----------------------------<  112<H>  3.8   |  29  |  0.74    Ca    9.5      05 Mar 2020 06:47    Urinalysis Basic - ( 03 Mar 2020 13:48 )    Color: Yellow / Appearance: Clear / S.010 / pH: x  Gluc: x / Ketone: Negative  / Bili: Negative / Urobili: Negative   Blood: x / Protein: Negative / Nitrite: Negative   Leuk Esterase: Negative / RBC: x / WBC x   Sq Epi: x / Non Sq Epi: x / Bacteria: x

## 2020-03-05 NOTE — PROGRESS NOTE ADULT - PROBLEM SELECTOR PLAN 6
UA unremarkable  c/o dysuria w/o other symptoms on admission  now with frequency w/o dysuria or other symptoms  will only monitor for now  pt on IV rocephin

## 2020-03-05 NOTE — PROGRESS NOTE ADULT - SUBJECTIVE AND OBJECTIVE BOX
Pt is awake, alert, lying in bed in NAD. Not a candidate for PICC line, continued on ABX.     INTERVAL HPI/OVERNIGHT EVENTS:      VITAL SIGNS:  T(F): 97.4 (20 @ 05:01)  HR: 71 (20 @ 05:01)  BP: 123/62 (20 @ 05:01)  RR: 18 (20 @ 05:01)  SpO2: 99% (20 @ 05:01)  Wt(kg): --  I&O's Detail          REVIEW OF SYSTEMS:    CONSTITUTIONAL:  No fevers, chills, sweats    HEENT:  Eyes:  No diplopia or blurred vision. ENT:  No earache, sore throat or runny nose.    CARDIOVASCULAR:  No pressure, squeezing, tightness, or heaviness about the chest; no palpitations.    RESPIRATORY:  Per HPI    GASTROINTESTINAL:  No abdominal pain, nausea, vomiting or diarrhea.    GENITOURINARY:  No dysuria, frequency or urgency.    NEUROLOGIC:  No paresthesias, fasciculations, seizures or weakness.    PSYCHIATRIC:  No disorder of thought or mood.      PHYSICAL EXAM:    Constitutional: Well developed and nourished  Eyes: Perrla  ENMT: normal  Neck: supple  Respiratory: good air entry  Cardiovascular: S1 S2 regular  Gastrointestinal: Soft, Non tender  Extremities: No edema  Vascular: normal  Neurological: Awake, alert,Ox3  Musculoskeletal: Normal      MEDICATIONS  (STANDING):  ALPRAZolam 0.5 milliGRAM(s) Oral every 12 hours  atorvastatin 40 milliGRAM(s) Oral at bedtime  cefTRIAXone   IVPB 2000 milliGRAM(s) IV Intermittent every 24 hours  enoxaparin Injectable 40 milliGRAM(s) SubCutaneous daily  letrozole 2.5 milliGRAM(s) Oral daily  levothyroxine 50 MICROGram(s) Oral daily  sodium chloride 0.9% lock flush 10 milliLiter(s) IV Push daily    MEDICATIONS  (PRN):  acetaminophen   Tablet .. 650 milliGRAM(s) Oral every 4 hours PRN Mild Pain (1 - 3)  ibuprofen  Tablet. 400 milliGRAM(s) Oral every 6 hours PRN Moderate Pain (4 - 6)  oxycodone    5 mG/acetaminophen 325 mG 1 Tablet(s) Oral every 6 hours PRN Severe Pain (7 - 10)      Allergies    No Known Allergies    Intolerances        LABS:                        12.2   3.93  )-----------( 408      ( 05 Mar 2020 06:47 )             37.3         136  |  103  |  13  ----------------------------<  112<H>  3.8   |  29  |  0.74    Ca    9.5      05 Mar 2020 06:47        Urinalysis Basic - ( 03 Mar 2020 13:48 )    Color: Yellow / Appearance: Clear / S.010 / pH: x  Gluc: x / Ketone: Negative  / Bili: Negative / Urobili: Negative   Blood: x / Protein: Negative / Nitrite: Negative   Leuk Esterase: Negative / RBC: x / WBC x   Sq Epi: x / Non Sq Epi: x / Bacteria: x            CAPILLARY BLOOD GLUCOSE            RADIOLOGY & ADDITIONAL TESTS:    CXR:  < from: Xray Chest 1 View- PORTABLE-Routine (20 @ 14:11) >  FINDINGS: Heart size appears within normal limits. No hilar or superior mediastinal abnormalities are identified. Linear opacities are identified within the bilateral lower lung field, likely related to linear scarring versus platelike atelectasis. Opacity is redemonstrated at the periphery of the left lower thorax likely corresponding to left-sided pleural thickening and pleural calcifications identified on CT evaluation of the abdomen dated 2019. There is no evidence for interval development of focal infiltrate, lobar consolidation or pulmonary edema. No definite pleural effusion is noted. There is no evidence for pneumothorax. Degenerative change of the midthoracic spine noted.    IMPRESSION: As above.    < end of copied text >    Ct scan chest:    ekg;    echo:

## 2020-03-05 NOTE — PROGRESS NOTE ADULT - SUBJECTIVE AND OBJECTIVE BOX
65y Female is under our care for bacteremia from prior admission but now presents with clogged extended dwell cath. Currently in IR about to have another attempt for an extended dwell. Denies any new complaints. Remains afebrile with normal wbc count.     REVIEW OF SYSTEMS:  [  ] Not able to illicit  General: no fevers no malaise  Chest: no cough no sob  GI: no nvd  Skin: no rashes  Neuro: no ha's no dizziness 	    MEDS:  cefTRIAXone   IVPB 2000 milliGRAM(s) IV Intermittent every 24 hours    ALLERGIES: Allergies    No Known Allergies    Intolerances      VITALS:  Vital Signs Last 24 Hrs  T(C): 36.6 (05 Mar 2020 12:05), Max: 36.8 (04 Mar 2020 20:42)  T(F): 97.9 (05 Mar 2020 12:05), Max: 98.2 (04 Mar 2020 20:42)  HR: 78 (05 Mar 2020 12:05) (71 - 78)  BP: 133/66 (05 Mar 2020 12:05) (116/57 - 133/66)  BP(mean): --  RR: 16 (05 Mar 2020 12:05) (16 - 18)  SpO2: 97% (05 Mar 2020 12:05) (97% - 99%)      PHYSICAL EXAM:  HEENT: n/a  Neck: supple no LN's   Respiratory: lungs clear no rales  Cardiovascular: S1 S2 reg no murmurs  Gastrointestinal: +BS with soft, nondistended abdomen; nontender  Extremities: LUE lymphedema  Skin: no rashes  Ortho: n/a  Neuro: AAO x 3      LABS/DIAGNOSTIC TESTS:                        12.2   3.93  )-----------( 408      ( 05 Mar 2020 06:47 )             37.3     WBC Count: 3.93 K/uL (03-05 @ 06:47)  WBC Count: 5.54 K/uL (03-04 @ 06:08)  WBC Count: 5.10 K/uL (03-03 @ 05:50)  WBC Count: 5.65 K/uL (03-02 @ 17:06)    03-05    136  |  103  |  13  ----------------------------<  112<H>  3.8   |  29  |  0.74    Ca    9.5      05 Mar 2020 06:47        CULTURES:   .Blood  02-18 @ 18:45   No growth at 5 days.  --  --      .Urine  02-15 @ 01:43   >=3 organisms. Probable collection contamination.  --  --      .Blood  02-15 @ 01:22   Growth in aerobic and anaerobic bottles: Streptococcus mitis/oralis group  --  Streptococcus mitis/oralis group        RADIOLOGY:  no new studies

## 2020-03-05 NOTE — PROGRESS NOTE ADULT - ASSESSMENT
1.	Clotted right arm extended dwell  2.	Bacteremia from prior admission  ·	cont rocephin 2gm IV daily until 3/14/20  ·	going for another extended dwell cath placement today

## 2020-03-05 NOTE — PROGRESS NOTE ADULT - ASSESSMENT
Assessment and Recommendation:   Problem/Recommendation - 1:  Problem: Breast cancer. Recommendation: Hem/onc follow up.    Problem/Recommendation - 2:  ·  Problem: Bacteremia.  Recommendation: Cont Antibiotics  Not a candidate for PICC line.   ID follow up.     Problem/Recommendation - 3:  ·  Problem: Pneumonia, bacterial.  Recommendation: Prior CXR showed a FUNMILAYO pneumonia.   F/U CXR   Abx    Problem/Recommendation - 4:  ·  Problem: Shortness of breath.  Recommendation: oxygen supp prn  Bronchodilators prn  PFTs and PSG (morbidly obese. R/O AGUSTIN) as OP.     Problem/Recommendation - 5:  ·  Problem: Hypothyroidism.  Recommendation: cont meds  TFTs.     Problem/Recommendation - 6:  Problem: PICC line infiltration. Recommendation: IR eval.  Not a candidate for PICC     Problem/Recommendation - 7:  Problem: Pleural effusion on left. Recommendation: follow up CXR.

## 2020-03-06 LAB
ANION GAP SERPL CALC-SCNC: 6 MMOL/L — SIGNIFICANT CHANGE UP (ref 5–17)
BUN SERPL-MCNC: 17 MG/DL — SIGNIFICANT CHANGE UP (ref 7–18)
CALCIUM SERPL-MCNC: 9.3 MG/DL — SIGNIFICANT CHANGE UP (ref 8.4–10.5)
CHLORIDE SERPL-SCNC: 104 MMOL/L — SIGNIFICANT CHANGE UP (ref 96–108)
CO2 SERPL-SCNC: 28 MMOL/L — SIGNIFICANT CHANGE UP (ref 22–31)
CREAT SERPL-MCNC: 0.79 MG/DL — SIGNIFICANT CHANGE UP (ref 0.5–1.3)
GLUCOSE SERPL-MCNC: 112 MG/DL — HIGH (ref 70–99)
HCT VFR BLD CALC: 39.3 % — SIGNIFICANT CHANGE UP (ref 34.5–45)
HGB BLD-MCNC: 13.1 G/DL — SIGNIFICANT CHANGE UP (ref 11.5–15.5)
MCHC RBC-ENTMCNC: 33.3 GM/DL — SIGNIFICANT CHANGE UP (ref 32–36)
MCHC RBC-ENTMCNC: 34.6 PG — HIGH (ref 27–34)
MCV RBC AUTO: 103.7 FL — HIGH (ref 80–100)
NRBC # BLD: 0 /100 WBCS — SIGNIFICANT CHANGE UP (ref 0–0)
PLATELET # BLD AUTO: 363 K/UL — SIGNIFICANT CHANGE UP (ref 150–400)
POTASSIUM SERPL-MCNC: 4.2 MMOL/L — SIGNIFICANT CHANGE UP (ref 3.5–5.3)
POTASSIUM SERPL-SCNC: 4.2 MMOL/L — SIGNIFICANT CHANGE UP (ref 3.5–5.3)
RBC # BLD: 3.79 M/UL — LOW (ref 3.8–5.2)
RBC # FLD: 12.5 % — SIGNIFICANT CHANGE UP (ref 10.3–14.5)
SODIUM SERPL-SCNC: 138 MMOL/L — SIGNIFICANT CHANGE UP (ref 135–145)
WBC # BLD: 3.48 K/UL — LOW (ref 3.8–10.5)
WBC # FLD AUTO: 3.48 K/UL — LOW (ref 3.8–10.5)

## 2020-03-06 RX ADMIN — Medication 0.5 MILLIGRAM(S): at 05:58

## 2020-03-06 RX ADMIN — OXYCODONE AND ACETAMINOPHEN 1 TABLET(S): 5; 325 TABLET ORAL at 12:51

## 2020-03-06 RX ADMIN — SODIUM CHLORIDE 10 MILLILITER(S): 9 INJECTION INTRAMUSCULAR; INTRAVENOUS; SUBCUTANEOUS at 12:51

## 2020-03-06 RX ADMIN — LETROZOLE 2.5 MILLIGRAM(S): 2.5 TABLET, FILM COATED ORAL at 12:46

## 2020-03-06 RX ADMIN — Medication 0.5 MILLIGRAM(S): at 21:49

## 2020-03-06 RX ADMIN — CEFTRIAXONE 100 MILLIGRAM(S): 500 INJECTION, POWDER, FOR SOLUTION INTRAMUSCULAR; INTRAVENOUS at 12:46

## 2020-03-06 RX ADMIN — ENOXAPARIN SODIUM 40 MILLIGRAM(S): 100 INJECTION SUBCUTANEOUS at 12:46

## 2020-03-06 RX ADMIN — Medication 50 MICROGRAM(S): at 05:58

## 2020-03-06 NOTE — PROGRESS NOTE ADULT - SUBJECTIVE AND OBJECTIVE BOX
65y Female is under our care for bacteremia from prior admission, s/p removal of clogged extended dwell cath. Was not successful with placement of another extended dwell  in IR yesterday. Patient will likely have to finish antibiotics treatment inhouse. Remains afebrile with normal wbc count.     REVIEW OF SYSTEMS:  [  ] Not able to illicit  General: no fevers no malaise  Chest: no cough no sob  GI: no nvd  Skin: no rashes  Neuro: no ha's no dizziness 	    MEDS:  cefTRIAXone   IVPB 2000 milliGRAM(s) IV Intermittent every 24 hours    ALLERGIES: Allergies    No Known Allergies    Intolerances      VITALS:  Vital Signs Last 24 Hrs  T(C): 36.2 (06 Mar 2020 05:39), Max: 36.5 (05 Mar 2020 20:20)  T(F): 97.2 (06 Mar 2020 05:39), Max: 97.7 (05 Mar 2020 20:20)  HR: 68 (06 Mar 2020 05:39) (65 - 68)  BP: 125/72 (06 Mar 2020 05:39) (103/54 - 125/72)  BP(mean): --  RR: 16 (06 Mar 2020 05:39) (16 - 17)  SpO2: 100% (06 Mar 2020 05:39) (98% - 100%)      PHYSICAL EXAM:  HEENT: n/a  Neck: supple no LN's   Respiratory: lungs clear no rales  Cardiovascular: S1 S2 reg no murmurs  Gastrointestinal: +BS with soft, nondistended abdomen; nontender  Extremities: LUE lymphedema  Skin: no rashes  Ortho: n/a  Neuro: awake and alert      LABS/DIAGNOSTIC TESTS:                         13.1   3.48  )-----------( 363      ( 06 Mar 2020 07:12 )             39.3   WBC Count: 3.48 K/uL (03-06 @ 07:12)  WBC Count: 3.93 K/uL (03-05 @ 06:47)  WBC Count: 5.54 K/uL (03-04 @ 06:08)  WBC Count: 5.10 K/uL (03-03 @ 05:50)  WBC Count: 5.65 K/uL (03-02 @ 17:06)    03-06    138  |  104  |  17  ----------------------------<  112<H>  4.2   |  28  |  0.79    Ca    9.3      06 Mar 2020 07:12        CULTURES:   .Blood  02-18 @ 18:45   No growth at 5 days.  --  --      .Urine  02-15 @ 01:43   >=3 organisms. Probable collection contamination.  --  --      .Blood  02-15 @ 01:22   Growth in aerobic and anaerobic bottles: Streptococcus mitis/oralis group  --  Streptococcus mitis/oralis group        RADIOLOGY:  no new studies

## 2020-03-06 NOTE — PROGRESS NOTE ADULT - ASSESSMENT
Assessment and Recommendation:   Problem/Recommendation - 1:  Problem: Breast cancer. Recommendation: Hem/onc follow up.  Left arm lymphedema.     Problem/Recommendation - 2:  ·  Problem: Bacteremia.  Recommendation: Cont Antibiotics  Not a candidate for PICC line/extended dwell   ID follow up.     Problem/Recommendation - 3:  ·  Problem: Pneumonia, bacterial.  Recommendation: Prior CXR showed a FUNMILAYO pneumonia.   F/U CXR   Abx    Problem/Recommendation - 4:  ·  Problem: Shortness of breath.  Recommendation: oxygen supp prn  Bronchodilators prn  PFTs and PSG (morbidly obese. R/O AGUSTIN) as OP.     Problem/Recommendation - 5:  ·  Problem: Hypothyroidism.  Recommendation: cont meds  TFTs.     Problem/Recommendation - 6:  Problem: PICC line infiltration. Recommendation: IR eval.  Not a candidate for PICC     Problem/Recommendation - 7:  Problem: Pleural effusion on left. Recommendation: follow up CXR.

## 2020-03-06 NOTE — PROGRESS NOTE ADULT - SUBJECTIVE AND OBJECTIVE BOX
Pt is awake, alert, lying in bed in NAD.     INTERVAL HPI/OVERNIGHT EVENTS:    VITAL SIGNS:  T(F): 97.2 (03-06-20 @ 05:39)  HR: 68 (03-06-20 @ 05:39)  BP: 125/72 (03-06-20 @ 05:39)  RR: 16 (03-06-20 @ 05:39)  SpO2: 100% (03-06-20 @ 05:39)  Wt(kg): --  I&O's Detail    REVIEW OF SYSTEMS:    CONSTITUTIONAL:  No fevers, chills, sweats    HEENT:  Eyes:  No diplopia or blurred vision. ENT:  No earache, sore throat or runny nose.    CARDIOVASCULAR:  No pressure, squeezing, tightness, or heaviness about the chest; no palpitations.    RESPIRATORY:  Per HPI    GASTROINTESTINAL:  No abdominal pain, nausea, vomiting or diarrhea.    GENITOURINARY:  No dysuria, frequency or urgency.    NEUROLOGIC:  No paresthesias, fasciculations, seizures or weakness.    PSYCHIATRIC:  No disorder of thought or mood.      PHYSICAL EXAM:    Constitutional: Well developed and nourished  Eyes:Perrla  ENMT: normal  Neck:supple  Respiratory: good air entry  Cardiovascular: S1 S2 regular  Gastrointestinal: Soft, Non tender  Extremities: left upper ext. edema  Vascular:normal  Neurological:Awake, alert,Ox3  Musculoskeletal:Normal      MEDICATIONS  (STANDING):  ALPRAZolam 0.5 milliGRAM(s) Oral every 12 hours  atorvastatin 40 milliGRAM(s) Oral at bedtime  cefTRIAXone   IVPB 2000 milliGRAM(s) IV Intermittent every 24 hours  enoxaparin Injectable 40 milliGRAM(s) SubCutaneous daily  letrozole 2.5 milliGRAM(s) Oral daily  levothyroxine 50 MICROGram(s) Oral daily  lidocaine   Patch 1 Patch Transdermal every 24 hours  sodium chloride 0.9% lock flush 10 milliLiter(s) IV Push daily    MEDICATIONS  (PRN):  acetaminophen   Tablet .. 650 milliGRAM(s) Oral every 4 hours PRN Mild Pain (1 - 3)  acetaminophen   Tablet .. 650 milliGRAM(s) Oral every 4 hours PRN Mild Pain (1 - 3)  acetaminophen   Tablet .. 1000 milliGRAM(s) Oral every 6 hours PRN Moderate Pain (4 - 6)  oxycodone    5 mG/acetaminophen 325 mG 1 Tablet(s) Oral every 12 hours PRN Severe Pain (7 - 10)      Allergies    No Known Allergies    Intolerances        LABS:                        13.1   3.48  )-----------( 363      ( 06 Mar 2020 07:12 )             39.3     03-06    138  |  104  |  17  ----------------------------<  112<H>  4.2   |  28  |  0.79    Ca    9.3      06 Mar 2020 07:12                CAPILLARY BLOOD GLUCOSE            RADIOLOGY & ADDITIONAL TESTS:    CXR:    Ct scan chest:    ekg;    echo:

## 2020-03-06 NOTE — PROGRESS NOTE ADULT - SUBJECTIVE AND OBJECTIVE BOX
Chart reviewed.  Patient seen and examined.    CC: Patient is a 65y old  Female who presents with a chief complaint of Clogged Picc line (05 Mar 2020 10:54)    HPI: 66 y/o F with PMHx of breast cancer s/p bilateral mastectomy on Ibrance, malignant pleural effusion s/p VATS procedure, chronic left upper extremity lymphedema, hypothyroidism presents to ED complaining of clogged right sided extended dwell catheter line since this morning. Pt was originally recently admitted for suspicious pneumonia and left arm cellulitis with positive blood cultures Streptococcus Mitis. SEAN was negative for endocarditis. Pt was discharged on Ceftriaxone 2gm daily via PICC line. pt is due for  another 10d.   Pt denies any nausea vomiting, skin discoloration, fever, chest pain, bruises  Pt is anxious about the fact the she will have to get IV access in her neck  ED course:  /60  temp 97  HR 71  RR 18  SpO2 99 (02 Mar 2020 21:53)    SUBJECTIVE/ROS: No significant issues overnight; denies urinary symptoms today; wants to be "put under" to have PICC/extended dwell catheter placed in IR "because the needle hurts"; has occasional chronic back pain; wants percocet. Denies CP/palpitation/SOB/HA/dizziness/abd pain/n/v/d.     Allergies  No Known Allergies    MEDICATIONS  (STANDING):  ALPRAZolam 0.5 milliGRAM(s) Oral every 12 hours  atorvastatin 40 milliGRAM(s) Oral at bedtime  cefTRIAXone   IVPB 2000 milliGRAM(s) IV Intermittent every 24 hours  enoxaparin Injectable 40 milliGRAM(s) SubCutaneous daily  letrozole 2.5 milliGRAM(s) Oral daily  levothyroxine 50 MICROGram(s) Oral daily  lidocaine   Patch 1 Patch Transdermal every 24 hours  sodium chloride 0.9% lock flush 10 milliLiter(s) IV Push daily    MEDICATIONS  (PRN):  acetaminophen   Tablet .. 650 milliGRAM(s) Oral every 4 hours PRN Mild Pain (1 - 3)  acetaminophen   Tablet .. 650 milliGRAM(s) Oral every 4 hours PRN Mild Pain (1 - 3)  acetaminophen   Tablet .. 1000 milliGRAM(s) Oral every 6 hours PRN Moderate Pain (4 - 6)  oxycodone    5 mG/acetaminophen 325 mG 1 Tablet(s) Oral every 12 hours PRN Severe Pain (7 - 10)    Vital Signs Last 24 Hrs  T(C): 36.2 (06 Mar 2020 05:39), Max: 36.5 (05 Mar 2020 20:20)  T(F): 97.2 (06 Mar 2020 05:39), Max: 97.7 (05 Mar 2020 20:20)  HR: 68 (06 Mar 2020 05:39) (65 - 68)  BP: 125/72 (06 Mar 2020 05:39) (103/54 - 125/72)  BP(mean): --  RR: 16 (06 Mar 2020 05:39) (16 - 17)  SpO2: 100% (06 Mar 2020 05:39) (98% - 100%)    PHYSICAL EXAM:    GENERAL: NAD    HEENT:  pupils equal and reactive, EOMI, no oropharyngeal lesions, erythema, exudates, oral thrush    NECK:   supple, no carotid bruits, no palpable lymph nodes, no thyromegaly    CV:  +S1, +S2, regular, no murmurs or rubs    RESP:   lungs clear to auscultation bilaterally, no wheezing, rales, rhonchi, good air entry bilaterally    BREAST:  not examined    GI:  abdomen soft, non-tender, non-distended, normal BS, no bruits, no abdominal masses, no palpable masses    RECTAL:  not examined    :  not examined    MSK:   normal muscle tone, no atrophy, no rigidity, no contractions    EXT: CHRONIC LUE lymphedema otherwise no c/c/e, no calf pain    VASCULAR:  pulses equal and symmetric in the upper and lower extremities    NEURO:  AAOX3, no focal neurological deficits, follows all commands, able to move extremities spontaneously    SKIN: resolving/yellowish bruise to biceps (not likely from BP cuff as pt's spouse reported)    LABS:                  13.1   3.48  )-----------( 363      ( 06 Mar 2020 07:12 )             39.3     03-06    138  |  104  |  17  ----------------------------<  112<H>  4.2   |  28  |  0.79    Ca    9.3      06 Mar 2020 07:12     ( 05 Mar 2020 06:47 )               12.2   3.93  )-----------( 408                   37.3     03-05    136  |  103  |  13  ----------------------------<  112<H>  3.8   |  29  |  0.74    Ca    9.5      05 Mar 2020 06:47    Urinalysis Basic - ( 03 Mar 2020 13:48 )    Color: Yellow / Appearance: Clear / S.010 / pH: x  Gluc: x / Ketone: Negative  / Bili: Negative / Urobili: Negative   Blood: x / Protein: Negative / Nitrite: Negative   Leuk Esterase: Negative / RBC: x / WBC x   Sq Epi: x / Non Sq Epi: x / Bacteria: x

## 2020-03-06 NOTE — PROGRESS NOTE ADULT - ASSESSMENT
1.	Bacteremia from prior admission  2.	S/p removal of clotted right arm extended dwell  ·	cont rocephin 2gm IV daily until 3/14/20

## 2020-03-07 RX ADMIN — SODIUM CHLORIDE 10 MILLILITER(S): 9 INJECTION INTRAMUSCULAR; INTRAVENOUS; SUBCUTANEOUS at 12:33

## 2020-03-07 RX ADMIN — OXYCODONE AND ACETAMINOPHEN 1 TABLET(S): 5; 325 TABLET ORAL at 12:44

## 2020-03-07 RX ADMIN — LETROZOLE 2.5 MILLIGRAM(S): 2.5 TABLET, FILM COATED ORAL at 12:34

## 2020-03-07 RX ADMIN — CEFTRIAXONE 100 MILLIGRAM(S): 500 INJECTION, POWDER, FOR SOLUTION INTRAMUSCULAR; INTRAVENOUS at 12:35

## 2020-03-07 RX ADMIN — OXYCODONE AND ACETAMINOPHEN 1 TABLET(S): 5; 325 TABLET ORAL at 00:25

## 2020-03-07 RX ADMIN — ENOXAPARIN SODIUM 40 MILLIGRAM(S): 100 INJECTION SUBCUTANEOUS at 12:34

## 2020-03-07 RX ADMIN — Medication 50 MICROGRAM(S): at 06:35

## 2020-03-07 RX ADMIN — Medication 0.5 MILLIGRAM(S): at 07:40

## 2020-03-07 RX ADMIN — OXYCODONE AND ACETAMINOPHEN 1 TABLET(S): 5; 325 TABLET ORAL at 01:19

## 2020-03-07 RX ADMIN — OXYCODONE AND ACETAMINOPHEN 1 TABLET(S): 5; 325 TABLET ORAL at 13:15

## 2020-03-07 RX ADMIN — Medication 0.5 MILLIGRAM(S): at 18:08

## 2020-03-07 NOTE — PROGRESS NOTE ADULT - SUBJECTIVE AND OBJECTIVE BOX
CHIEF COMPLAINT:Patient is a 65y old  Female who presents with a chief complaint of Clogged Picc line (07 Mar 2020 10:47)    	  REVIEW OF SYSTEMS:  CONSTITUTIONAL: No fever, weight loss, or fatigue  EYES: No eye pain, visual disturbances, or discharge  ENMT:  No difficulty hearing, tinnitus, vertigo; No sinus or throat pain  NECK: No pain or stiffness  RESPIRATORY: No cough, wheezing, chills or hemoptysis; No Shortness of Breath  CARDIOVASCULAR: No chest pain, palpitations, passing out, dizziness, or leg swelling  GASTROINTESTINAL: No abdominal or epigastric pain. No nausea, vomiting, or hematemesis; No diarrhea or constipation. No melena or hematochezia.  GENITOURINARY: No dysuria, frequency, hematuria, or incontinence  NEUROLOGICAL: No headaches, memory loss, loss of strength, numbness, or tremors  SKIN: No itching, burning, rashes, or lesions   LYMPH Nodes: No enlarged glands  ENDOCRINE: No heat or cold intolerance; No hair loss  MUSCULOSKELETAL: No joint pain or swelling; No muscle, back, or extremity pain  PSYCHIATRIC: No depression, anxiety, mood swings, or difficulty sleeping  HEME/LYMPH: No easy bruising, or bleeding gums  ALLERY AND IMMUNOLOGIC: No hives or eczema	    [ ] All others negative	  [ ] Unable to obtain    PHYSICAL EXAM:  T(C): 36.6 (03-07-20 @ 20:33), Max: 36.8 (03-07-20 @ 13:20)  HR: 88 (03-07-20 @ 20:33) (73 - 88)  BP: 100/53 (03-07-20 @ 20:33) (100/53 - 126/52)  RR: 17 (03-07-20 @ 20:33) (16 - 18)  SpO2: 97% (03-07-20 @ 20:33) (97% - 98%)  Wt(kg): --  I&O's Summary      Appearance: Normal	  HEENT:   Normal oral mucosa, PERRL, EOMI	  Lymphatic: No lymphadenopathy  Cardiovascular: Normal S1 S2, No JVD, No murmurs, No edema  Respiratory: Lungs clear to auscultation	  Psychiatry: A & O x 3, Mood & affect appropriate  Gastrointestinal:  Soft, Non-tender, + BS	  Skin: No rashes, No ecchymoses, No cyanosis	  Neurologic: Non-focal  Extremities: Normal range of motion, No clubbing, cyanosis, Left arm Lymphydema  Vascular: Peripheral pulses palpable 2+ bilaterally    MEDICATIONS  (STANDING):  ALPRAZolam 0.5 milliGRAM(s) Oral every 12 hours  atorvastatin 40 milliGRAM(s) Oral at bedtime  cefTRIAXone   IVPB 2000 milliGRAM(s) IV Intermittent every 24 hours  enoxaparin Injectable 40 milliGRAM(s) SubCutaneous daily  letrozole 2.5 milliGRAM(s) Oral daily  levothyroxine 50 MICROGram(s) Oral daily  lidocaine   Patch 1 Patch Transdermal every 24 hours  sodium chloride 0.9% lock flush 10 milliLiter(s) IV Push daily      TELEMETRY: 	    ECG:  	  RADIOLOGY:  OTHER: 	  	  CBC Full  -  ( 06 Mar 2020 07:12 )  WBC Count : 3.48 K/uL  Hemoglobin : 13.1 g/dL  Hematocrit : 39.3 %  Platelet Count - Automated : 363 K/uL  Mean Cell Volume : 103.7 fl  Mean Cell Hemoglobin : 34.6 pg  Mean Cell Hemoglobin Concentration : 33.3 gm/dL  Auto Neutrophil # : x  Auto Lymphocyte # : x  Auto Monocyte # : x  Auto Eosinophil # : x  Auto Basophil # : x  Auto Neutrophil % : x  Auto Lymphocyte % : x  Auto Monocyte % : x  Auto Eosinophil % : x  Auto Basophil % : x        CARDIAC MARKERS:                              13.1   3.48  )-----------( 363      ( 06 Mar 2020 07:12 )             39.3       03-06    138  |  104  |  17  ----------------------------<  112<H>  4.2   |  28  |  0.79    Ca    9.3      06 Mar 2020 07:12              proBNP:   Lipid Profile: Cholesterol 141  LDL 98  HDL 30  TG 63    HgA1c: Hemoglobin A1C, Whole Blood: 6.1 % (02-15 @ 10:59)    TSH: Thyroid Stimulating Hormone, Serum: 0.92 uU/mL (02-15 @ 07:11)

## 2020-03-07 NOTE — PROGRESS NOTE ADULT - SUBJECTIVE AND OBJECTIVE BOX
Patient is a 65y old  Female who presents with a chief complaint of Clogged Picc line (06 Mar 2020 13:20)    Patient awake , alert , lying in bed in NAD.     INTERVAL HPI/OVERNIGHT EVENTS:      VITAL SIGNS:  T(F): 97.1 (03-07-20 @ 05:54)  HR: 75 (03-07-20 @ 05:54)  BP: 124/75 (03-07-20 @ 05:54)  RR: 16 (03-07-20 @ 05:54)  SpO2: 98% (03-07-20 @ 05:54)  Wt(kg): --  I&O's Detail          REVIEW OF SYSTEMS:    CONSTITUTIONAL:  No fevers, chills, sweats    HEENT:  Eyes:  No diplopia or blurred vision. ENT:  No earache, sore throat or runny nose.    CARDIOVASCULAR:  No pressure, squeezing, tightness, or heaviness about the chest; no palpitations.    RESPIRATORY:  Per HPI    GASTROINTESTINAL:  No abdominal pain, nausea, vomiting or diarrhea.    GENITOURINARY:  No dysuria, frequency or urgency.    NEUROLOGIC:  No paresthesias, fasciculations, seizures or weakness.    PSYCHIATRIC:  No disorder of thought or mood.      PHYSICAL EXAM:    Constitutional: Well developed and nourished  Eyes:Perrla  ENMT: normal  Neck:supple  Respiratory: good air entry  Cardiovascular: S1 S2 regular  Gastrointestinal: Soft, Non tender  Extremities: left upper ext. edema  Vascular:normal  Neurological:Awake, alert,Ox3  Musculoskeletal:Normal      MEDICATIONS  (STANDING):  ALPRAZolam 0.5 milliGRAM(s) Oral every 12 hours  atorvastatin 40 milliGRAM(s) Oral at bedtime  cefTRIAXone   IVPB 2000 milliGRAM(s) IV Intermittent every 24 hours  enoxaparin Injectable 40 milliGRAM(s) SubCutaneous daily  letrozole 2.5 milliGRAM(s) Oral daily  levothyroxine 50 MICROGram(s) Oral daily  lidocaine   Patch 1 Patch Transdermal every 24 hours  sodium chloride 0.9% lock flush 10 milliLiter(s) IV Push daily    MEDICATIONS  (PRN):  acetaminophen   Tablet .. 650 milliGRAM(s) Oral every 4 hours PRN Mild Pain (1 - 3)  acetaminophen   Tablet .. 650 milliGRAM(s) Oral every 4 hours PRN Mild Pain (1 - 3)  acetaminophen   Tablet .. 1000 milliGRAM(s) Oral every 6 hours PRN Moderate Pain (4 - 6)  oxycodone    5 mG/acetaminophen 325 mG 1 Tablet(s) Oral every 12 hours PRN Severe Pain (7 - 10)      Allergies    No Known Allergies    Intolerances        LABS:                        13.1   3.48  )-----------( 363      ( 06 Mar 2020 07:12 )             39.3     03-06    138  |  104  |  17  ----------------------------<  112<H>  4.2   |  28  |  0.79    Ca    9.3      06 Mar 2020 07:12                CAPILLARY BLOOD GLUCOSE            RADIOLOGY & ADDITIONAL TESTS:    CXR:    Ct scan chest:    ekg;    echo: Patient is a 65y old  Female who presents with a chief complaint of Clogged Picc line (06 Mar 2020 13:20)    Patient awake , alert , lying in bed in NAD.     INTERVAL HPI/OVERNIGHT EVENTS:      VITAL SIGNS:  T(F): 97.1 (03-07-20 @ 05:54)  HR: 75 (03-07-20 @ 05:54)  BP: 124/75 (03-07-20 @ 05:54)  RR: 16 (03-07-20 @ 05:54)  SpO2: 98% (03-07-20 @ 05:54)  Wt(kg): --  I&O's Detail          REVIEW OF SYSTEMS:    CONSTITUTIONAL:  No fevers, chills, sweats    HEENT:  Eyes:  No diplopia or blurred vision. ENT:  No earache, sore throat or runny nose.    CARDIOVASCULAR:  No pressure, squeezing, tightness, or heaviness about the chest; no palpitations.    RESPIRATORY:  Per HPI    GASTROINTESTINAL:  No abdominal pain, nausea, vomiting or diarrhea.    GENITOURINARY:  No dysuria, frequency or urgency.    NEUROLOGIC:  No paresthesias, fasciculations, seizures or weakness.    PSYCHIATRIC:  No disorder of thought or mood.      PHYSICAL EXAM:    Constitutional: Well developed and nourished  Eyes:Perrla  ENMT: normal  Neck:supple  Respiratory: good air entry  Cardiovascular: S1 S2 regular  Gastrointestinal: Soft, Non tender  Extremities: left upper ext. edema  Vascular:normal  Neurological:Awake, alert,Ox3  Musculoskeletal:Normal      MEDICATIONS  (STANDING):  ALPRAZolam 0.5 milliGRAM(s) Oral every 12 hours  atorvastatin 40 milliGRAM(s) Oral at bedtime  cefTRIAXone   IVPB 2000 milliGRAM(s) IV Intermittent every 24 hours  enoxaparin Injectable 40 milliGRAM(s) SubCutaneous daily  letrozole 2.5 milliGRAM(s) Oral daily  levothyroxine 50 MICROGram(s) Oral daily  lidocaine   Patch 1 Patch Transdermal every 24 hours  sodium chloride 0.9% lock flush 10 milliLiter(s) IV Push daily    MEDICATIONS  (PRN):  acetaminophen   Tablet .. 650 milliGRAM(s) Oral every 4 hours PRN Mild Pain (1 - 3)  acetaminophen   Tablet .. 650 milliGRAM(s) Oral every 4 hours PRN Mild Pain (1 - 3)  acetaminophen   Tablet .. 1000 milliGRAM(s) Oral every 6 hours PRN Moderate Pain (4 - 6)  oxycodone    5 mG/acetaminophen 325 mG 1 Tablet(s) Oral every 12 hours PRN Severe Pain (7 - 10)      Allergies    No Known Allergies    Intolerances        LABS:                        13.1   3.48  )-----------( 363      ( 06 Mar 2020 07:12 )             39.3     03-06    138  |  104  |  17  ----------------------------<  112<H>  4.2   |  28  |  0.79    Ca    9.3      06 Mar 2020 07:12                CAPILLARY BLOOD GLUCOSE            RADIOLOGY & ADDITIONAL TESTS:    CXR:  < from: Xray Chest 1 View- PORTABLE-Routine (03.04.20 @ 14:11) >  VIEWS: Portable AP radiography of the chest performed. Evaluation limited secondary to shallow inspiration.    FINDINGS: Heart size appears within normal limits. No hilar or superior mediastinal abnormalities are identified. Linear opacities are identified within the bilateral lower lung field, likely related to linear scarring versus platelike atelectasis. Opacity is redemonstrated at the periphery of the left lower thorax likely corresponding to left-sided pleural thickening and pleural calcifications identified on CT evaluation of the abdomen dated 11/29/2019. There is no evidence for interval development of focal infiltrate, lobar consolidation or pulmonary edema. No definite pleural effusion is noted. There is no evidence for pneumothorax. Degenerative change of the midthoracic spine noted.    IMPRESSION: As above.    < end of copied text >    Ct scan chest:    ekg;    echo:

## 2020-03-07 NOTE — PROGRESS NOTE ADULT - PROBLEM SELECTOR PROBLEM 6
PICC line infiltration PAST SURGICAL HISTORY:  H/O abdominal hysterectomy     History of D&C ABLATION

## 2020-03-07 NOTE — PROGRESS NOTE ADULT - PROBLEM SELECTOR PLAN 6
IR Kaylenal   Not a candidate for PICC Will cont antibiotics via peripheral IV  Not a candidate for PICC

## 2020-03-08 ENCOUNTER — TRANSCRIPTION ENCOUNTER (OUTPATIENT)
Age: 65
End: 2020-03-08

## 2020-03-08 LAB
ALBUMIN SERPL ELPH-MCNC: 3.7 G/DL — SIGNIFICANT CHANGE UP (ref 3.5–5)
ALP SERPL-CCNC: 74 U/L — SIGNIFICANT CHANGE UP (ref 40–120)
ALT FLD-CCNC: 23 U/L DA — SIGNIFICANT CHANGE UP (ref 10–60)
ANION GAP SERPL CALC-SCNC: 6 MMOL/L — SIGNIFICANT CHANGE UP (ref 5–17)
ANION GAP SERPL CALC-SCNC: 7 MMOL/L — SIGNIFICANT CHANGE UP (ref 5–17)
AST SERPL-CCNC: 13 U/L — SIGNIFICANT CHANGE UP (ref 10–40)
BILIRUB SERPL-MCNC: 1.3 MG/DL — HIGH (ref 0.2–1.2)
BUN SERPL-MCNC: 14 MG/DL — SIGNIFICANT CHANGE UP (ref 7–18)
BUN SERPL-MCNC: 16 MG/DL — SIGNIFICANT CHANGE UP (ref 7–18)
CALCIUM SERPL-MCNC: 9.1 MG/DL — SIGNIFICANT CHANGE UP (ref 8.4–10.5)
CALCIUM SERPL-MCNC: 9.6 MG/DL — SIGNIFICANT CHANGE UP (ref 8.4–10.5)
CHLORIDE SERPL-SCNC: 101 MMOL/L — SIGNIFICANT CHANGE UP (ref 96–108)
CHLORIDE SERPL-SCNC: 103 MMOL/L — SIGNIFICANT CHANGE UP (ref 96–108)
CO2 SERPL-SCNC: 27 MMOL/L — SIGNIFICANT CHANGE UP (ref 22–31)
CO2 SERPL-SCNC: 29 MMOL/L — SIGNIFICANT CHANGE UP (ref 22–31)
CREAT SERPL-MCNC: 0.77 MG/DL — SIGNIFICANT CHANGE UP (ref 0.5–1.3)
CREAT SERPL-MCNC: 0.8 MG/DL — SIGNIFICANT CHANGE UP (ref 0.5–1.3)
GLUCOSE SERPL-MCNC: 104 MG/DL — HIGH (ref 70–99)
GLUCOSE SERPL-MCNC: 95 MG/DL — SIGNIFICANT CHANGE UP (ref 70–99)
HCT VFR BLD CALC: 41.6 % — SIGNIFICANT CHANGE UP (ref 34.5–45)
HGB BLD-MCNC: 13.6 G/DL — SIGNIFICANT CHANGE UP (ref 11.5–15.5)
MCHC RBC-ENTMCNC: 32.7 GM/DL — SIGNIFICANT CHANGE UP (ref 32–36)
MCHC RBC-ENTMCNC: 33.8 PG — SIGNIFICANT CHANGE UP (ref 27–34)
MCV RBC AUTO: 103.5 FL — HIGH (ref 80–100)
NRBC # BLD: 0 /100 WBCS — SIGNIFICANT CHANGE UP (ref 0–0)
PLATELET # BLD AUTO: 395 K/UL — SIGNIFICANT CHANGE UP (ref 150–400)
POTASSIUM SERPL-MCNC: 4.1 MMOL/L — SIGNIFICANT CHANGE UP (ref 3.5–5.3)
POTASSIUM SERPL-MCNC: 4.2 MMOL/L — SIGNIFICANT CHANGE UP (ref 3.5–5.3)
POTASSIUM SERPL-SCNC: 4.1 MMOL/L — SIGNIFICANT CHANGE UP (ref 3.5–5.3)
POTASSIUM SERPL-SCNC: 4.2 MMOL/L — SIGNIFICANT CHANGE UP (ref 3.5–5.3)
PROT SERPL-MCNC: 9.3 G/DL — HIGH (ref 6–8.3)
RBC # BLD: 4.02 M/UL — SIGNIFICANT CHANGE UP (ref 3.8–5.2)
RBC # FLD: 12.5 % — SIGNIFICANT CHANGE UP (ref 10.3–14.5)
SODIUM SERPL-SCNC: 136 MMOL/L — SIGNIFICANT CHANGE UP (ref 135–145)
SODIUM SERPL-SCNC: 137 MMOL/L — SIGNIFICANT CHANGE UP (ref 135–145)
WBC # BLD: 5.17 K/UL — SIGNIFICANT CHANGE UP (ref 3.8–10.5)
WBC # FLD AUTO: 5.17 K/UL — SIGNIFICANT CHANGE UP (ref 3.8–10.5)

## 2020-03-08 RX ADMIN — OXYCODONE AND ACETAMINOPHEN 1 TABLET(S): 5; 325 TABLET ORAL at 18:10

## 2020-03-08 RX ADMIN — Medication 0.5 MILLIGRAM(S): at 17:47

## 2020-03-08 RX ADMIN — Medication 50 MICROGRAM(S): at 05:26

## 2020-03-08 RX ADMIN — OXYCODONE AND ACETAMINOPHEN 1 TABLET(S): 5; 325 TABLET ORAL at 06:21

## 2020-03-08 RX ADMIN — LETROZOLE 2.5 MILLIGRAM(S): 2.5 TABLET, FILM COATED ORAL at 12:24

## 2020-03-08 RX ADMIN — ENOXAPARIN SODIUM 40 MILLIGRAM(S): 100 INJECTION SUBCUTANEOUS at 12:24

## 2020-03-08 RX ADMIN — Medication 0.5 MILLIGRAM(S): at 05:26

## 2020-03-08 RX ADMIN — OXYCODONE AND ACETAMINOPHEN 1 TABLET(S): 5; 325 TABLET ORAL at 05:26

## 2020-03-08 RX ADMIN — OXYCODONE AND ACETAMINOPHEN 1 TABLET(S): 5; 325 TABLET ORAL at 17:47

## 2020-03-08 RX ADMIN — SODIUM CHLORIDE 10 MILLILITER(S): 9 INJECTION INTRAMUSCULAR; INTRAVENOUS; SUBCUTANEOUS at 12:23

## 2020-03-08 RX ADMIN — CEFTRIAXONE 100 MILLIGRAM(S): 500 INJECTION, POWDER, FOR SOLUTION INTRAMUSCULAR; INTRAVENOUS at 12:24

## 2020-03-08 NOTE — PROGRESS NOTE ADULT - PROBLEM SELECTOR PLAN 2
Cont antibiotics  Not a candidate for PICC line/extended dwell   ID follow up. Cont antibiotics  Not a candidate for PICC line/extended dwell   ID follow up.  Repeat BC negative

## 2020-03-08 NOTE — DISCHARGE NOTE PROVIDER - NSDCCPCAREPLAN_GEN_ALL_CORE_FT
PRINCIPAL DISCHARGE DIAGNOSIS  Diagnosis: Bacteremia  Assessment and Plan of Treatment: Call you Health care provider upon arrival home to make a one week follow up appointment.  If you develop fever, chills, malaise, or change in mental status call your Health Care Provider or go to the Emergency Department.  Nutrition is important, eat small frequent meals to help ensure you get adequate calories.  Do not stay in bed all day!  Increase your activity daily as tolerated.      Please take all of your medications as prescribed.  Follow up with your medical team as directed. PRINCIPAL DISCHARGE DIAGNOSIS  Diagnosis: Bacteremia  Assessment and Plan of Treatment: Call you Health care provider upon arrival home to make a one week follow up appointment.  If you develop fever, chills, malaise, or change in mental status call your Health Care Provider or go to the Emergency Department.  Nutrition is important, eat small frequent meals to help ensure you get adequate calories.  Do not stay in bed all day!  Increase your activity daily as tolerated.      Please take all of your medications as prescribed.  Follow up with your medical team as directed.      SECONDARY DISCHARGE DIAGNOSES  Diagnosis: Goals of care, counseling/discussion  Assessment and Plan of Treatment: The infection in your blood was caused by the bacteria in your mouth.  As discussed it is important that you take Amoxicillin 2g, 1 hour before any dental proceedure to prevent a similar infection in the future. PRINCIPAL DISCHARGE DIAGNOSIS  Diagnosis: Bacteremia  Assessment and Plan of Treatment: Completed all antibiotic course in the hospital  bacteremia source was mouth, thus we are provding you antibiotic for your dental procedure   take Amoxicillin 2gm 1 hour prior to dental procedure   Call you Health care provider upon arrival home to make a one week follow up appointment.  If you develop fever, chills, malaise, or change in mental status call your Health Care Provider or go to the Emergency Department.  Nutrition is important, eat small frequent meals to help ensure you get adequate calories.  Do not stay in bed all day!  Increase your activity daily as tolerated.       Follow up with your medical team as directed.      SECONDARY DISCHARGE DIAGNOSES  Diagnosis: Breast cancer  Assessment and Plan of Treatment: You have an appointment with Dr. Lala, hematology/ oncology on 3.23.2020   Take your chemomedications at the pharmacy which your hematology/ oncology sent for you to take at home.       Diagnosis: Goals of care, counseling/discussion  Assessment and Plan of Treatment: The infection in your blood was caused by the bacteria in your mouth.  As discussed it is important that you take Amoxicillin 2g, 1 hour before any dental proceedure to prevent a similar infection in the future.

## 2020-03-08 NOTE — PROGRESS NOTE ADULT - SUBJECTIVE AND OBJECTIVE BOX
Patient is a 65y old  Female who presents with a chief complaint of Clogged Picc line (07 Mar 2020 23:54)    Patient awake , alert , lying in bed in NAD.     INTERVAL HPI/OVERNIGHT EVENTS:      VITAL SIGNS:  T(F): 97.1 (03-08-20 @ 05:00)  HR: 75 (03-08-20 @ 05:00)  BP: 125/74 (03-08-20 @ 05:00)  RR: 16 (03-08-20 @ 05:00)  SpO2: 98% (03-08-20 @ 05:00)  Wt(kg): --  I&O's Detail          REVIEW OF SYSTEMS:    CONSTITUTIONAL:  No fevers, chills, sweats    HEENT:  Eyes:  No diplopia or blurred vision. ENT:  No earache, sore throat or runny nose.    CARDIOVASCULAR:  No pressure, squeezing, tightness, or heaviness about the chest; no palpitations.    RESPIRATORY:  Per HPI    GASTROINTESTINAL:  No abdominal pain, nausea, vomiting or diarrhea.    GENITOURINARY:  No dysuria, frequency or urgency.    NEUROLOGIC:  No paresthesias, fasciculations, seizures or weakness.    PSYCHIATRIC:  No disorder of thought or mood.      PHYSICAL EXAM:    Constitutional: Well developed and nourished  Eyes:Perrla  ENMT: normal  Neck:supple  Respiratory: good air entry  Cardiovascular: S1 S2 regular  Gastrointestinal: Soft, Non tender  Extremities: left upper ext. edema  Vascular:normal  Neurological:Awake, alert,Ox3  Musculoskeletal:Normal      MEDICATIONS  (STANDING):  ALPRAZolam 0.5 milliGRAM(s) Oral every 12 hours  atorvastatin 40 milliGRAM(s) Oral at bedtime  cefTRIAXone   IVPB 2000 milliGRAM(s) IV Intermittent every 24 hours  enoxaparin Injectable 40 milliGRAM(s) SubCutaneous daily  letrozole 2.5 milliGRAM(s) Oral daily  levothyroxine 50 MICROGram(s) Oral daily  lidocaine   Patch 1 Patch Transdermal every 24 hours  sodium chloride 0.9% lock flush 10 milliLiter(s) IV Push daily    MEDICATIONS  (PRN):  acetaminophen   Tablet .. 650 milliGRAM(s) Oral every 4 hours PRN Mild Pain (1 - 3)  acetaminophen   Tablet .. 650 milliGRAM(s) Oral every 4 hours PRN Mild Pain (1 - 3)  acetaminophen   Tablet .. 1000 milliGRAM(s) Oral every 6 hours PRN Moderate Pain (4 - 6)  oxycodone    5 mG/acetaminophen 325 mG 1 Tablet(s) Oral every 12 hours PRN Severe Pain (7 - 10)      Allergies    No Known Allergies    Intolerances        LABS:                    CAPILLARY BLOOD GLUCOSE            RADIOLOGY & ADDITIONAL TESTS:    CXR:  < from: Xray Chest 1 View- PORTABLE-Routine (03.04.20 @ 14:11) >  VIEWS: Portable AP radiography of the chest performed. Evaluation limited secondary to shallow inspiration.    FINDINGS: Heart size appears within normal limits. No hilar or superior mediastinal abnormalities are identified. Linear opacities are identified within the bilateral lower lung field, likely related to linear scarring versus platelike atelectasis. Opacity is redemonstrated at the periphery of the left lower thorax likely corresponding to left-sided pleural thickening and pleural calcifications identified on CT evaluation of the abdomen dated 11/29/2019. There is no evidence for interval development of focal infiltrate, lobar consolidation or pulmonary edema. No definite pleural effusion is noted. There is no evidence for pneumothorax. Degenerative change of the midthoracic spine noted.    IMPRESSION: As above.    < end of copied text >    Ct scan chest:    ekg;    echo: Patient is a 65y old  Female who presents with a chief complaint of Clogged Picc line (07 Mar 2020 23:54)    Patient awake , alert , lying in bed in NAD.     INTERVAL HPI/OVERNIGHT EVENTS:      VITAL SIGNS:  T(F): 97.1 (03-08-20 @ 05:00)  HR: 75 (03-08-20 @ 05:00)  BP: 125/74 (03-08-20 @ 05:00)  RR: 16 (03-08-20 @ 05:00)  SpO2: 98% (03-08-20 @ 05:00)  Wt(kg): --  I&O's Detail          REVIEW OF SYSTEMS:    CONSTITUTIONAL:  No fevers, chills, sweats    HEENT:  Eyes:  No diplopia or blurred vision. ENT:  No earache, sore throat or runny nose.    CARDIOVASCULAR:  No pressure, squeezing, tightness, or heaviness about the chest; no palpitations.    RESPIRATORY:  Per HPI    GASTROINTESTINAL:  No abdominal pain, nausea, vomiting or diarrhea.    GENITOURINARY:  No dysuria, frequency or urgency.    NEUROLOGIC:  No paresthesias, fasciculations, seizures or weakness.    PSYCHIATRIC:  No disorder of thought or mood.      PHYSICAL EXAM:    Constitutional: Well developed and nourished  Eyes:Perrla  ENMT: normal  Neck:supple  Respiratory: good air entry  Cardiovascular: S1 S2 regular  Gastrointestinal: Soft, Non tender  Extremities: left upper ext. edema  Vascular:normal  Neurological:Awake, alert,Ox3  Musculoskeletal:Normal      MEDICATIONS  (STANDING):  ALPRAZolam 0.5 milliGRAM(s) Oral every 12 hours  atorvastatin 40 milliGRAM(s) Oral at bedtime  cefTRIAXone   IVPB 2000 milliGRAM(s) IV Intermittent every 24 hours  enoxaparin Injectable 40 milliGRAM(s) SubCutaneous daily  letrozole 2.5 milliGRAM(s) Oral daily  levothyroxine 50 MICROGram(s) Oral daily  lidocaine   Patch 1 Patch Transdermal every 24 hours  sodium chloride 0.9% lock flush 10 milliLiter(s) IV Push daily    MEDICATIONS  (PRN):  acetaminophen   Tablet .. 650 milliGRAM(s) Oral every 4 hours PRN Mild Pain (1 - 3)  acetaminophen   Tablet .. 650 milliGRAM(s) Oral every 4 hours PRN Mild Pain (1 - 3)  acetaminophen   Tablet .. 1000 milliGRAM(s) Oral every 6 hours PRN Moderate Pain (4 - 6)  oxycodone    5 mG/acetaminophen 325 mG 1 Tablet(s) Oral every 12 hours PRN Severe Pain (7 - 10)      Allergies    No Known Allergies    Intolerances        LABS:    Culture - Blood (02.18.20 @ 18:45)    Specimen Source: .Blood    Culture Results:   No growth at 5 days.                    CAPILLARY BLOOD GLUCOSE            RADIOLOGY & ADDITIONAL TESTS:    CXR:  < from: Xray Chest 1 View- PORTABLE-Routine (03.04.20 @ 14:11) >  VIEWS: Portable AP radiography of the chest performed. Evaluation limited secondary to shallow inspiration.    FINDINGS: Heart size appears within normal limits. No hilar or superior mediastinal abnormalities are identified. Linear opacities are identified within the bilateral lower lung field, likely related to linear scarring versus platelike atelectasis. Opacity is redemonstrated at the periphery of the left lower thorax likely corresponding to left-sided pleural thickening and pleural calcifications identified on CT evaluation of the abdomen dated 11/29/2019. There is no evidence for interval development of focal infiltrate, lobar consolidation or pulmonary edema. No definite pleural effusion is noted. There is no evidence for pneumothorax. Degenerative change of the midthoracic spine noted.    IMPRESSION: As above.    < end of copied text >    Ct scan chest:    ekg;    echo:

## 2020-03-08 NOTE — DISCHARGE NOTE PROVIDER - PROVIDER TOKENS
PROVIDER:[TOKEN:[[id number]],FOLLOWUP:[2 weeks],ESTABLISHEDPATIENT:[T]] PROVIDER:[TOKEN:[[id number]],FOLLOWUP:[2 weeks],ESTABLISHEDPATIENT:[T]],PROVIDER:[TOKEN:[[id number]],SCHEDULEDAPPT:[03/23/2020],ESTABLISHEDPATIENT:[T]]

## 2020-03-08 NOTE — PROGRESS NOTE ADULT - SUBJECTIVE AND OBJECTIVE BOX
Patient is seen and examined Earlier in the morning. Case reviewed with the medical team. Above note is appreciated. Will follow up clinically. Continue DVT prophylaxis. Case discussed with ID. Continue Rocephin until 3/14/2020.

## 2020-03-08 NOTE — DISCHARGE NOTE PROVIDER - CARE PROVIDER_API CALL
Andrew Hein (DO)  Medicine  [address] [place] [address], [address] [place] [address]  Phone: [phone]  Fax: [phone]  Established Patient  Follow Up Time: 2 weeks Andrew Hein (DO)  Medicine  [facility], [address]rd [place] [address]  Phone: [phone]  Fax: [phone]  Established Patient  Follow Up Time: 2 weeks    Yunier Lala (MD)  Hematology; Internal Medicine; Medical Oncology  [address] [facility], [address]  [place] [id number]  Phone: [phone]  Fax: [phone]  Established Patient  Scheduled Appointment: 03/23/2020

## 2020-03-08 NOTE — DISCHARGE NOTE PROVIDER - NSDCMRMEDTOKEN_GEN_ALL_CORE_FT
atorvastatin 40 mg oral tablet: 1 tab(s) orally once a day (at bedtime)  cefTRIAXone 2 g injection: 2 gram(s) intravenously once a day   PLEASE REMOVE EXTENDED DWELL CATHETER UPON COMPLETION OF IV ANTIBIOTICS ON 3/14/2020.  THANK YOU  letrozole 2.5 mg oral tablet: 1 tab(s) orally once a day  levothyroxine 50 mcg (0.05 mg) oral tablet: 1 tab(s) orally once a day  Normal Saline Flush 0.9% injectable solution: 10 milliliter(s) injectable once a day   PLEASE FLUSH CATHETER  BEFORE AND AFTER ANTIBIOTIC ADMINISTRATION. ALPRAZolam 0.5 mg oral tablet: 1 tab(s) orally every 12 hours MDD:1mg  amoxicillin 500 mg oral capsule: 4 cap(s) orally once   atorvastatin 40 mg oral tablet: 1 tab(s) orally once a day (at bedtime)  ergocalciferol 50,000 intl units (1.25 mg) oral capsule: 1 cap(s) orally once a week  letrozole 2.5 mg oral tablet: 1 tab(s) orally once a day  levothyroxine 50 mcg (0.05 mg) oral tablet: 1 tab(s) orally once a day

## 2020-03-08 NOTE — DISCHARGE NOTE PROVIDER - NSDCFUADDINST_GEN_ALL_CORE_FT
Please bring all medications and discharge paperwork, including medication list to your next MD visit. Make sure to take Amoxicillin 2g, 1 hour prior to any dental procedure.    Please bring all medications and discharge paperwork, including medication list to your next MD visit.

## 2020-03-08 NOTE — DISCHARGE NOTE PROVIDER - HOSPITAL COURSE
64 y/o F with PMHx of breast cancer s/p bilateral mastectomy on Ibrance, malignant pleural effusion s/p VATS procedure, chronic left upper extremity lymphedema, hypothyroidism presents to ED complaining of clogged right sided extended dwell catheter line        IR states upon examining the vessels basilic vein was clotted throughout arm , brachial vein was to small .  Therefore, cephalic vein was prepped however, it was unsuccessful. We are limited to one arm only . She is not a candidate for  PICC line nor for extended catheter at this time.        Per ID Pt needs IV abx until 3/14. 66 y/o F with PMHx of breast cancer s/p bilateral mastectomy on Ibrance, malignant pleural effusion s/p VATS procedure, chronic left upper extremity lymphedema, hypothyroidism presents to ED complaining of clogged right sided extended dwell catheter line        IR states upon examining the vessels basilic vein was clotted throughout arm , brachial vein was to small .  Therefore, cephalic vein was prepped however, it was unsuccessful. We are limited to one arm only . She is not a candidate for  PICC line nor for extended catheter at this time.        Per ID Pt needs IV abx until 3/13.  The source of infection is the patients mouth.  Patient is to have Amoxicillin 2g, 1 hour prior to any dental procedure.        NOT COMPLETE 66 y/o F with PMHx of breast cancer s/p bilateral mastectomy on Ibrance, malignant pleural effusion s/p VATS procedure, chronic left upper extremity lymphedema, hypothyroidism presents to ED complaining of clogged right sided extended dwell catheter line        IR states upon examining the vessels basilic vein was clotted throughout arm , brachial vein was to small .  Therefore, cephalic vein was prepped however, it was unsuccessful. We are limited to one arm only . She is not a candidate for  PICC line nor for extended catheter at this time.        Completed IV ceftriaxone course for bacteremia, hemodynamically has been stable, pt is medically optimized for discharge. P.O Amoxicillin 2gm P.O was given as premeds for futher dental work in the outpatient setting.

## 2020-03-09 LAB
ANION GAP SERPL CALC-SCNC: 4 MMOL/L — LOW (ref 5–17)
BUN SERPL-MCNC: 17 MG/DL — SIGNIFICANT CHANGE UP (ref 7–18)
CALCIUM SERPL-MCNC: 9.5 MG/DL — SIGNIFICANT CHANGE UP (ref 8.4–10.5)
CHLORIDE SERPL-SCNC: 103 MMOL/L — SIGNIFICANT CHANGE UP (ref 96–108)
CO2 SERPL-SCNC: 31 MMOL/L — SIGNIFICANT CHANGE UP (ref 22–31)
CREAT SERPL-MCNC: 0.82 MG/DL — SIGNIFICANT CHANGE UP (ref 0.5–1.3)
GLUCOSE SERPL-MCNC: 108 MG/DL — HIGH (ref 70–99)
HCT VFR BLD CALC: 40.6 % — SIGNIFICANT CHANGE UP (ref 34.5–45)
HGB BLD-MCNC: 13.2 G/DL — SIGNIFICANT CHANGE UP (ref 11.5–15.5)
MCHC RBC-ENTMCNC: 32.5 GM/DL — SIGNIFICANT CHANGE UP (ref 32–36)
MCHC RBC-ENTMCNC: 33.8 PG — SIGNIFICANT CHANGE UP (ref 27–34)
MCV RBC AUTO: 104.1 FL — HIGH (ref 80–100)
NRBC # BLD: 0 /100 WBCS — SIGNIFICANT CHANGE UP (ref 0–0)
PLATELET # BLD AUTO: 332 K/UL — SIGNIFICANT CHANGE UP (ref 150–400)
POTASSIUM SERPL-MCNC: 4 MMOL/L — SIGNIFICANT CHANGE UP (ref 3.5–5.3)
POTASSIUM SERPL-SCNC: 4 MMOL/L — SIGNIFICANT CHANGE UP (ref 3.5–5.3)
RBC # BLD: 3.9 M/UL — SIGNIFICANT CHANGE UP (ref 3.8–5.2)
RBC # FLD: 12.7 % — SIGNIFICANT CHANGE UP (ref 10.3–14.5)
SODIUM SERPL-SCNC: 138 MMOL/L — SIGNIFICANT CHANGE UP (ref 135–145)
WBC # BLD: 3.18 K/UL — LOW (ref 3.8–10.5)
WBC # FLD AUTO: 3.18 K/UL — LOW (ref 3.8–10.5)

## 2020-03-09 PROCEDURE — 99232 SBSQ HOSP IP/OBS MODERATE 35: CPT

## 2020-03-09 RX ORDER — ERGOCALCIFEROL 1.25 MG/1
50000 CAPSULE ORAL
Refills: 0 | Status: DISCONTINUED | OUTPATIENT
Start: 2020-03-09 | End: 2020-03-13

## 2020-03-09 RX ADMIN — ERGOCALCIFEROL 50000 UNIT(S): 1.25 CAPSULE ORAL at 15:34

## 2020-03-09 RX ADMIN — SODIUM CHLORIDE 10 MILLILITER(S): 9 INJECTION INTRAMUSCULAR; INTRAVENOUS; SUBCUTANEOUS at 12:50

## 2020-03-09 RX ADMIN — OXYCODONE AND ACETAMINOPHEN 1 TABLET(S): 5; 325 TABLET ORAL at 18:46

## 2020-03-09 RX ADMIN — LETROZOLE 2.5 MILLIGRAM(S): 2.5 TABLET, FILM COATED ORAL at 12:50

## 2020-03-09 RX ADMIN — OXYCODONE AND ACETAMINOPHEN 1 TABLET(S): 5; 325 TABLET ORAL at 06:49

## 2020-03-09 RX ADMIN — Medication 0.5 MILLIGRAM(S): at 17:46

## 2020-03-09 RX ADMIN — OXYCODONE AND ACETAMINOPHEN 1 TABLET(S): 5; 325 TABLET ORAL at 17:46

## 2020-03-09 RX ADMIN — Medication 0.5 MILLIGRAM(S): at 05:41

## 2020-03-09 RX ADMIN — OXYCODONE AND ACETAMINOPHEN 1 TABLET(S): 5; 325 TABLET ORAL at 05:41

## 2020-03-09 RX ADMIN — CEFTRIAXONE 100 MILLIGRAM(S): 500 INJECTION, POWDER, FOR SOLUTION INTRAMUSCULAR; INTRAVENOUS at 12:50

## 2020-03-09 RX ADMIN — Medication 50 MICROGRAM(S): at 05:40

## 2020-03-09 NOTE — PROGRESS NOTE ADULT - SUBJECTIVE AND OBJECTIVE BOX
65y Female is under our care for bacteremia from prior admission, s/p removal of clogged extended dwell cath. Patient will remain inhouse until 3/14 to complete her antibiotic course. Remains afebrile with normal wbc count.     REVIEW OF SYSTEMS:  [  ] Not able to illicit  General: no fevers no malaise  Chest: no cough no sob  GI: no nvd  Skin: no rashes  Neuro: no ha's no dizziness 	    MEDS:  cefTRIAXone   IVPB 2000 milliGRAM(s) IV Intermittent every 24 hours    ALLERGIES: Allergies    No Known Allergies    Intolerances      VITALS:  Vital Signs Last 24 Hrs  T(C): 36.3 (09 Mar 2020 05:00), Max: 36.5 (08 Mar 2020 21:00)  T(F): 97.4 (09 Mar 2020 05:00), Max: 97.7 (08 Mar 2020 21:00)  HR: 69 (09 Mar 2020 05:00) (69 - 80)  BP: 136/72 (09 Mar 2020 05:00) (118/54 - 136/72)  BP(mean): --  RR: 16 (09 Mar 2020 05:00) (16 - 17)  SpO2: 100% (09 Mar 2020 05:00) (97% - 100%)      PHYSICAL EXAM:  HEENT: n/a  Neck: supple no LN's   Respiratory: lungs clear no rales  Cardiovascular: S1 S2 reg no murmurs  Gastrointestinal: +BS with soft, nondistended abdomen; nontender  Extremities: LUE lymphedema  Skin: no rashes  Ortho: n/a  Neuro: awake and alert      LABS/DIAGNOSTIC TESTS:                        13.2   3.18  )-----------( 332      ( 09 Mar 2020 06:26 )             40.6   WBC Count: 3.18 K/uL (03-09 @ 06:26)  WBC Count: 5.17 K/uL (03-08 @ 12:00)  WBC Count: 3.48 K/uL (03-06 @ 07:12)  WBC Count: 3.93 K/uL (03-05 @ 06:47)    03-09    138  |  103  |  17  ----------------------------<  108<H>  4.0   |  31  |  0.82    Ca    9.5      09 Mar 2020 06:26    TPro  9.3<H>  /  Alb  3.7  /  TBili  1.3<H>  /  DBili  x   /  AST  13  /  ALT  23  /  AlkPhos  74  03-08        CULTURES:   .Blood  02-18 @ 18:45   No growth at 5 days.  --  --      .Urine  02-15 @ 01:43   >=3 organisms. Probable collection contamination.  --  --      .Blood  02-15 @ 01:22   Growth in aerobic and anaerobic bottles: Streptococcus mitis/oralis group  --  Streptococcus mitis/oralis group        RADIOLOGY:  no new studies

## 2020-03-09 NOTE — PROGRESS NOTE ADULT - SUBJECTIVE AND OBJECTIVE BOX
Pt is awake, alert, lying in bed in NAD. Left upper ext. lymphedema. Receiving abx via IV, no PICC line placed.     INTERVAL HPI/OVERNIGHT EVENTS:      VITAL SIGNS:  T(F): 97.4 (03-09-20 @ 05:00)  HR: 69 (03-09-20 @ 05:00)  BP: 136/72 (03-09-20 @ 05:00)  RR: 16 (03-09-20 @ 05:00)  SpO2: 100% (03-09-20 @ 05:00)  Wt(kg): --  I&O's Detail          REVIEW OF SYSTEMS:    CONSTITUTIONAL:  No fevers, chills, sweats    HEENT:  Eyes:  No diplopia or blurred vision. ENT:  No earache, sore throat or runny nose.    CARDIOVASCULAR:  No pressure, squeezing, tightness, or heaviness about the chest; no palpitations.    RESPIRATORY:  Per HPI    GASTROINTESTINAL:  No abdominal pain, nausea, vomiting or diarrhea.    GENITOURINARY:  No dysuria, frequency or urgency.    NEUROLOGIC:  No paresthesias, fasciculations, seizures or weakness.    PSYCHIATRIC:  No disorder of thought or mood.      PHYSICAL EXAM:    Constitutional: Well developed and nourished  Eyes:Perrla  ENMT: normal  Neck:supple  Respiratory: good air entry  Cardiovascular: S1 S2 regular  Gastrointestinal: Soft, Non tender  Extremities: left upper ext. lymphedema.   Vascular:normal  Neurological:Awake, alert,Ox3  Musculoskeletal:Normal      MEDICATIONS  (STANDING):  ALPRAZolam 0.5 milliGRAM(s) Oral every 12 hours  atorvastatin 40 milliGRAM(s) Oral at bedtime  cefTRIAXone   IVPB 2000 milliGRAM(s) IV Intermittent every 24 hours  enoxaparin Injectable 40 milliGRAM(s) SubCutaneous daily  letrozole 2.5 milliGRAM(s) Oral daily  levothyroxine 50 MICROGram(s) Oral daily  lidocaine   Patch 1 Patch Transdermal every 24 hours  sodium chloride 0.9% lock flush 10 milliLiter(s) IV Push daily    MEDICATIONS  (PRN):  acetaminophen   Tablet .. 650 milliGRAM(s) Oral every 4 hours PRN Mild Pain (1 - 3)  acetaminophen   Tablet .. 650 milliGRAM(s) Oral every 4 hours PRN Mild Pain (1 - 3)  acetaminophen   Tablet .. 1000 milliGRAM(s) Oral every 6 hours PRN Moderate Pain (4 - 6)  oxycodone    5 mG/acetaminophen 325 mG 1 Tablet(s) Oral every 12 hours PRN Severe Pain (7 - 10)      Allergies    No Known Allergies    Intolerances        LABS:                        13.2   3.18  )-----------( 332      ( 09 Mar 2020 06:26 )             40.6     03-09    138  |  103  |  17  ----------------------------<  108<H>  4.0   |  31  |  0.82    Ca    9.5      09 Mar 2020 06:26    TPro  9.3<H>  /  Alb  3.7  /  TBili  1.3<H>  /  DBili  x   /  AST  13  /  ALT  23  /  AlkPhos  74  03-08      CAPILLARY BLOOD GLUCOSE    RADIOLOGY & ADDITIONAL TESTS:    CXR:    Ct scan chest:    ekg;    echo:

## 2020-03-09 NOTE — PROGRESS NOTE ADULT - SUBJECTIVE AND OBJECTIVE BOX
HPI:  66 y/o F with PMHx of breast cancer s/p bilateral mastectomy on Ibrance, malignant pleural effusion s/p VATS procedure, chronic left upper extremity lymphedema, hypothyroidism, recent hospitalization  for PNA  left arm cellulitis, and Bacteremia for which she was discharged on Ceftriaxone . Pt presented  to ED for IV access for  Ceftriaxone 2gm daily for 10 more days for treatment of bacteremia, Streptococcus Mitis. Pt reports she had RUE extended dwell which was clogged, and "came out."   Not a candidate for PICC nor extended dwell catheter in setting of basilic vein clotted throughout arm, small brachial vein, and failed attempt via cephalic vein in IR.    Pt to have IV Abx until 3/14.    OVERNIGHT EVENTS:  No new overnight events     REVIEW OF SYSTEMS:    CONSTITUTIONAL: No fever,   EYES: no acute visual disturbances  NECK: No pain or stiffness  RESPIRATORY: No cough; No shortness of breath  CARDIOVASCULAR: No chest pain, no palpitations  GASTROINTESTINAL: No pain. No nausea, vomiting or diarrhea   NEUROLOGICAL: No headache or numbness, no tremors  MUSCULOSKELETAL: No joint pain, no muscle pain  GENITOURINARY: no dysuria, no frequency, no hesitancy  PSYCHIATRY: no depression , no anxiety  ALL OTHER  ROS negative        Vital Signs Last 24 Hrs  T(C): 36.3 (09 Mar 2020 05:00), Max: 36.6 (08 Mar 2020 13:11)  T(F): 97.4 (09 Mar 2020 05:00), Max: 97.8 (08 Mar 2020 13:11)  HR: 69 (09 Mar 2020 05:00) (69 - 80)  BP: 136/72 (09 Mar 2020 05:00) (118/54 - 136/72)  BP(mean): --  RR: 16 (09 Mar 2020 05:00) (16 - 17)  SpO2: 100% (09 Mar 2020 05:00) (97% - 100%)    ________________________________________________  PHYSICAL EXAM:    GENERAL: NAD  HEENT: Normocephalic; conjunctivae and sclerae clear; moist mucous membranes;   NECK : supple, no JVD  CHEST/LUNG: Clear to auscultation bilaterally   HEART: S1 S2  regular  ABDOMEN: Soft, Nontender, Nondistended; Bowel sounds present  EXTREMITIES: no cyanosis; no LE edema; no calf tenderness  SKIN: warm and dry; no rash  NERVOUS SYSTEM:  Alert & Oriented x3; no new deficits    _________________________________________________  CURRENT MEDICATIONS:    MEDICATIONS  (STANDING):  ALPRAZolam 0.5 milliGRAM(s) Oral every 12 hours  atorvastatin 40 milliGRAM(s) Oral at bedtime  cefTRIAXone   IVPB 2000 milliGRAM(s) IV Intermittent every 24 hours  enoxaparin Injectable 40 milliGRAM(s) SubCutaneous daily  letrozole 2.5 milliGRAM(s) Oral daily  levothyroxine 50 MICROGram(s) Oral daily  lidocaine   Patch 1 Patch Transdermal every 24 hours  sodium chloride 0.9% lock flush 10 milliLiter(s) IV Push daily    MEDICATIONS  (PRN):  acetaminophen   Tablet .. 650 milliGRAM(s) Oral every 4 hours PRN Mild Pain (1 - 3)  acetaminophen   Tablet .. 650 milliGRAM(s) Oral every 4 hours PRN Mild Pain (1 - 3)  acetaminophen   Tablet .. 1000 milliGRAM(s) Oral every 6 hours PRN Moderate Pain (4 - 6)  oxycodone    5 mG/acetaminophen 325 mG 1 Tablet(s) Oral every 12 hours PRN Severe Pain (7 - 10)      __________________________________________________  LABS:                          13.2   3.18  )-----------( 332      ( 09 Mar 2020 06:26 )             40.6     03-09    138  |  103  |  17  ----------------------------<  108<H>  4.0   |  31  |  0.82< from: Xray Chest 1 View- PORTABLE-Routine (03.04.20 @ 14:11) >      Ca    9.5      09 Mar 2020 06:26    TPro  9.3<H>  /  Alb  3.7  /  TBili  1.3<H>  /  DBili  x   /  AST  13  /  ALT  23  /  AlkPhos  74  03-08        CAPILLARY BLOOD GLUCOSE          __________________________________________________  RADIOLOGY & ADDITIONAL TESTS:    Imaging Personally Reviewed:  YES    < from: Xray Chest 1 View- PORTABLE-Routine (03.04.20 @ 14:11) >  FINDINGS: Heart size appears within normal limits. No hilar or superior mediastinal abnormalities are identified. Linear opacities are identified within the bilateral lower lung field, likely related to linear scarring versus platelike atelectasis. Opacity is redemonstrated at the periphery of the left lower thorax likely corresponding to left-sided pleural thickening and pleural calcifications identified on CT evaluation of the abdomen dated 11/29/2019. There is no evidence for interval development of focal infiltrate, lobar consolidation or pulmonary edema. No definite pleural effusion is noted. There is no evidence for pneumothorax. Degenerative change of the midthoracic spine noted.    < end of copied text >    Consultant(s) Notes Reviewed:   YES     Plan of care was discussed with patient and /or primary care giver; all questions and concerns were addressed and care was aligned with patient's wishes. HPI:  64 y/o F with PMHx of breast cancer s/p bilateral mastectomy on Ibrance, malignant pleural effusion s/p VATS procedure, chronic left upper extremity lymphedema, hypothyroidism, recent hospitalization  for PNA  left arm cellulitis, and Bacteremia for which she was discharged on Ceftriaxone . Pt presented  to ED for IV access for  Ceftriaxone 2gm daily for 10 more days for treatment of bacteremia, Streptococcus Mitis. Pt reports she had RUE extended dwell which was clogged, and "came out."   Not a candidate for PICC nor extended dwell catheter in setting of basilic vein clotted throughout arm, small brachial vein, and failed attempt via cephalic vein in IR.    Pt to have IV Abx until 3/14  Patient examined at bedside, resting comfortably, denies pain, SOB or N&V.  Afebrile, VSS, NAD.    OVERNIGHT EVENTS:  No new overnight events     REVIEW OF SYSTEMS:    CONSTITUTIONAL: No fever,   EYES: no acute visual disturbances  NECK: No pain or stiffness  RESPIRATORY: No cough; No shortness of breath  CARDIOVASCULAR: No chest pain, no palpitations  GASTROINTESTINAL: No pain. No nausea, vomiting or diarrhea   NEUROLOGICAL: No headache or numbness, no tremors  MUSCULOSKELETAL: No joint pain, no muscle pain  GENITOURINARY: no dysuria, no frequency, no hesitancy  PSYCHIATRY: no depression , no anxiety  ALL OTHER  ROS negative        Vital Signs Last 24 Hrs  T(C): 36.3 (09 Mar 2020 05:00), Max: 36.6 (08 Mar 2020 13:11)  T(F): 97.4 (09 Mar 2020 05:00), Max: 97.8 (08 Mar 2020 13:11)  HR: 69 (09 Mar 2020 05:00) (69 - 80)  BP: 136/72 (09 Mar 2020 05:00) (118/54 - 136/72)  BP(mean): --  RR: 16 (09 Mar 2020 05:00) (16 - 17)  SpO2: 100% (09 Mar 2020 05:00) (97% - 100%)    ________________________________________________  PHYSICAL EXAM:    GENERAL: NAD  HEENT: Normocephalic; conjunctivae and sclerae clear; moist mucous membranes;   NECK : supple, no JVD  CHEST/LUNG: Clear to auscultation bilaterally   HEART: S1 S2  regular  ABDOMEN: Soft, Nontender, Nondistended; Bowel sounds present  EXTREMITIES: no cyanosis; no LE edema; no calf tenderness  SKIN: warm and dry; no rash  NERVOUS SYSTEM:  Alert & Oriented x3; no new deficits    _________________________________________________  CURRENT MEDICATIONS:    MEDICATIONS  (STANDING):  ALPRAZolam 0.5 milliGRAM(s) Oral every 12 hours  atorvastatin 40 milliGRAM(s) Oral at bedtime  cefTRIAXone   IVPB 2000 milliGRAM(s) IV Intermittent every 24 hours  enoxaparin Injectable 40 milliGRAM(s) SubCutaneous daily  letrozole 2.5 milliGRAM(s) Oral daily  levothyroxine 50 MICROGram(s) Oral daily  lidocaine   Patch 1 Patch Transdermal every 24 hours  sodium chloride 0.9% lock flush 10 milliLiter(s) IV Push daily    MEDICATIONS  (PRN):  acetaminophen   Tablet .. 650 milliGRAM(s) Oral every 4 hours PRN Mild Pain (1 - 3)  acetaminophen   Tablet .. 650 milliGRAM(s) Oral every 4 hours PRN Mild Pain (1 - 3)  acetaminophen   Tablet .. 1000 milliGRAM(s) Oral every 6 hours PRN Moderate Pain (4 - 6)  oxycodone    5 mG/acetaminophen 325 mG 1 Tablet(s) Oral every 12 hours PRN Severe Pain (7 - 10)      __________________________________________________  LABS:                          13.2   3.18  )-----------( 332      ( 09 Mar 2020 06:26 )             40.6     03-09    138  |  103  |  17  ----------------------------<  108<H>  4.0   |  31  |  0.82< from: Xray Chest 1 View- PORTABLE-Routine (03.04.20 @ 14:11) >      Ca    9.5      09 Mar 2020 06:26    TPro  9.3<H>  /  Alb  3.7  /  TBili  1.3<H>  /  DBili  x   /  AST  13  /  ALT  23  /  AlkPhos  74  03-08        CAPILLARY BLOOD GLUCOSE          __________________________________________________  RADIOLOGY & ADDITIONAL TESTS:    Imaging Personally Reviewed:  YES    < from: Xray Chest 1 View- PORTABLE-Routine (03.04.20 @ 14:11) >  FINDINGS: Heart size appears within normal limits. No hilar or superior mediastinal abnormalities are identified. Linear opacities are identified within the bilateral lower lung field, likely related to linear scarring versus platelike atelectasis. Opacity is redemonstrated at the periphery of the left lower thorax likely corresponding to left-sided pleural thickening and pleural calcifications identified on CT evaluation of the abdomen dated 11/29/2019. There is no evidence for interval development of focal infiltrate, lobar consolidation or pulmonary edema. No definite pleural effusion is noted. There is no evidence for pneumothorax. Degenerative change of the midthoracic spine noted.    < end of copied text >    Consultant(s) Notes Reviewed:   YES     Plan of care was discussed with patient and /or primary care giver; all questions and concerns were addressed and care was aligned with patient's wishes.

## 2020-03-10 LAB
ALBUMIN SERPL ELPH-MCNC: 3.2 G/DL — LOW (ref 3.5–5)
ALP SERPL-CCNC: 62 U/L — SIGNIFICANT CHANGE UP (ref 40–120)
ALT FLD-CCNC: 21 U/L DA — SIGNIFICANT CHANGE UP (ref 10–60)
ANION GAP SERPL CALC-SCNC: 7 MMOL/L — SIGNIFICANT CHANGE UP (ref 5–17)
AST SERPL-CCNC: 21 U/L — SIGNIFICANT CHANGE UP (ref 10–40)
BILIRUB SERPL-MCNC: 0.7 MG/DL — SIGNIFICANT CHANGE UP (ref 0.2–1.2)
BUN SERPL-MCNC: 18 MG/DL — SIGNIFICANT CHANGE UP (ref 7–18)
CALCIUM SERPL-MCNC: 9.1 MG/DL — SIGNIFICANT CHANGE UP (ref 8.4–10.5)
CHLORIDE SERPL-SCNC: 105 MMOL/L — SIGNIFICANT CHANGE UP (ref 96–108)
CO2 SERPL-SCNC: 26 MMOL/L — SIGNIFICANT CHANGE UP (ref 22–31)
CREAT SERPL-MCNC: 0.81 MG/DL — SIGNIFICANT CHANGE UP (ref 0.5–1.3)
GLUCOSE SERPL-MCNC: 113 MG/DL — HIGH (ref 70–99)
HCT VFR BLD CALC: 37 % — SIGNIFICANT CHANGE UP (ref 34.5–45)
HGB BLD-MCNC: 12.3 G/DL — SIGNIFICANT CHANGE UP (ref 11.5–15.5)
MCHC RBC-ENTMCNC: 33.2 GM/DL — SIGNIFICANT CHANGE UP (ref 32–36)
MCHC RBC-ENTMCNC: 34.3 PG — HIGH (ref 27–34)
MCV RBC AUTO: 103.1 FL — HIGH (ref 80–100)
NRBC # BLD: 0 /100 WBCS — SIGNIFICANT CHANGE UP (ref 0–0)
PLATELET # BLD AUTO: 177 K/UL — SIGNIFICANT CHANGE UP (ref 150–400)
POTASSIUM SERPL-MCNC: 4.6 MMOL/L — SIGNIFICANT CHANGE UP (ref 3.5–5.3)
POTASSIUM SERPL-SCNC: 4.6 MMOL/L — SIGNIFICANT CHANGE UP (ref 3.5–5.3)
PROT SERPL-MCNC: 8.1 G/DL — SIGNIFICANT CHANGE UP (ref 6–8.3)
RBC # BLD: 3.59 M/UL — LOW (ref 3.8–5.2)
RBC # FLD: 12.5 % — SIGNIFICANT CHANGE UP (ref 10.3–14.5)
SODIUM SERPL-SCNC: 138 MMOL/L — SIGNIFICANT CHANGE UP (ref 135–145)
WBC # BLD: 3.39 K/UL — LOW (ref 3.8–10.5)
WBC # FLD AUTO: 3.39 K/UL — LOW (ref 3.8–10.5)

## 2020-03-10 PROCEDURE — 99232 SBSQ HOSP IP/OBS MODERATE 35: CPT

## 2020-03-10 RX ADMIN — Medication 0.5 MILLIGRAM(S): at 05:53

## 2020-03-10 RX ADMIN — CEFTRIAXONE 100 MILLIGRAM(S): 500 INJECTION, POWDER, FOR SOLUTION INTRAMUSCULAR; INTRAVENOUS at 12:27

## 2020-03-10 RX ADMIN — LETROZOLE 2.5 MILLIGRAM(S): 2.5 TABLET, FILM COATED ORAL at 12:27

## 2020-03-10 RX ADMIN — Medication 50 MICROGRAM(S): at 05:55

## 2020-03-10 RX ADMIN — SODIUM CHLORIDE 10 MILLILITER(S): 9 INJECTION INTRAMUSCULAR; INTRAVENOUS; SUBCUTANEOUS at 12:27

## 2020-03-10 RX ADMIN — OXYCODONE AND ACETAMINOPHEN 1 TABLET(S): 5; 325 TABLET ORAL at 05:53

## 2020-03-10 RX ADMIN — Medication 0.5 MILLIGRAM(S): at 18:03

## 2020-03-10 RX ADMIN — OXYCODONE AND ACETAMINOPHEN 1 TABLET(S): 5; 325 TABLET ORAL at 07:03

## 2020-03-10 NOTE — PROGRESS NOTE ADULT - SUBJECTIVE AND OBJECTIVE BOX
65y Female is under our care for bacteremia from prior admission, s/p removal of clogged extended dwell cath. Patient is in same disposition and is just waiting to complete her antibiotic course until 3/14. Remains afebrile with normal wbc count.     REVIEW OF SYSTEMS:  [  ] Not able to illicit  General: no fevers no malaise  Chest: no cough no sob  GI: no nvd  Skin: no rashes  Neuro: no ha's no dizziness 	    MEDS:  cefTRIAXone   IVPB 2000 milliGRAM(s) IV Intermittent every 24 hours    ALLERGIES: Allergies    No Known Allergies    Intolerances      VITALS:  Vital Signs Last 24 Hrs  T(C): 36.6 (09 Mar 2020 20:41), Max: 36.6 (09 Mar 2020 20:41)  T(F): 97.9 (09 Mar 2020 20:41), Max: 97.9 (09 Mar 2020 20:41)  HR: 74 (09 Mar 2020 20:41) (74 - 84)  BP: 154/68 (09 Mar 2020 20:41) (113/63 - 154/68)  BP(mean): --  RR: 16 (09 Mar 2020 20:41) (16 - 16)  SpO2: 99% (09 Mar 2020 20:41) (99% - 100%)      PHYSICAL EXAM:  HEENT: n/a  Neck: supple no LN's   Respiratory: lungs clear no rales  Cardiovascular: S1 S2 reg no murmurs  Gastrointestinal: +BS with soft, nondistended abdomen; nontender  Extremities: LUE lymphedema  Skin: no rashes  Ortho: n/a  Neuro: awake and alert      LABS/DIAGNOSTIC TESTS:                           12.3   3.39  )-----------( 177      ( 10 Mar 2020 07:36 )             37.0   WBC Count: 3.39 K/uL (03-10 @ 07:36)  WBC Count: 3.18 K/uL (03-09 @ 06:26)  WBC Count: 5.17 K/uL (03-08 @ 12:00)  WBC Count: 3.48 K/uL (03-06 @ 07:12)    03-10    138  |  105  |  18  ----------------------------<  113<H>  4.6   |  26  |  0.81    Ca    9.1      10 Mar 2020 07:36    TPro  8.1  /  Alb  3.2<L>  /  TBili  0.7  /  DBili  x   /  AST  21  /  ALT  21  /  AlkPhos  62  03-10        CULTURES:   .Blood  02-18 @ 18:45   No growth at 5 days.  --  --      .Urine  02-15 @ 01:43   >=3 organisms. Probable collection contamination.  --  --      .Blood  02-15 @ 01:22   Growth in aerobic and anaerobic bottles: Streptococcus mitis/oralis group  --  Streptococcus mitis/oralis group        RADIOLOGY:  no new studies

## 2020-03-10 NOTE — PROGRESS NOTE ADULT - SUBJECTIVE AND OBJECTIVE BOX
HPI:  64 y/o F with PMHx of breast cancer s/p bilateral mastectomy on Ibrance, malignant pleural effusion s/p VATS procedure, chronic left upper extremity lymphedema, hypothyroidism, recent hospitalization  for PNA  left arm cellulitis, and Bacteremia for which she was discharged on Ceftriaxone . Pt presented  to ED for IV access for  Ceftriaxone 2gm daily for 10 more days for treatment of bacteremia, Streptococcus Mitis. Pt reports she had RUE extended dwell which was clogged, and "came out."   Not a candidate for PICC nor extended dwell catheter in setting of basilic vein clotted throughout arm, small brachial vein, and failed attempt via cephalic vein in IR.    Pt to have IV Abx until 3/13  Patient examined at bedside, resting comfortably, denies pain, SOB or N&V.  Afebrile, VSS, NAD.    OVERNIGHT EVENTS:  No new overnight events     REVIEW OF SYSTEMS:      CONSTITUTIONAL: No fever,   EYES: no acute visual disturbances  NECK: No pain or stiffness  RESPIRATORY: No cough; No shortness of breath  CARDIOVASCULAR: No chest pain, no palpitations  GASTROINTESTINAL: No pain. No nausea, vomiting or diarrhea   NEUROLOGICAL: No headache or numbness, no tremors  MUSCULOSKELETAL: No joint pain, no muscle pain  GENITOURINARY: no dysuria, no frequency, no hesitancy  PSYCHIATRY: no depression , no anxiety  ALL OTHER  ROS negative        Vital Signs Last 24 Hrs  T(C): 36.6 (09 Mar 2020 20:41), Max: 36.6 (09 Mar 2020 20:41)  T(F): 97.9 (09 Mar 2020 20:41), Max: 97.9 (09 Mar 2020 20:41)  HR: 74 (09 Mar 2020 20:41) (74 - 84)  BP: 154/68 (09 Mar 2020 20:41) (113/63 - 154/68)  BP(mean): --  RR: 16 (09 Mar 2020 20:41) (16 - 16)  SpO2: 99% (09 Mar 2020 20:41) (99% - 100%)    ________________________________________________  PHYSICAL EXAM:    GENERAL: NAD  HEENT: Normocephalic; conjunctivae and sclerae clear; moist mucous membranes;   NECK : supple, no JVD  CHEST/LUNG: Clear to auscultation bilaterally   HEART: S1 S2  regular  ABDOMEN: Soft, Nontender, Nondistended; Bowel sounds present  EXTREMITIES: no cyanosis; no LE edema; no calf tenderness  SKIN: warm and dry; no rash  NERVOUS SYSTEM:  Alert & Oriented x3; no new deficits    _________________________________________________  CURRENT MEDICATIONS:    MEDICATIONS  (STANDING):  ALPRAZolam 0.5 milliGRAM(s) Oral every 12 hours  atorvastatin 40 milliGRAM(s) Oral at bedtime  cefTRIAXone   IVPB 2000 milliGRAM(s) IV Intermittent every 24 hours  enoxaparin Injectable 40 milliGRAM(s) SubCutaneous daily  ergocalciferol 68519 Unit(s) Oral <User Schedule>  letrozole 2.5 milliGRAM(s) Oral daily  levothyroxine 50 MICROGram(s) Oral daily  lidocaine   Patch 1 Patch Transdermal every 24 hours  sodium chloride 0.9% lock flush 10 milliLiter(s) IV Push daily    MEDICATIONS  (PRN):  acetaminophen   Tablet .. 650 milliGRAM(s) Oral every 4 hours PRN Mild Pain (1 - 3)  acetaminophen   Tablet .. 650 milliGRAM(s) Oral every 4 hours PRN Mild Pain (1 - 3)  acetaminophen   Tablet .. 1000 milliGRAM(s) Oral every 6 hours PRN Moderate Pain (4 - 6)  oxycodone    5 mG/acetaminophen 325 mG 1 Tablet(s) Oral every 12 hours PRN Severe Pain (7 - 10)      __________________________________________________  LABS:                          12.3   3.39  )-----------( 177      ( 10 Mar 2020 07:36 )             37.0     03-10    138  |  105  |  18  ----------------------------<  113<H>  4.6   |  26  |  0.81    Ca    9.1      10 Mar 2020 07:36    TPro  8.1  /  Alb  3.2<L>  /  TBili  0.7  /  DBili  x   /  AST  21  /  ALT  21  /  AlkPhos  62  03-10        CAPILLARY BLOOD GLUCOSE          __________________________________________________  RADIOLOGY & ADDITIONAL TESTS:    Imaging Personally Reviewed:  YES      Consultant(s) Notes Reviewed:   YES     Plan of care was discussed with patient and /or primary care giver; all questions and concerns were addressed and care was aligned with patient's wishes. HPI:  64 y/o F with PMHx of breast cancer s/p bilateral mastectomy on Ibrance, malignant pleural effusion s/p VATS procedure, chronic left upper extremity lymphedema, hypothyroidism, recent hospitalization  for PNA  left arm cellulitis, and Bacteremia for which she was discharged on Ceftriaxone . Pt presented  to ED for IV access for  Ceftriaxone 2gm daily for 10 more days for treatment of bacteremia, Streptococcus Mitis. Pt reports she had RUE extended dwell which was clogged, and "came out."   Not a candidate for PICC nor extended dwell catheter in setting of basilic vein clotted throughout arm, small brachial vein, and failed attempt via cephalic vein in IR.    Pt to have IV Abx until 3/13  Patient examined at bedside, resting comfortably, denies pain, SOB or N&V.  Afebrile, VSS, NAD.    OVERNIGHT EVENTS:  No new overnight events     REVIEW OF SYSTEMS:      CONSTITUTIONAL: No fever,   EYES: no acute visual disturbances  NECK: No pain or stiffness  RESPIRATORY: No cough; No shortness of breath  CARDIOVASCULAR: No chest pain, no palpitations  GASTROINTESTINAL: No pain. No nausea, vomiting or diarrhea   NEUROLOGICAL: No headache or numbness, no tremors  MUSCULOSKELETAL: No joint pain, no muscle pain  GENITOURINARY: no dysuria, no frequency, no hesitancy  PSYCHIATRY: no depression , no anxiety  ALL OTHER  ROS negative        Vital Signs Last 24 Hrs  T(C): 36.6 (09 Mar 2020 20:41), Max: 36.6 (09 Mar 2020 20:41)  T(F): 97.9 (09 Mar 2020 20:41), Max: 97.9 (09 Mar 2020 20:41)  HR: 74 (09 Mar 2020 20:41) (74 - 84)  BP: 154/68 (09 Mar 2020 20:41) (113/63 - 154/68)  BP(mean): --  RR: 16 (09 Mar 2020 20:41) (16 - 16)  SpO2: 99% (09 Mar 2020 20:41) (99% - 100%)    ________________________________________________  PHYSICAL EXAM:    GENERAL: NAD  HEENT: Normocephalic; conjunctivae and sclerae clear; moist mucous membranes;   NECK : supple, no JVD  CHEST/LUNG: Clear to auscultation bilaterally   HEART: S1 S2  regular  ABDOMEN: Soft, Nontender, Nondistended; Bowel sounds present  EXTREMITIES: no cyanosis; no LE edema; no calf tenderness  SKIN: warm and dry; no rash  NERVOUS SYSTEM:  Alert & Oriented x3; no new deficits    _________________________________________________  CURRENT MEDICATIONS:    MEDICATIONS  (STANDING):  ALPRAZolam 0.5 milliGRAM(s) Oral every 12 hours  atorvastatin 40 milliGRAM(s) Oral at bedtime  cefTRIAXone   IVPB 2000 milliGRAM(s) IV Intermittent every 24 hours  enoxaparin Injectable 40 milliGRAM(s) SubCutaneous daily  ergocalciferol 88581 Unit(s) Oral <User Schedule>  letrozole 2.5 milliGRAM(s) Oral daily  levothyroxine 50 MICROGram(s) Oral daily  lidocaine   Patch 1 Patch Transdermal every 24 hours  sodium chloride 0.9% lock flush 10 milliLiter(s) IV Push daily    MEDICATIONS  (PRN):  acetaminophen   Tablet .. 650 milliGRAM(s) Oral every 4 hours PRN Mild Pain (1 - 3)  acetaminophen   Tablet .. 650 milliGRAM(s) Oral every 4 hours PRN Mild Pain (1 - 3)  acetaminophen   Tablet .. 1000 milliGRAM(s) Oral every 6 hours PRN Moderate Pain (4 - 6)  oxycodone    5 mG/acetaminophen 325 mG 1 Tablet(s) Oral every 12 hours PRN Severe Pain (7 - 10)      __________________________________________________  LABS:                          12.3   3.39  )-----------( 177      ( 10 Mar 2020 07:36 )             37.0     03-10    138  |  105  |  18  ----------------------------<  113<H>  4.6   |  26  |  0.81    Ca    9.1      10 Mar 2020 07:36    TPro  8.1  /  Alb  3.2<L>  /  TBili  0.7  /  DBili  x   /  AST  21  /  ALT  21  /  AlkPhos  62  03-10        CAPILLARY BLOOD GLUCOSE          __________________________________________________  RADIOLOGY & ADDITIONAL TESTS:    Imaging Personally Reviewed:  YES    < from: Xray Chest 1 View- PORTABLE-Routine (03.04.20 @ 14:11) >  FINDINGS: Heart size appears within normal limits. No hilar or superior mediastinal abnormalities are identified. Linear opacities are identified within the bilateral lower lung field, likely related to linear scarring versus platelike atelectasis. Opacity is redemonstrated at the periphery of the left lower thorax likely corresponding to left-sided pleural thickening and pleural calcifications identified on CT evaluation of the abdomen dated 11/29/2019. There is no evidence for interval development of focal infiltrate, lobar consolidation or pulmonary edema. No definite pleural effusion is noted. There is no evidence for pneumothorax. Degenerative change of the midthoracic spine noted.    < end of copied text >    Consultant(s) Notes Reviewed:   YES     Plan of care was discussed with patient and /or primary care giver; all questions and concerns were addressed and care was aligned with patient's wishes.

## 2020-03-10 NOTE — PROGRESS NOTE ADULT - SUBJECTIVE AND OBJECTIVE BOX
Pt is awake, alert, lying in bed in NAD. No complaints. Ongoing IV abx.     INTERVAL HPI/OVERNIGHT EVENTS:      VITAL SIGNS:  T(F): 97.9 (03-09-20 @ 20:41)  HR: 74 (03-09-20 @ 20:41)  BP: 154/68 (03-09-20 @ 20:41)  RR: 16 (03-09-20 @ 20:41)  SpO2: 99% (03-09-20 @ 20:41)  Wt(kg): --  I&O's Detail          REVIEW OF SYSTEMS:    CONSTITUTIONAL:  No fevers, chills, sweats    HEENT:  Eyes:  No diplopia or blurred vision. ENT:  No earache, sore throat or runny nose.    CARDIOVASCULAR:  No pressure, squeezing, tightness, or heaviness about the chest; no palpitations.    RESPIRATORY:  Per HPI    GASTROINTESTINAL:  No abdominal pain, nausea, vomiting or diarrhea.    GENITOURINARY:  No dysuria, frequency or urgency.    NEUROLOGIC:  No paresthesias, fasciculations, seizures or weakness.    PSYCHIATRIC:  No disorder of thought or mood.      PHYSICAL EXAM:    Constitutional: Well developed and nourished  Eyes: Perrla  ENMT: normal  Neck: supple  Respiratory: good air entry  Cardiovascular: S1 S2 regular  Gastrointestinal: Soft, Non tender  Extremities: left upper extremity lymphedema.   Vascular: normal  Neurological: Awake, alert,Ox3  Musculoskeletal: Normal      MEDICATIONS  (STANDING):  ALPRAZolam 0.5 milliGRAM(s) Oral every 12 hours  atorvastatin 40 milliGRAM(s) Oral at bedtime  cefTRIAXone   IVPB 2000 milliGRAM(s) IV Intermittent every 24 hours  enoxaparin Injectable 40 milliGRAM(s) SubCutaneous daily  ergocalciferol 23366 Unit(s) Oral <User Schedule>  letrozole 2.5 milliGRAM(s) Oral daily  levothyroxine 50 MICROGram(s) Oral daily  lidocaine   Patch 1 Patch Transdermal every 24 hours  sodium chloride 0.9% lock flush 10 milliLiter(s) IV Push daily    MEDICATIONS  (PRN):  acetaminophen   Tablet .. 650 milliGRAM(s) Oral every 4 hours PRN Mild Pain (1 - 3)  acetaminophen   Tablet .. 650 milliGRAM(s) Oral every 4 hours PRN Mild Pain (1 - 3)  acetaminophen   Tablet .. 1000 milliGRAM(s) Oral every 6 hours PRN Moderate Pain (4 - 6)  oxycodone    5 mG/acetaminophen 325 mG 1 Tablet(s) Oral every 12 hours PRN Severe Pain (7 - 10)      Allergies    No Known Allergies    Intolerances        LABS:                        12.3   3.39  )-----------( 177      ( 10 Mar 2020 07:36 )             37.0     03-10    138  |  105  |  18  ----------------------------<  113<H>  4.6   |  26  |  0.81    Ca    9.1      10 Mar 2020 07:36    TPro  8.1  /  Alb  3.2<L>  /  TBili  0.7  /  DBili  x   /  AST  21  /  ALT  21  /  AlkPhos  62  03-10    CAPILLARY BLOOD GLUCOSE    RADIOLOGY & ADDITIONAL TESTS:    CXR:    Ct scan chest:    ekg;    echo:

## 2020-03-10 NOTE — PROGRESS NOTE ADULT - ASSESSMENT
1.	Bacteremia from prior admission  2.	S/p removal of clotted right arm extended dwell  ·	cont rocephin 2gm IV daily until 3/14/20 1.	Bacteremia from prior admission  2.	S/p removal of clotted right arm extended dwell  ·	cont rocephin 2gm IV daily until 3/14/20  ·	when she gets dental work in the outpatient setting should get amoxicillin 2gms po 1 hour prior to dental work.

## 2020-03-10 NOTE — CONSULT NOTE ADULT - ASSESSMENT
64 y/o F with PMHx of breast cancer s/p bilateral mastectomy on Ibrance, malignant pleural effusion s/p VATS procedure, chronic left upper extremity lymphedema, hypothyroidism presents to ED complaining of clogged right sided extended dwell catheter line since this morning. Pt was originally recently admitted for suspicious pneumonia and left arm cellulitis with positive blood cultures Streptococcus Mitis. SEAN was negative for endocarditis. Pt was discharged on Ceftriaxone 2gm daily via PICC line. pt is due for  another 10d.   Pt denies any nausea vomiting, skin discoloration, fever, chest pain, bruises  Pt is anxious about the fact the she will have to get IV access in her neck      Problem # 1 Breast Cancer metastatic to the left lung   -Patient should be on letrazole or anastrazole daily plus Ibrance as her disease is responsive to treatment    Problem # 2 Strep mitus  -Pt will need to attend to oral care as the bacterial source is an oral pathogen and will have to be on oral antibiotics to undergo any oral care
1.	Clotted right arm extended dwell  ·	cont rocephin 2gm IV daily until 3/14/20  ·	going for another extended dwell cath placement tomorrow

## 2020-03-10 NOTE — CONSULT NOTE ADULT - SUBJECTIVE AND OBJECTIVE BOX
Patient is a 65y old  Female who presents with a chief complaint of Clogged Picc line (10 Mar 2020 09:52)      HPI:  66 y/o F with PMHx of breast cancer s/p bilateral mastectomy on Ibrance, malignant pleural effusion s/p VATS procedure, chronic left upper extremity lymphedema, hypothyroidism presents to ED complaining of clogged right sided extended dwell catheter line since this morning. Pt was originally recently admitted for suspicious pneumonia and left arm cellulitis with positive blood cultures Streptococcus Mitis. SEAN was negative for endocarditis. Pt was discharged on Ceftriaxone 2gm daily via PICC line. pt is due for  another 10d.   Pt denies any nausea vomiting, skin discoloration, fever, chest pain, bruises  Pt is anxious about the fact the she will have to get IV access in her neck  ED course:  /60  temp 97  HR 71  RR 18  SpO2 99 (02 Mar 2020 21:53)       ROS:  Negative except for:    PAST MEDICAL & SURGICAL HISTORY:  HLD (hyperlipidemia)  Breast cancer  Hypothyroidism  S/P mastectomy, bilateral  Lymphedema of arm: leftarm lymphedema since 20 years , after the breast reconstruction surgery  Thyroid condition  H/O pleural empyema: pleurodesis  S/P bilateral mastectomy  S/P mastectomy, bilateral: s/p implants b/l, no chemo or radiation therapy      SOCIAL HISTORY:    FAMILY HISTORY:  No pertinent family history in first degree relatives      MEDICATIONS  (STANDING):  ALPRAZolam 0.5 milliGRAM(s) Oral every 12 hours  atorvastatin 40 milliGRAM(s) Oral at bedtime  cefTRIAXone   IVPB 2000 milliGRAM(s) IV Intermittent every 24 hours  enoxaparin Injectable 40 milliGRAM(s) SubCutaneous daily  ergocalciferol 02307 Unit(s) Oral <User Schedule>  letrozole 2.5 milliGRAM(s) Oral daily  levothyroxine 50 MICROGram(s) Oral daily  lidocaine   Patch 1 Patch Transdermal every 24 hours  sodium chloride 0.9% lock flush 10 milliLiter(s) IV Push daily    MEDICATIONS  (PRN):  acetaminophen   Tablet .. 650 milliGRAM(s) Oral every 4 hours PRN Mild Pain (1 - 3)  acetaminophen   Tablet .. 650 milliGRAM(s) Oral every 4 hours PRN Mild Pain (1 - 3)  acetaminophen   Tablet .. 1000 milliGRAM(s) Oral every 6 hours PRN Moderate Pain (4 - 6)  oxycodone    5 mG/acetaminophen 325 mG 1 Tablet(s) Oral every 12 hours PRN Severe Pain (7 - 10)      Allergies    No Known Allergies    Intolerances        Vital Signs Last 24 Hrs  T(C): 36.6 (09 Mar 2020 20:41), Max: 36.6 (09 Mar 2020 20:41)  T(F): 97.9 (09 Mar 2020 20:41), Max: 97.9 (09 Mar 2020 20:41)  HR: 74 (09 Mar 2020 20:41) (74 - 84)  BP: 154/68 (09 Mar 2020 20:41) (113/63 - 154/68)  BP(mean): --  RR: 16 (09 Mar 2020 20:41) (16 - 16)  SpO2: 99% (09 Mar 2020 20:41) (99% - 100%)    PHYSICAL EXAM  General: adult in NAD  HEENT: clear oropharynx, anicteric sclera, pink conjunctiva  Neck: supple  CV: normal S1/S2 with no murmur rubs or gallops  Lungs: positive air movement b/l ant lungs,clear to auscultation, no wheezes, no rales  Abdomen: soft non-tender non-distended, no hepatosplenomegaly  Ext: no clubbing cyanosis or edema  Skin: no rashes and no petechiae  Neuro: alert and oriented X 4, no focal deficits      LABS:                          12.3   3.39  )-----------( 177      ( 10 Mar 2020 07:36 )             37.0         Mean Cell Volume : 103.1 fl  Mean Cell Hemoglobin : 34.3 pg  Mean Cell Hemoglobin Concentration : 33.2 gm/dL  Auto Neutrophil # : x  Auto Lymphocyte # : x  Auto Monocyte # : x  Auto Eosinophil # : x  Auto Basophil # : x  Auto Neutrophil % : x  Auto Lymphocyte % : x  Auto Monocyte % : x  Auto Eosinophil % : x  Auto Basophil % : x      Serial CBC's  03-10 @ 07:36  Hct-37.0 / Hgb-12.3 / Plat-177 / RBC-3.59 / WBC-3.39  Serial CBC's  03-09 @ 06:26  Hct-40.6 / Hgb-13.2 / Plat-332 / RBC-3.90 / WBC-3.18  Serial CBC's  03-08 @ 12:00  Hct-41.6 / Hgb-13.6 / Plat-395 / RBC-4.02 / WBC-5.17      03-10    138  |  105  |  18  ----------------------------<  113<H>  4.6   |  26  |  0.81    Ca    9.1      10 Mar 2020 07:36    TPro  8.1  /  Alb  3.2<L>  /  TBili  0.7  /  DBili  x   /  AST  21  /  ALT  21  /  AlkPhos  62  03-10                      BLOOD SMEAR INTERPRETATION:       RADIOLOGY & ADDITIONAL STUDIES:

## 2020-03-11 DIAGNOSIS — J98.11 ATELECTASIS: ICD-10-CM

## 2020-03-11 LAB
ALBUMIN SERPL ELPH-MCNC: 3.5 G/DL — SIGNIFICANT CHANGE UP (ref 3.5–5)
ALP SERPL-CCNC: 61 U/L — SIGNIFICANT CHANGE UP (ref 40–120)
ALT FLD-CCNC: 17 U/L DA — SIGNIFICANT CHANGE UP (ref 10–60)
ANION GAP SERPL CALC-SCNC: 4 MMOL/L — LOW (ref 5–17)
AST SERPL-CCNC: 8 U/L — LOW (ref 10–40)
BILIRUB SERPL-MCNC: 0.8 MG/DL — SIGNIFICANT CHANGE UP (ref 0.2–1.2)
BUN SERPL-MCNC: 18 MG/DL — SIGNIFICANT CHANGE UP (ref 7–18)
CALCIUM SERPL-MCNC: 9.2 MG/DL — SIGNIFICANT CHANGE UP (ref 8.4–10.5)
CHLORIDE SERPL-SCNC: 104 MMOL/L — SIGNIFICANT CHANGE UP (ref 96–108)
CO2 SERPL-SCNC: 30 MMOL/L — SIGNIFICANT CHANGE UP (ref 22–31)
CREAT SERPL-MCNC: 0.83 MG/DL — SIGNIFICANT CHANGE UP (ref 0.5–1.3)
GLUCOSE SERPL-MCNC: 112 MG/DL — HIGH (ref 70–99)
HCT VFR BLD CALC: 37.6 % — SIGNIFICANT CHANGE UP (ref 34.5–45)
HGB BLD-MCNC: 12.4 G/DL — SIGNIFICANT CHANGE UP (ref 11.5–15.5)
MCHC RBC-ENTMCNC: 33 GM/DL — SIGNIFICANT CHANGE UP (ref 32–36)
MCHC RBC-ENTMCNC: 34.3 PG — HIGH (ref 27–34)
MCV RBC AUTO: 104.2 FL — HIGH (ref 80–100)
NRBC # BLD: 0 /100 WBCS — SIGNIFICANT CHANGE UP (ref 0–0)
PLATELET # BLD AUTO: 269 K/UL — SIGNIFICANT CHANGE UP (ref 150–400)
POTASSIUM SERPL-MCNC: 4 MMOL/L — SIGNIFICANT CHANGE UP (ref 3.5–5.3)
POTASSIUM SERPL-SCNC: 4 MMOL/L — SIGNIFICANT CHANGE UP (ref 3.5–5.3)
PROT SERPL-MCNC: 7.8 G/DL — SIGNIFICANT CHANGE UP (ref 6–8.3)
RBC # BLD: 3.61 M/UL — LOW (ref 3.8–5.2)
RBC # FLD: 12.5 % — SIGNIFICANT CHANGE UP (ref 10.3–14.5)
SODIUM SERPL-SCNC: 138 MMOL/L — SIGNIFICANT CHANGE UP (ref 135–145)
WBC # BLD: 3.46 K/UL — LOW (ref 3.8–10.5)
WBC # FLD AUTO: 3.46 K/UL — LOW (ref 3.8–10.5)

## 2020-03-11 PROCEDURE — 99232 SBSQ HOSP IP/OBS MODERATE 35: CPT

## 2020-03-11 RX ADMIN — Medication 50 MICROGRAM(S): at 05:42

## 2020-03-11 RX ADMIN — LETROZOLE 2.5 MILLIGRAM(S): 2.5 TABLET, FILM COATED ORAL at 11:20

## 2020-03-11 RX ADMIN — CEFTRIAXONE 100 MILLIGRAM(S): 500 INJECTION, POWDER, FOR SOLUTION INTRAMUSCULAR; INTRAVENOUS at 13:28

## 2020-03-11 RX ADMIN — Medication 0.5 MILLIGRAM(S): at 05:42

## 2020-03-11 RX ADMIN — Medication 0.5 MILLIGRAM(S): at 18:46

## 2020-03-11 NOTE — PROGRESS NOTE ADULT - ASSESSMENT
1.	Bacteremia from prior admission  2.	S/p removal of clotted right arm extended dwell  ·	cont rocephin 2gm IV daily until 3/14/20  ·	For future dental work in the outpatient setting: should get amoxicillin 2gms po 1 hour prior to dental work 1.	Bacteremia from prior admission  2.	S/p removal of clotted right arm extended dwell  ·	cont rocephin 2gm IV daily until 3/14/20 am  ·	For future dental work in the outpatient setting: should get amoxicillin 2gms po 1 hour prior to dental work  ·	reconsult prn 1.	Bacteremia from prior admission  2.	S/p removal of clotted right arm extended dwell  ·	cont rocephin 2gm IV daily until 3/14/20 am  ·	For future dental work in the outpatient setting: should get amoxicillin 2gms po 1 hour prior to dental work

## 2020-03-11 NOTE — PROGRESS NOTE ADULT - ASSESSMENT
64 y/o F with PMHx of breast cancer s/p bilateral mastectomy on Ibrance, malignant pleural effusion s/p VATS procedure, chronic left upper extremity lymphedema, hypothyroidism presents to ED complaining of clogged right sided extended dwell catheter line since this morning. Pt was originally recently admitted for suspicious pneumonia and left arm cellulitis with positive blood cultures Streptococcus Mitis. SEAN was negative for endocarditis. Pt was discharged on Ceftriaxone 2gm daily via PICC line. pt is due for  another 10d.   Pt denies any nausea vomiting, skin discoloration, fever, chest pain, bruises  Pt is anxious about the fact the she will have to get IV access in her neck      # Metastatic Breast Cancer:  -Continue letrozole PO daily plus Ibrance as her disease is responsive to treatment. Can use home supply.   -Check CBC diff daily. No G-CSF for ANC > 1000  -Out-pt f/u with Dr. Lala.     # Strep mitus  -Pt will need to attend to oral care as the bacterial source is an oral pathogen and will have to be on oral antibiotics to undergo any oral care     Patient in agreement.   please call us with questions.  Dr. Lala returning in AM 66 y/o F with PMHx of breast cancer s/p bilateral mastectomy on Ibrance, malignant pleural effusion s/p VATS procedure, chronic left upper extremity lymphedema, hypothyroidism presents to ED complaining of clogged right sided extended dwell catheter line since this morning. Pt was originally recently admitted for suspicious pneumonia and left arm cellulitis with positive blood cultures Streptococcus Mitis. SEAN was negative for endocarditis. Pt was discharged on Ceftriaxone 2gm daily via PICC line. pt is due for  another 10d.   Pt denies any nausea vomiting, skin discoloration, fever, chest pain, bruises  Pt is anxious about the fact the she will have to get IV access in her neck      # Metastatic Breast Cancer:  -Continue letrozole PO daily plus Ibrance as her disease is responsive to treatment. Can use home supply.   -Check CBC diff daily. No G-CSF for ANC > 1000  -Out-pt f/u with Dr. Lala.     # Strep mitus  -ID on board. Abx regimenas per them   -Oral hygeine and care    Patient in agreement.   please call us with questions.  Dr. Lala returning in AM

## 2020-03-11 NOTE — PROGRESS NOTE ADULT - SUBJECTIVE AND OBJECTIVE BOX
Patient is a 65y old  Female who presents with a chief complaint of Clogged Picc line (10 Mar 2020 11:30)    Awake, alert, comfortable in bed in NAD  INTERVAL HPI/OVERNIGHT EVENTS:      VITAL SIGNS:  T(F): 97.7 (03-10-20 @ 21:39)  HR: 75 (03-10-20 @ 21:39)  BP: 137/65 (03-10-20 @ 21:39)  RR: 17 (03-10-20 @ 21:39)  SpO2: 98% (03-10-20 @ 21:39)  Wt(kg): --  I&O's Detail          REVIEW OF SYSTEMS:    CONSTITUTIONAL:  No fevers, chills, sweats    HEENT:  Eyes:  No diplopia or blurred vision. ENT:  No earache, sore throat or runny nose.    CARDIOVASCULAR:  No pressure, squeezing, tightness, or heaviness about the chest; no palpitations.    RESPIRATORY:  Per HPI    GASTROINTESTINAL:  No abdominal pain, nausea, vomiting or diarrhea.    GENITOURINARY:  No dysuria, frequency or urgency.    NEUROLOGIC:  No paresthesias, fasciculations, seizures or weakness.    PSYCHIATRIC:  No disorder of thought or mood.      PHYSICAL EXAM:    Constitutional: Well developed and nourished  Eyes:Perrla  ENMT: normal  Neck:supple  Respiratory: good air entry  Cardiovascular: S1 S2 regular  Gastrointestinal: Soft, Non tender  Extremities: No edema  Vascular:normal  Neurological:Awake, alert,Ox3  Musculoskeletal:Normal      MEDICATIONS  (STANDING):  ALPRAZolam 0.5 milliGRAM(s) Oral every 12 hours  atorvastatin 40 milliGRAM(s) Oral at bedtime  cefTRIAXone   IVPB 2000 milliGRAM(s) IV Intermittent every 24 hours  enoxaparin Injectable 40 milliGRAM(s) SubCutaneous daily  ergocalciferol 24741 Unit(s) Oral <User Schedule>  letrozole 2.5 milliGRAM(s) Oral daily  levothyroxine 50 MICROGram(s) Oral daily  lidocaine   Patch 1 Patch Transdermal every 24 hours  sodium chloride 0.9% lock flush 10 milliLiter(s) IV Push daily    MEDICATIONS  (PRN):  acetaminophen   Tablet .. 650 milliGRAM(s) Oral every 4 hours PRN Mild Pain (1 - 3)  acetaminophen   Tablet .. 650 milliGRAM(s) Oral every 4 hours PRN Mild Pain (1 - 3)  acetaminophen   Tablet .. 1000 milliGRAM(s) Oral every 6 hours PRN Moderate Pain (4 - 6)      Allergies    No Known Allergies    Intolerances        LABS:                        12.4   3.46  )-----------( 269      ( 11 Mar 2020 06:02 )             37.6     03-11    138  |  104  |  18  ----------------------------<  112<H>  4.0   |  30  |  0.83    Ca    9.2      11 Mar 2020 06:02    TPro  7.8  /  Alb  3.5  /  TBili  0.8  /  DBili  x   /  AST  8<L>  /  ALT  17  /  AlkPhos  61  03-11              CAPILLARY BLOOD GLUCOSE            RADIOLOGY & ADDITIONAL TESTS:    CXR:  < from: Xray Chest 1 View- PORTABLE-Routine (03.04.20 @ 14:11) >    FINDINGS: Heart size appears within normal limits. No hilar or superior mediastinal abnormalities are identified. Linear opacities are identified within the bilateral lower lung field, likely related to linear scarring versus platelike atelectasis. Opacity is redemonstrated at the periphery of the left lower thorax likely corresponding to left-sided pleural thickening and pleural calcifications identified on CT evaluation of the abdomen dated 11/29/2019. There is no evidence for interval development of focal infiltrate, lobar consolidation or pulmonary edema. No definite pleural effusion is noted. There is no evidence for pneumothorax. Degenerative change of the midthoracic spine noted.    IMPRESSION: As above.    < end of copied text >    Ct scan chest:    ekg;    echo:

## 2020-03-11 NOTE — PROGRESS NOTE ADULT - SUBJECTIVE AND OBJECTIVE BOX
HPI:  64 y/o F with PMHx of breast cancer s/p bilateral mastectomy on Ibrance, malignant pleural effusion s/p VATS procedure, chronic left upper extremity lymphedema, hypothyroidism, recent hospitalization  for PNA  left arm cellulitis, and Bacteremia for which she was discharged on Ceftriaxone . Pt presented  to ED for IV access for  Ceftriaxone 2gm daily for 10 more days for treatment of bacteremia, Streptococcus Mitis. Pt reports she had RUE extended dwell which was clogged, and "came out."   Not a candidate for PICC nor extended dwell catheter in setting of basilic vein clotted throughout arm, small brachial vein, and failed attempt via cephalic vein in IR.    Pt to have IV Abx until 3/13  Patient examined at bedside, resting comfortably, denies pain, SOB or N&V.  Afebrile, VSS, NAD.    OVERNIGHT EVENTS:  No new overnight events     REVIEW OF SYSTEMS:      CONSTITUTIONAL: No fever,   EYES: no acute visual disturbances  NECK: No pain or stiffness  RESPIRATORY: No cough; No shortness of breath  CARDIOVASCULAR: No chest pain, no palpitations  GASTROINTESTINAL: No pain. No nausea, vomiting or diarrhea   NEUROLOGICAL: No headache or numbness, no tremors  MUSCULOSKELETAL: No joint pain, no muscle pain  GENITOURINARY: no dysuria, no frequency, no hesitancy  PSYCHIATRY: no depression , no anxiety  ALL OTHER  ROS negative        Vital Signs Last 24 Hrs  T(C): 36.5 (10 Mar 2020 21:39), Max: 36.6 (10 Mar 2020 14:42)  T(F): 97.7 (10 Mar 2020 21:39), Max: 97.8 (10 Mar 2020 14:42)  HR: 75 (10 Mar 2020 21:39) (75 - 86)  BP: 137/65 (10 Mar 2020 21:39) (137/65 - 139/64)  BP(mean): --  RR: 17 (10 Mar 2020 21:39) (16 - 17)  SpO2: 98% (10 Mar 2020 21:39) (98% - 100%)    ________________________________________________  PHYSICAL EXAM:    GENERAL: NAD  HEENT: Normocephalic; conjunctivae and sclerae clear; moist mucous membranes;   NECK : supple, no JVD  CHEST/LUNG: Clear to auscultation bilaterally   HEART: S1 S2  regular  ABDOMEN: Soft, Nontender, Nondistended; Bowel sounds present  EXTREMITIES: no cyanosis; no LE edema; no calf tenderness  SKIN: warm and dry; no rash  NERVOUS SYSTEM:  Alert & Oriented x3; no new deficits    _________________________________________________  CURRENT MEDICATIONS:    MEDICATIONS  (STANDING):  ALPRAZolam 0.5 milliGRAM(s) Oral every 12 hours  atorvastatin 40 milliGRAM(s) Oral at bedtime  cefTRIAXone   IVPB 2000 milliGRAM(s) IV Intermittent every 24 hours  enoxaparin Injectable 40 milliGRAM(s) SubCutaneous daily  ergocalciferol 70553 Unit(s) Oral <User Schedule>  letrozole 2.5 milliGRAM(s) Oral daily  levothyroxine 50 MICROGram(s) Oral daily  lidocaine   Patch 1 Patch Transdermal every 24 hours  sodium chloride 0.9% lock flush 10 milliLiter(s) IV Push daily    MEDICATIONS  (PRN):  acetaminophen   Tablet .. 650 milliGRAM(s) Oral every 4 hours PRN Mild Pain (1 - 3)  acetaminophen   Tablet .. 650 milliGRAM(s) Oral every 4 hours PRN Mild Pain (1 - 3)  acetaminophen   Tablet .. 1000 milliGRAM(s) Oral every 6 hours PRN Moderate Pain (4 - 6)      __________________________________________________  LABS:                          12.4   3.46  )-----------( 269      ( 11 Mar 2020 06:02 )             37.6     03-11    138  |  104  |  18  ----------------------------<  112<H>  4.0   |  30  |  0.83    Ca    9.2      11 Mar 2020 06:02    TPro  7.8  /  Alb  3.5  /  TBili  0.8  /  DBili  x   /  AST  8<L>  /  ALT  17  /  AlkPhos  61  03-11        CAPILLARY BLOOD GLUCOSE          __________________________________________________  RADIOLOGY & ADDITIONAL TESTS:    Imaging Personally Reviewed:  YES    < from: Xray Chest 1 View- PORTABLE-Routine (03.04.20 @ 14:11) >  FINDINGS: Heart size appears within normal limits. No hilar or superior mediastinal abnormalities are identified. Linear opacities are identified within the bilateral lower lung field, likely related to linear scarring versus platelike atelectasis. Opacity is redemonstrated at the periphery of the left lower thorax likely corresponding to left-sided pleural thickening and pleural calcifications identified on CT evaluation of the abdomen dated 11/29/2019. There is no evidence for interval development of focal infiltrate, lobar consolidation or pulmonary edema. No definite pleural effusion is noted. There is no evidence for pneumothorax. Degenerative change of the midthoracic spine noted.    < end of copied text >    Consultant(s) Notes Reviewed:   YES     Plan of care was discussed with patient and /or primary care giver; all questions and concerns were addressed and care was aligned with patient's wishes.    Plan discussed with attending and consulting physicians.

## 2020-03-11 NOTE — PROGRESS NOTE ADULT - SUBJECTIVE AND OBJECTIVE BOX
65y Female is under our care for bacteremia from prior admission, s/p removal of clogged extended dwell cath. Patient was found to be laying on bed comfortably. Has no new complaints. Needs antibiotic course until 3/14. Remains afebrile with normal wbc count.     REVIEW OF SYSTEMS:  [  ] Not able to illicit  General: no fevers no malaise  Chest: no cough no sob  GI: no nvd  Skin: no rashes  Neuro: no ha's no dizziness 	    MEDS:  cefTRIAXone   IVPB 2000 milliGRAM(s) IV Intermittent every 24 hours    ALLERGIES: Allergies    No Known Allergies    Intolerances      VITALS:  Vital Signs Last 24 Hrs  T(C): 36.6 (11 Mar 2020 13:33), Max: 36.6 (10 Mar 2020 14:42)  T(F): 97.9 (11 Mar 2020 13:33), Max: 97.9 (11 Mar 2020 13:33)  HR: 74 (11 Mar 2020 13:33) (74 - 86)  BP: 130/54 (11 Mar 2020 13:33) (130/54 - 139/64)  BP(mean): --  RR: 18 (11 Mar 2020 13:33) (16 - 18)  SpO2: 98% (11 Mar 2020 13:33) (98% - 100%)      PHYSICAL EXAM:  HEENT: n/a  Neck: supple no LN's   Respiratory: lungs clear no rales  Cardiovascular: S1 S2 reg no murmurs  Gastrointestinal: +BS with soft, nondistended abdomen; nontender  Extremities: LUE lymphedema  Skin: no rashes  Ortho: n/a  Neuro: awake and alert      LABS/DIAGNOSTIC TESTS:                        12.4   3.46  )-----------( 269      ( 11 Mar 2020 06:02 )             37.6   WBC Count: 3.46 K/uL (03-11 @ 06:02)  WBC Count: 3.39 K/uL (03-10 @ 07:36)  WBC Count: 3.18 K/uL (03-09 @ 06:26)  WBC Count: 5.17 K/uL (03-08 @ 12:00)    03-11    138  |  104  |  18  ----------------------------<  112<H>  4.0   |  30  |  0.83    Ca    9.2      11 Mar 2020 06:02    TPro  7.8  /  Alb  3.5  /  TBili  0.8  /  DBili  x   /  AST  8<L>  /  ALT  17  /  AlkPhos  61  03-11        CULTURES:   .Blood  02-18 @ 18:45   No growth at 5 days.  --  --      .Urine  02-15 @ 01:43   >=3 organisms. Probable collection contamination.  --  --      .Blood  02-15 @ 01:22   Growth in aerobic and anaerobic bottles: Streptococcus mitis/oralis group  --  Streptococcus mitis/oralis group        RADIOLOGY:  no new studies 65y Female is under our care for bacteremia from prior admission, s/p removal of clogged extended dwell cath. Patient was found to be laying on bed comfortably. Has no new complaints. Needs antibiotic course until 3/14 am. Remains afebrile with normal wbc count.     REVIEW OF SYSTEMS:  [  ] Not able to illicit  General: no fevers no malaise  Chest: no cough no sob  GI: no nvd  Skin: no rashes  Neuro: no ha's no dizziness 	    MEDS:  cefTRIAXone   IVPB 2000 milliGRAM(s) IV Intermittent every 24 hours    ALLERGIES: Allergies    No Known Allergies    Intolerances      VITALS:  Vital Signs Last 24 Hrs  T(C): 36.6 (11 Mar 2020 13:33), Max: 36.6 (10 Mar 2020 14:42)  T(F): 97.9 (11 Mar 2020 13:33), Max: 97.9 (11 Mar 2020 13:33)  HR: 74 (11 Mar 2020 13:33) (74 - 86)  BP: 130/54 (11 Mar 2020 13:33) (130/54 - 139/64)  BP(mean): --  RR: 18 (11 Mar 2020 13:33) (16 - 18)  SpO2: 98% (11 Mar 2020 13:33) (98% - 100%)      PHYSICAL EXAM:  HEENT: n/a  Neck: supple no LN's   Respiratory: lungs clear no rales  Cardiovascular: S1 S2 reg no murmurs  Gastrointestinal: +BS with soft, nondistended abdomen; nontender  Extremities: LUE lymphedema  Skin: no rashes  Ortho: n/a  Neuro: awake and alert      LABS/DIAGNOSTIC TESTS:                        12.4   3.46  )-----------( 269      ( 11 Mar 2020 06:02 )             37.6   WBC Count: 3.46 K/uL (03-11 @ 06:02)  WBC Count: 3.39 K/uL (03-10 @ 07:36)  WBC Count: 3.18 K/uL (03-09 @ 06:26)  WBC Count: 5.17 K/uL (03-08 @ 12:00)    03-11    138  |  104  |  18  ----------------------------<  112<H>  4.0   |  30  |  0.83    Ca    9.2      11 Mar 2020 06:02    TPro  7.8  /  Alb  3.5  /  TBili  0.8  /  DBili  x   /  AST  8<L>  /  ALT  17  /  AlkPhos  61  03-11        CULTURES:   .Blood  02-18 @ 18:45   No growth at 5 days.  --  --      .Urine  02-15 @ 01:43   >=3 organisms. Probable collection contamination.  --  --      .Blood  02-15 @ 01:22   Growth in aerobic and anaerobic bottles: Streptococcus mitis/oralis group  --  Streptococcus mitis/oralis group        RADIOLOGY:  no new studies

## 2020-03-11 NOTE — PROGRESS NOTE ADULT - PROBLEM SELECTOR PLAN 9
Patient to remain FULL CODE
Pt was receiving home services for IV infusion  Poor candidate for PICC or extended dwell  Will need inpatient stay for IV Abx until 3/14/2020
Pt was receiving home services for IV infusion  Poor candidate for PICC or extended dwell  Will need inpatient stay for IV Abx until 3/14/2020  pt's  approached team during rounds recommending that someone from [place] infuse IV abt at home and as per pt's spouse will do IV's as needed:   team spoke with same nurse, (call initiated by Dr Jackson), from [place] who reported that pt is "a hard stick" and if he is unable to obtain access when IV site change is due, pt will have to return to the ED. Spouse agreed to dc 3/14 after completion of abt    pt and spouse agreed with discussed plan of care
Pt was receiving home services for IV infusion  Poor candidate for PICC or extended dwell  Will need inpatient stay for IV Abx until 3/14/2020  pt's  approached team during rounds recommending that someone from [place] infuse IV abt at home and as per pt's spouse will do IV's as needed:   team spoke with same nurse, (call initiated by Dr Jackson), from [place] who reported that pt is "a hard stick" and if he is unable to obtain access when IV site change is due, pt will have to return to the ED. Spouse agreed to dc 3/14 after completion of abt    pt and spouse agreed with discussed plan of care
Patient to remain FULL CODE
Pt was receiving home services for IV infusion  CM following

## 2020-03-11 NOTE — PROGRESS NOTE ADULT - SUBJECTIVE AND OBJECTIVE BOX
S: patient seen this morning.  Mentioned she feels okay.  No acute complaints.  Events noted.    Vital Signs Last 24 Hrs  T(C): 36.6 (11 Mar 2020 13:33), Max: 36.6 (11 Mar 2020 13:33)  T(F): 97.9 (11 Mar 2020 13:33), Max: 97.9 (11 Mar 2020 13:33)  HR: 74 (11 Mar 2020 13:33) (74 - 75)  BP: 130/54 (11 Mar 2020 13:33) (130/54 - 137/65)  BP(mean): --  ABP: --  ABP(mean): --  RR: 18 (11 Mar 2020 13:33) (17 - 18)  SpO2: 98% (11 Mar 2020 13:33) (98% - 98%)    PHYSICAL EXAM  General: adult in NAD  HEENT: clear oropharynx, poor dental hygeine  CVS: normal S1/S2   Resp: CTABL  GI: soft non-tender non-distended, no hepatosplenomegaly  Ext: LUE lymphedema  CNS: no focal deficits    CBC Full  -  ( 11 Mar 2020 06:02 )  WBC Count : 3.46 K/uL  RBC Count : 3.61 M/uL  Hemoglobin : 12.4 g/dL  Hematocrit : 37.6 %  Platelet Count - Automated : 269 K/uL  Mean Cell Volume : 104.2 fl  Mean Cell Hemoglobin : 34.3 pg  Mean Cell Hemoglobin Concentration : 33.0 gm/dL  Auto Neutrophil # : x  Auto Lymphocyte # : x  Auto Monocyte # : x  Auto Eosinophil # : x  Auto Basophil # : x  Auto Neutrophil % : x  Auto Lymphocyte % : x  Auto Monocyte % : x  Auto Eosinophil % : x  Auto Basophil % : x

## 2020-03-11 NOTE — PROGRESS NOTE ADULT - PROBLEM SELECTOR PROBLEM 9
Discharge planning issues
Goals of care, counseling/discussion
Discharge planning issues

## 2020-03-11 NOTE — PROGRESS NOTE ADULT - PROBLEM SELECTOR PROBLEM 7
1320: Patient with mild HA. Requesting something mild for pain control. Notified Dr. Socorro Mustafa. Orders for tylenol placed. Patient reports small BM. Notified Dr. Socorro Mustafa. Patient could be discharged after large BM. Bedside shift change report given to aJvi Pop (oncoming nurse) by Luis Garcia RN (offgoing nurse). Report included the following information SBAR, Kardex, Intake/Output, MAR and Recent Results. Hypothyroidism

## 2020-03-12 DIAGNOSIS — Z90.13 ACQUIRED ABSENCE OF BILATERAL BREASTS AND NIPPLES: ICD-10-CM

## 2020-03-12 LAB
ANION GAP SERPL CALC-SCNC: 5 MMOL/L — SIGNIFICANT CHANGE UP (ref 5–17)
BUN SERPL-MCNC: 20 MG/DL — HIGH (ref 7–18)
CALCIUM SERPL-MCNC: 9.6 MG/DL — SIGNIFICANT CHANGE UP (ref 8.4–10.5)
CHLORIDE SERPL-SCNC: 105 MMOL/L — SIGNIFICANT CHANGE UP (ref 96–108)
CO2 SERPL-SCNC: 28 MMOL/L — SIGNIFICANT CHANGE UP (ref 22–31)
CREAT SERPL-MCNC: 0.73 MG/DL — SIGNIFICANT CHANGE UP (ref 0.5–1.3)
GLUCOSE SERPL-MCNC: 116 MG/DL — HIGH (ref 70–99)
HCT VFR BLD CALC: 37.3 % — SIGNIFICANT CHANGE UP (ref 34.5–45)
HGB BLD-MCNC: 12.5 G/DL — SIGNIFICANT CHANGE UP (ref 11.5–15.5)
MCHC RBC-ENTMCNC: 33.5 GM/DL — SIGNIFICANT CHANGE UP (ref 32–36)
MCHC RBC-ENTMCNC: 34.1 PG — HIGH (ref 27–34)
MCV RBC AUTO: 101.6 FL — HIGH (ref 80–100)
NRBC # BLD: 0 /100 WBCS — SIGNIFICANT CHANGE UP (ref 0–0)
PLATELET # BLD AUTO: 255 K/UL — SIGNIFICANT CHANGE UP (ref 150–400)
POTASSIUM SERPL-MCNC: 4.1 MMOL/L — SIGNIFICANT CHANGE UP (ref 3.5–5.3)
POTASSIUM SERPL-SCNC: 4.1 MMOL/L — SIGNIFICANT CHANGE UP (ref 3.5–5.3)
RBC # BLD: 3.67 M/UL — LOW (ref 3.8–5.2)
RBC # FLD: 12.5 % — SIGNIFICANT CHANGE UP (ref 10.3–14.5)
SODIUM SERPL-SCNC: 138 MMOL/L — SIGNIFICANT CHANGE UP (ref 135–145)
WBC # BLD: 3.11 K/UL — LOW (ref 3.8–10.5)
WBC # FLD AUTO: 3.11 K/UL — LOW (ref 3.8–10.5)

## 2020-03-12 RX ORDER — ALPRAZOLAM 0.25 MG
0.5 TABLET ORAL ONCE
Refills: 0 | Status: DISCONTINUED | OUTPATIENT
Start: 2020-03-12 | End: 2020-03-12

## 2020-03-12 RX ORDER — ALPRAZOLAM 0.25 MG
0.5 TABLET ORAL EVERY 12 HOURS
Refills: 0 | Status: DISCONTINUED | OUTPATIENT
Start: 2020-03-12 | End: 2020-03-13

## 2020-03-12 RX ADMIN — SODIUM CHLORIDE 10 MILLILITER(S): 9 INJECTION INTRAMUSCULAR; INTRAVENOUS; SUBCUTANEOUS at 12:03

## 2020-03-12 RX ADMIN — LETROZOLE 2.5 MILLIGRAM(S): 2.5 TABLET, FILM COATED ORAL at 12:04

## 2020-03-12 RX ADMIN — Medication 0.5 MILLIGRAM(S): at 17:49

## 2020-03-12 RX ADMIN — CEFTRIAXONE 100 MILLIGRAM(S): 500 INJECTION, POWDER, FOR SOLUTION INTRAMUSCULAR; INTRAVENOUS at 13:33

## 2020-03-12 RX ADMIN — Medication 50 MICROGRAM(S): at 06:24

## 2020-03-12 RX ADMIN — Medication 0.5 MILLIGRAM(S): at 09:36

## 2020-03-12 NOTE — PROGRESS NOTE ADULT - SUBJECTIVE AND OBJECTIVE BOX
65y Female is under our care for bacteremia from prior admission, s/p removal of clogged extended dwell cath. Patient is in same disposition and has no symptoms. Needs antibiotic course until 3/14 am. Remains afebrile with normal wbc count.     REVIEW OF SYSTEMS:  [  ] Not able to illicit  General: no fevers no malaise  Chest: no cough no sob  GI: no nvd  Skin: no rashes  Neuro: no ha's no dizziness 	    MEDS:  cefTRIAXone   IVPB 2000 milliGRAM(s) IV Intermittent every 24 hours    ALLERGIES: Allergies    No Known Allergies    Intolerances      VITALS:  Vital Signs Last 24 Hrs  T(C): 36.5 (12 Mar 2020 13:23), Max: 36.5 (11 Mar 2020 21:36)  T(F): 97.7 (12 Mar 2020 13:23), Max: 97.7 (11 Mar 2020 21:36)  HR: 70 (12 Mar 2020 13:23) (70 - 79)  BP: 112/64 (12 Mar 2020 13:23) (111/55 - 112/64)  BP(mean): --  RR: 16 (12 Mar 2020 13:23) (16 - 17)  SpO2: 97% (12 Mar 2020 13:23) (97% - 97%)    PHYSICAL EXAM:  HEENT: n/a  Neck: supple no LN's   Respiratory: lungs clear no rales  Cardiovascular: S1 S2 reg no murmurs  Gastrointestinal: +BS with soft, nondistended abdomen; nontender  Extremities: LUE lymphedema  Skin: no rashes  Ortho: n/a  Neuro: awake and alert      LABS/DIAGNOSTIC TESTS:                          12.5   3.11  )-----------( 255      ( 12 Mar 2020 06:52 )             37.3   WBC Count: 3.11 K/uL (03-12 @ 06:52)  WBC Count: 3.46 K/uL (03-11 @ 06:02)  WBC Count: 3.39 K/uL (03-10 @ 07:36)  WBC Count: 3.18 K/uL (03-09 @ 06:26)  WBC Count: 5.17 K/uL (03-08 @ 12:00)    03-12    138  |  105  |  20<H>  ----------------------------<  116<H>  4.1   |  28  |  0.73    Ca    9.6      12 Mar 2020 06:52    TPro  7.8  /  Alb  3.5  /  TBili  0.8  /  DBili  x   /  AST  8<L>  /  ALT  17  /  AlkPhos  61  03-1        CULTURES:   .Blood  02-18 @ 18:45   No growth at 5 days.  --  --      .Urine  02-15 @ 01:43   >=3 organisms. Probable collection contamination.  --  --      .Blood  02-15 @ 01:22   Growth in aerobic and anaerobic bottles: Streptococcus mitis/oralis group  --  Streptococcus mitis/oralis group        RADIOLOGY:  no new studies

## 2020-03-12 NOTE — PROGRESS NOTE ADULT - ASSESSMENT
1.	Bacteremia from prior admission  2.	S/p removal of clotted right arm extended dwell  ·	cont rocephin 2gm IV daily until 3/14/20 am  ·	For future dental work in the outpatient setting: should get amoxicillin 2gms po 1 hour prior to dental work

## 2020-03-12 NOTE — PROGRESS NOTE ADULT - SUBJECTIVE AND OBJECTIVE BOX
Patient is a 65y old  Female who presents with a chief complaint of Clogged Picc line (12 Mar 2020 10:55)       Pt is seen and examined  pt is awake and out of bed to chair  pt seems comfortable and denies any complaints at this time          ROS:  Negative except for:    MEDICATIONS  (STANDING):  ALPRAZolam 0.5 milliGRAM(s) Oral every 12 hours  atorvastatin 40 milliGRAM(s) Oral at bedtime  cefTRIAXone   IVPB 2000 milliGRAM(s) IV Intermittent every 24 hours  enoxaparin Injectable 40 milliGRAM(s) SubCutaneous daily  ergocalciferol 76257 Unit(s) Oral <User Schedule>  letrozole 2.5 milliGRAM(s) Oral daily  levothyroxine 50 MICROGram(s) Oral daily  lidocaine   Patch 1 Patch Transdermal every 24 hours  sodium chloride 0.9% lock flush 10 milliLiter(s) IV Push daily    MEDICATIONS  (PRN):  acetaminophen   Tablet .. 650 milliGRAM(s) Oral every 4 hours PRN Mild Pain (1 - 3)  acetaminophen   Tablet .. 650 milliGRAM(s) Oral every 4 hours PRN Mild Pain (1 - 3)  acetaminophen   Tablet .. 1000 milliGRAM(s) Oral every 6 hours PRN Moderate Pain (4 - 6)      Allergies    No Known Allergies    Intolerances        Vital Signs Last 24 Hrs  T(C): 36.5 (11 Mar 2020 21:36), Max: 36.6 (11 Mar 2020 13:33)  T(F): 97.7 (11 Mar 2020 21:36), Max: 97.9 (11 Mar 2020 13:33)  HR: 79 (11 Mar 2020 21:36) (74 - 79)  BP: 111/55 (11 Mar 2020 21:36) (111/55 - 130/54)  BP(mean): --  RR: 17 (11 Mar 2020 21:36) (17 - 18)  SpO2: 97% (11 Mar 2020 21:36) (97% - 98%)    PHYSICAL EXAM  General: adult in NAD  HEENT: clear oropharynx, anicteric sclera, pink conjunctiva  Neck: supple  CV: normal S1/S2 with no murmur rubs or gallops  Lungs: positive air movement b/l ant lungs,clear to auscultation, no wheezes, no rales  Abdomen: soft non-tender non-distended, no hepatosplenomegaly  Ext: no clubbing cyanosis or edema  Skin: no rashes and no petechiae  Neuro: alert and oriented X 4, no focal deficits  LABS:                          12.5   3.11  )-----------( 255      ( 12 Mar 2020 06:52 )             37.3         Mean Cell Volume : 101.6 fl  Mean Cell Hemoglobin : 34.1 pg  Mean Cell Hemoglobin Concentration : 33.5 gm/dL  Auto Neutrophil # : x  Auto Lymphocyte # : x  Auto Monocyte # : x  Auto Eosinophil # : x  Auto Basophil # : x  Auto Neutrophil % : x  Auto Lymphocyte % : x  Auto Monocyte % : x  Auto Eosinophil % : x  Auto Basophil % : x    Serial CBC's  03-12 @ 06:52  Hct-37.3 / Hgb-12.5 / Plat-255 / RBC-3.67 / WBC-3.11          Serial CBC's  03-11 @ 06:02  Hct-37.6 / Hgb-12.4 / Plat-269 / RBC-3.61 / WBC-3.46          Serial CBC's  03-10 @ 07:36  Hct-37.0 / Hgb-12.3 / Plat-177 / RBC-3.59 / WBC-3.39          Serial CBC's  03-09 @ 06:26  Hct-40.6 / Hgb-13.2 / Plat-332 / RBC-3.90 / WBC-3.18          Serial CBC's  03-08 @ 12:00  Hct-41.6 / Hgb-13.6 / Plat-395 / RBC-4.02 / WBC-5.17            03-12    138  |  105  |  20<H>  ----------------------------<  116<H>  4.1   |  28  |  0.73    Ca    9.6      12 Mar 2020 06:52    TPro  7.8  /  Alb  3.5  /  TBili  0.8  /  DBili  x   /  AST  8<L>  /  ALT  17  /  AlkPhos  61  03-11          WBC Count: 3.11 K/uL (03-12-20 @ 06:52)  Hemoglobin: 12.5 g/dL (03-12-20 @ 06:52)  Hematocrit: 37.3 % (03-12-20 @ 06:52)  Platelet Count - Automated: 255 K/uL (03-12-20 @ 06:52)  WBC Count: 3.46 K/uL (03-11-20 @ 06:02)  Hemoglobin: 12.4 g/dL (03-11-20 @ 06:02)  Hematocrit: 37.6 % (03-11-20 @ 06:02)  Platelet Count - Automated: 269 K/uL (03-11-20 @ 06:02)  WBC Count: 3.39 K/uL (03-10-20 @ 07:36)  Hemoglobin: 12.3 g/dL (03-10-20 @ 07:36)  Hematocrit: 37.0 % (03-10-20 @ 07:36)  Platelet Count - Automated: 177 K/uL (03-10-20 @ 07:36)  WBC Count: 3.18 K/uL (03-09-20 @ 06:26)  Hemoglobin: 13.2 g/dL (03-09-20 @ 06:26)  Hematocrit: 40.6 % (03-09-20 @ 06:26)  Platelet Count - Automated: 332 K/uL (03-09-20 @ 06:26)  WBC Count: 5.17 K/uL (03-08-20 @ 12:00)  Hemoglobin: 13.6 g/dL (03-08-20 @ 12:00)  Hematocrit: 41.6 % (03-08-20 @ 12:00)  Platelet Count - Automated: 395 K/uL (03-08-20 @ 12:00)  WBC Count: 3.48 K/uL (03-06-20 @ 07:12)  Hemoglobin: 13.1 g/dL (03-06-20 @ 07:12)  Hematocrit: 39.3 % (03-06-20 @ 07:12)  Platelet Count - Automated: 363 K/uL (03-06-20 @ 07:12)  WBC Count: 3.93 K/uL (03-05-20 @ 06:47)  Hemoglobin: 12.2 g/dL (03-05-20 @ 06:47)  Hematocrit: 37.3 % (03-05-20 @ 06:47)  Platelet Count - Automated: 408 K/uL (03-05-20 @ 06:47)  WBC Count: 5.54 K/uL (03-04-20 @ 06:08)  Hemoglobin: 12.3 g/dL (03-04-20 @ 06:08)  Hematocrit: 37.6 % (03-04-20 @ 06:08)  Platelet Count - Automated: 403 K/uL (03-04-20 @ 06:08)  WBC Count: 5.10 K/uL (03-03-20 @ 05:50)  Hemoglobin: 11.8 g/dL (03-03-20 @ 05:50)  Hematocrit: 36.0 % (03-03-20 @ 05:50)  Platelet Count - Automated: 431 K/uL (03-03-20 @ 05:50)  WBC Count: 5.65 K/uL (03-02-20 @ 17:06)  Hemoglobin: 12.1 g/dL (03-02-20 @ 17:06)  Hematocrit: 36.8 % (03-02-20 @ 17:06)  Platelet Count - Automated: 478 K/uL (03-02-20 @ 17:06)                BLOOD SMEAR INTERPRETATION:       RADIOLOGY & ADDITIONAL STUDIES:

## 2020-03-12 NOTE — PROGRESS NOTE ADULT - SUBJECTIVE AND OBJECTIVE BOX
NP Note discussed with  Primary Attending    Patient is a 65y old  Female who presents with a chief complaint of Clogged Picc line (11 Mar 2020 17:10)    HPI:  64 y/o F with PMHx of breast cancer s/p bilateral mastectomy on Ibrance, malignant pleural effusion s/p VATS procedure, chronic left upper extremity lymphedema, hypothyroidism, recent hospitalization  for PNA  left arm cellulitis, and Bacteremia for which she was discharged on Ceftriaxone . Pt presented  to ED for IV access for  Ceftriaxone 2gm daily for 10 more days for treatment of bacteremia, Streptococcus Mitis. Pt reports she had RUE extended dwell which was clogged, and "came out."   Not a candidate for PICC nor extended dwell catheter in setting of basilic vein clotted throughout arm, small brachial vein, and failed attempt via cephalic vein in IR.    Pt to have IV Abx until 3/14 AM  Patient examined at bedside, resting comfortably, denies pain, SOB or N&V.  Afebrile, VSS, NAD.    INTERVAL HPI/OVERNIGHT EVENTS: no new complaints    MEDICATIONS  (STANDING):  ALPRAZolam 0.5 milliGRAM(s) Oral every 12 hours  atorvastatin 40 milliGRAM(s) Oral at bedtime  cefTRIAXone   IVPB 2000 milliGRAM(s) IV Intermittent every 24 hours  enoxaparin Injectable 40 milliGRAM(s) SubCutaneous daily  ergocalciferol 64939 Unit(s) Oral <User Schedule>  letrozole 2.5 milliGRAM(s) Oral daily  levothyroxine 50 MICROGram(s) Oral daily  lidocaine   Patch 1 Patch Transdermal every 24 hours  sodium chloride 0.9% lock flush 10 milliLiter(s) IV Push daily    MEDICATIONS  (PRN):  acetaminophen   Tablet .. 650 milliGRAM(s) Oral every 4 hours PRN Mild Pain (1 - 3)  acetaminophen   Tablet .. 650 milliGRAM(s) Oral every 4 hours PRN Mild Pain (1 - 3)  acetaminophen   Tablet .. 1000 milliGRAM(s) Oral every 6 hours PRN Moderate Pain (4 - 6)      __________________________________________________  REVIEW OF SYSTEMS:    CONSTITUTIONAL: No fever,   EYES: no acute visual disturbances  NECK: No pain or stiffness  RESPIRATORY: No cough; No shortness of breath  CARDIOVASCULAR: No chest pain, no palpitations  GASTROINTESTINAL: No pain. No nausea or vomiting; No diarrhea   NEUROLOGICAL: No headache or numbness, no tremors  MUSCULOSKELETAL: No joint pain, no muscle pain  GENITOURINARY: no dysuria, no frequency, no hesitancy  PSYCHIATRY: no depression , no anxiety  ALL OTHER  ROS negative        Vital Signs Last 24 Hrs  T(C): 36.5 (11 Mar 2020 21:36), Max: 36.6 (11 Mar 2020 13:33)  T(F): 97.7 (11 Mar 2020 21:36), Max: 97.9 (11 Mar 2020 13:33)  HR: 79 (11 Mar 2020 21:36) (74 - 79)  BP: 111/55 (11 Mar 2020 21:36) (111/55 - 130/54)  BP(mean): --  RR: 17 (11 Mar 2020 21:36) (17 - 18)  SpO2: 97% (11 Mar 2020 21:36) (97% - 98%)    ________________________________________________  PHYSICAL EXAM:  GENERAL: NAD  HEENT: Normocephalic;  conjunctivae and sclerae clear; moist mucous membranes;   NECK : supple  CHEST/LUNG: Clear to auscultation bilaterally with good air entry   HEART: S1 S2  regular; no murmurs, gallops or rubs  ABDOMEN: Soft, Nontender, Nondistended; Bowel sounds present  EXTREMITIES: no cyanosis; no edema; no calf tenderness  SKIN: warm and dry; no rash  NERVOUS SYSTEM:  Awake and alert; Oriented  to place, person and time ; no new deficits    _________________________________________________  LABS:                        12.5   3.11  )-----------( 255      ( 12 Mar 2020 06:52 )             37.3     03-12    138  |  105  |  20<H>  ----------------------------<  116<H>  4.1   |  28  |  0.73    Ca    9.6      12 Mar 2020 06:52    TPro  7.8  /  Alb  3.5  /  TBili  0.8  /  DBili  x   /  AST  8<L>  /  ALT  17  /  AlkPhos  61  03-11        CAPILLARY BLOOD GLUCOSE            RADIOLOGY & ADDITIONAL TESTS:    EXAM:  XR CHEST PORTABLE ROUTINE 1V                            PROCEDURE DATE:  03/04/2020          INTERPRETATION:  INDICATION: Bacteremia    PRIORS: 2/14/2020    VIEWS: Portable AP radiography of the chest performed. Evaluation limited secondary to shallow inspiration.    FINDINGS: Heart size appears within normal limits. No hilar or superior mediastinal abnormalities are identified. Linear opacities are identified within the bilateral lower lung field, likely related to linear scarring versus platelike atelectasis. Opacity is redemonstrated at the periphery of the left lower thorax likely corresponding to left-sided pleural thickening and pleural calcifications identified on CT evaluation of the abdomen dated 11/29/2019. There is no evidence for interval development of focal infiltrate, lobar consolidation or pulmonary edema. No definite pleural effusion is noted. There is no evidence for pneumothorax. Degenerative change of the midthoracic spine noted.    IMPRESSION: As above.            VIC ASTUDILLO   This document has been electronically signed. Mar  4 2020  4:06PM        Imaging Personally Reviewed:  YES    Consultant(s) Notes Reviewed:   YES    Care Discussed with Consultants : ID     Plan of care was discussed with patient and /or primary care giver; all questions and concerns were addressed and care was aligned with patient's wishes.

## 2020-03-12 NOTE — PROGRESS NOTE ADULT - ASSESSMENT
66 y/o F with PMHx of breast cancer s/p bilateral mastectomy on Ibrance, malignant pleural effusion s/p VATS procedure, chronic left upper extremity lymphedema, hypothyroidism presents to ED complaining of clogged right sided extended dwell catheter line since this morning. Pt was originally recently admitted for suspicious pneumonia and left arm cellulitis with positive blood cultures Streptococcus Mitis. SEAN was negative for endocarditis. Pt was discharged on Ceftriaxone 2gm daily via PICC line. pt is due for  another 10d.   Pt denies any nausea vomiting, skin discoloration, fever, chest pain, bruises  Pt is anxious about the fact the she will have to get IV access in her neck      # Metastatic Breast Cancer:  -Continue letrozole PO daily plus Ibrance as her disease is responsive to treatment. Can use home supply.   -Check CBC diff daily. No G-CSF for ANC > 1000  -Out-pt f/u with Dr. Lala.     # Strep mitus  -ID on board. Abx regimenas per them   -Oral hygeine and care    Patient in agreement.   please call us with questions.

## 2020-03-12 NOTE — PROGRESS NOTE ADULT - SUBJECTIVE AND OBJECTIVE BOX
Pt is awake, alert, lying in bed in NAD.     INTERVAL HPI/OVERNIGHT EVENTS:      VITAL SIGNS:  T(F): 97.7 (03-11-20 @ 21:36)  HR: 79 (03-11-20 @ 21:36)  BP: 111/55 (03-11-20 @ 21:36)  RR: 17 (03-11-20 @ 21:36)  SpO2: 97% (03-11-20 @ 21:36)  Wt(kg): --  I&O's Detail          REVIEW OF SYSTEMS:    CONSTITUTIONAL:  No fevers, chills, sweats    HEENT:  Eyes:  No diplopia or blurred vision. ENT:  No earache, sore throat or runny nose.    CARDIOVASCULAR:  No pressure, squeezing, tightness, or heaviness about the chest; no palpitations.    RESPIRATORY:  Per HPI    GASTROINTESTINAL:  No abdominal pain, nausea, vomiting or diarrhea.    GENITOURINARY:  No dysuria, frequency or urgency.    NEUROLOGIC:  No paresthesias, fasciculations, seizures or weakness.    PSYCHIATRIC:  No disorder of thought or mood.      PHYSICAL EXAM:    Constitutional: Well developed and nourished  Eyes: Perrla  ENMT: normal  Neck: supple  Respiratory: good air entry  Cardiovascular: S1 S2 regular  Gastrointestinal: Soft, Non tender  Extremities: left arm lymphedema.   Vascular: normal  Neurological: Awake, alert,Ox3  Musculoskeletal: Normal      MEDICATIONS  (STANDING):  ALPRAZolam 0.5 milliGRAM(s) Oral every 12 hours  atorvastatin 40 milliGRAM(s) Oral at bedtime  cefTRIAXone   IVPB 2000 milliGRAM(s) IV Intermittent every 24 hours  enoxaparin Injectable 40 milliGRAM(s) SubCutaneous daily  ergocalciferol 16048 Unit(s) Oral <User Schedule>  letrozole 2.5 milliGRAM(s) Oral daily  levothyroxine 50 MICROGram(s) Oral daily  lidocaine   Patch 1 Patch Transdermal every 24 hours  sodium chloride 0.9% lock flush 10 milliLiter(s) IV Push daily    MEDICATIONS  (PRN):  acetaminophen   Tablet .. 650 milliGRAM(s) Oral every 4 hours PRN Mild Pain (1 - 3)  acetaminophen   Tablet .. 650 milliGRAM(s) Oral every 4 hours PRN Mild Pain (1 - 3)  acetaminophen   Tablet .. 1000 milliGRAM(s) Oral every 6 hours PRN Moderate Pain (4 - 6)      Allergies    No Known Allergies    Intolerances        LABS:                        12.5   3.11  )-----------( 255      ( 12 Mar 2020 06:52 )             37.3     03-12    138  |  105  |  20<H>  ----------------------------<  116<H>  4.1   |  28  |  0.73    Ca    9.6      12 Mar 2020 06:52    TPro  7.8  /  Alb  3.5  /  TBili  0.8  /  DBili  x   /  AST  8<L>  /  ALT  17  /  AlkPhos  61  03-11    CAPILLARY BLOOD GLUCOSE    RADIOLOGY & ADDITIONAL TESTS:    CXR:    Ct scan chest:    ekg;    echo:

## 2020-03-13 ENCOUNTER — TRANSCRIPTION ENCOUNTER (OUTPATIENT)
Age: 65
End: 2020-03-13

## 2020-03-13 VITALS
DIASTOLIC BLOOD PRESSURE: 51 MMHG | RESPIRATION RATE: 18 BRPM | SYSTOLIC BLOOD PRESSURE: 131 MMHG | HEART RATE: 94 BPM | OXYGEN SATURATION: 97 % | TEMPERATURE: 98 F

## 2020-03-13 LAB
ANION GAP SERPL CALC-SCNC: 8 MMOL/L — SIGNIFICANT CHANGE UP (ref 5–17)
BUN SERPL-MCNC: 19 MG/DL — HIGH (ref 7–18)
CALCIUM SERPL-MCNC: 9.4 MG/DL — SIGNIFICANT CHANGE UP (ref 8.4–10.5)
CHLORIDE SERPL-SCNC: 104 MMOL/L — SIGNIFICANT CHANGE UP (ref 96–108)
CO2 SERPL-SCNC: 27 MMOL/L — SIGNIFICANT CHANGE UP (ref 22–31)
CREAT SERPL-MCNC: 0.77 MG/DL — SIGNIFICANT CHANGE UP (ref 0.5–1.3)
GLUCOSE SERPL-MCNC: 110 MG/DL — HIGH (ref 70–99)
HCT VFR BLD CALC: 37.6 % — SIGNIFICANT CHANGE UP (ref 34.5–45)
HGB BLD-MCNC: 12.4 G/DL — SIGNIFICANT CHANGE UP (ref 11.5–15.5)
MCHC RBC-ENTMCNC: 33 GM/DL — SIGNIFICANT CHANGE UP (ref 32–36)
MCHC RBC-ENTMCNC: 34 PG — SIGNIFICANT CHANGE UP (ref 27–34)
MCV RBC AUTO: 103 FL — HIGH (ref 80–100)
NRBC # BLD: 0 /100 WBCS — SIGNIFICANT CHANGE UP (ref 0–0)
PLATELET # BLD AUTO: 229 K/UL — SIGNIFICANT CHANGE UP (ref 150–400)
POTASSIUM SERPL-MCNC: 3.9 MMOL/L — SIGNIFICANT CHANGE UP (ref 3.5–5.3)
POTASSIUM SERPL-SCNC: 3.9 MMOL/L — SIGNIFICANT CHANGE UP (ref 3.5–5.3)
RBC # BLD: 3.65 M/UL — LOW (ref 3.8–5.2)
RBC # FLD: 12.5 % — SIGNIFICANT CHANGE UP (ref 10.3–14.5)
SODIUM SERPL-SCNC: 139 MMOL/L — SIGNIFICANT CHANGE UP (ref 135–145)
WBC # BLD: 3.07 K/UL — LOW (ref 3.8–10.5)
WBC # FLD AUTO: 3.07 K/UL — LOW (ref 3.8–10.5)

## 2020-03-13 PROCEDURE — 81001 URINALYSIS AUTO W/SCOPE: CPT

## 2020-03-13 PROCEDURE — 85027 COMPLETE CBC AUTOMATED: CPT

## 2020-03-13 PROCEDURE — 99238 HOSP IP/OBS DSCHRG MGMT 30/<: CPT

## 2020-03-13 PROCEDURE — 99285 EMERGENCY DEPT VISIT HI MDM: CPT | Mod: 25

## 2020-03-13 PROCEDURE — 84100 ASSAY OF PHOSPHORUS: CPT

## 2020-03-13 PROCEDURE — 93005 ELECTROCARDIOGRAM TRACING: CPT

## 2020-03-13 PROCEDURE — 80053 COMPREHEN METABOLIC PANEL: CPT

## 2020-03-13 PROCEDURE — 71045 X-RAY EXAM CHEST 1 VIEW: CPT

## 2020-03-13 PROCEDURE — 83735 ASSAY OF MAGNESIUM: CPT

## 2020-03-13 PROCEDURE — 87641 MR-STAPH DNA AMP PROBE: CPT

## 2020-03-13 PROCEDURE — 80048 BASIC METABOLIC PNL TOTAL CA: CPT

## 2020-03-13 PROCEDURE — 96374 THER/PROPH/DIAG INJ IV PUSH: CPT

## 2020-03-13 PROCEDURE — 87640 STAPH A DNA AMP PROBE: CPT

## 2020-03-13 PROCEDURE — 36415 COLL VENOUS BLD VENIPUNCTURE: CPT

## 2020-03-13 RX ORDER — ERGOCALCIFEROL 1.25 MG/1
1 CAPSULE ORAL
Qty: 4 | Refills: 0
Start: 2020-03-13 | End: 2020-04-11

## 2020-03-13 RX ORDER — LEVOTHYROXINE SODIUM 125 MCG
1 TABLET ORAL
Qty: 0 | Refills: 0 | DISCHARGE
Start: 2020-03-13

## 2020-03-13 RX ORDER — AMOXICILLIN 250 MG/5ML
4 SUSPENSION, RECONSTITUTED, ORAL (ML) ORAL
Qty: 12 | Refills: 0
Start: 2020-03-13 | End: 2020-03-15

## 2020-03-13 RX ORDER — ALPRAZOLAM 0.25 MG
1 TABLET ORAL
Qty: 28 | Refills: 0
Start: 2020-03-13 | End: 2020-03-26

## 2020-03-13 RX ADMIN — SODIUM CHLORIDE 10 MILLILITER(S): 9 INJECTION INTRAMUSCULAR; INTRAVENOUS; SUBCUTANEOUS at 11:58

## 2020-03-13 RX ADMIN — LETROZOLE 2.5 MILLIGRAM(S): 2.5 TABLET, FILM COATED ORAL at 11:58

## 2020-03-13 RX ADMIN — Medication 0.5 MILLIGRAM(S): at 05:15

## 2020-03-13 RX ADMIN — CEFTRIAXONE 100 MILLIGRAM(S): 500 INJECTION, POWDER, FOR SOLUTION INTRAMUSCULAR; INTRAVENOUS at 11:57

## 2020-03-13 RX ADMIN — Medication 50 MICROGRAM(S): at 05:15

## 2020-03-13 NOTE — DISCHARGE NOTE NURSING/CASE MANAGEMENT/SOCIAL WORK - PATIENT PORTAL LINK FT
You can access the FollowMyHealth Patient Portal offered by [facility] by registering at the following website: [email or web address]. By joining [facility]’s FollowMyHealth portal, you will also be able to view your health information using other applications (apps) compatible with our system.

## 2020-03-13 NOTE — PROGRESS NOTE ADULT - PROBLEM SELECTOR PLAN 4
Chronic LUE lymphedema secondary to bilat mastectomy r/t breast ca  Continue with LUE precautions  Pt takes Letrozole at home  Continue with home regimen   Pt to follow up with Heme/Onc as outpatient
Chronic LUE lymphedema secondary to bilat mastectomy r/t breast ca  Continue with LUE precautions  Pt takes Letrozole at home  Continue with home regimen   Pt to follow up with Heme/Onc as outpatient
Follow up CXR showed no effusion
Lipid panel stable  Pt takes atorvastatin at home  Continue with home regimen
Oxygen Supp prn   Bronchodilators PRN   PFTs and PSG ( morbidly obese. R/O AGUSTIN) as OP.
Oxygen Supp prn   Bronchodilators PRN   PFTs and PSG ( morbidly obese. R/O AGUSTIN) as OP.
chronic LUE lymphedema 2/2 bilateral mastectomy due to breast cancer  Continue with LUE precautions  con't home dose letrozole
chronic LUE lymphedema 2/2 bilateral mastectomy due to breast cancer  Continue with LUE precautions  con't home dose letrozole  f/u with her heme-onc outpt
chronic LUE lymphedema 2/2 bilateral mastectomy due to breast cancer  Continue with LUE precautions  con't home dose letrozole  f/u with her heme-onc outpt
oxygen supp prn   Bronchodilators PRN   PFTs and PSG ( morbidly obese. R/O AGUSTIN) as OP
oxygen supp prn   Bronchodilators PRN   PFTs and PSG ( morbidly obese. R/O AGUSTIN) as OP.
Continue letrozole PO daily plus Ibrance as her disease is responsive to treatment. Can use home supply.   monitor CBC  Out-pt f/u with Dr. Lala.
Chronic LUE lymphedema secondary to bilat mastectomy r/t breast ca  Continue with LUE precautions  Pt takes Letrozole at home  Continue with home regimen   Pt to follow up with Heme/Onc as outpatient
c/w levothyroxine

## 2020-03-13 NOTE — PROGRESS NOTE ADULT - SUBJECTIVE AND OBJECTIVE BOX
Pt is awake, alert, lying in bed in NAD.     INTERVAL HPI/OVERNIGHT EVENTS:      VITAL SIGNS:  T(F): 98.6 (03-13-20 @ 05:30)  HR: 74 (03-13-20 @ 05:30)  BP: 92/53 (03-13-20 @ 05:30)  RR: 18 (03-13-20 @ 05:30)  SpO2: 98% (03-13-20 @ 05:30)  Wt(kg): --  I&O's Detail          REVIEW OF SYSTEMS:    CONSTITUTIONAL:  No fevers, chills, sweats    HEENT:  Eyes:  No diplopia or blurred vision. ENT:  No earache, sore throat or runny nose.    CARDIOVASCULAR:  No pressure, squeezing, tightness, or heaviness about the chest; no palpitations.    RESPIRATORY:  Per HPI    GASTROINTESTINAL:  No abdominal pain, nausea, vomiting or diarrhea.    GENITOURINARY:  No dysuria, frequency or urgency.    NEUROLOGIC:  No paresthesias, fasciculations, seizures or weakness.    PSYCHIATRIC:  No disorder of thought or mood.      PHYSICAL EXAM:    Constitutional: Well developed and nourished  Eyes:Perrla  ENMT: normal  Neck:supple  Respiratory: good air entry  Cardiovascular: S1 S2 regular  Gastrointestinal: Soft, Non tender  Extremities: + left upper ext. lymphedema  Vascular:normal  Neurological:Awake, alert,Ox3  Musculoskeletal:Normal      MEDICATIONS  (STANDING):  ALPRAZolam 0.5 milliGRAM(s) Oral every 12 hours  atorvastatin 40 milliGRAM(s) Oral at bedtime  cefTRIAXone   IVPB 2000 milliGRAM(s) IV Intermittent every 24 hours  enoxaparin Injectable 40 milliGRAM(s) SubCutaneous daily  ergocalciferol 35609 Unit(s) Oral <User Schedule>  letrozole 2.5 milliGRAM(s) Oral daily  levothyroxine 50 MICROGram(s) Oral daily  lidocaine   Patch 1 Patch Transdermal every 24 hours  sodium chloride 0.9% lock flush 10 milliLiter(s) IV Push daily    MEDICATIONS  (PRN):  acetaminophen   Tablet .. 650 milliGRAM(s) Oral every 4 hours PRN Mild Pain (1 - 3)  acetaminophen   Tablet .. 650 milliGRAM(s) Oral every 4 hours PRN Mild Pain (1 - 3)  acetaminophen   Tablet .. 1000 milliGRAM(s) Oral every 6 hours PRN Moderate Pain (4 - 6)      Allergies    No Known Allergies    Intolerances        LABS:                        12.4   3.07  )-----------( 229      ( 13 Mar 2020 07:16 )             37.6     03-13    139  |  104  |  19<H>  ----------------------------<  110<H>  3.9   |  27  |  0.77    Ca    9.4      13 Mar 2020 07:16                CAPILLARY BLOOD GLUCOSE            RADIOLOGY & ADDITIONAL TESTS:    CXR:    Ct scan chest:    ekg;    echo:

## 2020-03-13 NOTE — PROGRESS NOTE ADULT - ATTENDING COMMENTS
I agree with above
Patient is seen and examined. Case reviewed with the medical team. Above note is appreciated. Will follow up clinically. Continue DVT prophylaxis. Case discussed with Cardiology, and ID and oncology. Continue Rocephin. Discharge planning.
Patient is seen and examined. Case reviewed with the medical team. Above note is appreciated. Will follow up clinically. Continue DVT prophylaxis. Case discussed with ID and cardiology.
Patient is seen and examined. Case reviewed with the medical team. Above note is appreciated. Will follow up clinically. Continue DVT prophylaxis. Case discussed with cardiology, Oncology and ID. Continue Rocephin for three more days. Patient can take amoxil 2 gm one hour prior to any dental procedure.
Patient is seen and examined earlier. Case reviewed with the medical team. Above note is appreciated. Will follow up clinically. Continue DVT prophylaxis. Case discussed with ID. Continue Rocephin for now. Unable to place Picc line.
Patient is seen and examined. Case reviewed with the medical team. Above note is appreciated. Will follow up clinically. Continue DVT prophylaxis. Case discussed with ID. Will continue Rocephin until 3/13/20. will follow up with ID about continuing prophylactic antibiotics as patient is going to follow up for dental work for poor oral hygene. Oncology note is appreciated. Will clarify and see if patient needs to be on Ibrace and Arimidex at this time.
Patient is seen and examined. Case reviewed with the medical team. Above note is appreciated. Will follow up clinically. Continue DVT prophylaxis. Case discussed with ID. Continue Rocephin until 3/14/20. Unable to Replace picc line. Will follow up With IR if a mid line can be placed.
Patient is seen and examined. Case reviewed with the medical team. Above note is appreciated. Will follow up clinically. Continue DVT prophylaxis. Case discussed with ID. Continue Rocephin until 3/14/20. Unable to Replace picc line.
Patient is seen and examined. Case reviewed with the medical team. Above note is appreciated. Will follow up clinically. Continue DVT prophylaxis. Case discussed with ID. Continue Rocephin until 3/14/2020. Will speak with IR to see if any other IV catheter can be placed.

## 2020-03-13 NOTE — PROGRESS NOTE ADULT - PROBLEM SELECTOR PLAN 7
Cont meds   TFTs.
Cont meds   TFTs.
Continue with SQ Lovenox
Fall without injury 3/4/2020  Slowly slided her buttocks down on the floor in Nursing station 3/4/2020  monitor
Fall without injury 3/4/2020  Slowly slided her buttocks down on the floor in Nursing station 3/4/2020  monitor
Fall without injury 3/4/58934  Slowly slided her buttocks down on the floor in Nursing station 3/4/2020  monitor
Follow up CXR
Follow up CXR.
cont meds   TFTs.
full code
Continue with SQ Lovenox
c/w home regimen of letrozole

## 2020-03-13 NOTE — PROGRESS NOTE ADULT - PROBLEM SELECTOR PROBLEM 1
Bacteremia
Breast cancer
Bacteremia
Bacteremia

## 2020-03-13 NOTE — PROGRESS NOTE ADULT - PROBLEM SELECTOR PLAN 1
Hem/onc follow up   Left arm lymphedema
Hem/onc follow up   Left arm lymphedema.
Strep Mitis/Oralis  (2/15/20)  Afebrile  WBC 3.18  Continue with Rocephin until 3/14/2020  Per IR Pt not a candidate for PICC or extended dwell  Dr. Wolfe (ID) following
Strep Mitis/Oralis  (2/15/20)  Afebrile  WBC 3.46  Continue with Rocephin, per ID pt needs until 3/13  Per IR Pt not a candidate for PICC or extended dwell  Infection source is patients mouth.  Pt will need Amoxicillin 2g, 1 hour prior to any dental procedure.  Dr. Wolfe (ID) following
Strep Mitis/Oralis  (2/15/20)  Afebrile  WBC 5.54  Continue with Rocephin until 3/14/2020  Per IR Pt not a candidate for PICC or extended dwell  Dr. Wolfe (ID) following
Strep Mitis/Oralis  (2/15/20)  Afebrile  WBC normal  Continue with Rocephin until 3/14/2020  Per IR Pt not a candidate for PICC or extended dwell  Dr. Wolfe (ID) following
Strep Mitis/Oralis  (2/15/20)  Afebrile  WBC 3.11  Continue with Rocephin, per ID pt needs until 3/14 AM  Per IR Pt not a candidate for PICC or extended dwell  Infection source is patients mouth.  Pt will need Amoxicillin 2g, 1 hour prior to any dental procedure.  Dr. Wolfe (ID) following.
Strep Mitis/Oralis  (2/15/20)  Afebrile  WBC 3.39  Continue with Rocephin, per ID pt needs until 3/13  Per IR Pt not a candidate for PICC or extended dwell  Dr. Wolfe (ID) following
with Strep Mitis/Oralis  2/15  SEAN neg for endocarditis  afebrile, no leukocytosis  c/w Rocephin til 3/14/2020 for total of 4 weeks  Pt not a candidate for PICC or another extended dwell  Appreciate IR  f/u ID consult with Dr. Wolfe

## 2020-03-13 NOTE — PROGRESS NOTE ADULT - PROBLEM SELECTOR PLAN 5
Continue with Lidoderm patch  Continue with PRN Percocet
Continue with Lidoderm patch  Continue with PRN Percocet
History of bilateral mastectomy  Pt has chronic lymphedema LUE  Continue with LUE precautions
Prior CXR showed FUNMILAYO pneumonia   F/u CXR  showed no acute infiltrate but rather plate-like Atelectasis  Antibiotics for Bacteremia
cont meds   TFTs
cont meds   TFTs.
lidoderm patch  dc motrin given risk of GI complications  percocet Q12hr PRN 1 tab for severe pain
lidoderm patch  percocet Q12hr PRN 1 tab for severe pain
lidoderm patch  percocet Q12hr PRN 1 tab for severe pain
c/w tylenol and lidocaine patch
Continue with Lidoderm patch  Continue with PRN Percocet
c/w statin

## 2020-03-13 NOTE — PROGRESS NOTE ADULT - SUBJECTIVE AND OBJECTIVE BOX
65y Female is under our care for bacteremia from prior admission, s/p removal of clogged extended dwell cath. Patient feels well and insists on being discharged today. No fevers or any other overnight events. May be discharged with last antibiotic dose being today.      REVIEW OF SYSTEMS:  [  ] Not able to illicit  General: no fevers no malaise  Chest: no cough no sob  GI: no nvd  Skin: no rashes  Neuro: no ha's no dizziness 	    MEDS:  cefTRIAXone   IVPB 2000 milliGRAM(s) IV Intermittent every 24 hours    ALLERGIES: Allergies    No Known Allergies    Intolerances      VITALS:  Vital Signs Last 24 Hrs  T(C): 37 (13 Mar 2020 05:30), Max: 37 (13 Mar 2020 05:30)  T(F): 98.6 (13 Mar 2020 05:30), Max: 98.6 (13 Mar 2020 05:30)  HR: 74 (13 Mar 2020 05:30) (70 - 90)  BP: 92/53 (13 Mar 2020 05:30) (92/53 - 113/67)  BP(mean): --  RR: 18 (13 Mar 2020 05:30) (16 - 18)  SpO2: 98% (13 Mar 2020 05:30) (96% - 98%)      PHYSICAL EXAM:  HEENT: n/a  Neck: supple no LN's   Respiratory: lungs clear no rales  Cardiovascular: S1 S2 reg no murmurs  Gastrointestinal: +BS with soft, nondistended abdomen; nontender  Extremities: LUE lymphedema  Skin: no rashes  Ortho: n/a  Neuro: awake and alert      LABS/DIAGNOSTIC TESTS:                         12.4   3.07  )-----------( 229      ( 13 Mar 2020 07:16 )             37.6   WBC Count: 3.07 K/uL (03-13 @ 07:16)  WBC Count: 3.11 K/uL (03-12 @ 06:52)  WBC Count: 3.46 K/uL (03-11 @ 06:02)  WBC Count: 3.39 K/uL (03-10 @ 07:36)  WBC Count: 3.18 K/uL (03-09 @ 06:26)    03-13    139  |  104  |  19<H>  ----------------------------<  110<H>  3.9   |  27  |  0.77    Ca    9.4      13 Mar 2020 07:16        CULTURES:   .Blood  02-18 @ 18:45   No growth at 5 days.  --  --      .Urine  02-15 @ 01:43   >=3 organisms. Probable collection contamination.  --  --      .Blood  02-15 @ 01:22   Growth in aerobic and anaerobic bottles: Streptococcus mitis/oralis group  --  Streptococcus mitis/oralis group        RADIOLOGY:  no new studies

## 2020-03-13 NOTE — PROGRESS NOTE ADULT - ASSESSMENT
1.	Bacteremia from prior admission  2.	S/p removal of clotted right arm extended dwell  ·	dc planning today  ·	may complete her antibiotic course as of today  ·	For future dental work in the outpatient setting: should get amoxicillin 2gms po 1 hour prior to dental work  ·	reconsult prn

## 2020-03-13 NOTE — PROGRESS NOTE ADULT - PROBLEM SELECTOR PROBLEM 2
PICC line infiltration
Atelectasis
Bacteremia
Dysuria
Hypothyroidism

## 2020-03-13 NOTE — PROGRESS NOTE ADULT - PROBLEM SELECTOR PLAN 8
Continue with SQ Lovenox
Pt was receiving IV abx infusion @ home when extended dwell became dislodged  Per IR pt is not a candidate for PICC or extended dwell  Pt needs IV Abx until 3/13/2020  Spouse request that infusion nurse be able to insert line when needed.  Dr. Benoit and infusion nurse discussed with spouse the complexities and risk for readmission associated with infusion nurse putting in line.  Pt and spouse agreed with discussed plan of care
Pt was receiving IV abx infusion @ home when extended dwell became dislodged  Per IR pt is not a candidate for PICC or extended dwell  Pt needs IV Abx until 3/14/2020  Spouse request that infusion nurse be able to insert line when needed.  Dr. Benoit and infusion nurse discussed with spouse the complexities and risk for readmission associated with infusion nurse putting in line.  Pt and spouse agreed with discussed plan of care
Pt was receiving home services for IV infusion  Not a candidate for PICC or extended dwell  Will need IV Abx until 3/14  CM following
Will cont antibiotics via peripheral IV  Not a candidate for PICC.
will be d/ezekiel home once her ABT course completed - 3/14 AM per ID
Pt was receiving IV abx infusion @ home when extended dwell became dislodged  Per IR pt is not a candidate for PICC or extended dwell  Pt needs IV Abx until 3/13/2020  Spouse request that infusion nurse be able to insert line when needed.  Dr. Benoit and infusion nurse discussed with spouse the complexities and risk for readmission associated with infusion nurse putting in line.  Pt and spouse agreed with discussed plan of care
DVT ppx: lovenox SC

## 2020-03-13 NOTE — PROGRESS NOTE ADULT - PROBLEM SELECTOR PLAN 3
Cont antibiotics  Not a candidate for PICC line/extended dwell   ID follow up.  Repeat BC negative
HDL 30 (2/25/2020) otherwise lipids are wnl  con't home dose lipitor 40mg HS
HDL 30 (2/25/2020) otherwise lipids panel stable  Pt takes Lipitor at home  Continue with home regimen
HDL 30 (2/25/2020) otherwise lipids panel stable  Pt takes Lipitor at home  Continue with home regimen
Prior CXR showed FUNMILAYO pneumonia   F/u CXR   Antibiotics
Prior CXR showed FUNMILAYO pneumonia   F/u CXR   Antibiotics.
TSH 0.92  Pt takes Levothyroxine at home  Continue with home regimen
Chronic LUE lymphedema secondary to bilat mastectomy r/t breast ca  Continue with LUE precautions  Pt takes Letrozole at home  Continue with home regimen   Pt to follow up with Heme/Onc as outpatient.
HDL 30 (2/25/2020) otherwise lipids panel stable  Pt takes Lipitor at home  Continue with home regimen
afebrile, no leukocytosis  Pt already on Rocephin  f/u UA

## 2020-03-14 NOTE — PROGRESS NOTE ADULT - SUBJECTIVE AND OBJECTIVE BOX
Patient is a 65y old  Female who presents with a chief complaint of Clogged Picc line (13 Mar 2020 12:15)       Pt is seen and examined and seen before discharge  pt is awake and lying in bed/out of bed to chair  pt seems comfortable and denies any complaints at this time          ROS:  Negative except for:    MEDICATIONS  (STANDING):  ALPRAZolam 0.5 milliGRAM(s) Oral every 12 hours  atorvastatin 40 milliGRAM(s) Oral at bedtime  enoxaparin Injectable 40 milliGRAM(s) SubCutaneous daily  ergocalciferol 18585 Unit(s) Oral <User Schedule>  letrozole 2.5 milliGRAM(s) Oral daily  levothyroxine 50 MICROGram(s) Oral daily  lidocaine   Patch 1 Patch Transdermal every 24 hours    MEDICATIONS  (PRN):  acetaminophen   Tablet .. 650 milliGRAM(s) Oral every 4 hours PRN Mild Pain (1 - 3)  acetaminophen   Tablet .. 650 milliGRAM(s) Oral every 4 hours PRN Mild Pain (1 - 3)  acetaminophen   Tablet .. 1000 milliGRAM(s) Oral every 6 hours PRN Moderate Pain (4 - 6)      Allergies    No Known Allergies    Intolerances        Vital Signs Last 24 Hrs  T(C): 36.8 (13 Mar 2020 13:33), Max: 37 (13 Mar 2020 05:30)  T(F): 98.2 (13 Mar 2020 13:33), Max: 98.6 (13 Mar 2020 05:30)  HR: 94 (13 Mar 2020 13:33) (74 - 94)  BP: 131/51 (13 Mar 2020 13:33) (92/53 - 131/51)  BP(mean): --  RR: 18 (13 Mar 2020 13:33) (18 - 18)  SpO2: 97% (13 Mar 2020 13:33) (97% - 98%)    PHYSICAL EXAM  General: adult in NAD  HEENT: clear oropharynx, anicteric sclera, pink conjunctiva  Neck: supple  CV: normal S1/S2 with no murmur rubs or gallops  Lungs: positive air movement b/l ant lungs,clear to auscultation, no wheezes, no rales  Abdomen: soft non-tender non-distended, no hepatosplenomegaly  Ext: no clubbing cyanosis or edema  Skin: no rashes and no petechiae  Neuro: alert and oriented X 4, no focal deficits  LABS:                          12.4   3.07  )-----------( 229      ( 13 Mar 2020 07:16 )             37.6         Mean Cell Volume : 103.0 fl  Mean Cell Hemoglobin : 34.0 pg  Mean Cell Hemoglobin Concentration : 33.0 gm/dL  Auto Neutrophil # : x  Auto Lymphocyte # : x  Auto Monocyte # : x  Auto Eosinophil # : x  Auto Basophil # : x  Auto Neutrophil % : x  Auto Lymphocyte % : x  Auto Monocyte % : x  Auto Eosinophil % : x  Auto Basophil % : x    Serial CBC's  03-13 @ 07:16  Hct-37.6 / Hgb-12.4 / Plat-229 / RBC-3.65 / WBC-3.07          Serial CBC's  03-12 @ 06:52  Hct-37.3 / Hgb-12.5 / Plat-255 / RBC-3.67 / WBC-3.11          Serial CBC's  03-11 @ 06:02  Hct-37.6 / Hgb-12.4 / Plat-269 / RBC-3.61 / WBC-3.46          Serial CBC's  03-10 @ 07:36  Hct-37.0 / Hgb-12.3 / Plat-177 / RBC-3.59 / WBC-3.39            03-13    139  |  104  |  19<H>  ----------------------------<  110<H>  3.9   |  27  |  0.77    Ca    9.4      13 Mar 2020 07:16            WBC Count: 3.07 K/uL (03-13-20 @ 07:16)  Hemoglobin: 12.4 g/dL (03-13-20 @ 07:16)  Hematocrit: 37.6 % (03-13-20 @ 07:16)  Platelet Count - Automated: 229 K/uL (03-13-20 @ 07:16)  WBC Count: 3.11 K/uL (03-12-20 @ 06:52)  Hemoglobin: 12.5 g/dL (03-12-20 @ 06:52)  Hematocrit: 37.3 % (03-12-20 @ 06:52)  Platelet Count - Automated: 255 K/uL (03-12-20 @ 06:52)  WBC Count: 3.46 K/uL (03-11-20 @ 06:02)  Hemoglobin: 12.4 g/dL (03-11-20 @ 06:02)  Hematocrit: 37.6 % (03-11-20 @ 06:02)  Platelet Count - Automated: 269 K/uL (03-11-20 @ 06:02)  WBC Count: 3.39 K/uL (03-10-20 @ 07:36)  Hemoglobin: 12.3 g/dL (03-10-20 @ 07:36)  Hematocrit: 37.0 % (03-10-20 @ 07:36)  Platelet Count - Automated: 177 K/uL (03-10-20 @ 07:36)  WBC Count: 3.18 K/uL (03-09-20 @ 06:26)  Hemoglobin: 13.2 g/dL (03-09-20 @ 06:26)  Hematocrit: 40.6 % (03-09-20 @ 06:26)  Platelet Count - Automated: 332 K/uL (03-09-20 @ 06:26)  WBC Count: 5.17 K/uL (03-08-20 @ 12:00)  Hemoglobin: 13.6 g/dL (03-08-20 @ 12:00)  Hematocrit: 41.6 % (03-08-20 @ 12:00)  Platelet Count - Automated: 395 K/uL (03-08-20 @ 12:00)  WBC Count: 3.48 K/uL (03-06-20 @ 07:12)  Hemoglobin: 13.1 g/dL (03-06-20 @ 07:12)  Hematocrit: 39.3 % (03-06-20 @ 07:12)  Platelet Count - Automated: 363 K/uL (03-06-20 @ 07:12)  WBC Count: 3.93 K/uL (03-05-20 @ 06:47)  Hemoglobin: 12.2 g/dL (03-05-20 @ 06:47)  Hematocrit: 37.3 % (03-05-20 @ 06:47)  Platelet Count - Automated: 408 K/uL (03-05-20 @ 06:47)  WBC Count: 5.54 K/uL (03-04-20 @ 06:08)  Hemoglobin: 12.3 g/dL (03-04-20 @ 06:08)  Hematocrit: 37.6 % (03-04-20 @ 06:08)  Platelet Count - Automated: 403 K/uL (03-04-20 @ 06:08)                BLOOD SMEAR INTERPRETATION:       RADIOLOGY & ADDITIONAL STUDIES:

## 2020-03-14 NOTE — PROGRESS NOTE ADULT - ASSESSMENT
66 y/o F with PMHx of breast cancer s/p bilateral mastectomy on Ibrance, malignant pleural effusion s/p VATS procedure, chronic left upper extremity lymphedema, hypothyroidism presents to ED complaining of clogged right sided extended dwell catheter line since this morning. Pt was originally recently admitted for suspicious pneumonia and left arm cellulitis with positive blood cultures Streptococcus Mitis. SEAN was negative for endocarditis. Pt was discharged on Ceftriaxone 2gm daily via PICC line. pt is due for  another 10d.   Pt denies any nausea vomiting, skin discoloration, fever, chest pain, bruises  Pt is anxious about the fact the she will have to get IV access in her neck      # Metastatic Breast Cancer:  -Continue letrozole PO daily plus Ibrance as her disease is responsive to treatment. Can use home supply.   -Check CBC diff daily. No G-CSF for ANC > 1000  -Out-pt f/u with Dr. Lala.     # Strep mitus  -ID on board. Abx regimen as per them   -Oral hygeine and care  -As per ID -2 gm Amoxicillin 30 min before dental work    Patient in agreement.   please call us with questions.

## 2020-03-14 NOTE — PROGRESS NOTE ADULT - REASON FOR ADMISSION
Clogged Picc line
Clogged Extended Dwell line
Clogged Picc line

## 2020-04-01 NOTE — PATIENT PROFILE ADULT - HAS THE PATIENT EXPERIENCED ANY OF THE FOLLOWING WITHIN THE WEEK PRIOR TO ADMISSION?
Group Therapy Note    Start Time: 991  End Time:  312  Number of Participants: 8    Type of Group: Community Meeting       Patient's Goal:  Switch medications to help improve anxiety      Notes:      Participation Level:  Active Listener       Participation Quality: Appropriate      Thought Process/Content: Logical      Affective Functioning: Congruent      Mood: calm      Level of consciousness:  Alert      Modes of Intervention: Support      Discipline Responsible: Behavioral Health Tech II      Signature:  Yazmin Crowell no

## 2020-06-24 ENCOUNTER — APPOINTMENT (OUTPATIENT)
Dept: ULTRASOUND IMAGING | Facility: HOSPITAL | Age: 65
End: 2020-06-24

## 2020-08-27 ENCOUNTER — APPOINTMENT (OUTPATIENT)
Dept: ULTRASOUND IMAGING | Facility: HOSPITAL | Age: 65
End: 2020-08-27
Payer: MEDICARE

## 2020-08-27 ENCOUNTER — OUTPATIENT (OUTPATIENT)
Dept: OUTPATIENT SERVICES | Facility: HOSPITAL | Age: 65
LOS: 1 days | End: 2020-08-27
Payer: MEDICARE

## 2020-08-27 DIAGNOSIS — Z90.13 ACQUIRED ABSENCE OF BILATERAL BREASTS AND NIPPLES: Chronic | ICD-10-CM

## 2020-08-27 DIAGNOSIS — Z87.09 PERSONAL HISTORY OF OTHER DISEASES OF THE RESPIRATORY SYSTEM: Chronic | ICD-10-CM

## 2020-08-27 DIAGNOSIS — C50.911 MALIGNANT NEOPLASM OF UNSPECIFIED SITE OF RIGHT FEMALE BREAST: ICD-10-CM

## 2020-08-27 PROCEDURE — 76536 US EXAM OF HEAD AND NECK: CPT

## 2020-08-27 PROCEDURE — 76536 US EXAM OF HEAD AND NECK: CPT | Mod: 26

## 2020-10-18 ENCOUNTER — APPOINTMENT (OUTPATIENT)
Dept: DISASTER EMERGENCY | Facility: CLINIC | Age: 65
End: 2020-10-18

## 2020-10-18 DIAGNOSIS — Z01.818 ENCOUNTER FOR OTHER PREPROCEDURAL EXAMINATION: ICD-10-CM

## 2020-10-19 LAB — SARS-COV-2 N GENE NPH QL NAA+PROBE: NOT DETECTED

## 2020-10-21 ENCOUNTER — APPOINTMENT (OUTPATIENT)
Dept: INTERVENTIONAL RADIOLOGY/VASCULAR | Facility: HOSPITAL | Age: 65
End: 2020-10-21
Payer: MEDICARE

## 2020-10-21 ENCOUNTER — RESULT REVIEW (OUTPATIENT)
Age: 65
End: 2020-10-21

## 2020-10-21 ENCOUNTER — OUTPATIENT (OUTPATIENT)
Dept: OUTPATIENT SERVICES | Facility: HOSPITAL | Age: 65
LOS: 1 days | End: 2020-10-21
Payer: MEDICARE

## 2020-10-21 DIAGNOSIS — Z90.13 ACQUIRED ABSENCE OF BILATERAL BREASTS AND NIPPLES: Chronic | ICD-10-CM

## 2020-10-21 DIAGNOSIS — Z87.09 PERSONAL HISTORY OF OTHER DISEASES OF THE RESPIRATORY SYSTEM: Chronic | ICD-10-CM

## 2020-10-21 DIAGNOSIS — C73 MALIGNANT NEOPLASM OF THYROID GLAND: ICD-10-CM

## 2020-10-21 PROCEDURE — 60100 BIOPSY OF THYROID: CPT

## 2020-10-21 PROCEDURE — 88173 CYTOPATH EVAL FNA REPORT: CPT

## 2020-10-21 PROCEDURE — 88173 CYTOPATH EVAL FNA REPORT: CPT | Mod: 26

## 2021-01-27 ENCOUNTER — EMERGENCY (EMERGENCY)
Facility: HOSPITAL | Age: 66
LOS: 1 days | Discharge: HOME | End: 2021-01-27
Attending: EMERGENCY MEDICINE
Payer: MEDICARE

## 2021-01-27 VITALS
HEART RATE: 111 BPM | SYSTOLIC BLOOD PRESSURE: 136 MMHG | HEIGHT: 63 IN | WEIGHT: 240.08 LBS | OXYGEN SATURATION: 98 % | RESPIRATION RATE: 20 BRPM | DIASTOLIC BLOOD PRESSURE: 85 MMHG | TEMPERATURE: 98 F

## 2021-01-27 DIAGNOSIS — Z90.13 ACQUIRED ABSENCE OF BILATERAL BREASTS AND NIPPLES: Chronic | ICD-10-CM

## 2021-01-27 DIAGNOSIS — Z87.09 PERSONAL HISTORY OF OTHER DISEASES OF THE RESPIRATORY SYSTEM: Chronic | ICD-10-CM

## 2021-01-27 LAB — SARS-COV-2 RNA SPEC QL NAA+PROBE: SIGNIFICANT CHANGE UP

## 2021-01-27 PROCEDURE — 87635 SARS-COV-2 COVID-19 AMP PRB: CPT

## 2021-01-27 PROCEDURE — U0005: CPT

## 2021-01-27 PROCEDURE — 99283 EMERGENCY DEPT VISIT LOW MDM: CPT

## 2021-01-27 NOTE — ED PROVIDER NOTE - OBJECTIVE STATEMENT
65 F Patient presents for testing for COVID-19. Pt notes her son recently tested positive for COVID. Patient denies fevers, chills, cough, shortness of breath, chest pain or any other signs of symptoms of concern.

## 2021-01-27 NOTE — ED PROVIDER NOTE - PATIENT PORTAL LINK FT
You can access the FollowMyHealth Patient Portal offered by [facility] by registering at the following website: [email or web address]. By joining [employer]’s FollowMyHealth portal, you will also be able to view your health information using other applications (apps) compatible with our system.

## 2021-01-27 NOTE — ED PROVIDER NOTE - CLINICAL SUMMARY MEDICAL DECISION MAKING FREE TEXT BOX
Pt asymptomatic with positive COVID contact, presents for covid testing. Will perform test and advise patient to isolate in accordance with local, state and federal guidelines.

## 2021-04-17 ENCOUNTER — EMERGENCY (EMERGENCY)
Facility: HOSPITAL | Age: 66
LOS: 1 days | Discharge: HOME | End: 2021-04-17
Attending: EMERGENCY MEDICINE
Payer: COMMERCIAL

## 2021-04-17 VITALS
HEART RATE: 95 BPM | SYSTOLIC BLOOD PRESSURE: 145 MMHG | OXYGEN SATURATION: 98 % | RESPIRATION RATE: 18 BRPM | DIASTOLIC BLOOD PRESSURE: 74 MMHG

## 2021-04-17 VITALS
TEMPERATURE: 98 F | DIASTOLIC BLOOD PRESSURE: 68 MMHG | OXYGEN SATURATION: 96 % | WEIGHT: 240.08 LBS | HEIGHT: 63 IN | HEART RATE: 113 BPM | RESPIRATION RATE: 16 BRPM | SYSTOLIC BLOOD PRESSURE: 136 MMHG

## 2021-04-17 DIAGNOSIS — Z90.13 ACQUIRED ABSENCE OF BILATERAL BREASTS AND NIPPLES: Chronic | ICD-10-CM

## 2021-04-17 DIAGNOSIS — Z87.09 PERSONAL HISTORY OF OTHER DISEASES OF THE RESPIRATORY SYSTEM: Chronic | ICD-10-CM

## 2021-04-17 LAB
ALBUMIN SERPL ELPH-MCNC: 3.6 G/DL — SIGNIFICANT CHANGE UP (ref 3.5–5)
ALP SERPL-CCNC: 79 U/L — SIGNIFICANT CHANGE UP (ref 40–120)
ALT FLD-CCNC: 33 U/L DA — SIGNIFICANT CHANGE UP (ref 10–60)
ANION GAP SERPL CALC-SCNC: 7 MMOL/L — SIGNIFICANT CHANGE UP (ref 5–17)
ANISOCYTOSIS BLD QL: SLIGHT — SIGNIFICANT CHANGE UP
APPEARANCE UR: CLEAR — SIGNIFICANT CHANGE UP
AST SERPL-CCNC: 26 U/L — SIGNIFICANT CHANGE UP (ref 10–40)
BACTERIA # UR AUTO: ABNORMAL /HPF
BASOPHILS # BLD AUTO: 0.1 K/UL — SIGNIFICANT CHANGE UP (ref 0–0.2)
BASOPHILS NFR BLD AUTO: 2 % — SIGNIFICANT CHANGE UP (ref 0–2)
BILIRUB SERPL-MCNC: 0.9 MG/DL — SIGNIFICANT CHANGE UP (ref 0.2–1.2)
BILIRUB UR-MCNC: NEGATIVE — SIGNIFICANT CHANGE UP
BUN SERPL-MCNC: 13 MG/DL — SIGNIFICANT CHANGE UP (ref 7–18)
CALCIUM SERPL-MCNC: 8.9 MG/DL — SIGNIFICANT CHANGE UP (ref 8.4–10.5)
CHLORIDE SERPL-SCNC: 105 MMOL/L — SIGNIFICANT CHANGE UP (ref 96–108)
CO2 SERPL-SCNC: 24 MMOL/L — SIGNIFICANT CHANGE UP (ref 22–31)
COLOR SPEC: YELLOW — SIGNIFICANT CHANGE UP
CREAT SERPL-MCNC: 0.68 MG/DL — SIGNIFICANT CHANGE UP (ref 0.5–1.3)
DIFF PNL FLD: ABNORMAL
EOSINOPHIL # BLD AUTO: 0.05 K/UL — SIGNIFICANT CHANGE UP (ref 0–0.5)
EOSINOPHIL NFR BLD AUTO: 1 % — SIGNIFICANT CHANGE UP (ref 0–6)
EPI CELLS # UR: ABNORMAL /HPF
GLUCOSE SERPL-MCNC: 110 MG/DL — HIGH (ref 70–99)
GLUCOSE UR QL: NEGATIVE — SIGNIFICANT CHANGE UP
HCT VFR BLD CALC: 37.7 % — SIGNIFICANT CHANGE UP (ref 34.5–45)
HGB BLD-MCNC: 12.6 G/DL — SIGNIFICANT CHANGE UP (ref 11.5–15.5)
KETONES UR-MCNC: NEGATIVE — SIGNIFICANT CHANGE UP
LEUKOCYTE ESTERASE UR-ACNC: ABNORMAL
LYMPHOCYTES # BLD AUTO: 1.36 K/UL — SIGNIFICANT CHANGE UP (ref 1–3.3)
LYMPHOCYTES # BLD AUTO: 27 % — SIGNIFICANT CHANGE UP (ref 13–44)
MACROCYTES BLD QL: SLIGHT — SIGNIFICANT CHANGE UP
MANUAL SMEAR VERIFICATION: SIGNIFICANT CHANGE UP
MCHC RBC-ENTMCNC: 33.4 GM/DL — SIGNIFICANT CHANGE UP (ref 32–36)
MCHC RBC-ENTMCNC: 35.4 PG — HIGH (ref 27–34)
MCV RBC AUTO: 105.9 FL — HIGH (ref 80–100)
MONOCYTES # BLD AUTO: 0.75 K/UL — SIGNIFICANT CHANGE UP (ref 0–0.9)
MONOCYTES NFR BLD AUTO: 15 % — HIGH (ref 2–14)
NEUTROPHILS # BLD AUTO: 2.71 K/UL — SIGNIFICANT CHANGE UP (ref 1.8–7.4)
NEUTROPHILS NFR BLD AUTO: 54 % — SIGNIFICANT CHANGE UP (ref 43–77)
NITRITE UR-MCNC: NEGATIVE — SIGNIFICANT CHANGE UP
NRBC # BLD: 0 /100 — SIGNIFICANT CHANGE UP (ref 0–0)
OVALOCYTES BLD QL SMEAR: SLIGHT — SIGNIFICANT CHANGE UP
PH UR: 5 — SIGNIFICANT CHANGE UP (ref 5–8)
PLAT MORPH BLD: NORMAL — SIGNIFICANT CHANGE UP
PLATELET # BLD AUTO: 158 K/UL — SIGNIFICANT CHANGE UP (ref 150–400)
POLYCHROMASIA BLD QL SMEAR: SLIGHT — SIGNIFICANT CHANGE UP
POTASSIUM SERPL-MCNC: 4.1 MMOL/L — SIGNIFICANT CHANGE UP (ref 3.5–5.3)
POTASSIUM SERPL-SCNC: 4.1 MMOL/L — SIGNIFICANT CHANGE UP (ref 3.5–5.3)
PROT SERPL-MCNC: 8.1 G/DL — SIGNIFICANT CHANGE UP (ref 6–8.3)
PROT UR-MCNC: NEGATIVE — SIGNIFICANT CHANGE UP
RBC # BLD: 3.56 M/UL — LOW (ref 3.8–5.2)
RBC # FLD: 12.8 % — SIGNIFICANT CHANGE UP (ref 10.3–14.5)
RBC BLD AUTO: ABNORMAL
RBC CASTS # UR COMP ASSIST: ABNORMAL /HPF (ref 0–2)
SARS-COV-2 RNA SPEC QL NAA+PROBE: SIGNIFICANT CHANGE UP
SODIUM SERPL-SCNC: 136 MMOL/L — SIGNIFICANT CHANGE UP (ref 135–145)
SP GR SPEC: 1.01 — SIGNIFICANT CHANGE UP (ref 1.01–1.02)
UROBILINOGEN FLD QL: NEGATIVE — SIGNIFICANT CHANGE UP
VARIANT LYMPHS # BLD: 1 % — SIGNIFICANT CHANGE UP (ref 0–6)
WBC # BLD: 5.02 K/UL — SIGNIFICANT CHANGE UP (ref 3.8–10.5)
WBC # FLD AUTO: 5.02 K/UL — SIGNIFICANT CHANGE UP (ref 3.8–10.5)
WBC UR QL: ABNORMAL /HPF (ref 0–5)

## 2021-04-17 PROCEDURE — 71045 X-RAY EXAM CHEST 1 VIEW: CPT

## 2021-04-17 PROCEDURE — 71045 X-RAY EXAM CHEST 1 VIEW: CPT | Mod: 26

## 2021-04-17 PROCEDURE — 81001 URINALYSIS AUTO W/SCOPE: CPT

## 2021-04-17 PROCEDURE — 80053 COMPREHEN METABOLIC PANEL: CPT

## 2021-04-17 PROCEDURE — 99284 EMERGENCY DEPT VISIT MOD MDM: CPT

## 2021-04-17 PROCEDURE — 87086 URINE CULTURE/COLONY COUNT: CPT

## 2021-04-17 PROCEDURE — 85025 COMPLETE CBC W/AUTO DIFF WBC: CPT

## 2021-04-17 PROCEDURE — 87635 SARS-COV-2 COVID-19 AMP PRB: CPT

## 2021-04-17 PROCEDURE — 36415 COLL VENOUS BLD VENIPUNCTURE: CPT

## 2021-04-17 PROCEDURE — 99284 EMERGENCY DEPT VISIT MOD MDM: CPT | Mod: 25

## 2021-04-17 RX ORDER — ACETAMINOPHEN 500 MG
975 TABLET ORAL ONCE
Refills: 0 | Status: COMPLETED | OUTPATIENT
Start: 2021-04-17 | End: 2021-04-17

## 2021-04-17 RX ORDER — CEFUROXIME AXETIL 250 MG
1 TABLET ORAL
Qty: 14 | Refills: 0
Start: 2021-04-17 | End: 2021-04-23

## 2021-04-17 RX ORDER — SODIUM CHLORIDE 9 MG/ML
1000 INJECTION INTRAMUSCULAR; INTRAVENOUS; SUBCUTANEOUS ONCE
Refills: 0 | Status: COMPLETED | OUTPATIENT
Start: 2021-04-17 | End: 2021-04-17

## 2021-04-17 RX ORDER — CEFUROXIME AXETIL 250 MG
250 TABLET ORAL ONCE
Refills: 0 | Status: COMPLETED | OUTPATIENT
Start: 2021-04-17 | End: 2021-04-17

## 2021-04-17 RX ADMIN — Medication 975 MILLIGRAM(S): at 15:04

## 2021-04-17 RX ADMIN — Medication 975 MILLIGRAM(S): at 16:00

## 2021-04-17 RX ADMIN — Medication 250 MILLIGRAM(S): at 16:54

## 2021-04-17 NOTE — ED ADULT NURSE NOTE - OBJECTIVE STATEMENT
Pt AOx4, ambulatory, c/o cough, fatigue, urinary urgency and frequency since yesterday. Pt denies fever, recent travel, CP, SOB, and/or sick contacts. No distress noted. Requests covid test.

## 2021-04-17 NOTE — ED ADULT NURSE NOTE - NSIMPLEMENTINTERV_GEN_ALL_ED
Implemented All Universal Safety Interventions:  [place] to call system. Call bell, personal items and telephone within reach. Instruct patient to call for assistance. Room bathroom lighting operational. Non-slip footwear when patient is off stretcher. Physically safe environment: no spills, clutter or unnecessary equipment. Stretcher in lowest position, wheels locked, appropriate side rails in place.

## 2021-04-17 NOTE — ED PROVIDER NOTE - OBJECTIVE STATEMENT
65 y/o F with a significant PMHx of breast cancer s/p mastectomy c/o fatigue, headache, cough since yesterday. Patient endorses urinary urgency and frequency.  Patient states she wants to get tested for covid-19. Denies any fevers, chills, or any other complaints.

## 2021-04-17 NOTE — ED PROVIDER NOTE - PATIENT PORTAL LINK FT
You can access the FollowMyHealth Patient Portal offered by [facility] by registering at the following website: [email or web address]. By joining [employer]’s FollowMyHealth portal, you will also be able to view your health information using other applications (apps) compatible with our system.

## 2021-04-18 LAB
CULTURE RESULTS: SIGNIFICANT CHANGE UP
SPECIMEN SOURCE: SIGNIFICANT CHANGE UP

## 2021-04-20 NOTE — ED POST DISCHARGE NOTE - OTHER COMMUNICATION
Patient called for UCx results. Results given. Feels better on antibiotic. Instructed to finish prescription, f/u with PMD in 1-2 days. Return to the ED immediately if getting worse, not improving, or if having any new or troubling symptoms.

## 2021-04-27 NOTE — ED ADULT NURSE NOTE - ATTEMPT TO OOB
Continue Regimen: Mirvaso cream- aaa face qam (refilled outside of note today)\\nPlexion cleanser- wash face qd/bid
Discontinue Regimen: Soolantra cream QD
Samples Given: Rhofade apply pea size amount to face qd
Plan: Follow up with [facility] for a consultation to discuss BBL vs new vivace RF micro needling treatments
Detail Level: Zone
no

## 2021-05-18 ENCOUNTER — INPATIENT (INPATIENT)
Facility: HOSPITAL | Age: 66
LOS: 5 days | Discharge: HOME | DRG: 603 | End: 2021-05-24
Attending: INTERNAL MEDICINE | Admitting: INTERNAL MEDICINE
Payer: MEDICARE

## 2021-05-18 VITALS
TEMPERATURE: 99 F | SYSTOLIC BLOOD PRESSURE: 157 MMHG | DIASTOLIC BLOOD PRESSURE: 112 MMHG | RESPIRATION RATE: 20 BRPM | HEART RATE: 120 BPM | HEIGHT: 63 IN | OXYGEN SATURATION: 96 %

## 2021-05-18 DIAGNOSIS — Z90.13 ACQUIRED ABSENCE OF BILATERAL BREASTS AND NIPPLES: Chronic | ICD-10-CM

## 2021-05-18 DIAGNOSIS — L03.114 CELLULITIS OF LEFT UPPER LIMB: ICD-10-CM

## 2021-05-18 DIAGNOSIS — Z87.09 PERSONAL HISTORY OF OTHER DISEASES OF THE RESPIRATORY SYSTEM: Chronic | ICD-10-CM

## 2021-05-18 DIAGNOSIS — E78.5 HYPERLIPIDEMIA, UNSPECIFIED: ICD-10-CM

## 2021-05-18 DIAGNOSIS — Z29.9 ENCOUNTER FOR PROPHYLACTIC MEASURES, UNSPECIFIED: ICD-10-CM

## 2021-05-18 DIAGNOSIS — R65.10 SYSTEMIC INFLAMMATORY RESPONSE SYNDROME (SIRS) OF NON-INFECTIOUS ORIGIN WITHOUT ACUTE ORGAN DYSFUNCTION: ICD-10-CM

## 2021-05-18 DIAGNOSIS — C50.919 MALIGNANT NEOPLASM OF UNSPECIFIED SITE OF UNSPECIFIED FEMALE BREAST: ICD-10-CM

## 2021-05-18 DIAGNOSIS — M79.89 OTHER SPECIFIED SOFT TISSUE DISORDERS: ICD-10-CM

## 2021-05-18 DIAGNOSIS — E03.9 HYPOTHYROIDISM, UNSPECIFIED: ICD-10-CM

## 2021-05-18 LAB
ALBUMIN SERPL ELPH-MCNC: 3.1 G/DL — LOW (ref 3.5–5)
ALP SERPL-CCNC: 70 U/L — SIGNIFICANT CHANGE UP (ref 40–120)
ALT FLD-CCNC: 25 U/L DA — SIGNIFICANT CHANGE UP (ref 10–60)
ANION GAP SERPL CALC-SCNC: 8 MMOL/L — SIGNIFICANT CHANGE UP (ref 5–17)
APPEARANCE UR: CLEAR — SIGNIFICANT CHANGE UP
APTT BLD: 29.1 SEC — SIGNIFICANT CHANGE UP (ref 27.5–35.5)
AST SERPL-CCNC: 15 U/L — SIGNIFICANT CHANGE UP (ref 10–40)
BASOPHILS # BLD AUTO: 0 K/UL — SIGNIFICANT CHANGE UP (ref 0–0.2)
BASOPHILS NFR BLD AUTO: 0 % — SIGNIFICANT CHANGE UP (ref 0–2)
BILIRUB SERPL-MCNC: 1.7 MG/DL — HIGH (ref 0.2–1.2)
BILIRUB UR-MCNC: NEGATIVE — SIGNIFICANT CHANGE UP
BUN SERPL-MCNC: 19 MG/DL — HIGH (ref 7–18)
CALCIUM SERPL-MCNC: 9.2 MG/DL — SIGNIFICANT CHANGE UP (ref 8.4–10.5)
CHLORIDE SERPL-SCNC: 102 MMOL/L — SIGNIFICANT CHANGE UP (ref 96–108)
CO2 SERPL-SCNC: 27 MMOL/L — SIGNIFICANT CHANGE UP (ref 22–31)
COLOR SPEC: YELLOW — SIGNIFICANT CHANGE UP
CREAT SERPL-MCNC: 1.05 MG/DL — SIGNIFICANT CHANGE UP (ref 0.5–1.3)
DIFF PNL FLD: ABNORMAL
EOSINOPHIL # BLD AUTO: 0 K/UL — SIGNIFICANT CHANGE UP (ref 0–0.5)
EOSINOPHIL NFR BLD AUTO: 0 % — SIGNIFICANT CHANGE UP (ref 0–6)
GLUCOSE SERPL-MCNC: 132 MG/DL — HIGH (ref 70–99)
GLUCOSE UR QL: NEGATIVE — SIGNIFICANT CHANGE UP
HCT VFR BLD CALC: 38.8 % — SIGNIFICANT CHANGE UP (ref 34.5–45)
HGB BLD-MCNC: 13 G/DL — SIGNIFICANT CHANGE UP (ref 11.5–15.5)
INR BLD: 1.09 RATIO — SIGNIFICANT CHANGE UP (ref 0.88–1.16)
KETONES UR-MCNC: NEGATIVE — SIGNIFICANT CHANGE UP
LACTATE SERPL-SCNC: 1.4 MMOL/L — SIGNIFICANT CHANGE UP (ref 0.7–2)
LEUKOCYTE ESTERASE UR-ACNC: ABNORMAL
LYMPHOCYTES # BLD AUTO: 0.5 K/UL — LOW (ref 1–3.3)
LYMPHOCYTES # BLD AUTO: 6 % — LOW (ref 13–44)
MCHC RBC-ENTMCNC: 33.5 GM/DL — SIGNIFICANT CHANGE UP (ref 32–36)
MCHC RBC-ENTMCNC: 35.3 PG — HIGH (ref 27–34)
MCV RBC AUTO: 105.4 FL — HIGH (ref 80–100)
MONOCYTES # BLD AUTO: 0.34 K/UL — SIGNIFICANT CHANGE UP (ref 0–0.9)
MONOCYTES NFR BLD AUTO: 4 % — SIGNIFICANT CHANGE UP (ref 2–14)
NEUTROPHILS # BLD AUTO: 7.23 K/UL — SIGNIFICANT CHANGE UP (ref 1.8–7.4)
NEUTROPHILS NFR BLD AUTO: 82 % — HIGH (ref 43–77)
NITRITE UR-MCNC: NEGATIVE — SIGNIFICANT CHANGE UP
PH UR: 5 — SIGNIFICANT CHANGE UP (ref 5–8)
PLATELET # BLD AUTO: 257 K/UL — SIGNIFICANT CHANGE UP (ref 150–400)
POTASSIUM SERPL-MCNC: 4.5 MMOL/L — SIGNIFICANT CHANGE UP (ref 3.5–5.3)
POTASSIUM SERPL-SCNC: 4.5 MMOL/L — SIGNIFICANT CHANGE UP (ref 3.5–5.3)
PROT SERPL-MCNC: 8 G/DL — SIGNIFICANT CHANGE UP (ref 6–8.3)
PROT UR-MCNC: 30 MG/DL
PROTHROM AB SERPL-ACNC: 12.9 SEC — SIGNIFICANT CHANGE UP (ref 10.6–13.6)
RBC # BLD: 3.68 M/UL — LOW (ref 3.8–5.2)
RBC # FLD: 13.1 % — SIGNIFICANT CHANGE UP (ref 10.3–14.5)
SARS-COV-2 RNA SPEC QL NAA+PROBE: SIGNIFICANT CHANGE UP
SODIUM SERPL-SCNC: 137 MMOL/L — SIGNIFICANT CHANGE UP (ref 135–145)
SP GR SPEC: 1.01 — SIGNIFICANT CHANGE UP (ref 1.01–1.02)
UROBILINOGEN FLD QL: NEGATIVE — SIGNIFICANT CHANGE UP
WBC # BLD: 8.41 K/UL — SIGNIFICANT CHANGE UP (ref 3.8–10.5)
WBC # FLD AUTO: 8.41 K/UL — SIGNIFICANT CHANGE UP (ref 3.8–10.5)

## 2021-05-18 PROCEDURE — 93971 EXTREMITY STUDY: CPT | Mod: 26,LT

## 2021-05-18 PROCEDURE — 99285 EMERGENCY DEPT VISIT HI MDM: CPT

## 2021-05-18 PROCEDURE — 76705 ECHO EXAM OF ABDOMEN: CPT | Mod: 26

## 2021-05-18 PROCEDURE — 71045 X-RAY EXAM CHEST 1 VIEW: CPT | Mod: 26

## 2021-05-18 RX ORDER — LETROZOLE 2.5 MG/1
1 TABLET, FILM COATED ORAL
Qty: 0 | Refills: 0 | DISCHARGE

## 2021-05-18 RX ORDER — LETROZOLE 2.5 MG/1
2.5 TABLET, FILM COATED ORAL DAILY
Refills: 0 | Status: DISCONTINUED | OUTPATIENT
Start: 2021-05-18 | End: 2021-05-24

## 2021-05-18 RX ORDER — VANCOMYCIN HCL 1 G
1000 VIAL (EA) INTRAVENOUS ONCE
Refills: 0 | Status: COMPLETED | OUTPATIENT
Start: 2021-05-18 | End: 2021-05-18

## 2021-05-18 RX ORDER — LEVOTHYROXINE SODIUM 125 MCG
50 TABLET ORAL DAILY
Refills: 0 | Status: DISCONTINUED | OUTPATIENT
Start: 2021-05-18 | End: 2021-05-24

## 2021-05-18 RX ORDER — ACETAMINOPHEN 500 MG
650 TABLET ORAL ONCE
Refills: 0 | Status: COMPLETED | OUTPATIENT
Start: 2021-05-18 | End: 2021-05-18

## 2021-05-18 RX ORDER — PIPERACILLIN AND TAZOBACTAM 4; .5 G/20ML; G/20ML
3.38 INJECTION, POWDER, LYOPHILIZED, FOR SOLUTION INTRAVENOUS EVERY 8 HOURS
Refills: 0 | Status: DISCONTINUED | OUTPATIENT
Start: 2021-05-18 | End: 2021-05-19

## 2021-05-18 RX ORDER — SIMVASTATIN 20 MG/1
10 TABLET, FILM COATED ORAL AT BEDTIME
Refills: 0 | Status: DISCONTINUED | OUTPATIENT
Start: 2021-05-18 | End: 2021-05-24

## 2021-05-18 RX ORDER — SODIUM CHLORIDE 9 MG/ML
1000 INJECTION INTRAMUSCULAR; INTRAVENOUS; SUBCUTANEOUS ONCE
Refills: 0 | Status: COMPLETED | OUTPATIENT
Start: 2021-05-18 | End: 2021-05-18

## 2021-05-18 RX ORDER — ALPRAZOLAM 0.25 MG
0.5 TABLET ORAL ONCE
Refills: 0 | Status: DISCONTINUED | OUTPATIENT
Start: 2021-05-18 | End: 2021-05-18

## 2021-05-18 RX ORDER — SIMVASTATIN 20 MG/1
1 TABLET, FILM COATED ORAL
Qty: 0 | Refills: 0 | DISCHARGE

## 2021-05-18 RX ORDER — OXYCODONE AND ACETAMINOPHEN 5; 325 MG/1; MG/1
1 TABLET ORAL EVERY 6 HOURS
Refills: 0 | Status: DISCONTINUED | OUTPATIENT
Start: 2021-05-18 | End: 2021-05-20

## 2021-05-18 RX ORDER — ACETAMINOPHEN 500 MG
650 TABLET ORAL EVERY 6 HOURS
Refills: 0 | Status: DISCONTINUED | OUTPATIENT
Start: 2021-05-18 | End: 2021-05-24

## 2021-05-18 RX ORDER — ENOXAPARIN SODIUM 100 MG/ML
40 INJECTION SUBCUTANEOUS DAILY
Refills: 0 | Status: DISCONTINUED | OUTPATIENT
Start: 2021-05-18 | End: 2021-05-24

## 2021-05-18 RX ORDER — CEFAZOLIN SODIUM 1 G
2000 VIAL (EA) INJECTION ONCE
Refills: 0 | Status: COMPLETED | OUTPATIENT
Start: 2021-05-18 | End: 2021-05-18

## 2021-05-18 RX ORDER — ALPRAZOLAM 0.25 MG
0.5 TABLET ORAL EVERY 12 HOURS
Refills: 0 | Status: DISCONTINUED | OUTPATIENT
Start: 2021-05-18 | End: 2021-05-24

## 2021-05-18 RX ORDER — VANCOMYCIN HCL 1 G
1000 VIAL (EA) INTRAVENOUS EVERY 12 HOURS
Refills: 0 | Status: DISCONTINUED | OUTPATIENT
Start: 2021-05-18 | End: 2021-05-19

## 2021-05-18 RX ADMIN — Medication 650 MILLIGRAM(S): at 22:39

## 2021-05-18 RX ADMIN — Medication 650 MILLIGRAM(S): at 15:15

## 2021-05-18 RX ADMIN — Medication 650 MILLIGRAM(S): at 19:17

## 2021-05-18 RX ADMIN — Medication 250 MILLIGRAM(S): at 14:45

## 2021-05-18 RX ADMIN — PIPERACILLIN AND TAZOBACTAM 25 GRAM(S): 4; .5 INJECTION, POWDER, LYOPHILIZED, FOR SOLUTION INTRAVENOUS at 22:26

## 2021-05-18 RX ADMIN — Medication 100 MILLIGRAM(S): at 14:45

## 2021-05-18 RX ADMIN — SODIUM CHLORIDE 1000 MILLILITER(S): 9 INJECTION INTRAMUSCULAR; INTRAVENOUS; SUBCUTANEOUS at 14:44

## 2021-05-18 RX ADMIN — Medication 0.5 MILLIGRAM(S): at 14:14

## 2021-05-18 RX ADMIN — Medication 650 MILLIGRAM(S): at 14:14

## 2021-05-18 NOTE — H&P ADULT - ATTENDING COMMENTS
67 y/o F with PMHx of breast cancer s/p bilateral mastectomy, malignant pleural effusion s/p VATS procedure, chronic LUE lymphedema x 20y, hypothyroidism and HLD presents to the ED with complaint of left upper extremity swelling,cellulitis,abnormal CXR.  1.Cellulitis-ID eval,ABX.  2.CTA-R/O PE.  3.Doppler-r/o dvt.  4.Breast ca-Letrozole.  5.GI and DVT prophylaxis.

## 2021-05-18 NOTE — ED PROVIDER NOTE - PROGRESS NOTE DETAILS
dr phillips requests dr mcmullen. discussed the case with the admitting MD who accepts the patient for admission

## 2021-05-18 NOTE — PATIENT PROFILE ADULT - ABILITY TO HEAR (WITH HEARING AID OR HEARING APPLIANCE IF NORMALLY USED):
right ear impaired/Mildly to Moderately Impaired: difficulty hearing in some environments or speaker may need to increase volume or speak distinctly

## 2021-05-18 NOTE — ED ADULT NURSE NOTE - OBJECTIVE STATEMENT
Pt c/o of Left arm swelling with associated fever x yesterday. No acute distress noted, denies chest pain, no shortness of breath indicated. Pt c/o of Left arm swelling with associated fever x yesterday. Pt is A&Ox3 with forgetfulness. No acute distress noted, denies chest pain, no shortness of breath indicated.

## 2021-05-18 NOTE — ED PROVIDER NOTE - OBJECTIVE STATEMENT
66 female hx below, w a day of LUE swelling w associated fever high as 101, has not taken meds or tyl yet, has had swelling to this ue in past. no trauma. no n/v. notes that she has never had blood clot. on "chemo pills" but no chemotherapy.

## 2021-05-18 NOTE — ED PROVIDER NOTE - SKIN, MLM
Skin normal color for race, warm, dry and intact. LUE w marked edema and redness from just below the shoulder to the hand. nl radial pulse. there is associated induration. there is no fluctuance and there is no crepitus. no streaking further. no truncal issue.

## 2021-05-18 NOTE — H&P ADULT - ASSESSMENT
67y/o F with PMHx of breast cancer s/p bilateral mastectomy, malignant pleural effusion s/p VATS procedure, chronic LUE lymphedema x 20y, hypothyroidism and HLD presents to the ED with complaint of left upper extremity swelling.

## 2021-05-18 NOTE — H&P ADULT - PROBLEM SELECTOR PLAN 1
pt febrile with chills, tachycardia meeting SIRS criteria 2/4  likely from cellulitis  lactate normal   UA negative   CXR shows b/l patchy opacities   s/p vanc and cefazolin in the ED  will start on vanc and zosyn  follow blood and urine cultures  f/u procalcitonin, ESR   f/u CTA chest   ID Dr Wolfe

## 2021-05-18 NOTE — H&P ADULT - NSHPPHYSICALEXAM_GEN_ALL_CORE
PHYSICAL EXAM:  GENERAL: NAD, lying in bed comfortably  HEAD:  Atraumatic, Normocephalic  EYES: EOMI, PERRLA, conjunctiva and sclera clear  ENT: Moist mucous membranes  NECK: Supple, No JVD  CHEST/LUNG: Clear to auscultation bilaterally; No rales, rhonchi, wheezing, or rubs. Unlabored respirations  HEART: Regular rate and rhythm; No murmurs, rubs, or gallops  ABDOMEN: Bowel sounds present; Soft, Nontender, Nondistended. No hepatomegally  EXTREMITIES:  2+ Peripheral Pulses, brisk capillary refill. No clubbing, cyanosis, or edema  NERVOUS SYSTEM:  Alert & Oriented X3, speech clear. No deficits   MSK: FROM all 4 extremities, full and equal strength  SKIN: No rashes or lesions PHYSICAL EXAM:  GENERAL: NAD, lying in bed comfortably  HEAD:  Atraumatic, Normocephalic  EYES: EOMI, PERRLA, conjunctiva and sclera clear  ENT: Moist mucous membranes  NECK: Supple, No JVD  CHEST/LUNG: Clear to auscultation bilaterally; No rales, rhonchi, wheezing, or rubs. Unlabored respirations  HEART: Regular rate and rhythm; No murmurs, rubs, or gallops  ABDOMEN: Bowel sounds present; Soft, Nontender, Nondistended. No hepatomegally  EXTREMITIES:  LUE erythematous with erythema extending to upper arm, swollen, tender and warm to touch   NERVOUS SYSTEM:  Alert & Oriented X3, speech clear. No deficits   MSK: FROM all 4 extremities, full and equal strength

## 2021-05-18 NOTE — H&P ADULT - HISTORY OF PRESENT ILLNESS
67 y/o F with PMHx of breast cancer s/p bilateral mastectomy, malignant pleural effusion s/p VATS procedure, chronic LUE lymphedema x 20y, hypothyroidism and HLD presents to the ED with complaint of left upper extremity swelling. Patient had redness and swelling of LUE for --- days along with fever of 101 at home.  65 y/o F with PMHx of breast cancer s/p bilateral mastectomy, malignant pleural effusion s/p VATS procedure, chronic LUE lymphedema x 20y, hypothyroidism and HLD presents to the ED with complaint of left upper extremity swelling. Patient reports pain, redness and swelling of LUE since yesterday. She reports she has this intermittently but gets better with antibiotics. Also reports having fever of 101 yesterday and 99 today along with chills. Denies chest pain, cough, dysuria, abdominal pain. Patient is complaining of back pain and feeling cold and chills when seen.    ED course:   Vitals: /112, , Temp 99 Sat normal on RA  Labs: WBC count normal, bili 1.3.

## 2021-05-18 NOTE — H&P ADULT - PROBLEM SELECTOR PLAN 2
pt p/w erythema, swelling and pain of LUE  pt has chronic lymphedema since breast surgery 20y ago  concern for cellulitis   will start on vanc and zosyn

## 2021-05-18 NOTE — ED ADULT NURSE NOTE - NS ED NURSE LEVEL OF CONSCIOUSNESS ORIENTATION
Oriented - self; Oriented - place; Oriented - time Oriented - self; Oriented - place; Oriented - time/Asking repetitive questions

## 2021-05-19 DIAGNOSIS — J18.9 PNEUMONIA, UNSPECIFIED ORGANISM: ICD-10-CM

## 2021-05-19 DIAGNOSIS — Z02.9 ENCOUNTER FOR ADMINISTRATIVE EXAMINATIONS, UNSPECIFIED: ICD-10-CM

## 2021-05-19 DIAGNOSIS — L03.114 CELLULITIS OF LEFT UPPER LIMB: ICD-10-CM

## 2021-05-19 LAB
A1C WITH ESTIMATED AVERAGE GLUCOSE RESULT: 6.8 % — HIGH (ref 4–5.6)
ALBUMIN SERPL ELPH-MCNC: 2.6 G/DL — LOW (ref 3.5–5)
ALP SERPL-CCNC: 74 U/L — SIGNIFICANT CHANGE UP (ref 40–120)
ALT FLD-CCNC: 24 U/L DA — SIGNIFICANT CHANGE UP (ref 10–60)
ANION GAP SERPL CALC-SCNC: 8 MMOL/L — SIGNIFICANT CHANGE UP (ref 5–17)
AST SERPL-CCNC: 16 U/L — SIGNIFICANT CHANGE UP (ref 10–40)
BASOPHILS # BLD AUTO: SIGNIFICANT CHANGE UP K/UL (ref 0–0.2)
BASOPHILS NFR BLD AUTO: SIGNIFICANT CHANGE UP % (ref 0–2)
BILIRUB SERPL-MCNC: 1.5 MG/DL — HIGH (ref 0.2–1.2)
BUN SERPL-MCNC: 15 MG/DL — SIGNIFICANT CHANGE UP (ref 7–18)
CALCIUM SERPL-MCNC: 8.5 MG/DL — SIGNIFICANT CHANGE UP (ref 8.4–10.5)
CHLORIDE SERPL-SCNC: 104 MMOL/L — SIGNIFICANT CHANGE UP (ref 96–108)
CHOLEST SERPL-MCNC: 177 MG/DL — SIGNIFICANT CHANGE UP
CO2 SERPL-SCNC: 25 MMOL/L — SIGNIFICANT CHANGE UP (ref 22–31)
COVID-19 SPIKE DOMAIN AB INTERP: NEGATIVE — SIGNIFICANT CHANGE UP
COVID-19 SPIKE DOMAIN ANTIBODY RESULT: 0.4 U/ML — SIGNIFICANT CHANGE UP
CREAT SERPL-MCNC: 0.8 MG/DL — SIGNIFICANT CHANGE UP (ref 0.5–1.3)
CULTURE RESULTS: SIGNIFICANT CHANGE UP
EOSINOPHIL # BLD AUTO: SIGNIFICANT CHANGE UP K/UL (ref 0–0.5)
EOSINOPHIL NFR BLD AUTO: SIGNIFICANT CHANGE UP % (ref 0–6)
ERYTHROCYTE [SEDIMENTATION RATE] IN BLOOD: 92 MM/HR — HIGH (ref 0–20)
ESTIMATED AVERAGE GLUCOSE: 148 MG/DL — HIGH (ref 68–114)
FOLATE SERPL-MCNC: 11.9 NG/ML — SIGNIFICANT CHANGE UP
GLUCOSE SERPL-MCNC: 177 MG/DL — HIGH (ref 70–99)
HCT VFR BLD CALC: 34.5 % — SIGNIFICANT CHANGE UP (ref 34.5–45)
HDLC SERPL-MCNC: 34 MG/DL — LOW
HGB BLD-MCNC: 11.6 G/DL — SIGNIFICANT CHANGE UP (ref 11.5–15.5)
IMM GRANULOCYTES NFR BLD AUTO: SIGNIFICANT CHANGE UP % (ref 0–1.5)
LIPID PNL WITH DIRECT LDL SERPL: 120 MG/DL — HIGH
LYMPHOCYTES # BLD AUTO: SIGNIFICANT CHANGE UP % (ref 13–44)
LYMPHOCYTES # BLD AUTO: SIGNIFICANT CHANGE UP K/UL (ref 1–3.3)
MAGNESIUM SERPL-MCNC: 2.5 MG/DL — SIGNIFICANT CHANGE UP (ref 1.6–2.6)
MCHC RBC-ENTMCNC: 33.6 GM/DL — SIGNIFICANT CHANGE UP (ref 32–36)
MCHC RBC-ENTMCNC: 35.5 PG — HIGH (ref 27–34)
MCV RBC AUTO: 105.5 FL — HIGH (ref 80–100)
MONOCYTES # BLD AUTO: SIGNIFICANT CHANGE UP K/UL (ref 0–0.9)
MONOCYTES NFR BLD AUTO: SIGNIFICANT CHANGE UP % (ref 2–14)
MRSA PCR RESULT.: SIGNIFICANT CHANGE UP
NEUTROPHILS # BLD AUTO: SIGNIFICANT CHANGE UP K/UL (ref 1.8–7.4)
NEUTROPHILS NFR BLD AUTO: SIGNIFICANT CHANGE UP % (ref 43–77)
NON HDL CHOLESTEROL: 143 MG/DL — HIGH
NRBC # BLD: 0 /100 WBCS — SIGNIFICANT CHANGE UP (ref 0–0)
PHOSPHATE SERPL-MCNC: 2.8 MG/DL — SIGNIFICANT CHANGE UP (ref 2.5–4.5)
PLATELET # BLD AUTO: 251 K/UL — SIGNIFICANT CHANGE UP (ref 150–400)
POTASSIUM SERPL-MCNC: 3.3 MMOL/L — LOW (ref 3.5–5.3)
POTASSIUM SERPL-SCNC: 3.3 MMOL/L — LOW (ref 3.5–5.3)
PROCALCITONIN SERPL-MCNC: 2.2 NG/ML — HIGH (ref 0.02–0.1)
PROT SERPL-MCNC: 7.4 G/DL — SIGNIFICANT CHANGE UP (ref 6–8.3)
RBC # BLD: 3.27 M/UL — LOW (ref 3.8–5.2)
RBC # FLD: 13.3 % — SIGNIFICANT CHANGE UP (ref 10.3–14.5)
S AUREUS DNA NOSE QL NAA+PROBE: SIGNIFICANT CHANGE UP
SARS-COV-2 IGG+IGM SERPL QL IA: 0.4 U/ML — SIGNIFICANT CHANGE UP
SARS-COV-2 IGG+IGM SERPL QL IA: NEGATIVE — SIGNIFICANT CHANGE UP
SARS-COV-2 RNA SPEC QL NAA+PROBE: SIGNIFICANT CHANGE UP
SODIUM SERPL-SCNC: 137 MMOL/L — SIGNIFICANT CHANGE UP (ref 135–145)
SPECIMEN SOURCE: SIGNIFICANT CHANGE UP
TRIGL SERPL-MCNC: 117 MG/DL — SIGNIFICANT CHANGE UP
TROPONIN I SERPL-MCNC: <0.015 NG/ML — SIGNIFICANT CHANGE UP (ref 0–0.04)
TSH SERPL-MCNC: 3.35 UU/ML — SIGNIFICANT CHANGE UP (ref 0.34–4.82)
VIT B12 SERPL-MCNC: 210 PG/ML — LOW (ref 232–1245)
WBC # BLD: 8.46 K/UL — SIGNIFICANT CHANGE UP (ref 3.8–10.5)
WBC # FLD AUTO: 8.46 K/UL — SIGNIFICANT CHANGE UP (ref 3.8–10.5)

## 2021-05-19 RX ORDER — CEFAZOLIN SODIUM 1 G
2000 VIAL (EA) INJECTION EVERY 8 HOURS
Refills: 0 | Status: DISCONTINUED | OUTPATIENT
Start: 2021-05-19 | End: 2021-05-24

## 2021-05-19 RX ORDER — HYDROMORPHONE HYDROCHLORIDE 2 MG/ML
1 INJECTION INTRAMUSCULAR; INTRAVENOUS; SUBCUTANEOUS ONCE
Refills: 0 | Status: DISCONTINUED | OUTPATIENT
Start: 2021-05-19 | End: 2021-05-19

## 2021-05-19 RX ORDER — POTASSIUM CHLORIDE 20 MEQ
40 PACKET (EA) ORAL ONCE
Refills: 0 | Status: COMPLETED | OUTPATIENT
Start: 2021-05-19 | End: 2021-05-19

## 2021-05-19 RX ORDER — PANTOPRAZOLE SODIUM 20 MG/1
40 TABLET, DELAYED RELEASE ORAL
Refills: 0 | Status: DISCONTINUED | OUTPATIENT
Start: 2021-05-19 | End: 2021-05-24

## 2021-05-19 RX ORDER — LIDOCAINE 4 G/100G
1 CREAM TOPICAL EVERY 24 HOURS
Refills: 0 | Status: DISCONTINUED | OUTPATIENT
Start: 2021-05-19 | End: 2021-05-24

## 2021-05-19 RX ADMIN — OXYCODONE AND ACETAMINOPHEN 1 TABLET(S): 5; 325 TABLET ORAL at 00:08

## 2021-05-19 RX ADMIN — PANTOPRAZOLE SODIUM 40 MILLIGRAM(S): 20 TABLET, DELAYED RELEASE ORAL at 11:41

## 2021-05-19 RX ADMIN — HYDROMORPHONE HYDROCHLORIDE 1 MILLIGRAM(S): 2 INJECTION INTRAMUSCULAR; INTRAVENOUS; SUBCUTANEOUS at 19:23

## 2021-05-19 RX ADMIN — HYDROMORPHONE HYDROCHLORIDE 1 MILLIGRAM(S): 2 INJECTION INTRAMUSCULAR; INTRAVENOUS; SUBCUTANEOUS at 17:41

## 2021-05-19 RX ADMIN — OXYCODONE AND ACETAMINOPHEN 1 TABLET(S): 5; 325 TABLET ORAL at 06:18

## 2021-05-19 RX ADMIN — Medication 40 MILLIEQUIVALENT(S): at 21:32

## 2021-05-19 RX ADMIN — LETROZOLE 2.5 MILLIGRAM(S): 2.5 TABLET, FILM COATED ORAL at 11:39

## 2021-05-19 RX ADMIN — Medication 0.5 MILLIGRAM(S): at 17:38

## 2021-05-19 RX ADMIN — OXYCODONE AND ACETAMINOPHEN 1 TABLET(S): 5; 325 TABLET ORAL at 23:00

## 2021-05-19 RX ADMIN — OXYCODONE AND ACETAMINOPHEN 1 TABLET(S): 5; 325 TABLET ORAL at 16:45

## 2021-05-19 RX ADMIN — LIDOCAINE 1 PATCH: 4 CREAM TOPICAL at 11:38

## 2021-05-19 RX ADMIN — Medication 50 MICROGRAM(S): at 06:18

## 2021-05-19 RX ADMIN — OXYCODONE AND ACETAMINOPHEN 1 TABLET(S): 5; 325 TABLET ORAL at 01:16

## 2021-05-19 RX ADMIN — SIMVASTATIN 10 MILLIGRAM(S): 20 TABLET, FILM COATED ORAL at 21:32

## 2021-05-19 RX ADMIN — Medication 0.5 MILLIGRAM(S): at 06:18

## 2021-05-19 RX ADMIN — OXYCODONE AND ACETAMINOPHEN 1 TABLET(S): 5; 325 TABLET ORAL at 08:00

## 2021-05-19 RX ADMIN — ENOXAPARIN SODIUM 40 MILLIGRAM(S): 100 INJECTION SUBCUTANEOUS at 11:39

## 2021-05-19 RX ADMIN — OXYCODONE AND ACETAMINOPHEN 1 TABLET(S): 5; 325 TABLET ORAL at 18:00

## 2021-05-19 RX ADMIN — OXYCODONE AND ACETAMINOPHEN 1 TABLET(S): 5; 325 TABLET ORAL at 21:48

## 2021-05-19 RX ADMIN — LIDOCAINE 1 PATCH: 4 CREAM TOPICAL at 20:01

## 2021-05-19 RX ADMIN — PIPERACILLIN AND TAZOBACTAM 25 GRAM(S): 4; .5 INJECTION, POWDER, LYOPHILIZED, FOR SOLUTION INTRAVENOUS at 06:18

## 2021-05-19 RX ADMIN — Medication 100 MILLIGRAM(S): at 21:32

## 2021-05-19 NOTE — PROGRESS NOTE ADULT - ASSESSMENT
67 y/o F with PMHx of breast cancer s/p bilateral mastectomy, malignant pleural effusion s/p VATS procedure, chronic LUE lymphedema x 20y, hypothyroidism and HLD presents to the ED with complaint of left upper extremity swelling. Patient reports pain, redness and swelling of LUE since yesterday. She reports she has this intermittently but gets better with antibiotics. Also reports having fever of 101 yesterday and 99 today along with chills. Denies chest pain, cough, dysuria, abdominal pain. Patient is complaining of back pain and feeling cold and chills when seen.   65 y/o F with PMHx of breast cancer s/p bilateral mastectomy, malignant pleural effusion s/p VATS procedure, chronic LUE lymphedema x 20y, hypothyroidism and HLD presents to the ED with complaint of left upper extremity swelling. Admitted to medicine for sepsis secondary to LUE cellulitis. Doppler negative for DVT, started on Vanco Zosyn. CXR with abnormal findings placed on high suspicion for COVID. Plan for CT patient not agreeable this AM and states it hurts her back to lay flat and requesting lidocaine patch

## 2021-05-19 NOTE — CONSULT NOTE ADULT - SUBJECTIVE AND OBJECTIVE BOX
MEDICATIONS  (STANDING):  ALPRAZolam 0.5 milliGRAM(s) Oral every 12 hours  ceFAZolin   IVPB 2000 milliGRAM(s) IV Intermittent every 8 hours  enoxaparin Injectable 40 milliGRAM(s) SubCutaneous daily  letrozole 2.5 milliGRAM(s) Oral daily  levothyroxine 50 MICROGram(s) Oral daily  lidocaine   Patch 1 Patch Transdermal every 24 hours  pantoprazole    Tablet 40 milliGRAM(s) Oral before breakfast  simvastatin 10 milliGRAM(s) Oral at bedtime    MEDICATIONS  (PRN):  acetaminophen   Tablet .. 650 milliGRAM(s) Oral every 6 hours PRN Temp greater or equal to 38C (100.4F), Moderate Pain (4 - 6)  oxycodone    5 mG/acetaminophen 325 mG 1 Tablet(s) Oral every 6 hours PRN Severe Pain (7 - 10)      Vital Signs Last 24 Hrs  T(C): 36.6 (19 May 2021 12:02), Max: 39.3 (18 May 2021 19:12)  T(F): 97.8 (19 May 2021 12:02), Max: 102.8 (18 May 2021 19:12)  HR: 99 (19 May 2021 12:02) (98 - 118)  BP: 125/56 (19 May 2021 12:02) (108/42 - 162/97)  BP(mean): --  RR: 20 (19 May 2021 12:02) (19 - 20)  SpO2: 95% (19 May 2021 12:02) (95% - 97%)    LABS:                        11.6   8.46  )-----------( 251      ( 19 May 2021 08:25 )             34.5         137  |  104  |  15  ----------------------------<  177<H>  3.3<L>   |  25  |  0.80    Ca    8.5      19 May 2021 08:25  Phos  2.8       Mg     2.5         TPro  7.4  /  Alb  2.6<L>  /  TBili  1.5<H>  /  DBili  x   /  AST  16  /  ALT  24  /  AlkPhos  74      PT/INR - ( 18 May 2021 14:43 )   PT: 12.9 sec;   INR: 1.09 ratio         PTT - ( 18 May 2021 14:43 )  PTT:29.1 sec  CARDIAC MARKERS ( 19 May 2021 08:25 )  <0.015 ng/mL / x     / x     / x     / x          Urinalysis Basic - ( 18 May 2021 15:12 )    Color: Yellow / Appearance: Clear / S.015 / pH: x  Gluc: x / Ketone: Negative  / Bili: Negative / Urobili: Negative   Blood: x / Protein: 30 mg/dL / Nitrite: Negative   Leuk Esterase: Trace / RBC: 2-5 /HPF / WBC 3-5 /HPF   Sq Epi: x / Non Sq Epi: Occasional /HPF / Bacteria: Trace /HPF        COVID-19 PCR: NotDetec (18 May 2021 15:12)  COVID-19 PCR: NotDetec (2021 15:08)  COVID-19 PCR: NotDetec (2021 15:41)      RADIOLOGY & ADDITIONAL TESTS:  Imaging from Last 24 Hours:    Electrocardiogram/QTc Interval:    COORDINATION OF CARE:  Care Discussed with Consultants/Other Providers:   Reason for Admission: LUE swelling  History of Present Illness:   67 y/o F with PMHx of breast cancer s/p bilateral mastectomy, malignant pleural effusion s/p VATS procedure, chronic LUE lymphedema x 20y, hypothyroidism and HLD presents to the ED with complaint of left upper extremity swelling. Patient reports pain, redness and swelling of LUE since yesterday. She reports she has this intermittently but gets better with antibiotics. Also reports having fever of 101 yesterday and 99 today along with chills. Denies chest pain, cough, dysuria, abdominal pain. Patient is complaining of back pain and feeling cold and chills when seen.    ED course:   Vitals: /112, , Temp 99 Sat normal on RA  Labs: WBC count normal, bili 1.3.      Review of Systems:  Other Review of Systems: All other review of systems negative, except as noted in HPI      Allergies and Intolerances:        Allergies:  	No Known Allergies:     Home Medications:   * Patient Currently Takes Medications as of 2021 16:46 documented in Structured Notes  · 	cefuroxime 250 mg oral tablet: 1 tab(s) orally every 12 hours   · 	ALPRAZolam 0.5 mg oral tablet: 1 tab(s) orally every 12 hours MDD:1mg  · 	amoxicillin 500 mg oral capsule: 4 cap(s) orally once   · 	ergocalciferol 50,000 intl units (1.25 mg) oral capsule: 1 cap(s) orally once a week  · 	levothyroxine 50 mcg (0.05 mg) oral tablet: 1 tab(s) orally once a day  · 	atorvastatin 40 mg oral tablet: 1 tab(s) orally once a day (at bedtime)  · 	letrozole 2.5 mg oral tablet: 1 tab(s) orally once a day    Patient History:    Past Medical, Past Surgical, and Family History:  PAST MEDICAL HISTORY:  Breast cancer   HLD (hyperlipidemia)   Hypothyroidism   Lymphedema of arm leftarm lymphedema since 20 years , after the breast reconstruction surgery  S/P mastectomy, bilateral   Thyroid condition.     PAST SURGICAL HISTORY:  H/O pleural empyema pleurodesis  S/P bilateral mastectomy   S/P mastectomy, bilateral s/p implants b/l, no chemo or radiation therapy.     FAMILY HISTORY:  No pertinent family history in first degree relatives.   No Pertinent Family History in first degree relatives of: NA.     Social History:  Social History (marital status, living situation, occupation, tobacco use, alcohol and drug use, and sexual history): denies smoking, alcohol, other drugs        MEDICATIONS  (STANDING):  ALPRAZolam 0.5 milliGRAM(s) Oral every 12 hours  ceFAZolin   IVPB 2000 milliGRAM(s) IV Intermittent every 8 hours  enoxaparin Injectable 40 milliGRAM(s) SubCutaneous daily  letrozole 2.5 milliGRAM(s) Oral daily  levothyroxine 50 MICROGram(s) Oral daily  lidocaine   Patch 1 Patch Transdermal every 24 hours  pantoprazole    Tablet 40 milliGRAM(s) Oral before breakfast  simvastatin 10 milliGRAM(s) Oral at bedtime    MEDICATIONS  (PRN):  acetaminophen   Tablet .. 650 milliGRAM(s) Oral every 6 hours PRN Temp greater or equal to 38C (100.4F), Moderate Pain (4 - 6)  oxycodone    5 mG/acetaminophen 325 mG 1 Tablet(s) Oral every 6 hours PRN Severe Pain (7 - 10)      Vital Signs Last 24 Hrs  T(C): 36.6 (19 May 2021 12:02), Max: 39.3 (18 May 2021 19:12)  T(F): 97.8 (19 May 2021 12:02), Max: 102.8 (18 May 2021 19:12)  HR: 99 (19 May 2021 12:02) (98 - 118)  BP: 125/56 (19 May 2021 12:02) (108/42 - 162/97)  BP(mean): --  RR: 20 (19 May 2021 12:02) (19 - 20)  SpO2: 95% (19 May 2021 12:02) (95% - 97%)    PHYSICAL EXAM:  GENERAL: NAD, OOB to chair, obese  HEENT: Normocephalic;  conjunctivae and sclerae clear; moist mucous membranes   NECK : supple  RESP: CTA B/L  HEART: S1S2, no murmurs, gallops or rubs  ABDOMEN: Soft, Nontender, Nondistended; Bowel sounds present  EXTREMITIES: no cyanosis; LUE lymphedema with erythematous rash +warmth that extends to left breast and CW, slight B/L LE edema; no calf tenderness  LN: No palpable lymphadenopathy  SKIN: warm and dry; no rash  NERVOUS SYSTEM:  Awake and alert; Oriented to place, person and time    LABS:                        11.6   8.46  )-----------( 251      ( 19 May 2021 08:25 )             34.5     -    137  |  104  |  15  ----------------------------<  177<H>  3.3<L>   |  25  |  0.80    Ca    8.5      19 May 2021 08:25  Phos  2.8     -  Mg     2.5         TPro  7.4  /  Alb  2.6<L>  /  TBili  1.5<H>  /  DBili  x   /  AST  16  /  ALT  24  /  AlkPhos  74  -    PT/INR - ( 18 May 2021 14:43 )   PT: 12.9 sec;   INR: 1.09 ratio         PTT - ( 18 May 2021 14:43 )  PTT:29.1 sec  CARDIAC MARKERS ( 19 May 2021 08:25 )  <0.015 ng/mL / x     / x     / x     / x          Urinalysis Basic - ( 18 May 2021 15:12 )    Color: Yellow / Appearance: Clear / S.015 / pH: x  Gluc: x / Ketone: Negative  / Bili: Negative / Urobili: Negative   Blood: x / Protein: 30 mg/dL / Nitrite: Negative   Leuk Esterase: Trace / RBC: 2-5 /HPF / WBC 3-5 /HPF   Sq Epi: x / Non Sq Epi: Occasional /HPF / Bacteria: Trace /HPF        COVID-19 PCR: NotDetec (18 May 2021 15:12)  COVID-19 PCR: NotDetec (2021 15:08)  COVID-19 PCR: NotDetec (2021 15:41)      RADIOLOGY & ADDITIONAL TESTS:  < from: Xray Chest 1 View- PORTABLE-Urgent (21 @ 14:16) >    IMPRESSION: Bilateral patchy airspace opacities.    < end of copied text >  < from: US Duplex Venous Upper Ext Ltd, Left (21 @ 13:36) >  IMPRESSION:  No evidence of left upper extremity deep venous thrombosis.    < end of copied text >  < from: US Hepatic & Pancreatic (21 @ 17:07) >    IMPRESSION: Enlarged fatty liver.    No gallstone or gallbladder wall thickening.    < end of copied text >     Reason for Admission: LUE swelling  History of Present Illness:   67 y/o F with PMHx of breast cancer s/p bilateral mastectomy, malignant pleural effusion s/p VATS procedure, chronic LUE lymphedema x 20y, hypothyroidism and HLD presents to the ED with complaint of left upper extremity swelling. Patient reports pain, redness and swelling of LUE since yesterday. She reports she has this intermittently but gets better with antibiotics. Also reports having fever of 101 yesterday and 99 today along with chills. Denies chest pain, cough, dysuria, abdominal pain. Patient is complaining of back pain and feeling cold and chills when seen.    ED course:   Vitals: /112, , Temp 99 Sat normal on RA  Labs: WBC count normal, bili 1.3.      Review of Systems:  Other Review of Systems: All other review of systems negative, except as noted in HPI      Allergies and Intolerances:        Allergies:  	No Known Allergies:     Home Medications:   * Patient Currently Takes Medications as of 2021 16:46 documented in Structured Notes  · 	cefuroxime 250 mg oral tablet: 1 tab(s) orally every 12 hours   · 	ALPRAZolam 0.5 mg oral tablet: 1 tab(s) orally every 12 hours MDD:1mg  · 	amoxicillin 500 mg oral capsule: 4 cap(s) orally once   · 	ergocalciferol 50,000 intl units (1.25 mg) oral capsule: 1 cap(s) orally once a week  · 	levothyroxine 50 mcg (0.05 mg) oral tablet: 1 tab(s) orally once a day  · 	atorvastatin 40 mg oral tablet: 1 tab(s) orally once a day (at bedtime)  · 	letrozole 2.5 mg oral tablet: 1 tab(s) orally once a day    Patient History:    Past Medical, Past Surgical, and Family History:  PAST MEDICAL HISTORY:  Breast cancer   HLD (hyperlipidemia)   Hypothyroidism   Lymphedema of arm left arm lymphedema since 20 years , after the breast reconstruction surgery  S/P mastectomy, bilateral   Thyroid condition.     PAST SURGICAL HISTORY:  H/O pleural empyema pleurodesis  S/P bilateral mastectomy   S/P mastectomy, bilateral s/p implants b/l, no chemo or radiation therapy.     FAMILY HISTORY:  No pertinent family history in first degree relatives.   No Pertinent Family History in first degree relatives of: NA.     Social History:  Social History (marital status, living situation, occupation, tobacco use, alcohol and drug use, and sexual history): denies smoking, alcohol, other drugs        MEDICATIONS  (STANDING):  ALPRAZolam 0.5 milliGRAM(s) Oral every 12 hours  ceFAZolin   IVPB 2000 milliGRAM(s) IV Intermittent every 8 hours  enoxaparin Injectable 40 milliGRAM(s) SubCutaneous daily  letrozole 2.5 milliGRAM(s) Oral daily  levothyroxine 50 MICROGram(s) Oral daily  lidocaine   Patch 1 Patch Transdermal every 24 hours  pantoprazole    Tablet 40 milliGRAM(s) Oral before breakfast  simvastatin 10 milliGRAM(s) Oral at bedtime    MEDICATIONS  (PRN):  acetaminophen   Tablet .. 650 milliGRAM(s) Oral every 6 hours PRN Temp greater or equal to 38C (100.4F), Moderate Pain (4 - 6)  oxycodone    5 mG/acetaminophen 325 mG 1 Tablet(s) Oral every 6 hours PRN Severe Pain (7 - 10)      Vital Signs Last 24 Hrs  T(C): 36.6 (19 May 2021 12:02), Max: 39.3 (18 May 2021 19:12)  T(F): 97.8 (19 May 2021 12:02), Max: 102.8 (18 May 2021 19:12)  HR: 99 (19 May 2021 12:02) (98 - 118)  BP: 125/56 (19 May 2021 12:02) (108/42 - 162/97)  BP(mean): --  RR: 20 (19 May 2021 12:02) (19 - 20)  SpO2: 95% (19 May 2021 12:02) (95% - 97%)    PHYSICAL EXAM:  GENERAL: NAD, OOB to chair, obese  HEENT: Normocephalic;  conjunctivae and sclerae clear; moist mucous membranes   NECK : supple  RESP: CTA B/L  HEART: S1S2, no murmurs, gallops or rubs  ABDOMEN: Soft, Nontender, Nondistended; Bowel sounds present  EXTREMITIES: no cyanosis; LUE lymphedema with erythema +warmth that extends to left breast and CW, slight B/L LE edema; no calf tenderness  LN: No palpable lymphadenopathy  SKIN: warm and dry; no rash  NERVOUS SYSTEM:  Awake and alert; Oriented to place, person and time    LABS:                        11.6   8.46  )-----------( 251      ( 19 May 2021 08:25 )             34.5     -    137  |  104  |  15  ----------------------------<  177<H>  3.3<L>   |  25  |  0.80    Ca    8.5      19 May 2021 08:25  Phos  2.8       Mg     2.5         TPro  7.4  /  Alb  2.6<L>  /  TBili  1.5<H>  /  DBili  x   /  AST  16  /  ALT  24  /  AlkPhos  74  -    PT/INR - ( 18 May 2021 14:43 )   PT: 12.9 sec;   INR: 1.09 ratio         PTT - ( 18 May 2021 14:43 )  PTT:29.1 sec  CARDIAC MARKERS ( 19 May 2021 08:25 )  <0.015 ng/mL / x     / x     / x     / x          Urinalysis Basic - ( 18 May 2021 15:12 )    Color: Yellow / Appearance: Clear / S.015 / pH: x  Gluc: x / Ketone: Negative  / Bili: Negative / Urobili: Negative   Blood: x / Protein: 30 mg/dL / Nitrite: Negative   Leuk Esterase: Trace / RBC: 2-5 /HPF / WBC 3-5 /HPF   Sq Epi: x / Non Sq Epi: Occasional /HPF / Bacteria: Trace /HPF        COVID-19 PCR: NotDetec (18 May 2021 15:12)  COVID-19 PCR: NotDetec (2021 15:08)  COVID-19 PCR: NotDetec (2021 15:41)      RADIOLOGY & ADDITIONAL TESTS:  < from: Xray Chest 1 View- PORTABLE-Urgent (21 @ 14:16) >    IMPRESSION: Bilateral patchy airspace opacities.    < end of copied text >  < from: US Duplex Venous Upper Ext Ltd, Left (21 @ 13:36) >  IMPRESSION:  No evidence of left upper extremity deep venous thrombosis.    < end of copied text >  < from: US Hepatic & Pancreatic (21 @ 17:07) >    IMPRESSION: Enlarged fatty liver.    No gallstone or gallbladder wall thickening.    < end of copied text >

## 2021-05-19 NOTE — CONSULT NOTE ADULT - SUBJECTIVE AND OBJECTIVE BOX
HPI:  65 y/o F with PMHx of breast cancer s/p bilateral mastectomy, malignant pleural effusion s/p VATS procedure, chronic LUE lymphedema x 20y, hypothyroidism and HLD presents to the ED with complaint of left upper extremity swelling. Patient reports pain, redness and swelling of LUE since yesterday. She reports she has this intermittently but gets better with antibiotics. Also reports having fever of 101 yesterday and 99 today along with chills. Denies chest pain, cough, dysuria, abdominal pain. Patient is complaining of back pain and feeling cold and chills when seen.    ED course:   Vitals: /112, , Temp 99 Sat normal on RA  Labs: WBC count normal, bili 1.3.  (18 May 2021 16:48)    REVIEW OF SYSTEMS:  [  ] Not able to elicit  General:	  Chest:	  GI:	  :  Skin:	  Musculoskeletal:	  Neuro:    PAST MEDICAL & SURGICAL HISTORY:  Thyroid condition    Lymphedema of arm  leftarm lymphedema since 20 years , after the breast reconstruction surgery    S/P mastectomy, bilateral    Hypothyroidism    Breast cancer    HLD (hyperlipidemia)    S/P mastectomy, bilateral  s/p implants b/l, no chemo or radiation therapy    S/P bilateral mastectomy    H/O pleural empyema  pleurodesis      ALLERGIES: No Known Allergies    MEDS:  acetaminophen   Tablet .. 650 milliGRAM(s) Oral every 6 hours PRN  ALPRAZolam 0.5 milliGRAM(s) Oral every 12 hours  enoxaparin Injectable 40 milliGRAM(s) SubCutaneous daily  letrozole 2.5 milliGRAM(s) Oral daily  levothyroxine 50 MICROGram(s) Oral daily  lidocaine   Patch 1 Patch Transdermal every 24 hours  oxycodone    5 mG/acetaminophen 325 mG 1 Tablet(s) Oral every 6 hours PRN  pantoprazole    Tablet 40 milliGRAM(s) Oral before breakfast  piperacillin/tazobactam IVPB.. 3.375 Gram(s) IV Intermittent every 8 hours  simvastatin 10 milliGRAM(s) Oral at bedtime  vancomycin  IVPB 1000 milliGRAM(s) IV Intermittent every 12 hours    SOCIAL HISTORY:  Smoker:      FAMILY HISTORY:  No pertinent family history in first degree relatives      VITALS:  Vital Signs Last 24 Hrs  T(C): 36.6 (19 May 2021 12:02), Max: 39.3 (18 May 2021 19:12)  T(F): 97.8 (19 May 2021 12:02), Max: 102.8 (18 May 2021 19:12)  HR: 99 (19 May 2021 12:02) (98 - 118)  BP: 125/56 (19 May 2021 12:02) (108/42 - 162/97)  BP(mean): --  RR: 20 (19 May 2021 12:02) (19 - 20)  SpO2: 95% (19 May 2021 12:02) (95% - 97%)      PHYSICAL EXAM:  Constitutional:  HEENT:  Neck:  Respiratory:  Cardiovascular:  Gastrointestinal:  Extremities:  Skin:  Ortho:  Neuro:      LABS/DIAGNOSTIC TESTS:                        11.6   8.46  )-----------( 251      ( 19 May 2021 08:25 )             34.5     WBC Count: 8.46 K/uL ( @ 08:25)  WBC Count: 8.41 K/uL ( @ 14:43)        137  |  104  |  15  ----------------------------<  177<H>  3.3<L>   |  25  |  0.80    Ca    8.5      19 May 2021 08:25  Phos  2.8       Mg     2.5         TPro  7.4  /  Alb  2.6<L>  /  TBili  1.5<H>  /  DBili  x   /  AST  16  /  ALT  24  /  AlkPhos  74  19    Urinalysis Basic - ( 18 May 2021 15:12 )    Color: Yellow / Appearance: Clear / S.015 / pH: x  Gluc: x / Ketone: Negative  / Bili: Negative / Urobili: Negative   Blood: x / Protein: 30 mg/dL / Nitrite: Negative   Leuk Esterase: Trace / RBC: 2-5 /HPF / WBC 3-5 /HPF   Sq Epi: x / Non Sq Epi: Occasional /HPF / Bacteria: Trace /HPF      LIVER FUNCTIONS - ( 19 May 2021 08:25 )  Alb: 2.6 g/dL / Pro: 7.4 g/dL / ALK PHOS: 74 U/L / ALT: 24 U/L DA / AST: 16 U/L / GGT: x           PT/INR - ( 18 May 2021 14:43 )   PT: 12.9 sec;   INR: 1.09 ratio         PTT - ( 18 May 2021 14:43 )  PTT:29.1 sec  Lactate, Blood: 1.4 mmol/L ( @ 14:43)    ABG -     CULTURES:   .Urine Clean Catch (Midstream)  - @ 22:03   <10,000 CFU/mL Normal Urogenital Wilda  --  --          RADIOLOGY:  < from: US Hepatic & Pancreatic (21 @ 17:07) >    EXAM:  US LIVER AND PANCREAS                            PROCEDURE DATE:  2021          INTERPRETATION:  CLINICAL STATEMENT: Elevated bilirubin. COVID positive    TECHNIQUE: Ultrasound of the right upper quadrant.    COMPARISON: CT scan 2019    FINDINGS:    The liver is enlarged and echogenic. There is hepatopedal flow in the main portal vein. There is no liver mass    There is no gallstone or gallbladder wall thickening.    The common bile duct is not dilated measuring 4 mm.    The pancreas is not well seen.    The right kidney measures 12.1 cm in length.  There is no hydronephrosis.    The visualized aorta and IVC are unremarkable.    IMPRESSION: Enlarged fatty liver.    No gallstone or gallbladder wall thickening.    -----------------------------------------------------------------------------------------------------------------------------------------------------------------------------------------------------------------------------------------------    < from: Xray Chest 1 View- PORTABLE-Urgent (21 @ 14:16) >    EXAM:  XR CHEST PORTABLE URGENT 1V                            PROCEDURE DATE:  2021          INTERPRETATION:  INDICATION: Sepsis    PRIORS: 2021.    VIEWS: Portable AP radiography of the chest performed.    FINDINGS: Heart size as well as mediastinal contours cannot be assessed on this portable evaluation. There are patchy airspace opacities identified bilaterally representing interval change from prior examination. Opacity at the periphery of the left lower thorax suggests the presence of left pleural thickening. No evidence of pneumothorax. No mediastinal shift. Degenerative change thoracic spine.    IMPRESSION: Bilateral patchy airspace opacities.      < end of copied text >   HPI:  65 y/o F with PMHx of breast cancer s/p bilateral mastectomy, malignant pleural effusion s/p VATS procedure, chronic LUE lymphedema x 20y, hypothyroidism and HLD presents to the ED with complaint of left upper extremity swelling. Patient reports pain, redness and swelling of LUE since yesterday. She reports she has this intermittently but gets better with antibiotics. Also reports having fever of 101 yesterday and 99 today along with chills. Denies chest pain, cough, dysuria, abdominal pain. Patient is complaining of back pain and feeling cold and chills when seen.    ED course:   Vitals: /112, , Temp 99 Sat normal on RA  Labs: WBC count normal, bili 1.3.  (18 May 2021 16:48)    History as above, patient admitted from home for LUE cellulitis, redness and pain for 1 day.  Patient has a history of breast cancer, she is s/p bilateral mastectomy about 20 years ago and has been having chronic lymphedema on the LUE ever since.  Patient reports recurrent cellulitis on the left upper extremity where she was treated with antibiotics.  Patient is COVID negative and had never been vaccinated but Chest x-ray shows bilateral patchy airspace opacities.  She presents with LUE redness, swelling and tenderness extending to the left axilla and down to the hand.  She exhibited a fever of 102.8 last night without any leukocytosis and US was negative for DVT on the left arm.     REVIEW OF SYSTEMS:  [  ] Not able to elicit  General: fevers +, no chills, malaise +   Chest: infrequent nonproductive cough + for a couple of days, no shortness of breath, no chest pain  GI: no nvd no abdominal pain  : no urinary symptoms   Skin: left arm redness  Musculoskeletal: Chronic LBP  Neuro: no ha's no dizziness     PAST MEDICAL & SURGICAL HISTORY:  Thyroid condition    Lymphedema of arm  leftarm lymphedema since 20 years , after the breast reconstruction surgery    S/P mastectomy, bilateral    Hypothyroidism    Breast cancer    HLD (hyperlipidemia)    S/P mastectomy, bilateral  s/p implants b/l, no chemo or radiation therapy    S/P bilateral mastectomy    H/O pleural empyema  pleurodesis      ALLERGIES: No Known Allergies    MEDS:  acetaminophen   Tablet .. 650 milliGRAM(s) Oral every 6 hours PRN  ALPRAZolam 0.5 milliGRAM(s) Oral every 12 hours  enoxaparin Injectable 40 milliGRAM(s) SubCutaneous daily  letrozole 2.5 milliGRAM(s) Oral daily  levothyroxine 50 MICROGram(s) Oral daily  lidocaine   Patch 1 Patch Transdermal every 24 hours  oxycodone    5 mG/acetaminophen 325 mG 1 Tablet(s) Oral every 6 hours PRN  pantoprazole    Tablet 40 milliGRAM(s) Oral before breakfast  piperacillin/tazobactam IVPB.. 3.375 Gram(s) IV Intermittent every 8 hours  simvastatin 10 milliGRAM(s) Oral at bedtime  vancomycin  IVPB 1000 milliGRAM(s) IV Intermittent every 12 hours    SOCIAL HISTORY:  Smoker:  Denies  ETOH: Denies    FAMILY HISTORY:  No pertinent family history in first degree relatives      VITALS:  Vital Signs Last 24 Hrs  T(C): 36.6 (19 May 2021 12:02), Max: 39.3 (18 May 2021 19:12)  T(F): 97.8 (19 May 2021 12:02), Max: 102.8 (18 May 2021 19:12)  HR: 99 (19 May 2021 12:02) (98 - 118)  BP: 125/56 (19 May 2021 12:02) (108/42 - 162/97)  BP(mean): --  RR: 20 (19 May 2021 12:02) (19 - 20)  SpO2: 95% (19 May 2021 12:02) (95% - 97%)      PHYSICAL EXAM:  Constitutional: Well developed, well groomed, no distress  HEENT: normocephalic with moist oral mucosa  Neck: supple no LN's no JVD  Respiratory: normal, airway patent, breath sounds equal, clear to auscultate bilateraly, no rales, no rhonchi, no wheeze  Cardiovascular: Regular rate and rhythm, no murmurs  Gastrointestinal: +BS, normal, soft, nontender, nondistended, no rebound tenderness, no guarding, no rigidity, no organomegaly  Extremities: no edema, no cyanosis, no clubbing  Skin: no rashes  Ortho: no jt swelling  Neuro: AAO x 4       LABS/DIAGNOSTIC TESTS:                        11.6   8.46  )-----------( 251      ( 19 May 2021 08:25 )             34.5     WBC Count: 8.46 K/uL (05-19 @ 08:25)  WBC Count: 8.41 K/uL ( @ 14:43)        137  |  104  |  15  ----------------------------<  177<H>  3.3<L>   |  25  |  0.80    Ca    8.5      19 May 2021 08:25  Phos  2.8       Mg     2.5         TPro  7.4  /  Alb  2.6<L>  /  TBili  1.5<H>  /  DBili  x   /  AST  16  /  ALT  24  /  AlkPhos  74      Urinalysis Basic - ( 18 May 2021 15:12 )    Color: Yellow / Appearance: Clear / S.015 / pH: x  Gluc: x / Ketone: Negative  / Bili: Negative / Urobili: Negative   Blood: x / Protein: 30 mg/dL / Nitrite: Negative   Leuk Esterase: Trace / RBC: 2-5 /HPF / WBC 3-5 /HPF   Sq Epi: x / Non Sq Epi: Occasional /HPF / Bacteria: Trace /HPF      LIVER FUNCTIONS - ( 19 May 2021 08:25 )  Alb: 2.6 g/dL / Pro: 7.4 g/dL / ALK PHOS: 74 U/L / ALT: 24 U/L DA / AST: 16 U/L / GGT: x           PT/INR - ( 18 May 2021 14:43 )   PT: 12.9 sec;   INR: 1.09 ratio         PTT - ( 18 May 2021 14:43 )  PTT:29.1 sec  Lactate, Blood: 1.4 mmol/L ( @ 14:43)    ABG -     CULTURES:   .Urine Clean Catch (Midstream)   @ 22:03   <10,000 CFU/mL Normal Urogenital Wilda  --  --          RADIOLOGY:  < from: US Hepatic & Pancreatic (21 @ 17:07) >    EXAM:  US LIVER AND PANCREAS                            PROCEDURE DATE:  2021          INTERPRETATION:  CLINICAL STATEMENT: Elevated bilirubin. COVID positive    TECHNIQUE: Ultrasound of the right upper quadrant.    COMPARISON: CT scan 2019    FINDINGS:    The liver is enlarged and echogenic. There is hepatopedal flow in the main portal vein. There is no liver mass    There is no gallstone or gallbladder wall thickening.    The common bile duct is not dilated measuring 4 mm.    The pancreas is not well seen.    The right kidney measures 12.1 cm in length.  There is no hydronephrosis.    The visualized aorta and IVC are unremarkable.    IMPRESSION: Enlarged fatty liver.    No gallstone or gallbladder wall thickening.    -----------------------------------------------------------------------------------------------------------------------------------------------------------------------------------------------------------------------------------------------    < from: Xray Chest 1 View- PORTABLE-Urgent (21 @ 14:16) >    EXAM:  XR CHEST PORTABLE URGENT 1V                            PROCEDURE DATE:  2021          INTERPRETATION:  INDICATION: Sepsis    PRIORS: 2021.    VIEWS: Portable AP radiography of the chest performed.    FINDINGS: Heart size as well as mediastinal contours cannot be assessed on this portable evaluation. There are patchy airspace opacities identified bilaterally representing interval change from prior examination. Opacity at the periphery of the left lower thorax suggests the presence of left pleural thickening. No evidence of pneumothorax. No mediastinal shift. Degenerative change thoracic spine.    IMPRESSION: Bilateral patchy airspace opacities.      < end of copied text >   HPI:  65 y/o F with PMHx of breast cancer s/p bilateral mastectomy, malignant pleural effusion s/p VATS procedure, chronic LUE lymphedema x 20y, hypothyroidism and HLD presents to the ED with complaint of left upper extremity swelling. Patient reports pain, redness and swelling of LUE since yesterday. She reports she has this intermittently but gets better with antibiotics. Also reports having fever of 101 yesterday and 99 today along with chills. Denies chest pain, cough, dysuria, abdominal pain. Patient is complaining of back pain and feeling cold and chills when seen.    ED course:   Vitals: /112, , Temp 99 Sat normal on RA  Labs: WBC count normal, bili 1.3.  (18 May 2021 16:48)    History as above, patient admitted from home for LUE cellulitis, redness and pain for 1 day.  Patient has a history of breast cancer, she is s/p bilateral mastectomy about 20 years ago and has been having chronic lymphedema on the LUE ever since.  Patient reports recurrent cellulitis on the left upper extremity where she was treated with antibiotics.  Patient is COVID negative and had never been vaccinated but Chest x-ray shows bilateral patchy airspace opacities.  She presents with LUE redness, swelling and tenderness extending to the left axilla and down to the hand.  She exhibited a fever of 102.8 last night without any leukocytosis and US was negative for DVT on the left arm.     REVIEW OF SYSTEMS:  [  ] Not able to elicit  General: fevers +, no chills, malaise +   Chest: infrequent nonproductive cough + for a couple of days, no shortness of breath, no chest pain  GI: no nvd no abdominal pain  : no urinary symptoms   Skin: left arm redness  Musculoskeletal: Chronic LBP  Neuro: no ha's no dizziness     PAST MEDICAL & SURGICAL HISTORY:  Thyroid condition    Lymphedema of arm  leftarm lymphedema since 20 years , after the breast reconstruction surgery    S/P mastectomy, bilateral    Hypothyroidism    Breast cancer    HLD (hyperlipidemia)    S/P mastectomy, bilateral  s/p implants b/l, no chemo or radiation therapy    S/P bilateral mastectomy    H/O pleural empyema  pleurodesis      ALLERGIES: No Known Allergies    MEDS:  acetaminophen   Tablet .. 650 milliGRAM(s) Oral every 6 hours PRN  ALPRAZolam 0.5 milliGRAM(s) Oral every 12 hours  enoxaparin Injectable 40 milliGRAM(s) SubCutaneous daily  letrozole 2.5 milliGRAM(s) Oral daily  levothyroxine 50 MICROGram(s) Oral daily  lidocaine   Patch 1 Patch Transdermal every 24 hours  oxycodone    5 mG/acetaminophen 325 mG 1 Tablet(s) Oral every 6 hours PRN  pantoprazole    Tablet 40 milliGRAM(s) Oral before breakfast  piperacillin/tazobactam IVPB.. 3.375 Gram(s) IV Intermittent every 8 hours  simvastatin 10 milliGRAM(s) Oral at bedtime  vancomycin  IVPB 1000 milliGRAM(s) IV Intermittent every 12 hours    SOCIAL HISTORY:  Smoker:  Denies  ETOH: Denies    FAMILY HISTORY:  No pertinent family history in first degree relatives      VITALS:  Vital Signs Last 24 Hrs  T(C): 36.6 (19 May 2021 12:02), Max: 39.3 (18 May 2021 19:12)  T(F): 97.8 (19 May 2021 12:02), Max: 102.8 (18 May 2021 19:12)  HR: 99 (19 May 2021 12:02) (98 - 118)  BP: 125/56 (19 May 2021 12:02) (108/42 - 162/97)  BP(mean): --  RR: 20 (19 May 2021 12:02) (19 - 20)  SpO2: 95% (19 May 2021 12:02) (95% - 97%)      PHYSICAL EXAM:  Constitutional: Well developed, well groomed, no distress  HEENT: normocephalic with moist oral mucosa  Neck: supple no LN's no JVD  Respiratory: Bilateral crackles  Cardiovascular: Regular rate and rhythm, no murmurs  Gastrointestinal: +BS, normal, soft, nontender, nondistended, no rebound tenderness, no guarding, no rigidity, no organomegaly, obese  Extremities: Left upper extremity edema +4, erythema+, tenderness+ and warmth +  Skin: Left upper extremity erythema extending from the left axilla down to the hand  Ortho: no jt swelling  Neuro: AAO x 4       LABS/DIAGNOSTIC TESTS:                        11.6   8.46  )-----------( 251      ( 19 May 2021 08:25 )             34.5     WBC Count: 8.46 K/uL (05-19 @ 08:25)  WBC Count: 8.41 K/uL ( @ 14:43)        137  |  104  |  15  ----------------------------<  177<H>  3.3<L>   |  25  |  0.80    Ca    8.5      19 May 2021 08:25  Phos  2.8       Mg     2.5         TPro  7.4  /  Alb  2.6<L>  /  TBili  1.5<H>  /  DBili  x   /  AST  16  /  ALT  24  /  AlkPhos  74      Urinalysis Basic - ( 18 May 2021 15:12 )    Color: Yellow / Appearance: Clear / S.015 / pH: x  Gluc: x / Ketone: Negative  / Bili: Negative / Urobili: Negative   Blood: x / Protein: 30 mg/dL / Nitrite: Negative   Leuk Esterase: Trace / RBC: 2-5 /HPF / WBC 3-5 /HPF   Sq Epi: x / Non Sq Epi: Occasional /HPF / Bacteria: Trace /HPF      LIVER FUNCTIONS - ( 19 May 2021 08:25 )  Alb: 2.6 g/dL / Pro: 7.4 g/dL / ALK PHOS: 74 U/L / ALT: 24 U/L DA / AST: 16 U/L / GGT: x           PT/INR - ( 18 May 2021 14:43 )   PT: 12.9 sec;   INR: 1.09 ratio         PTT - ( 18 May 2021 14:43 )  PTT:29.1 sec  Lactate, Blood: 1.4 mmol/L ( @ 14:43)    ABG -     CULTURES:   .Urine Clean Catch (Midstream)   @ 22:03   <10,000 CFU/mL Normal Urogenital Wilda  --  --          RADIOLOGY:  < from: US Hepatic & Pancreatic (21 @ 17:07) >    EXAM:  US LIVER AND PANCREAS                            PROCEDURE DATE:  2021          INTERPRETATION:  CLINICAL STATEMENT: Elevated bilirubin. COVID positive    TECHNIQUE: Ultrasound of the right upper quadrant.    COMPARISON: CT scan 2019    FINDINGS:    The liver is enlarged and echogenic. There is hepatopedal flow in the main portal vein. There is no liver mass    There is no gallstone or gallbladder wall thickening.    The common bile duct is not dilated measuring 4 mm.    The pancreas is not well seen.    The right kidney measures 12.1 cm in length.  There is no hydronephrosis.    The visualized aorta and IVC are unremarkable.    IMPRESSION: Enlarged fatty liver.    No gallstone or gallbladder wall thickening.    -----------------------------------------------------------------------------------------------------------------------------------------------------------------------------------------------------------------------------------------------    < from: Xray Chest 1 View- PORTABLE-Urgent (21 @ 14:16) >    EXAM:  XR CHEST PORTABLE URGENT 1V                            PROCEDURE DATE:  2021          INTERPRETATION:  INDICATION: Sepsis    PRIORS: 2021.    VIEWS: Portable AP radiography of the chest performed.    FINDINGS: Heart size as well as mediastinal contours cannot be assessed on this portable evaluation. There are patchy airspace opacities identified bilaterally representing interval change from prior examination. Opacity at the periphery of the left lower thorax suggests the presence of left pleural thickening. No evidence of pneumothorax. No mediastinal shift. Degenerative change thoracic spine.    IMPRESSION: Bilateral patchy airspace opacities.      < end of copied text >

## 2021-05-19 NOTE — PROGRESS NOTE ADULT - PROBLEM SELECTOR PLAN 2
Render Post-Care Instructions In Note?: yes Include Z78.9 (Other Specified Conditions Influencing Health Status) As An Associated Diagnosis?: No Medical Necessity Clause: This procedure was medically necessary because the lesions that were treated were: Detail Level: Detailed Consent: The patient's consent was obtained including but not limited to risks of crusting, scabbing, blistering, scarring, darker or lighter pigmentary change, recurrence, incomplete removal and infection. Medical Necessity Information: It is in your best interest to select a reason for this procedure from the list below. All of these items fulfill various CMS LCD requirements except the new and changing color options. Number Of Freeze-Thaw Cycles: 2 freeze-thaw cycles pt p/w erythema, swelling and pain of LUE  pt has chronic lymphedema since breast surgery 20y ago  concern for cellulitis   will start on vanc and zosyn Post-Care Instructions: I reviewed with the patient in detail post-care instructions. Patient is to wear sunprotection, and avoid picking at any of the treated lesions. Pt may apply Vaseline to crusted or scabbing areas. Chronic Lymphedema   erythema, swelling and pain  chronic lymphedema from B/L mastectomy 20y ago  Leidy Seay D2  ID Aiden

## 2021-05-19 NOTE — CONSULT NOTE ADULT - ASSESSMENT
Left upper extremity cellultis  Fever  Unlikely to be COVID pneumonia - patient is asymptomatic    Plan: DC Vanco and zosyn  Start Cefazolin 2g iv q8  If doing well, will plan to DC on Friday or Saturday on po abx

## 2021-05-19 NOTE — PROGRESS NOTE ADULT - SUBJECTIVE AND OBJECTIVE BOX
CARDIOLOGY/MEDICAL ATTENDING    CHIEF COMPLAINT:Patient is a 66y old  Female who presents with a chief complaint of LUE swelling .Pt appears comfortable.    	  REVIEW OF SYSTEMS:  CONSTITUTIONAL: No fever, weight loss, or fatigue  EYES: No eye pain, visual disturbances, or discharge  ENT:  No difficulty hearing, tinnitus, vertigo; No sinus or throat pain  NECK: No pain or stiffness  RESPIRATORY: No cough, wheezing, chills or hemoptysis; No Shortness of Breath  CARDIOVASCULAR: No chest pain, palpitations, passing out, dizziness, +Lt arm swelling  GASTROINTESTINAL: No abdominal or epigastric pain. No nausea, vomiting, or hematemesis; No diarrhea or constipation. No melena or hematochezia.  GENITOURINARY: No dysuria, frequency, hematuria, or incontinence  NEUROLOGICAL: No headaches, memory loss, loss of strength, numbness, or tremors  SKIN: No itching, burning, rashes, or lesions   LYMPH Nodes: No enlarged glands  ENDOCRINE: No heat or cold intolerance; No hair loss  MUSCULOSKELETAL: No joint pain or swelling; No muscle, back, or extremity pain  PSYCHIATRIC: No depression, anxiety, mood swings, or difficulty sleeping  HEME/LYMPH: No easy bruising, or bleeding gums  ALLERGY AND IMMUNOLOGIC: No hives or eczema	        PHYSICAL EXAM:  T(C): 36.6 (05-19-21 @ 05:00), Max: 39.3 (05-18-21 @ 19:12)  HR: 98 (05-19-21 @ 05:00) (98 - 120)  BP: 108/42 (05-19-21 @ 05:00) (108/42 - 162/97)  RR: 19 (05-19-21 @ 05:00) (19 - 20)  SpO2: 97% (05-19-21 @ 05:00) (95% - 97%)  Wt(kg): --  I&O's Summary      Appearance: Normal	  HEENT:   Normal oral mucosa, PERRL, EOMI	  Lymphatic: No lymphadenopathy  Cardiovascular: Normal S1 S2, No JVD, No murmurs, LUE 2+edema  Respiratory: Lungs clear to auscultation	  Psychiatry: A & O x 3, Mood & affect appropriate  Gastrointestinal:  Soft, Non-tender, + BS	  Skin: No rashes, No ecchymoses, No cyanosis	  Neurologic: Non-focal  Extremities: Normal range of motion, No clubbing, cyanosis or edema  Vascular: Peripheral pulses palpable 2+ bilaterally    MEDICATIONS  (STANDING):  ALPRAZolam 0.5 milliGRAM(s) Oral every 12 hours  enoxaparin Injectable 40 milliGRAM(s) SubCutaneous daily  letrozole 2.5 milliGRAM(s) Oral daily  levothyroxine 50 MICROGram(s) Oral daily  lidocaine   Patch 1 Patch Transdermal every 24 hours  piperacillin/tazobactam IVPB.. 3.375 Gram(s) IV Intermittent every 8 hours  simvastatin 10 milliGRAM(s) Oral at bedtime  vancomycin  IVPB 1000 milliGRAM(s) IV Intermittent every 12 hours      	  	  LABS:	 	                     11.6   8.46  )-----------( 251      ( 19 May 2021 08:25 )             34.5     05-18    137  |  102  |  19<H>  ----------------------------<  132<H>  4.5   |  27  |  1.05    Ca    9.2      18 May 2021 14:43    TPro  8.0  /  Alb  3.1<L>  /  TBili  1.7<H>  /  DBili  0.3<H>  /  AST  15  /  ALT  25  /  AlkPhos  70  05-18    Doppler-no dvt.

## 2021-05-19 NOTE — CONSULT NOTE ADULT - ASSESSMENT
complete note to follow   65 y/o F with PMHx of breast cancer s/p bilateral mastectomy, malignant pleural effusion s/p VATS procedure, chronic LUE lymphedema x 20y, hypothyroidism and HLD presents to the ED with complaint of left upper extremity swelling. Patient reports pain, redness and swelling of LUE since yesterday. She reports she has this intermittently but gets better with antibiotics. Also reports having fever of 101 yesterday and 99 today along with chills.    OnHx: Pt is well known to my colleague Oncologist Dr. Laal. Pt was diagnosed with Right Breast CA in 1995 s/p mastectomy, Left Breast CA in 1999 s/p mastectomy and treated with tamoxifen. In 02/2015 she developed a pleural effusion which was + for Met Breast CA ER/CT + [id number] (-) and was started on Ibrance + Femara. Pt last seen 04/21/21 and has been stable with CA 27-29=22.5, CEA=1.4, Hgb=12.6    Problem# Metastatic Breast Cancer  p/w LUE worsening lymphedema with rash and fever  pt states she is taking Ibrance 125mg QD x 21 days then 7 days off and Femara 2.5mg QD  CXR shows B/L patchy opacities, PCR (-) x 1 (last admit in 4/2021 PCR was (-))  Doppler LE (-)  Abd US- fatty liver  WBC normal with neutrophilia  ESR=92  Urine Cx (-)  febrile yesterday, afebrile today, pt on isolation for high-suspicion of covid  Rec's:  -continue Ibrance and Femara  -Cellulitis/PNA mgmt as per ID and Medicine Teams  -lymphedema, advised upon discharge to restart lymphedema therapy and wear a sleeve, can wrap with ace bandage during hospitalization when cellulitis resolves, keep LUE elevated  -Daily CBC  -Check anemia panel  -f/u Blood culture  -Recommend covid vaccine, pt prefer pfizer and to schedule as outpt  -Upon discharge f/u with Dr. Lala    Thank you for the referral. Will continue to monitor the patient.  Please call with any questions [phone]  Above reviewed with Attending Dr. Sullivan   65 y/o F with PMHx of breast cancer s/p bilateral mastectomy, malignant pleural effusion s/p VATS procedure, chronic LUE lymphedema x 20y, hypothyroidism and HLD presents to the ED with complaint of left upper extremity swelling. Patient reports pain, redness and swelling of LUE since yesterday. She reports she has this intermittently but gets better with antibiotics. Also reports having fever of 101 yesterday and 99 today along with chills.    OnHx: Pt is well known to my colleague Oncologist Dr. Lala. Pt was diagnosed with Right Breast CA in 1995 s/p mastectomy, Left Breast CA in 1999 s/p mastectomy and treated with tamoxifen. In 02/2015 she developed a pleural effusion which was + for Met Breast CA ER/TX + [id number] (-) and was started on Ibrance + Femara. Pt last seen 04/21/21 and has been stable with CA 27-29=22.5, CEA=1.4, Hgb=12.6    Problem# Metastatic Breast Cancer  p/w LUE worsening lymphedema with rash and fever  pt states she is taking Ibrance 125mg QD x 21 days then 7 days off and Femara 2.5mg QD  CXR shows B/L patchy opacities, PCR (-) x 1 (last admit in 4/2021 PCR was (-))  Doppler LUE (-)  Abd US- fatty liver  WBC normal with neutrophilia  ESR=92  Urine Cx (-)  febrile yesterday, afebrile today, pt on isolation for high-suspicion of covid  Rec's:  -continue Ibrance and Femara  -Cellulitis/PNA mgmt as per ID and Medicine Teams  -lymphedema, advised upon discharge to restart lymphedema therapy and wear a sleeve, can wrap with ace bandage during hospitalization when cellulitis resolves, keep LUE elevated  -Daily CBC  -Check anemia panel  -f/u Blood culture  -Recommend covid vaccine, pt prefer pfizer and to schedule as outpt  -Upon discharge f/u with Oncologist Dr. Lala    Thank you for the referral. Will continue to monitor the patient.  Please call with any questions [phone]  Above reviewed with Attending Dr. Sullivan

## 2021-05-19 NOTE — CONSULT NOTE ADULT - ASSESSMENT
1. Cellulitis of LUE   Panculture  Antibiotic  ID Eval   US neg for DVT   Monitor labs  Monitor Temp  DVT and GI PPX      2. PNA   On CXR  Cont Abx  ID F/U   F/U CXR     3. Hx of Malignant Pleural Effusion  S/P VATS   CT to further eval   Bronchodilators PRN   O2 supp PRN     4. Hx of Breast CA   S/P B/L Mastectomy   On Letrozole   F/U as OP Heme/onc     5. Hypothyroidism  Cont meds   Endo F/U

## 2021-05-19 NOTE — PROGRESS NOTE ADULT - ASSESSMENT
67 y/o F with PMHx of breast cancer s/p bilateral mastectomy, malignant pleural effusion s/p VATS procedure, chronic LUE lymphedema x 20y, hypothyroidism and HLD presents to the ED with complaint of left upper extremity swelling,cellulitis,abnormal CXR.  1.Cellulitis-ID eval,ABX.  2.CTA-R/O PE.  3.Hypothyroidism-synthroid.  4.Breast ca-Letrozole.  5.Doppler-neg for DVT.  6.Abnormal CXR-Pulm eval.  7.GI and DVT prophylaxis.  8.Check Blood cx,urine cx,TFT's,FLP and A1c.

## 2021-05-19 NOTE — CONSULT NOTE ADULT - SUBJECTIVE AND OBJECTIVE BOX
PULMONARY CONSULT NOTE      ANA JAUREGUI  MRN-[id number]    Patient is a 66y old  Female who presents with a chief complaint of LUE swelling (19 May 2021 10:15)      HISTORY OF PRESENT ILLNESS:  History of Present Illness:  Reason for Admission: LUE swelling  History of Present Illness:   67 y/o F with PMHx of breast cancer s/p bilateral mastectomy, malignant pleural effusion s/p VATS procedure, chronic LUE lymphedema x 20y, hypothyroidism and HLD presents to the ED with complaint of left upper extremity swelling. Patient reports pain, redness and swelling of LUE since yesterday. She reports she has this intermittently but gets better with antibiotics. Also reports having fever of 101 yesterday and 99 today along with chills. Denies chest pain, cough, dysuria, abdominal pain. Patient is complaining of back pain and feeling cold and chills when seen.    ED course:   Vitals: /112, , Temp 99 Sat normal on RA  Labs: WBC count normal, bili 1.3.       MEDICATIONS  (STANDING):  ALPRAZolam 0.5 milliGRAM(s) Oral every 12 hours  enoxaparin Injectable 40 milliGRAM(s) SubCutaneous daily  letrozole 2.5 milliGRAM(s) Oral daily  levothyroxine 50 MICROGram(s) Oral daily  lidocaine   Patch 1 Patch Transdermal every 24 hours  pantoprazole    Tablet 40 milliGRAM(s) Oral before breakfast  piperacillin/tazobactam IVPB.. 3.375 Gram(s) IV Intermittent every 8 hours  simvastatin 10 milliGRAM(s) Oral at bedtime  vancomycin  IVPB 1000 milliGRAM(s) IV Intermittent every 12 hours      MEDICATIONS  (PRN):  acetaminophen   Tablet .. 650 milliGRAM(s) Oral every 6 hours PRN Temp greater or equal to 38C (100.4F), Moderate Pain (4 - 6)  oxycodone    5 mG/acetaminophen 325 mG 1 Tablet(s) Oral every 6 hours PRN Severe Pain (7 - 10)      Allergies    No Known Allergies    Intolerances        PAST MEDICAL & SURGICAL HISTORY:  Thyroid condition    Lymphedema of arm  leftarm lymphedema since 20 years , after the breast reconstruction surgery    S/P mastectomy, bilateral    Hypothyroidism    Breast cancer    HLD (hyperlipidemia)    S/P mastectomy, bilateral  s/p implants b/l, no chemo or radiation therapy    S/P bilateral mastectomy    H/O pleural empyema  pleurodesis        FAMILY HISTORY:  No pertinent family history in first degree relatives        SOCIAL HISTORY  Smoking History:     REVIEW OF SYSTEMS:    CONSTITUTIONAL:  No fevers, chills, sweats    HEENT:  Eyes:  No diplopia or blurred vision. ENT:  No earache, sore throat or runny nose.    CARDIOVASCULAR:  No pressure, squeezing, tightness, or heaviness about the chest; no palpitations.    RESPIRATORY:  Per HPI    GASTROINTESTINAL:  No abdominal pain, nausea, vomiting or diarrhea.    GENITOURINARY:  No dysuria, frequency or urgency.    NEUROLOGIC:  No paresthesias, fasciculations, seizures or weakness.    PSYCHIATRIC:  No disorder of thought or mood.    Vital Signs Last 24 Hrs  T(C): 36.6 (19 May 2021 12:02), Max: 39.3 (18 May 2021 19:12)  T(F): 97.8 (19 May 2021 12:02), Max: 102.8 (18 May 2021 19:12)  HR: 99 (19 May 2021 12:02) (98 - 118)  BP: 125/56 (19 May 2021 12:02) (108/42 - 162/97)  BP(mean): --  RR: 20 (19 May 2021 12:02) (19 - 20)  SpO2: 95% (19 May 2021 12:02) (95% - 97%)  I&O's Detail      PHYSICAL EXAMINATION:    GENERAL: The patient is a well-developed, well-nourished _____in no apparent distress.     HEENT: Head is normocephalic and atraumatic. Extraocular muscles are intact. Mucous membranes are moist.     NECK: Supple.     LUNGS: Clear to auscultation without wheezing, rales, or rhonchi. Respirations unlabored    HEART: Regular rate and rhythm without murmur.    ABDOMEN: Soft, nontender, and nondistended.  No hepatosplenomegaly is noted.    EXTREMITIES: Without any cyanosis, clubbing, rash, lesions or edema.    NEUROLOGIC: Grossly intact.      LABS:                        11.6   8.46  )-----------( 251      ( 19 May 2021 08:25 )             34.5     05-    137  |  104  |  15  ----------------------------<  177<H>  3.3<L>   |  25  |  0.80    Ca    8.5      19 May 2021 08:25  Phos  2.8     -  Mg     2.5         TPro  7.4  /  Alb  2.6<L>  /  TBili  1.5<H>  /  DBili  x   /  AST  16  /  ALT  24  /  AlkPhos  74  -    PT/INR - ( 18 May 2021 14:43 )   PT: 12.9 sec;   INR: 1.09 ratio         PTT - ( 18 May 2021 14:43 )  PTT:29.1 sec  Urinalysis Basic - ( 18 May 2021 15:12 )    Color: Yellow / Appearance: Clear / S.015 / pH: x  Gluc: x / Ketone: Negative  / Bili: Negative / Urobili: Negative   Blood: x / Protein: 30 mg/dL / Nitrite: Negative   Leuk Esterase: Trace / RBC: 2-5 /HPF / WBC 3-5 /HPF   Sq Epi: x / Non Sq Epi: Occasional /HPF / Bacteria: Trace /HPF        CARDIAC MARKERS ( 19 May 2021 08:25 )  <0.015 ng/mL / x     / x     / x     / x              Lactate, Blood: 1.4 mmol/L (21 @ 14:43)        MICROBIOLOGY:    RADIOLOGY & ADDITIONAL STUDIES:    CXR:  < from: Xray Chest 1 View- PORTABLE-Urgent (21 @ 14:16) >  IMPRESSION: Bilateral patchy airspace opacities.      < end of copied text >    < from: US Hepatic & Pancreatic (21 @ 17:07) >  IMPRESSION: Enlarged fatty liver.    No gallstone or gallbladder wall thickening.    < end of copied text >    IMPRESSION:  No evidence of left upper extremity deep venous thrombosis.    Ct scan chest:    ekg;    echo:

## 2021-05-19 NOTE — PROGRESS NOTE ADULT - SUBJECTIVE AND OBJECTIVE BOX
-* incomplete NP Note discussed with  Primary Attending    Patient is a 66y old  Female who presents with a chief complaint of LUE swelling (19 May 2021 10:15)      INTERVAL HPI/OVERNIGHT EVENTS: no new complaints    MEDICATIONS  (STANDING):  ALPRAZolam 0.5 milliGRAM(s) Oral every 12 hours  enoxaparin Injectable 40 milliGRAM(s) SubCutaneous daily  letrozole 2.5 milliGRAM(s) Oral daily  levothyroxine 50 MICROGram(s) Oral daily  lidocaine   Patch 1 Patch Transdermal every 24 hours  pantoprazole    Tablet 40 milliGRAM(s) Oral before breakfast  piperacillin/tazobactam IVPB.. 3.375 Gram(s) IV Intermittent every 8 hours  simvastatin 10 milliGRAM(s) Oral at bedtime  vancomycin  IVPB 1000 milliGRAM(s) IV Intermittent every 12 hours    MEDICATIONS  (PRN):  acetaminophen   Tablet .. 650 milliGRAM(s) Oral every 6 hours PRN Temp greater or equal to 38C (100.4F), Moderate Pain (4 - 6)  oxycodone    5 mG/acetaminophen 325 mG 1 Tablet(s) Oral every 6 hours PRN Severe Pain (7 - 10)      __________________________________________________  REVIEW OF SYSTEMS:    CONSTITUTIONAL: No fever,   EYES: no acute visual disturbances  NECK: No pain or stiffness  RESPIRATORY: No cough; No shortness of breath  CARDIOVASCULAR: No chest pain, no palpitations  GASTROINTESTINAL: No pain. No nausea or vomiting; No diarrhea   NEUROLOGICAL: No headache or numbness, no tremors  MUSCULOSKELETAL: No joint pain, no muscle pain  GENITOURINARY: no dysuria, no frequency, no hesitancy  PSYCHIATRY: no depression , no anxiety  ALL OTHER  ROS negative        Vital Signs Last 24 Hrs  T(C): 36.6 (19 May 2021 05:00), Max: 39.3 (18 May 2021 19:12)  T(F): 97.8 (19 May 2021 05:00), Max: 102.8 (18 May 2021 19:12)  HR: 98 (19 May 2021 05:00) (98 - 120)  BP: 108/42 (19 May 2021 05:00) (108/42 - 162/97)  BP(mean): --  RR: 19 (19 May 2021 05:00) (19 - 20)  SpO2: 97% (19 May 2021 05:00) (95% - 97%)    ________________________________________________  PHYSICAL EXAM:  GENERAL: NAD  HEENT: Normocephalic;  conjunctivae and sclerae clear; moist mucous membranes;   NECK : supple  CHEST/LUNG: Clear to auscultation bilaterally with good air entry   HEART: S1 S2  regular; no murmurs, gallops or rubs  ABDOMEN: Soft, Nontender, Nondistended; Bowel sounds present  EXTREMITIES: no cyanosis; no edema; no calf tenderness  SKIN: warm and dry; no rash  NERVOUS SYSTEM:  Awake and alert; Oriented  to place, person and time ; no new deficits    _________________________________________________  LABS:                        11.6   8.46  )-----------( 251      ( 19 May 2021 08:25 )             34.5     05-    137  |  104  |  15  ----------------------------<  177<H>  3.3<L>   |  25  |  0.80    Ca    8.5      19 May 2021 08:25  Phos  2.8       Mg     2.5         TPro  7.4  /  Alb  2.6<L>  /  TBili  1.5<H>  /  DBili  x   /  AST  16  /  ALT  24  /  AlkPhos  74  05-    PT/INR - ( 18 May 2021 14:43 )   PT: 12.9 sec;   INR: 1.09 ratio         PTT - ( 18 May 2021 14:43 )  PTT:29.1 sec  Urinalysis Basic - ( 18 May 2021 15:12 )    Color: Yellow / Appearance: Clear / S.015 / pH: x  Gluc: x / Ketone: Negative  / Bili: Negative / Urobili: Negative   Blood: x / Protein: 30 mg/dL / Nitrite: Negative   Leuk Esterase: Trace / RBC: 2-5 /HPF / WBC 3-5 /HPF   Sq Epi: x / Non Sq Epi: Occasional /HPF / Bacteria: Trace /HPF      CAPILLARY BLOOD GLUCOSE            RADIOLOGY & ADDITIONAL TESTS:    Imaging Personally Reviewed:  YES/NO    Consultant(s) Notes Reviewed:   YES/ No    Care Discussed with Consultants :     Plan of care was discussed with patient and /or primary care giver; all questions and concerns were addressed and care was aligned with patient's wishes.     NP Note discussed with  Primary Attending    Patient is a 66y old  Female who presents with a chief complaint of LUE swelling (19 May 2021 10:15)    INTERVAL HPI/OVERNIGHT EVENTS: overnight febrile     MEDICATIONS  (STANDING):  ALPRAZolam 0.5 milliGRAM(s) Oral every 12 hours  enoxaparin Injectable 40 milliGRAM(s) SubCutaneous daily  letrozole 2.5 milliGRAM(s) Oral daily  levothyroxine 50 MICROGram(s) Oral daily  lidocaine   Patch 1 Patch Transdermal every 24 hours  pantoprazole    Tablet 40 milliGRAM(s) Oral before breakfast  piperacillin/tazobactam IVPB.. 3.375 Gram(s) IV Intermittent every 8 hours  simvastatin 10 milliGRAM(s) Oral at bedtime  vancomycin  IVPB 1000 milliGRAM(s) IV Intermittent every 12 hours    MEDICATIONS  (PRN):  acetaminophen   Tablet .. 650 milliGRAM(s) Oral every 6 hours PRN Temp greater or equal to 38C (100.4F), Moderate Pain (4 - 6)  oxycodone    5 mG/acetaminophen 325 mG 1 Tablet(s) Oral every 6 hours PRN Severe Pain (7 - 10)      __________________________________________________  REVIEW OF SYSTEMS:    CONSTITUTIONAL: +fever,   EYES: no acute visual disturbances  NECK: No pain or stiffness  RESPIRATORY: No cough; No shortness of breath  CARDIOVASCULAR: No chest pain, no palpitations  GASTROINTESTINAL: No pain. No nausea or vomiting; No diarrhea   NEUROLOGICAL: No headache or numbness, no tremors  MUSCULOSKELETAL: No joint pain, +Back pain   GENITOURINARY: no dysuria, no frequency, no hesitancy  PSYCHIATRY: no depression , no anxiety  ALL OTHER  ROS negative        Vital Signs Last 24 Hrs  T(C): 36.6 (19 May 2021 05:00), Max: 39.3 (18 May 2021 19:12)  T(F): 97.8 (19 May 2021 05:00), Max: 102.8 (18 May 2021 19:12)  HR: 98 (19 May 2021 05:00) (98 - 120)  BP: 108/42 (19 May 2021 05:00) (108/42 - 162/97)  BP(mean): --  RR: 19 (19 May 2021 05:00) (19 - 20)  SpO2: 97% (19 May 2021 05:00) (95% - 97%)    ________________________________________________  PHYSICAL EXAM:  GENERAL: NAD  HEENT: Normocephalic;  conjunctivae and sclerae clear;  NECK : supple  CHEST/LUNG: diminished b/l poor effort    HEART: S1 S2  regular; no murmurs, gallops or rubs  ABDOMEN: Soft, Nontender, distended; Bowel sounds present  EXTREMITIES: + LUE Edema, and erythema no cyanosis; no calf tenderness  SKIN: warm and dry; no rash  NERVOUS SYSTEM:  Awake and alert; Oriented  to place, person and time ; no new deficits    _________________________________________________  LABS:                        11.6   8.46  )-----------( 251      ( 19 May 2021 08:25 )             34.5     05-    137  |  104  |  15  ----------------------------<  177<H>  3.3<L>   |  25  |  0.80    Ca    8.5      19 May 2021 08:25  Phos  2.8     -  Mg     2.5         TPro  7.4  /  Alb  2.6<L>  /  TBili  1.5<H>  /  DBili  x   /  AST  16  /  ALT  24  /  AlkPhos  74  05-19    PT/INR - ( 18 May 2021 14:43 )   PT: 12.9 sec;   INR: 1.09 ratio         PTT - ( 18 May 2021 14:43 )  PTT:29.1 sec  Urinalysis Basic - ( 18 May 2021 15:12 )    Color: Yellow / Appearance: Clear / S.015 / pH: x  Gluc: x / Ketone: Negative  / Bili: Negative / Urobili: Negative   Blood: x / Protein: 30 mg/dL / Nitrite: Negative   Leuk Esterase: Trace / RBC: 2-5 /HPF / WBC 3-5 /HPF   Sq Epi: x / Non Sq Epi: Occasional /HPF / Bacteria: Trace /HPF          RADIOLOGY & ADDITIONAL TESTS:   < from: Xray Chest 1 View- PORTABLE-Urgent (21 @ 14:16) >  IMPRESSION: Bilateral patchy airspace opacities.    < end of copied text >    Imaging Personally Reviewed:  YES    Consultant(s) Notes Reviewed:   YES      Plan of care was discussed with patient and /or primary care giver; all questions and concerns were addressed and care was aligned with patient's wishes.

## 2021-05-19 NOTE — PROGRESS NOTE ADULT - PROBLEM SELECTOR PLAN 1
pt febrile with chills, tachycardia meeting SIRS criteria 2/4  likely from cellulitis  lactate normal   UA negative   CXR shows b/l patchy opacities   s/p vanc and cefazolin in the ED  will start on vanc and zosyn  follow blood and urine cultures  f/u procalcitonin, ESR   f/u CTA chest   ID Dr Wolfe Sepsis likely due to LUE cellulitis   febrile overnight  CXR shows b/l patchy opacities   Vanco Zosyn D2  pending blood and urine cultures  f/u CTA chest - patient refused this AM   ID Dr Wolfe

## 2021-05-19 NOTE — PROGRESS NOTE ADULT - PROBLEM SELECTOR PLAN 3
pt takes letrozole PO daily plus Ibrance   c/w home meds CXR found Bilateral patchy airspace opacities  suspicious for COVID   Pending CT  Asymtomatic   contact/airborn isolation

## 2021-05-19 NOTE — CONSULT NOTE ADULT - ATTENDING COMMENTS
I agree with above
I reviewed patient's data and participated in the management of the patient along with Torri VERMA as well as hematology/med oncology faculty during the daily heme/onc case review. I reviewed pertinent clinical information, PE,  labs as well as A/P as outline above, in agreement and edited as appropriate.   MET BREAST CA  LEFT ARM CELLULITIS / LYMPHOEDEMA  PNA  cont abx as above   doppler Left arm neg for DVT  keep arm elevated  pt can resume her Femara/ibrance  tx for met breast ca  f/u with DR Lala as an outpt

## 2021-05-20 DIAGNOSIS — C78.00 SECONDARY MALIGNANT NEOPLASM OF UNSPECIFIED LUNG: ICD-10-CM

## 2021-05-20 DIAGNOSIS — M54.9 DORSALGIA, UNSPECIFIED: ICD-10-CM

## 2021-05-20 LAB
ANION GAP SERPL CALC-SCNC: 8 MMOL/L — SIGNIFICANT CHANGE UP (ref 5–17)
BUN SERPL-MCNC: 16 MG/DL — SIGNIFICANT CHANGE UP (ref 7–18)
CALCIUM SERPL-MCNC: 8.9 MG/DL — SIGNIFICANT CHANGE UP (ref 8.4–10.5)
CHLORIDE SERPL-SCNC: 103 MMOL/L — SIGNIFICANT CHANGE UP (ref 96–108)
CO2 SERPL-SCNC: 26 MMOL/L — SIGNIFICANT CHANGE UP (ref 22–31)
CREAT SERPL-MCNC: 0.9 MG/DL — SIGNIFICANT CHANGE UP (ref 0.5–1.3)
GLUCOSE SERPL-MCNC: 157 MG/DL — HIGH (ref 70–99)
HCT VFR BLD CALC: 33.3 % — LOW (ref 34.5–45)
HGB BLD-MCNC: 11 G/DL — LOW (ref 11.5–15.5)
MCHC RBC-ENTMCNC: 33 GM/DL — SIGNIFICANT CHANGE UP (ref 32–36)
MCHC RBC-ENTMCNC: 35.1 PG — HIGH (ref 27–34)
MCV RBC AUTO: 106.4 FL — HIGH (ref 80–100)
NRBC # BLD: 0 /100 WBCS — SIGNIFICANT CHANGE UP (ref 0–0)
PLATELET # BLD AUTO: 292 K/UL — SIGNIFICANT CHANGE UP (ref 150–400)
POTASSIUM SERPL-MCNC: 3.7 MMOL/L — SIGNIFICANT CHANGE UP (ref 3.5–5.3)
POTASSIUM SERPL-SCNC: 3.7 MMOL/L — SIGNIFICANT CHANGE UP (ref 3.5–5.3)
RBC # BLD: 3.13 M/UL — LOW (ref 3.8–5.2)
RBC # FLD: 13.3 % — SIGNIFICANT CHANGE UP (ref 10.3–14.5)
SODIUM SERPL-SCNC: 137 MMOL/L — SIGNIFICANT CHANGE UP (ref 135–145)
WBC # BLD: 7.55 K/UL — SIGNIFICANT CHANGE UP (ref 3.8–10.5)
WBC # FLD AUTO: 7.55 K/UL — SIGNIFICANT CHANGE UP (ref 3.8–10.5)

## 2021-05-20 PROCEDURE — 71275 CT ANGIOGRAPHY CHEST: CPT | Mod: 26

## 2021-05-20 PROCEDURE — 72128 CT CHEST SPINE W/O DYE: CPT | Mod: 26

## 2021-05-20 PROCEDURE — 72131 CT LUMBAR SPINE W/O DYE: CPT | Mod: 26

## 2021-05-20 PROCEDURE — 36000 PLACE NEEDLE IN VEIN: CPT

## 2021-05-20 PROCEDURE — 99221 1ST HOSP IP/OBS SF/LOW 40: CPT

## 2021-05-20 RX ORDER — POLYETHYLENE GLYCOL 3350 17 G/17G
17 POWDER, FOR SOLUTION ORAL DAILY
Refills: 0 | Status: DISCONTINUED | OUTPATIENT
Start: 2021-05-20 | End: 2021-05-24

## 2021-05-20 RX ORDER — OXYCODONE HYDROCHLORIDE 5 MG/1
5 TABLET ORAL EVERY 4 HOURS
Refills: 0 | Status: DISCONTINUED | OUTPATIENT
Start: 2021-05-20 | End: 2021-05-24

## 2021-05-20 RX ORDER — OXYCODONE HYDROCHLORIDE 5 MG/1
10 TABLET ORAL EVERY 4 HOURS
Refills: 0 | Status: DISCONTINUED | OUTPATIENT
Start: 2021-05-20 | End: 2021-05-24

## 2021-05-20 RX ORDER — PALBOCICLIB 100 MG/1
125 CAPSULE ORAL DAILY
Refills: 0 | Status: DISCONTINUED | OUTPATIENT
Start: 2021-05-20 | End: 2021-05-20

## 2021-05-20 RX ORDER — PALBOCICLIB 100 MG/1
125 CAPSULE ORAL DAILY
Refills: 0 | Status: DISCONTINUED | OUTPATIENT
Start: 2021-05-20 | End: 2021-05-24

## 2021-05-20 RX ORDER — HYDROMORPHONE HYDROCHLORIDE 2 MG/ML
1 INJECTION INTRAMUSCULAR; INTRAVENOUS; SUBCUTANEOUS ONCE
Refills: 0 | Status: COMPLETED | OUTPATIENT
Start: 2021-05-20 | End: 2021-05-20

## 2021-05-20 RX ORDER — AZITHROMYCIN 500 MG/1
500 TABLET, FILM COATED ORAL DAILY
Refills: 0 | Status: DISCONTINUED | OUTPATIENT
Start: 2021-05-20 | End: 2021-05-21

## 2021-05-20 RX ORDER — SENNA PLUS 8.6 MG/1
2 TABLET ORAL AT BEDTIME
Refills: 0 | Status: DISCONTINUED | OUTPATIENT
Start: 2021-05-20 | End: 2021-05-24

## 2021-05-20 RX ORDER — GABAPENTIN 400 MG/1
100 CAPSULE ORAL EVERY 12 HOURS
Refills: 0 | Status: DISCONTINUED | OUTPATIENT
Start: 2021-05-20 | End: 2021-05-24

## 2021-05-20 RX ORDER — KETOROLAC TROMETHAMINE 30 MG/ML
10 SYRINGE (ML) INJECTION EVERY 8 HOURS
Refills: 0 | Status: DISCONTINUED | OUTPATIENT
Start: 2021-05-20 | End: 2021-05-24

## 2021-05-20 RX ORDER — OXYCODONE AND ACETAMINOPHEN 5; 325 MG/1; MG/1
1 TABLET ORAL ONCE
Refills: 0 | Status: DISCONTINUED | OUTPATIENT
Start: 2021-05-20 | End: 2021-05-20

## 2021-05-20 RX ORDER — HYDROMORPHONE HYDROCHLORIDE 2 MG/ML
0.5 INJECTION INTRAMUSCULAR; INTRAVENOUS; SUBCUTANEOUS ONCE
Refills: 0 | Status: DISCONTINUED | OUTPATIENT
Start: 2021-05-20 | End: 2021-05-20

## 2021-05-20 RX ORDER — AZITHROMYCIN 500 MG/1
500 TABLET, FILM COATED ORAL DAILY
Refills: 0 | Status: DISCONTINUED | OUTPATIENT
Start: 2021-05-20 | End: 2021-05-20

## 2021-05-20 RX ORDER — PREGABALIN 225 MG/1
1000 CAPSULE ORAL DAILY
Refills: 0 | Status: DISCONTINUED | OUTPATIENT
Start: 2021-05-20 | End: 2021-05-24

## 2021-05-20 RX ADMIN — LIDOCAINE 1 PATCH: 4 CREAM TOPICAL at 00:06

## 2021-05-20 RX ADMIN — POLYETHYLENE GLYCOL 3350 17 GRAM(S): 17 POWDER, FOR SOLUTION ORAL at 13:07

## 2021-05-20 RX ADMIN — LIDOCAINE 1 PATCH: 4 CREAM TOPICAL at 09:17

## 2021-05-20 RX ADMIN — Medication 100 MILLIGRAM(S): at 13:05

## 2021-05-20 RX ADMIN — Medication 10 MILLIGRAM(S): at 13:06

## 2021-05-20 RX ADMIN — OXYCODONE HYDROCHLORIDE 10 MILLIGRAM(S): 5 TABLET ORAL at 12:55

## 2021-05-20 RX ADMIN — Medication 100 MILLIGRAM(S): at 21:42

## 2021-05-20 RX ADMIN — Medication 50 MICROGRAM(S): at 06:36

## 2021-05-20 RX ADMIN — HYDROMORPHONE HYDROCHLORIDE 0.5 MILLIGRAM(S): 2 INJECTION INTRAMUSCULAR; INTRAVENOUS; SUBCUTANEOUS at 13:10

## 2021-05-20 RX ADMIN — OXYCODONE AND ACETAMINOPHEN 1 TABLET(S): 5; 325 TABLET ORAL at 10:01

## 2021-05-20 RX ADMIN — LIDOCAINE 1 PATCH: 4 CREAM TOPICAL at 21:38

## 2021-05-20 RX ADMIN — AZITHROMYCIN 500 MILLIGRAM(S): 500 TABLET, FILM COATED ORAL at 13:06

## 2021-05-20 RX ADMIN — Medication 10 MILLIGRAM(S): at 21:41

## 2021-05-20 RX ADMIN — LIDOCAINE 1 PATCH: 4 CREAM TOPICAL at 19:58

## 2021-05-20 RX ADMIN — PREGABALIN 1000 MICROGRAM(S): 225 CAPSULE ORAL at 13:04

## 2021-05-20 RX ADMIN — Medication 650 MILLIGRAM(S): at 03:30

## 2021-05-20 RX ADMIN — SIMVASTATIN 10 MILLIGRAM(S): 20 TABLET, FILM COATED ORAL at 21:41

## 2021-05-20 RX ADMIN — ENOXAPARIN SODIUM 40 MILLIGRAM(S): 100 INJECTION SUBCUTANEOUS at 13:04

## 2021-05-20 RX ADMIN — OXYCODONE HYDROCHLORIDE 10 MILLIGRAM(S): 5 TABLET ORAL at 16:38

## 2021-05-20 RX ADMIN — OXYCODONE HYDROCHLORIDE 10 MILLIGRAM(S): 5 TABLET ORAL at 13:30

## 2021-05-20 RX ADMIN — GABAPENTIN 100 MILLIGRAM(S): 400 CAPSULE ORAL at 18:22

## 2021-05-20 RX ADMIN — PANTOPRAZOLE SODIUM 40 MILLIGRAM(S): 20 TABLET, DELAYED RELEASE ORAL at 06:36

## 2021-05-20 RX ADMIN — Medication 650 MILLIGRAM(S): at 02:21

## 2021-05-20 RX ADMIN — OXYCODONE HYDROCHLORIDE 10 MILLIGRAM(S): 5 TABLET ORAL at 18:11

## 2021-05-20 RX ADMIN — Medication 0.5 MILLIGRAM(S): at 06:36

## 2021-05-20 RX ADMIN — OXYCODONE AND ACETAMINOPHEN 1 TABLET(S): 5; 325 TABLET ORAL at 06:35

## 2021-05-20 RX ADMIN — PALBOCICLIB 125 MILLIGRAM(S): 100 CAPSULE ORAL at 18:23

## 2021-05-20 RX ADMIN — LETROZOLE 2.5 MILLIGRAM(S): 2.5 TABLET, FILM COATED ORAL at 13:06

## 2021-05-20 RX ADMIN — OXYCODONE AND ACETAMINOPHEN 1 TABLET(S): 5; 325 TABLET ORAL at 11:25

## 2021-05-20 RX ADMIN — Medication 0.5 MILLIGRAM(S): at 18:22

## 2021-05-20 RX ADMIN — Medication 100 MILLIGRAM(S): at 06:35

## 2021-05-20 RX ADMIN — Medication 10 MILLIGRAM(S): at 15:42

## 2021-05-20 RX ADMIN — HYDROMORPHONE HYDROCHLORIDE 0.5 MILLIGRAM(S): 2 INJECTION INTRAMUSCULAR; INTRAVENOUS; SUBCUTANEOUS at 13:41

## 2021-05-20 RX ADMIN — OXYCODONE AND ACETAMINOPHEN 1 TABLET(S): 5; 325 TABLET ORAL at 09:21

## 2021-05-20 NOTE — PROGRESS NOTE ADULT - SUBJECTIVE AND OBJECTIVE BOX
CARDIOLOGY/MEDICAL ATTENDING    CHIEF COMPLAINT:Patient is a 66y old  Female who presents with a chief complaint of LUE swelling .Pt complaining of back pain.    	  REVIEW OF SYSTEMS:  CONSTITUTIONAL: No fever, weight loss, or fatigue  EYES: No eye pain, visual disturbances, or discharge  ENT:  No difficulty hearing, tinnitus, vertigo; No sinus or throat pain  NECK: No pain or stiffness  RESPIRATORY: No cough, wheezing, chills or hemoptysis; No Shortness of Breath  CARDIOVASCULAR: No chest pain, palpitations, passing out, dizziness, or leg swelling  GASTROINTESTINAL: No abdominal or epigastric pain. No nausea, vomiting, or hematemesis; No diarrhea or constipation. No melena or hematochezia.  GENITOURINARY: No dysuria, frequency, hematuria, or incontinence  NEUROLOGICAL: No headaches, memory loss, loss of strength, numbness, or tremors  SKIN: No itching, burning, rashes, or lesions   LYMPH Nodes: No enlarged glands  ENDOCRINE: No heat or cold intolerance; No hair loss  MUSCULOSKELETAL: No joint pain or swelling; No muscle, + back pain  PSYCHIATRIC: No depression, anxiety, mood swings, or difficulty sleeping  HEME/LYMPH: No easy bruising, or bleeding gums  ALLERGY AND IMMUNOLOGIC: No hives or eczema	      PHYSICAL EXAM:  T(C): 36.6 (05-20-21 @ 05:30), Max: 36.6 (05-19-21 @ 12:02)  HR: 103 (05-20-21 @ 05:30) (99 - 103)  BP: 129/79 (05-20-21 @ 05:30) (120/69 - 129/79)  RR: 19 (05-20-21 @ 05:30) (19 - 20)  SpO2: 100% (05-20-21 @ 05:30) (95% - 100%)        Appearance: Normal	  HEENT:   Normal oral mucosa, PERRL, EOMI	  Lymphatic: No lymphadenopathy  Cardiovascular: Normal S1 S2, No JVD, No murmurs, +3 LUE edema  Respiratory: Lungs clear to auscultation	  Psychiatry: A & O x 3, Mood & affect appropriate  Gastrointestinal:  Soft, Non-tender, + BS	  Skin: No rashes, No ecchymoses, No cyanosis	  Neurologic: Non-focal  Extremities: Normal range of motion, No clubbing, cyanosis +3 LUE edema  Vascular: Peripheral pulses palpable 2+ bilaterally    MEDICATIONS  (STANDING):  ALPRAZolam 0.5 milliGRAM(s) Oral every 12 hours  azithromycin   Tablet 500 milliGRAM(s) Oral daily  ceFAZolin   IVPB 2000 milliGRAM(s) IV Intermittent every 8 hours  cyanocobalamin Injectable 1000 MICROGram(s) SubCutaneous daily  enoxaparin Injectable 40 milliGRAM(s) SubCutaneous daily  gabapentin 100 milliGRAM(s) Oral every 12 hours  ketorolac 10 milliGRAM(s) Oral every 8 hours  letrozole 2.5 milliGRAM(s) Oral daily  levothyroxine 50 MICROGram(s) Oral daily  lidocaine   Patch 1 Patch Transdermal every 24 hours  palbociclib 125 milliGRAM(s) Oral daily  pantoprazole    Tablet 40 milliGRAM(s) Oral before breakfast  polyethylene glycol 3350 17 Gram(s) Oral daily  senna 2 Tablet(s) Oral at bedtime  simvastatin 10 milliGRAM(s) Oral at bedtime    	  	  LABS:	 	    CARDIAC MARKERS:  CARDIAC MARKERS ( 19 May 2021 08:25 )  <0.015 ng/mL / x     / x     / x     / x                                    11.0   7.55  )-----------( 292      ( 20 May 2021 07:53 )             33.3     05-20    137  |  103  |  16  ----------------------------<  157<H>  3.7   |  26  |  0.90    Ca    8.9      20 May 2021 07:53  Phos  2.8     05-19  Mg     2.5     05-19    TPro  7.4  /  Alb  2.6<L>  /  TBili  1.5<H>  /  DBili  x   /  AST  16  /  ALT  24  /  AlkPhos  74  05-19    Lipid Profile: Cholesterol 177  HDL 34        TSH: Thyroid Stimulating Hormone, Serum: 3.35 uU/mL (05-19 @ 08:25)      	  Vitamin B12, Serum: 210 pg/mL (05.19.21 @ 13:37)

## 2021-05-20 NOTE — PROGRESS NOTE ADULT - ASSESSMENT
complete note to follow   Assessment and Recommendation:   · Assessment	  67 y/o F with PMHx of breast cancer s/p bilateral mastectomy, malignant pleural effusion s/p VATS procedure, chronic LUE lymphedema x 20y, hypothyroidism and HLD presents to the ED with complaint of left upper extremity swelling. Patient reports pain, redness and swelling of LUE since yesterday. She reports she has this intermittently but gets better with antibiotics. Also reports having fever of 101 yesterday and 99 today along with chills.    OnHx: Pt is well known to my colleague Oncologist Dr. Lala. Pt was diagnosed with Right Breast CA in 1995 s/p mastectomy, Left Breast CA in 1999 s/p mastectomy and treated with tamoxifen. In 02/2015 she developed a pleural effusion which was + for Met Breast CA ER/LA + [id number] (-) and was started on Ibrance + Femara. Pt last seen 04/21/21 and has been stable with CA 27-29=22.5, CEA=1.4, Hgb=12.6    Problem# Metastatic Breast Cancer  p/w LUE worsening lymphedema with rash and fever  pt states she is taking Ibrance 125mg QD x 21 days then 7 days off and Femara 2.5mg QD  CXR shows B/L patchy opacities, PCR (-) x 1 (last admit in 4/2021 PCR was (-))  Doppler LUE (-)  Abd US- fatty liver  WBC normal with neutrophilia  ESR=92  Urine Cx (-)  pt on isolation for high-suspicion of covid, chest CT pending  [id number]  Rec's:  -Recommend Pain Mgmt for LBP  -continue Ibrance and Femara (okay for pt to keep Ibrance in her room and self-admin medication)  -Cellulitis/PNA mgmt as per ID and Medicine Teams  -lymphedema, advised upon discharge to restart lymphedema therapy and wear a sleeve, can wrap with ace bandage during hospitalization when cellulitis resolves, keep LUE elevated  -Daily CBC  -B12 deficiency, give 1,000mcg IM x 1  -f/u Blood culture  -Recommend covid vaccine, pt prefer pfizer and to schedule as outpt  -Upon discharge f/u with Oncologist Dr. Lala    Thank you for the referral. Will continue to monitor the patient.  Please call with any questions [phone]  Above reviewed with Attending Dr. Sullivan

## 2021-05-20 NOTE — PROGRESS NOTE ADULT - ASSESSMENT
67 y/o F with PMHx of breast cancer s/p bilateral mastectomy, malignant pleural effusion s/p VATS procedure, chronic LUE lymphedema x 20y, hypothyroidism and HLD presents to the ED with complaint of left upper extremity swelling. Admitted to medicine for sepsis secondary to LUE cellulitis. Doppler negative for DVT, started on Vanco Zosyn. CXR with abnormal findings placed on high suspicion for COVID. Plan for CT patient not agreeable this AM and states it hurts her back to lay flat and requesting lidocaine patch 67 y/o F with PMHx of breast cancer s/p bilateral mastectomy, malignant pleural effusion s/p VATS procedure, chronic LUE lymphedema x 20y, hypothyroidism and HLD presents to the ED with complaint of left upper extremity swelling. Admitted to medicine for sepsis secondary to LUE cellulitis. Doppler negative for DVT, started on Vanco Zosyn --> changed to Kefazolin. CXR with abnormal findings placed on high suspicion for COVID. Plan for CTA to r/o PE delayed as patient not agreeable and states it hurts her back to lay flat pain medication given as ordered.

## 2021-05-20 NOTE — CONSULT NOTE ADULT - PROBLEM SELECTOR RECOMMENDATION 9
Pt with back pain.  + history of breast ca with mets to lung.  Pt is being treated for cellulitis of left arm - on IV abx  nonopioid recc  - gabapentin 100mg po q 12 hours  - lidocaine patch daily  - toradol 10mg po q8 hours   opioid recc  - no iv opioids  - oxycodone 5mg po q 4 hours prn   - oxycodone 10mg po q 4 hours prn  bowel regimen  - PPI  - senna  - miralax  CT spine - lumbar and thoracic to rule out mets

## 2021-05-20 NOTE — PROGRESS NOTE ADULT - PROBLEM SELECTOR PLAN 1
Sepsis likely due to LUE cellulitis   febrile overnight  CXR shows b/l patchy opacities   Vanco Zosyn D2  pending blood and urine cultures  f/u CTA chest - patient refused this AM   ID Dr Wolfe Improving   Sepsis likely due to LUE cellulitis vs PNA  afebrile 24hours   CXR shows b/l patchy opacities   abx changed to Kefazolin   Zithromax to cover PNA   pending blood culture  urine culture negative   f/u CTA chest  ID Dr Wolfe

## 2021-05-20 NOTE — PROGRESS NOTE ADULT - ASSESSMENT
65 y/o F with PMHx of breast cancer s/p bilateral mastectomy, malignant pleural effusion s/p VATS procedure, chronic LUE lymphedema x 20y, hypothyroidism and HLD presents to the ED with complaint of left upper extremity swelling,cellulitis,abnormal CXR-pneumonia and back pain.  1.Cellulitis-ID eval,ABX.  2.CTA-R/O PE.  3.Hypothyroidism-synthroid.  4.Breast ca-Letrozole.  5.Replace vit b12.  6.Abnl CXR-Pulm eval noted.  7.GI and DVT prophylaxis.  8.Back pain-CT spine.

## 2021-05-20 NOTE — PROGRESS NOTE ADULT - ASSESSMENT
1. Cellulitis of LUE   Antibiotic - may switch to PO if doing well - per ID   ID F/U   US neg for DVT   Monitor labs  Monitor Temp  DVT and GI PPX      2. PNA   CXR   On Keflex; Procalcitonin 2.20  add Azithromycin - can switch to PO upon D/C  ID F/U   F/U CXR     3. Hx of Malignant Pleural Effusion  S/P VATS   CT to further eval   Bronchodilators PRN   O2 supp PRN     4. Hx of Breast CA   S/P B/L Mastectomy   On Letrozole   Heme/onc F/U noted.   Recc Covid vaccine   Management for lymphedema     5. Hypothyroidism  Cont meds   Endo F/U

## 2021-05-20 NOTE — PROGRESS NOTE ADULT - PROBLEM SELECTOR PLAN 4
pt takes letrozole PO daily plus Ibrance   b/l mastectomy 20 years ago chronic back pain   no trauma   Pending thoracolumbar CT  pain management consulted  reccs appreciated

## 2021-05-20 NOTE — PROGRESS NOTE ADULT - PROBLEM SELECTOR PLAN 5
TSH WNL   Continue home dose levothyroxine hc of breast CA with mets   pt takes letrozole PO daily plus Ibrance (meds brought in by family and approved by pharmacy)  b/l mastectomy 20 years ago

## 2021-05-20 NOTE — PROGRESS NOTE ADULT - SUBJECTIVE AND OBJECTIVE BOX
Patient is a 66y old  Female who presents with a chief complaint of LUE swelling (20 May 2021 12:05)      SUBJECTIVE / OVERNIGHT EVENTS:    ADDITIONAL REVIEW OF SYSTEMS:    MEDICATIONS  (STANDING):  ALPRAZolam 0.5 milliGRAM(s) Oral every 12 hours  azithromycin   Tablet 500 milliGRAM(s) Oral daily  ceFAZolin   IVPB 2000 milliGRAM(s) IV Intermittent every 8 hours  cyanocobalamin Injectable 1000 MICROGram(s) SubCutaneous daily  enoxaparin Injectable 40 milliGRAM(s) SubCutaneous daily  gabapentin 100 milliGRAM(s) Oral every 12 hours  ketorolac 10 milliGRAM(s) Oral every 8 hours  letrozole 2.5 milliGRAM(s) Oral daily  levothyroxine 50 MICROGram(s) Oral daily  lidocaine   Patch 1 Patch Transdermal every 24 hours  palbociclib 125 milliGRAM(s) Oral daily  pantoprazole    Tablet 40 milliGRAM(s) Oral before breakfast  polyethylene glycol 3350 17 Gram(s) Oral daily  senna 2 Tablet(s) Oral at bedtime  simvastatin 10 milliGRAM(s) Oral at bedtime    MEDICATIONS  (PRN):  acetaminophen   Tablet .. 650 milliGRAM(s) Oral every 6 hours PRN Temp greater or equal to 38C (100.4F), Moderate Pain (4 - 6)  oxyCODONE    IR 5 milliGRAM(s) Oral every 4 hours PRN Moderate Pain (4 - 6)  oxyCODONE    IR 10 milliGRAM(s) Oral every 4 hours PRN Severe Pain (7 - 10)      Vital Signs Last 24 Hrs  T(C): 36.6 (20 May 2021 05:30), Max: 36.6 (20 May 2021 05:30)  T(F): 97.9 (20 May 2021 05:30), Max: 97.9 (20 May 2021 05:30)  HR: 103 (20 May 2021 05:30) (102 - 103)  BP: 129/79 (20 May 2021 05:30) (120/69 - 129/79)  BP(mean): --  RR: 19 (20 May 2021 05:30) (19 - 20)  SpO2: 100% (20 May 2021 05:30) (99% - 100%)      LABS:                        11.0   7.55  )-----------( 292      ( 20 May 2021 07:53 )             33.3     05-20    137  |  103  |  16  ----------------------------<  157<H>  3.7   |  26  |  0.90    Ca    8.9      20 May 2021 07:53  Phos  2.8     05-19  Mg     2.5     05-19    TPro  7.4  /  Alb  2.6<L>  /  TBili  1.5<H>  /  DBili  x   /  AST  16  /  ALT  24  /  AlkPhos  74  05-19    PT/INR - ( 18 May 2021 14:43 )   PT: 12.9 sec;   INR: 1.09 ratio         PTT - ( 18 May 2021 14:43 )  PTT:29.1 sec  CARDIAC MARKERS ( 19 May 2021 08:25 )  <0.015 ng/mL / x     / x     / x     / x          Urinalysis Basic - ( 18 May 2021 15:12 )    Color: Yellow / Appearance: Clear / S.015 / pH: x  Gluc: x / Ketone: Negative  / Bili: Negative / Urobili: Negative   Blood: x / Protein: 30 mg/dL / Nitrite: Negative   Leuk Esterase: Trace / RBC: 2-5 /HPF / WBC 3-5 /HPF   Sq Epi: x / Non Sq Epi: Occasional /HPF / Bacteria: Trace /HPF        Culture - Urine (collected 18 May 2021 21:58)  Source: .Urine Clean Catch (Midstream)  Final Report (19 May 2021 17:50):    <10,000 CFU/mL Normal Urogenital Wilda      COVID-19 PCR: NotDetec (19 May 2021 16:49)  COVID-19 PCR: NotDetec (18 May 2021 15:12)  COVID-19 PCR: NotDetec (2021 15:08)  COVID-19 PCR: NotDetec (2021 15:41)      RADIOLOGY & ADDITIONAL TESTS:  Imaging from Last 24 Hours:    Electrocardiogram/QTc Interval:    COORDINATION OF CARE:  Care Discussed with Consultants/Other Providers:   Patient is a 66y old  Female who presents with a chief complaint of LUE swelling (20 May 2021 12:05)      SUBJECTIVE / OVERNIGHT EVENTS: events noted. Pt c/o continued LBP x 2 days started after sleeping in hospital bed. Denies numbness/tingling LE/incontinence      MEDICATIONS  (STANDING):  ALPRAZolam 0.5 milliGRAM(s) Oral every 12 hours  azithromycin   Tablet 500 milliGRAM(s) Oral daily  ceFAZolin   IVPB 2000 milliGRAM(s) IV Intermittent every 8 hours  cyanocobalamin Injectable 1000 MICROGram(s) SubCutaneous daily  enoxaparin Injectable 40 milliGRAM(s) SubCutaneous daily  gabapentin 100 milliGRAM(s) Oral every 12 hours  ketorolac 10 milliGRAM(s) Oral every 8 hours  letrozole 2.5 milliGRAM(s) Oral daily  levothyroxine 50 MICROGram(s) Oral daily  lidocaine   Patch 1 Patch Transdermal every 24 hours  palbociclib 125 milliGRAM(s) Oral daily  pantoprazole    Tablet 40 milliGRAM(s) Oral before breakfast  polyethylene glycol 3350 17 Gram(s) Oral daily  senna 2 Tablet(s) Oral at bedtime  simvastatin 10 milliGRAM(s) Oral at bedtime    MEDICATIONS  (PRN):  acetaminophen   Tablet .. 650 milliGRAM(s) Oral every 6 hours PRN Temp greater or equal to 38C (100.4F), Moderate Pain (4 - 6)  oxyCODONE    IR 5 milliGRAM(s) Oral every 4 hours PRN Moderate Pain (4 - 6)  oxyCODONE    IR 10 milliGRAM(s) Oral every 4 hours PRN Severe Pain (7 - 10)      Vital Signs Last 24 Hrs  T(C): 36.6 (20 May 2021 05:30), Max: 36.6 (20 May 2021 05:30)  T(F): 97.9 (20 May 2021 05:30), Max: 97.9 (20 May 2021 05:30)  HR: 103 (20 May 2021 05:30) (102 - 103)  BP: 129/79 (20 May 2021 05:30) (120/69 - 129/79)  BP(mean): --  RR: 19 (20 May 2021 05:30) (19 - 20)  SpO2: 100% (20 May 2021 05:30) (99% - 100%)      PHYSICAL EXAM:  GENERAL: NAD, lying in bed, obese  HEENT: Normocephalic;  conjunctivae and sclerae clear; moist mucous membranes   NECK : supple  RESP: CTA B/L  HEART: S1S2, no murmurs, gallops or rubs  ABDOMEN: Soft, Nontender, Nondistended; Bowel sounds present  EXTREMITIES: no cyanosis; LUE lymphedema with erythema +warmth that extends to left breast and CW, slight B/L LE edema; no calf tenderness  LN: No palpable lymphadenopathy  SKIN: warm and dry; no rash  NERVOUS SYSTEM:  Awake and alert; Oriented to place, person and time      LABS:                        11.0   7.55  )-----------( 292      ( 20 May 2021 07:53 )             33.3     05-20    137  |  103  |  16  ----------------------------<  157<H>  3.7   |  26  |  0.90    Ca    8.9      20 May 2021 07:53  Phos  2.8     05-  Mg     2.5     -    TPro  7.4  /  Alb  2.6<L>  /  TBili  1.5<H>  /  DBili  x   /  AST  16  /  ALT  24  /  AlkPhos  74  05-19    PT/INR - ( 18 May 2021 14:43 )   PT: 12.9 sec;   INR: 1.09 ratio         PTT - ( 18 May 2021 14:43 )  PTT:29.1 sec  CARDIAC MARKERS ( 19 May 2021 08:25 )  <0.015 ng/mL / x     / x     / x     / x          Urinalysis Basic - ( 18 May 2021 15:12 )    Color: Yellow / Appearance: Clear / S.015 / pH: x  Gluc: x / Ketone: Negative  / Bili: Negative / Urobili: Negative   Blood: x / Protein: 30 mg/dL / Nitrite: Negative   Leuk Esterase: Trace / RBC: 2-5 /HPF / WBC 3-5 /HPF   Sq Epi: x / Non Sq Epi: Occasional /HPF / Bacteria: Trace /HPF        Culture - Urine (collected 18 May 2021 21:58)  Source: .Urine Clean Catch (Midstream)  Final Report (19 May 2021 17:50):    <10,000 CFU/mL Normal Urogenital Wilda      COVID-19 PCR: NotDetec (19 May 2021 16:49)  COVID-19 PCR: NotDetec (18 May 2021 15:12)  COVID-19 PCR: NotDetec (2021 15:08)  COVID-19 PCR: NotDetec (2021 15:41)

## 2021-05-20 NOTE — CONSULT NOTE ADULT - SUBJECTIVE AND OBJECTIVE BOX
Source of information: , Chart review, patient  Patient language: English  : n/a    CC: Patient is a 66y old  Female who presents with a chief complaint of LUE swelling (20 May 2021 12:39)      HPI:  67 y/o F with PMHx of breast cancer s/p bilateral mastectomy, malignant pleural effusion s/p VATS procedure, chronic LUE lymphedema x 20y, hypothyroidism and HLD presents to the ED with complaint of left upper extremity swelling. Patient reports pain, redness and swelling of LUE since yesterday. She reports she has this intermittently but gets better with antibiotics. Also reports having fever of 101 yesterday and 99 today along with chills. Denies chest pain, cough, dysuria, abdominal pain. Patient is complaining of back pain and feeling cold and chills when seen.    ED course:   Vitals: /112, , Temp 99 Sat normal on RA  Labs: WBC count normal, bili 1.3.  (18 May 2021 16:48)    Pt with history of breat ca with mets to lung.  Pt with chronic LUE lymphedema  Pt admitted with cellulitis of left arm.  + complaints of lower back pain.  Pain increases on exertion.  Pt tolerating diet.     PAIN SCORE:   4/10    SCALE USED: (1-10 VNRS)    PAST MEDICAL & SURGICAL HISTORY:  Thyroid condition    Lymphedema of arm  leftarm lymphedema since 20 years , after the breast reconstruction surgery    S/P mastectomy, bilateral    Hypothyroidism    Breast cancer    HLD (hyperlipidemia)    S/P mastectomy, bilateral  s/p implants b/l, no chemo or radiation therapy    S/P bilateral mastectomy    H/O pleural empyema  pleurodesis        FAMILY HISTORY:  No pertinent family history in first degree relatives    SOCIAL HISTORY:  [x ] Denies Smoking, Alcohol, or Drug Use    Allergies    No Known Allergies    Intolerances        MEDICATIONS:    MEDICATIONS  (STANDING):  ALPRAZolam 0.5 milliGRAM(s) Oral every 12 hours  azithromycin   Tablet 500 milliGRAM(s) Oral daily  ceFAZolin   IVPB 2000 milliGRAM(s) IV Intermittent every 8 hours  cyanocobalamin Injectable 1000 MICROGram(s) SubCutaneous daily  enoxaparin Injectable 40 milliGRAM(s) SubCutaneous daily  gabapentin 100 milliGRAM(s) Oral every 12 hours  ketorolac 10 milliGRAM(s) Oral every 8 hours  letrozole 2.5 milliGRAM(s) Oral daily  levothyroxine 50 MICROGram(s) Oral daily  lidocaine   Patch 1 Patch Transdermal every 24 hours  palbociclib 125 milliGRAM(s) Oral daily  pantoprazole    Tablet 40 milliGRAM(s) Oral before breakfast  polyethylene glycol 3350 17 Gram(s) Oral daily  senna 2 Tablet(s) Oral at bedtime  simvastatin 10 milliGRAM(s) Oral at bedtime    MEDICATIONS  (PRN):  acetaminophen   Tablet .. 650 milliGRAM(s) Oral every 6 hours PRN Temp greater or equal to 38C (100.4F), Moderate Pain (4 - 6)  oxyCODONE    IR 5 milliGRAM(s) Oral every 4 hours PRN Moderate Pain (4 - 6)  oxyCODONE    IR 10 milliGRAM(s) Oral every 4 hours PRN Severe Pain (7 - 10)      Vital Signs Last 24 Hrs  T(C): 36.6 (20 May 2021 13:56), Max: 36.6 (20 May 2021 05:30)  T(F): 97.9 (20 May 2021 13:56), Max: 97.9 (20 May 2021 05:30)  HR: 100 (20 May 2021 13:56) (100 - 103)  BP: 124/73 (20 May 2021 13:56) (120/69 - 129/79)  BP(mean): --  RR: 20 (20 May 2021 13:56) (19 - 20)  SpO2: 99% (20 May 2021 13:56) (99% - 100%)    LABS:                          11.0   7.55  )-----------( 292      ( 20 May 2021 07:53 )             33.3     -20    137  |  103  |  16  ----------------------------<  157<H>  3.7   |  26  |  0.90    Ca    8.9      20 May 2021 07:53  Phos  2.8       Mg     2.5         TPro  7.4  /  Alb  2.6<L>  /  TBili  1.5<H>  /  DBili  x   /  AST  16  /  ALT  24  /  AlkPhos  74  05-19    PT/INR - ( 18 May 2021 14:43 )   PT: 12.9 sec;   INR: 1.09 ratio         PTT - ( 18 May 2021 14:43 )  PTT:29.1 sec  LIVER FUNCTIONS - ( 19 May 2021 08:25 )  Alb: 2.6 g/dL / Pro: 7.4 g/dL / ALK PHOS: 74 U/L / ALT: 24 U/L DA / AST: 16 U/L / GGT: x           Urinalysis Basic - ( 18 May 2021 15:12 )    Color: Yellow / Appearance: Clear / S.015 / pH: x  Gluc: x / Ketone: Negative  / Bili: Negative / Urobili: Negative   Blood: x / Protein: 30 mg/dL / Nitrite: Negative   Leuk Esterase: Trace / RBC: 2-5 /HPF / WBC 3-5 /HPF   Sq Epi: x / Non Sq Epi: Occasional /HPF / Bacteria: Trace /HPF      CAPILLARY BLOOD GLUCOSE          REVIEW OF SYSTEMS:  CONSTITUTIONAL: No fever or fatigue  RESPIRATORY: No cough, wheezing, chills or hemoptysis; No shortness of breath  CARDIOVASCULAR: No chest pain, palpitations, dizziness, or leg swelling  GASTROINTESTINAL: No abdominal or epigastric pain. No nausea, vomiting; No diarrhea or constipation.   GENITOURINARY: No dysuria, frequency, hematuria, retention or incontinence  MUSCULOSKELETAL: + left arm swelling and redness + chronic lymphedema   NEURO: No weakness, no numbness   PSYCHIATRIC: No depression, anxiety, mood swings, or difficulty sleeping    PHYSICAL EXAM:  GENERAL:  Alert & Oriented X3, NAD, Good concentration  CHEST/LUNG: Clear to auscultation bilaterally; No rales, rhonchi, wheezing, or rubs  HEART: Regular rate and rhythm; No murmurs, rubs, or gallops  ABDOMEN: Soft, Nontender, Nondistended; Bowel sounds present  : no incontinence, no flank pain  EXTREMITIES:  2+ Peripheral Pulses, + left UE edema and erythema + lumbar spine tenderness  MUSCULOSKELETAL:  + decreased rom Motor Strength 5/5 B/L upper and lower extremities; moves all extremities equally against gravity; ROM intact; negative SRL  NEUROLOGICAL: awake and alert and oriented   SKIN: No rashes or lesions    Radiology:  < from: US Hepatic & Pancreatic (21 @ 17:07) >    EXAM:  US LIVER AND PANCREAS                            PROCEDURE DATE:  2021          INTERPRETATION:  CLINICAL STATEMENT: Elevated bilirubin. COVID positive    TECHNIQUE: Ultrasound of the right upper quadrant.    COMPARISON: CT scan 2019    FINDINGS:    The liver is enlarged and echogenic. There is hepatopedal flow in the main portal vein. There is no liver mass    There is no gallstone or gallbladder wall thickening.    The common bile duct is not dilated measuring 4 mm.    The pancreas is not well seen.    The right kidney measures 12.1 cm in length.  There is no hydronephrosis.    The visualized aorta and IVC are unremarkable.    IMPRESSION: Enlarged fatty liver.    No gallstone or gallbladder wall thickening.    < end of copied text >      Drug Screen:  enoxaparin Injectable 40 milliGRAM(s) SubCutaneous daily    azithromycin   Tablet 500 milliGRAM(s) Oral daily  ceFAZolin   IVPB 2000 milliGRAM(s) IV Intermittent every 8 hours        < from: US Duplex Venous Upper Ext Ltd, Left (21 @ 13:36) >  EXAM:  US DPLX UPR EXT VEINS LTD LT                            PROCEDURE DATE:  2021          INTERPRETATION:  CLINICAL INFORMATION: Left upper extremity swelling    COMPARISON: 2019    TECHNIQUE: Duplex sonography of the LEFT UPPER extremity veins with color and spectral Doppler, with and without compression.    FINDINGS:    The left internal jugular, subclavian, axillary and brachial veins are patent and compressible where applicable.  The basilic and cephalic veins (superficial veins) are patent and without thrombus.    Doppler examination shows normal spontaneous and phasic flow.    IMPRESSION:  No evidence of left upper extremity deep venous thrombosis.    < end of copied text >  ORT Score   Family Hx of substance abuse	Female	Male  Alcohol 	                                              1              3  Illegal drugs	                                      2              3  Rx drugs                                               4	      4    Personal Hx of substance abuse		  Alcohol 	                                               3	      3  Illegal drugs                                  	       4	      4  Rx drugs                                                5	      5  Age between 16- 45 years	               1             1  hx preadolescent sexual abuse	       3	      0  Psychological disease		  ADD, OCD, bipolar, schizophrenia	2	      2  Depression                                    	1	      1  Score totals 		0  		  a score of 3 or lower indicates low risk for opioid abuse		  a score of 4-7 indicates moderate risk for opioid abuse		  a score of 8 or higher indicates high risk for opioid abuse	    Risk factors associated with adverse outcomes related to opioid treatment  [x ]  Concurrent benzodiazepine use  [ ]  History/ Active substance use or alcohol use disorder  [ ] Psychiatric co-morbidity  [ ] Sleep apnea  [ ] COPD  [ ] BMI> 35  [ ] Liver dysfunction  [ ] Renal dysfunction  [ ] CHF  [ ] Smoker  [x]  Age > 60 years      [x ]  [place]  Reviewed and Copied to Chart. See below.

## 2021-05-20 NOTE — PROGRESS NOTE ADULT - PROBLEM SELECTOR PLAN 8
Patient from home   will need medical optimization prior to D/C   pending pt eval lovenox for DVT prophylaxis

## 2021-05-20 NOTE — PROGRESS NOTE ADULT - SUBJECTIVE AND OBJECTIVE BOX
66y Female is under our care for     REVIEW OF SYSTEMS:  [  ] Not able to elicit  General:	  Chest:	  GI:	  :  Skin:	  Musculoskeletal:	  Neuro:	    MEDS:  azithromycin   Tablet 500 milliGRAM(s) Oral daily  ceFAZolin   IVPB 2000 milliGRAM(s) IV Intermittent every 8 hours    ALLERGIES: Allergies    No Known Allergies    Intolerances        VITALS:  Vital Signs Last 24 Hrs  T(C): 36.6 (20 May 2021 05:30), Max: 36.6 (20 May 2021 05:30)  T(F): 97.9 (20 May 2021 05:30), Max: 97.9 (20 May 2021 05:30)  HR: 103 (20 May 2021 05:30) (102 - 103)  BP: 129/79 (20 May 2021 05:30) (120/69 - 129/79)  BP(mean): --  RR: 19 (20 May 2021 05:30) (19 - 20)  SpO2: 100% (20 May 2021 05:30) (99% - 100%)      PHYSICAL EXAM:  HEENT:  Neck:  Respiratory:  Cardiovascular:  Gastrointestinal:  Extremities:  Skin:  Ortho:  Neuro:    LABS/DIAGNOSTIC TESTS:                        11.0   7.55  )-----------( 292      ( 20 May 2021 07:53 )             33.3     WBC Count: 7.55 K/uL (05-20 @ 07:53)  WBC Count: 8.46 K/uL (05-19 @ 08:25)  WBC Count: 8.41 K/uL (05-18 @ 14:43)    05-20    137  |  103  |  16  ----------------------------<  157<H>  3.7   |  26  |  0.90    Ca    8.9      20 May 2021 07:53  Phos  2.8     05-19  Mg     2.5     05-19    TPro  7.4  /  Alb  2.6<L>  /  TBili  1.5<H>  /  DBili  x   /  AST  16  /  ALT  24  /  AlkPhos  74  05-19      CULTURES:   .Urine Clean Catch (Midstream)  05-18 @ 21:58   <10,000 CFU/mL Normal Urogenital Wilda  --  --      .Urine Clean Catch (Midstream)  04-17 @ 22:03   <10,000 CFU/mL Normal Urogenital Wilda  --  --        RADIOLOGY:  no new studies 66y Female is under our care for left upper extremity cellulitis.  Patient was seen laying in the bed with no acute distress.  She reports lower back pain but was recently medicated, remains afebrile, WBC count is WNL, repeat covid was negative.      REVIEW OF SYSTEMS:  [  ] Not able to elicit  General: no fevers no malaise  Chest: infrequent cough+, no sob  GI: no nvd  : no urinary sxs   Skin: left arm redness  Musculoskeletal: no trauma no LBP  Neuro: no ha's no dizziness     MEDS:  azithromycin   Tablet 500 milliGRAM(s) Oral daily  ceFAZolin   IVPB 2000 milliGRAM(s) IV Intermittent every 8 hours    ALLERGIES: Allergies    No Known Allergies    Intolerances        VITALS:  Vital Signs Last 24 Hrs  T(C): 36.6 (20 May 2021 05:30), Max: 36.6 (20 May 2021 05:30)  T(F): 97.9 (20 May 2021 05:30), Max: 97.9 (20 May 2021 05:30)  HR: 103 (20 May 2021 05:30) (102 - 103)  BP: 129/79 (20 May 2021 05:30) (120/69 - 129/79)  BP(mean): --  RR: 19 (20 May 2021 05:30) (19 - 20)  SpO2: 100% (20 May 2021 05:30) (99% - 100%)      PHYSICAL EXAM:  HEENT: n/a  Neck: supple no LN's   Respiratory: lungs clear no rales  Cardiovascular: S1 S2 reg no murmurs  Gastrointestinal: +BS with soft, nondistended abdomen; nontender  Extremities: left upper extremity edema +4  Skin: Left upper extremity erythema, warmth +, mild tenderness+  Ortho: n/a  Neuro: AAO x 4      LABS/DIAGNOSTIC TESTS:                        11.0   7.55  )-----------( 292      ( 20 May 2021 07:53 )             33.3     WBC Count: 7.55 K/uL (05-20 @ 07:53)  WBC Count: 8.46 K/uL (05-19 @ 08:25)  WBC Count: 8.41 K/uL (05-18 @ 14:43)    05-20    137  |  103  |  16  ----------------------------<  157<H>  3.7   |  26  |  0.90    Ca    8.9      20 May 2021 07:53  Phos  2.8     05-19  Mg     2.5     05-19    TPro  7.4  /  Alb  2.6<L>  /  TBili  1.5<H>  /  DBili  x   /  AST  16  /  ALT  24  /  AlkPhos  74  05-19      CULTURES:   .Urine Clean Catch (Midstream)  05-18 @ 21:58   <10,000 CFU/mL Normal Urogenital Wilda  --  --      .Urine Clean Catch (Midstream)  04-17 @ 22:03   <10,000 CFU/mL Normal Urogenital Wilda  --  --        RADIOLOGY:  no new studies

## 2021-05-20 NOTE — PROGRESS NOTE ADULT - SUBJECTIVE AND OBJECTIVE BOX
Pt is awake, alert, lying in bed. Afebrile.     INTERVAL HPI/OVERNIGHT EVENTS:      VITAL SIGNS:  T(F): 97.9 (21 @ 05:30)  HR: 103 (21 @ 05:30)  BP: 129/79 (21 @ 05:30)  RR: 19 (21 @ 05:30)  SpO2: 100% (21 @ 05:30)  Wt(kg): --  I&O's Detail          REVIEW OF SYSTEMS:    CONSTITUTIONAL:  No fevers, chills, sweats    HEENT:  Eyes:  No diplopia or blurred vision. ENT:  No earache, sore throat or runny nose.    CARDIOVASCULAR:  No pressure, squeezing, tightness, or heaviness about the chest; no palpitations.    RESPIRATORY:  Per HPI    GASTROINTESTINAL:  No abdominal pain, nausea, vomiting or diarrhea.    GENITOURINARY:  No dysuria, frequency or urgency.    NEUROLOGIC:  No paresthesias, fasciculations, seizures or weakness.    PSYCHIATRIC:  No disorder of thought or mood.      PHYSICAL EXAM:    Constitutional: Well developed and nourished  Eyes:Perrla  ENMT: normal  Neck:supple  Respiratory: good air entry  Cardiovascular: S1 S2 regular  Gastrointestinal: Soft, Non tender  Extremities: LUE cellulitis   Vascular:normal  Neurological:Awake, alert,Ox3  Musculoskeletal:Normal      MEDICATIONS  (STANDING):  ALPRAZolam 0.5 milliGRAM(s) Oral every 12 hours  ceFAZolin   IVPB 2000 milliGRAM(s) IV Intermittent every 8 hours  enoxaparin Injectable 40 milliGRAM(s) SubCutaneous daily  letrozole 2.5 milliGRAM(s) Oral daily  levothyroxine 50 MICROGram(s) Oral daily  lidocaine   Patch 1 Patch Transdermal every 24 hours  pantoprazole    Tablet 40 milliGRAM(s) Oral before breakfast  simvastatin 10 milliGRAM(s) Oral at bedtime    MEDICATIONS  (PRN):  acetaminophen   Tablet .. 650 milliGRAM(s) Oral every 6 hours PRN Temp greater or equal to 38C (100.4F), Moderate Pain (4 - 6)  oxycodone    5 mG/acetaminophen 325 mG 1 Tablet(s) Oral every 6 hours PRN Severe Pain (7 - 10)      Allergies    No Known Allergies    Intolerances        LABS:                        11.0   7.55  )-----------( 292      ( 20 May 2021 07:53 )             33.3     05-    137  |  103  |  16  ----------------------------<  157<H>  3.7   |  26  |  0.90    Ca    8.9      20 May 2021 07:53  Phos  2.8       Mg     2.5         TPro  7.4  /  Alb  2.6<L>  /  TBili  1.5<H>  /  DBili  x   /  AST  16  /  ALT  24  /  AlkPhos  74  -    PT/INR - ( 18 May 2021 14:43 )   PT: 12.9 sec;   INR: 1.09 ratio         PTT - ( 18 May 2021 14:43 )  PTT:29.1 sec  Urinalysis Basic - ( 18 May 2021 15:12 )    Color: Yellow / Appearance: Clear / S.015 / pH: x  Gluc: x / Ketone: Negative  / Bili: Negative / Urobili: Negative   Blood: x / Protein: 30 mg/dL / Nitrite: Negative   Leuk Esterase: Trace / RBC: 2-5 /HPF / WBC 3-5 /HPF   Sq Epi: x / Non Sq Epi: Occasional /HPF / Bacteria: Trace /HPF        CARDIAC MARKERS ( 19 May 2021 08:25 )  <0.015 ng/mL / x     / x     / x     / x          CAPILLARY BLOOD GLUCOSE      Procalcitonin, Serum (21 @ 13:35)   Procalcitonin, Serum: 2.20:       RADIOLOGY & ADDITIONAL TESTS:    CXR:    Ct scan chest:    ekg;    echo:

## 2021-05-20 NOTE — PROGRESS NOTE ADULT - ASSESSMENT
Left upper extremity cellultis  Fever - improved  Unlikely to be COVID pneumonia - patient is asymptomatic and is covid neg x 2    Plan: Continue Cefazolin 2g iv q8  If doing well, will plan to DC on Friday or Saturday on po abx Left upper extremity cellultis  Fever - improved  Unlikely to be COVID pneumonia - patient is asymptomatic and is covid neg x 2    Plan: Continue Cefazolin 2g iv q8  If doing well, will plan to DC on  Saturday or Sunday on po abx  Can DC isolation for COVID    I agree with above

## 2021-05-20 NOTE — PROGRESS NOTE ADULT - SUBJECTIVE AND OBJECTIVE BOX
-*-* INCOMPLETE  NP Note discussed with  Primary Attending    Patient is a 66y old  Female who presents with a chief complaint of LUE swelling (20 May 2021 12:39)    INTERVAL HPI/OVERNIGHT EVENTS: back pain, improvement in erythema and edema of LUE    MEDICATIONS  (STANDING):  ALPRAZolam 0.5 milliGRAM(s) Oral every 12 hours  azithromycin   Tablet 500 milliGRAM(s) Oral daily  ceFAZolin   IVPB 2000 milliGRAM(s) IV Intermittent every 8 hours  cyanocobalamin Injectable 1000 MICROGram(s) SubCutaneous daily  enoxaparin Injectable 40 milliGRAM(s) SubCutaneous daily  gabapentin 100 milliGRAM(s) Oral every 12 hours  ketorolac 10 milliGRAM(s) Oral every 8 hours  letrozole 2.5 milliGRAM(s) Oral daily  levothyroxine 50 MICROGram(s) Oral daily  lidocaine   Patch 1 Patch Transdermal every 24 hours  palbociclib 125 milliGRAM(s) Oral daily  pantoprazole    Tablet 40 milliGRAM(s) Oral before breakfast  polyethylene glycol 3350 17 Gram(s) Oral daily  senna 2 Tablet(s) Oral at bedtime  simvastatin 10 milliGRAM(s) Oral at bedtime    MEDICATIONS  (PRN):  acetaminophen   Tablet .. 650 milliGRAM(s) Oral every 6 hours PRN Temp greater or equal to 38C (100.4F), Moderate Pain (4 - 6)  oxyCODONE    IR 5 milliGRAM(s) Oral every 4 hours PRN Moderate Pain (4 - 6)  oxyCODONE    IR 10 milliGRAM(s) Oral every 4 hours PRN Severe Pain (7 - 10)      __________________________________________________  REVIEW OF SYSTEMS:    CONSTITUTIONAL: No fever,   EYES: no acute visual disturbances  NECK: No pain or stiffness  RESPIRATORY: No cough; No shortness of breath  CARDIOVASCULAR: No chest pain, no palpitations  GASTROINTESTINAL: No pain. No nausea or vomiting; No diarrhea   NEUROLOGICAL: No headache or numbness, no tremors  MUSCULOSKELETAL: No joint pain, no muscle pain  GENITOURINARY: no dysuria, no frequency, no hesitancy  PSYCHIATRY: no depression , no anxiety  ALL OTHER  ROS negative        Vital Signs Last 24 Hrs  T(C): 36.6 (20 May 2021 05:30), Max: 36.6 (20 May 2021 05:30)  T(F): 97.9 (20 May 2021 05:30), Max: 97.9 (20 May 2021 05:30)  HR: 103 (20 May 2021 05:30) (102 - 103)  BP: 129/79 (20 May 2021 05:30) (120/69 - 129/79)  BP(mean): --  RR: 19 (20 May 2021 05:30) (19 - 20)  SpO2: 100% (20 May 2021 05:30) (99% - 100%)    ________________________________________________  PHYSICAL EXAM:  GENERAL: Morbidly obese female  HEENT: Normocephalic;  conjunctivae and sclerae clear;  NECK : supple  CHEST/LUNG: diminished b/l poor effort    HEART: S1 S2  regular; no murmurs, gallops or rubs  ABDOMEN: Soft, Nontender, distended; Bowel sounds present  EXTREMITIES: + LUE Edema, and erythema no cyanosis; no calf tenderness  SKIN: warm and dry; no rash  NERVOUS SYSTEM:  Awake and alert; Oriented  to place, person and time  _________________________________________________  LABS:                        11.0   7.55  )-----------( 292      ( 20 May 2021 07:53 )             33.3     05-20    137  |  103  |  16  ----------------------------<  157<H>  3.7   |  26  |  0.90    Ca    8.9      20 May 2021 07:53  Phos  2.8       Mg     2.5         TPro  7.4  /  Alb  2.6<L>  /  TBili  1.5<H>  /  DBili  x   /  AST  16  /  ALT  24  /  AlkPhos  74  05-19    PT/INR - ( 18 May 2021 14:43 )   PT: 12.9 sec;   INR: 1.09 ratio         PTT - ( 18 May 2021 14:43 )  PTT:29.1 sec  Urinalysis Basic - ( 18 May 2021 15:12 )    Color: Yellow / Appearance: Clear / S.015 / pH: x  Gluc: x / Ketone: Negative  / Bili: Negative / Urobili: Negative   Blood: x / Protein: 30 mg/dL / Nitrite: Negative   Leuk Esterase: Trace / RBC: 2-5 /HPF / WBC 3-5 /HPF   Sq Epi: x / Non Sq Epi: Occasional /HPF / Bacteria: Trace /HPF      CAPILLARY BLOOD GLUCOSE      RADIOLOGY & ADDITIONAL TESTS:   < from: US Hepatic & Pancreatic (21 @ 17:07) >    EXAM:  US LIVER AND PANCREAS                            PROCEDURE DATE:  2021          INTERPRETATION:  CLINICAL STATEMENT: Elevated bilirubin. COVID positive    TECHNIQUE: Ultrasound of the right upper quadrant.    COMPARISON: CT scan 2019    FINDINGS:    The liver is enlarged and echogenic. There is hepatopedal flow in the main portal vein. There is no liver mass    There is no gallstone or gallbladder wall thickening.    The common bile duct is not dilated measuring 4 mm.    The pancreas is not well seen.    The right kidney measures 12.1 cm in length.  There is no hydronephrosis.    The visualized aorta and IVC are unremarkable.    IMPRESSION: Enlarged fatty liver.    No gallstone or gallbladder wall thickening.    < end of copied text >  < from: Xray Chest 1 View- PORTABLE-Urgent (21 @ 14:16) >    EXAM:  XR CHEST PORTABLE URGENT 1V                            PROCEDURE DATE:  2021          INTERPRETATION:  INDICATION: Sepsis    PRIORS: 2021.    VIEWS: Portable AP radiography of the chest performed.    FINDINGS: Heart size as well as mediastinal contours cannot be assessed on this portable evaluation. There are patchy airspace opacities identified bilaterally representing interval change from prior examination. Opacity at the periphery of the left lower thorax suggests the presence of left pleural thickening. No evidence of pneumothorax. No mediastinal shift. Degenerative change thoracic spine.    IMPRESSION: Bilateral patchy airspace opacities.      < end of copied text >    Imaging Personally Reviewed:  YES    Consultant(s) Notes Reviewed:   YES    Plan of care was discussed with patient and /or primary care giver; all questions and concerns were addressed and care was aligned with patient's wishes.

## 2021-05-20 NOTE — PROGRESS NOTE ADULT - PROBLEM SELECTOR PLAN 3
CXR found Bilateral patchy airspace opacities  suspicious for COVID   Pending CT  Asymtomatic   contact/airborn isolation CXR found Bilateral patchy airspace opacities  suspicious for COVID   Pending CT to r/o PE   Asymptomatic   contact/airborne isolation

## 2021-05-20 NOTE — CHART NOTE - NSCHARTNOTEFT_GEN_A_CORE
EVENT: Son Jimmy Paris ([phone]) called, updated on pt's condition, questions answered, pt is informed.  Vital Signs Last 24 Hrs  T(C): 37.6 (20 May 2021 21:04), Max: 37.6 (20 May 2021 21:04)  T(F): 99.7 (20 May 2021 21:04), Max: 99.7 (20 May 2021 21:04)  HR: 114 (20 May 2021 21:04) (100 - 114)  BP: 128/62 (20 May 2021 21:04) (124/73 - 129/79)  BP(mean): --  RR: 17 (20 May 2021 21:04) (17 - 20)  SpO2: 98% (20 May 2021 21:04) (98% - 100%)

## 2021-05-20 NOTE — PHARMACOTHERAPY INTERVENTION NOTE - COMMENTS
125 mg daily  changed to daily for 14 days ( 21 DAYS TO RECEIVE DAILY AND 7 DAYS OFF)
500 mg oral daily for 333 days changed to 3 days for PNA

## 2021-05-20 NOTE — CONSULT NOTE ADULT - ASSESSMENT
The Drug Utilization Report below displays all of the controlled substance prescriptions, if any, that your patient has filled in the last twelve months. The information displayed on this report is compiled from pharmacy submissions to the Department, and accurately reflects the information as submitted by the pharmacies.    This report was requested by: Xiao Blair | Reference #: [id number]    Others' Prescriptions  Patient Name: Ave Lugo Date: 1955  Address: [address] [place], [place] [id number]: Female  Rx Written	Rx Dispensed	Drug	Quantity	Days Supply	Prescriber Name	Prescriber Penelope #	Payment Method  05/06/2021	05/06/2021	alprazolam 0.5 mg tablet	30	15	Chiki-Deejay, [facility]	[id number]	Medicare  Dispenser [facility] #[id number]  04/23/2021	04/23/2021	alprazolam 0.5 mg tablet	30	15	Chiki-Deejay, [facility]	[id number]	Medicare Dispenser [facility] #[id number]  04/09/2021	04/09/2021	alprazolam 0.5 mg tablet	30	15	Chiki-Deejay, [facility]	[id number]	Medicare Dispenser [facility] #[id number]  03/26/2021	03/27/2021	alprazolam 0.5 mg tablet	30	15	[facility]	[id number]	Medicare  Dispenser [facility] #[id number]  03/11/2021	03/12/2021	alprazolam 0.5 mg tablet	30	15	[facility]	[id number]	Medicare Dispenser [facility] #[id number]  02/27/2021	02/28/2021	alprazolam 0.5 mg tablet	30	15	Chiki-Deejay, [facility]	[id number]	Medicare Dispenser [facility] #[id number]  02/11/2021	02/13/2021	alprazolam 0.5 mg tablet	30	15	Chiki-Deejay, [facility]	[id number]	Medicare Dispenser [facility] #[id number]

## 2021-05-20 NOTE — PROGRESS NOTE ADULT - ATTENDING COMMENTS
I reviewed patient's data and participated in the management of the patient along with Torri VERMA as well as hematology/med oncology faculty during the daily heme/onc case review. I reviewed pertinent clinical information, PE,  labs as well as A/P as outline above, in agreement and edited as appropriate.   Cellulitis- cont with abx  met breast ca- cont palliative hormonal tx with Letrosole/Ibrance  Back pain- optimize pain management, f/u pain  management team

## 2021-05-21 ENCOUNTER — TRANSCRIPTION ENCOUNTER (OUTPATIENT)
Age: 66
End: 2021-05-21

## 2021-05-21 LAB
ANION GAP SERPL CALC-SCNC: 8 MMOL/L — SIGNIFICANT CHANGE UP (ref 5–17)
BUN SERPL-MCNC: 10 MG/DL — SIGNIFICANT CHANGE UP (ref 7–18)
CALCIUM SERPL-MCNC: 9.3 MG/DL — SIGNIFICANT CHANGE UP (ref 8.4–10.5)
CHLORIDE SERPL-SCNC: 99 MMOL/L — SIGNIFICANT CHANGE UP (ref 96–108)
CO2 SERPL-SCNC: 28 MMOL/L — SIGNIFICANT CHANGE UP (ref 22–31)
CREAT SERPL-MCNC: 0.7 MG/DL — SIGNIFICANT CHANGE UP (ref 0.5–1.3)
GLUCOSE SERPL-MCNC: 126 MG/DL — HIGH (ref 70–99)
HCT VFR BLD CALC: 33.5 % — LOW (ref 34.5–45)
HGB BLD-MCNC: 11.2 G/DL — LOW (ref 11.5–15.5)
MCHC RBC-ENTMCNC: 33.4 GM/DL — SIGNIFICANT CHANGE UP (ref 32–36)
MCHC RBC-ENTMCNC: 35.1 PG — HIGH (ref 27–34)
MCV RBC AUTO: 105 FL — HIGH (ref 80–100)
NRBC # BLD: 0 /100 WBCS — SIGNIFICANT CHANGE UP (ref 0–0)
PLATELET # BLD AUTO: 373 K/UL — SIGNIFICANT CHANGE UP (ref 150–400)
POTASSIUM SERPL-MCNC: 3.5 MMOL/L — SIGNIFICANT CHANGE UP (ref 3.5–5.3)
POTASSIUM SERPL-SCNC: 3.5 MMOL/L — SIGNIFICANT CHANGE UP (ref 3.5–5.3)
RBC # BLD: 3.19 M/UL — LOW (ref 3.8–5.2)
RBC # FLD: 13.2 % — SIGNIFICANT CHANGE UP (ref 10.3–14.5)
SODIUM SERPL-SCNC: 135 MMOL/L — SIGNIFICANT CHANGE UP (ref 135–145)
WBC # BLD: 5.4 K/UL — SIGNIFICANT CHANGE UP (ref 3.8–10.5)
WBC # FLD AUTO: 5.4 K/UL — SIGNIFICANT CHANGE UP (ref 3.8–10.5)

## 2021-05-21 PROCEDURE — 99231 SBSQ HOSP IP/OBS SF/LOW 25: CPT

## 2021-05-21 RX ADMIN — Medication 0.5 MILLIGRAM(S): at 17:27

## 2021-05-21 RX ADMIN — Medication 50 MICROGRAM(S): at 05:34

## 2021-05-21 RX ADMIN — Medication 10 MILLIGRAM(S): at 14:33

## 2021-05-21 RX ADMIN — PREGABALIN 1000 MICROGRAM(S): 225 CAPSULE ORAL at 12:27

## 2021-05-21 RX ADMIN — Medication 10 MILLIGRAM(S): at 08:00

## 2021-05-21 RX ADMIN — Medication 10 MILLIGRAM(S): at 05:34

## 2021-05-21 RX ADMIN — Medication 10 MILLIGRAM(S): at 15:00

## 2021-05-21 RX ADMIN — LETROZOLE 2.5 MILLIGRAM(S): 2.5 TABLET, FILM COATED ORAL at 12:27

## 2021-05-21 RX ADMIN — Medication 10 MILLIGRAM(S): at 00:17

## 2021-05-21 RX ADMIN — Medication 0.5 MILLIGRAM(S): at 05:34

## 2021-05-21 RX ADMIN — GABAPENTIN 100 MILLIGRAM(S): 400 CAPSULE ORAL at 05:34

## 2021-05-21 RX ADMIN — PANTOPRAZOLE SODIUM 40 MILLIGRAM(S): 20 TABLET, DELAYED RELEASE ORAL at 05:35

## 2021-05-21 RX ADMIN — PALBOCICLIB 125 MILLIGRAM(S): 100 CAPSULE ORAL at 12:28

## 2021-05-21 RX ADMIN — POLYETHYLENE GLYCOL 3350 17 GRAM(S): 17 POWDER, FOR SOLUTION ORAL at 12:27

## 2021-05-21 RX ADMIN — LIDOCAINE 1 PATCH: 4 CREAM TOPICAL at 21:27

## 2021-05-21 RX ADMIN — Medication 100 MILLIGRAM(S): at 14:35

## 2021-05-21 RX ADMIN — Medication 100 MILLIGRAM(S): at 21:29

## 2021-05-21 RX ADMIN — Medication 10 MILLIGRAM(S): at 23:11

## 2021-05-21 RX ADMIN — LIDOCAINE 1 PATCH: 4 CREAM TOPICAL at 08:28

## 2021-05-21 RX ADMIN — Medication 100 MILLIGRAM(S): at 05:34

## 2021-05-21 RX ADMIN — Medication 10 MILLIGRAM(S): at 21:29

## 2021-05-21 RX ADMIN — LIDOCAINE 1 PATCH: 4 CREAM TOPICAL at 20:09

## 2021-05-21 RX ADMIN — ENOXAPARIN SODIUM 40 MILLIGRAM(S): 100 INJECTION SUBCUTANEOUS at 12:27

## 2021-05-21 RX ADMIN — GABAPENTIN 100 MILLIGRAM(S): 400 CAPSULE ORAL at 17:27

## 2021-05-21 NOTE — PROGRESS NOTE ADULT - PROBLEM SELECTOR PLAN 3
CXR found Bilateral patchy airspace opacities  suspicious for COVID   Pending CT to r/o PE   Asymptomatic   contact/airborne isolation CT confirms NO PNA  Zithromax d.c  isolation removed   Santhosh alegria following

## 2021-05-21 NOTE — PROGRESS NOTE ADULT - PROBLEM SELECTOR PLAN 1
Improving   Sepsis likely due to LUE cellulitis vs PNA  afebrile 24hours   CXR shows b/l patchy opacities   abx changed to Kefazolin   Zithromax to cover PNA   pending blood culture  urine culture negative   f/u CTA chest  ID Dr Wolfe Improving   Sepsis likely due to LUE cellulitis vs PNA  afebrile 24hours   CXR shows b/l patchy opacities   abx changed to Kefazolin   Zithromax d/c  pending blood culture  urine culture negative   f/u CTA chest  ID Dr Wolfe

## 2021-05-21 NOTE — DISCHARGE NOTE PROVIDER - HOSPITAL COURSE
67 y/o F with PMHx of breast cancer s/p bilateral mastectomy, malignant pleural effusion s/p VATS procedure, chronic LUE lymphedema x 20y, hypothyroidism and HLD presents to the ED with complaint of left upper extremity swelling. Admitted to medicine for sepsis secondary to LUE cellulitis. Doppler negative for DVT, started on Vanco Zosyn, changed to Kefazolin. CXR with abnormal findings placed on high suspicion for COVID. Plan for CTA to r/o PE delayed as patient not agreeable and states it hurts her back to lay flat pain medication given then able to obtain CTA which confirmed with no PNA / NO PE.  Heme/onc consulted, continue her breast CA meds and  Upon discharge f/u with Oncologist Dr. Lala.  Cultures were negative, no leukocytosis and cellulitis improving with IV ABx thus pt is d/cing home on P.O ABX as ID recommended   PT recommends -x x x 65 y/o F with PMHx of breast cancer s/p bilateral mastectomy, malignant pleural effusion s/p VATS procedure, chronic LUE lymphedema x 20y, hypothyroidism and HLD presents to the ED with complaint of left upper extremity swelling. Admitted to medicine for sepsis secondary to LUE cellulitis. Doppler negative for DVT, started on Vanco Zosyn, changed to Kefazolin. CTA r/o PE and PNA. Hospital stay complicated by complaints of back pain. Pain management consulted, CT of spine with no acute pathology. Heme/onc consulted, continue her breast CA meds and  Upon discharge f/u with Oncologist Dr. Lala.  Cultures were negative, no leukocytosis and cellulitis improving with IV ABx thus pt is d/cing home on P.O ABX as ID recommended Keflex 500mg PO BID to complete 7 day course. Metoprolol added to patient regimen as reccomended by attending cardiologist.   PT recommends finds patient can go home with outpatient PT. 65 y/o F with PMHx of breast cancer s/p bilateral mastectomy, malignant pleural effusion s/p VATS procedure, chronic LUE lymphedema x 20y, hypothyroidism and HLD presents to the ED with complaint of left upper extremity swelling. Admitted to medicine for sepsis secondary to LUE cellulitis. Doppler negative for DVT, started on Vanco Zosyn, changed to Kefazolin. CTA r/o PE and PNA. Hospital stay complicated by complaints of back pain. Pain management consulted, CT of spine with no acute pathology. Heme/onc consulted, continue her breast CA meds and  Upon discharge f/u with Oncologist Dr. Lala in 1 week for repeat CBC to monitor leukopenia (likely due to kefzol use).  Cultures were negative, no leukocytosis and cellulitis improving with IV ABx thus pt is d/cing home on P.O ABX as ID recommended Keflex 500mg PO BID to complete 7 day course. Metoprolol added to patient regimen as reccomended by attending cardiologist.   PT recommends finds patient can go home with outpatient PT.      Discharge discussed with attending     This is only a brief summary of patient's hospital stay, for full course please see EMR

## 2021-05-21 NOTE — DISCHARGE NOTE PROVIDER - CARE PROVIDER_API CALL
Andrew Hein (DO)  Medicine  [address], [address]  [place] [address]  Phone: [phone]  Fax: [phone]  Follow Up Time: 2 weeks    Yunier Lala  [facility]  [address], [address]  [place] [address]  Phone: [phone]  Fax: [phone]  Follow Up Time: 2 weeks   Andrew Hein ()  Medicine  [address] [facility] [address], [address]  [place] [address]  Phone: [phone]  Fax: [phone]  Follow Up Time: 2 weeks    Yunier Lala  [facility]  [address], [address]  [place] [address]  Phone: [phone]  Fax: [phone]  Follow Up Time: 2 weeks    Alexandrea Wise  INTERNAL MEDICINE  [address] [place] [address]  Phone: [phone]  Fax: [phone]  Follow Up Time:    Andrew Hein ()  Medicine  [address] [facility] [address], [address]  [place] [address]  Phone: [phone]  Fax: [phone]  Follow Up Time: 2 weeks    Yunier Lala  [facility]  [address], [address]  [place] [address]  Phone: [phone]  Fax: [phone]  Follow Up Time: 1 week    Alexandrea Wise  INTERNAL MEDICINE  [address] [place] [address]  Phone: [phone]  Fax: [phone]  Follow Up Time:

## 2021-05-21 NOTE — PROGRESS NOTE ADULT - PROBLEM SELECTOR PLAN 2
Chronic Lymphedema   erythema, swelling and pain  chronic lymphedema from B/L mastectomy 20y ago  abx changed to Kefazolin 5/20   ALEXANDER Wolfe

## 2021-05-21 NOTE — DISCHARGE NOTE PROVIDER - NSDCCPCAREPLAN_GEN_ALL_CORE_FT
PRINCIPAL DISCHARGE DIAGNOSIS  Diagnosis: Cellulitis of left upper extremity  Assessment and Plan of Treatment: Take all of your antibiotics as ordered.  Call your Health Care Provider within two days of arriving home to make a follow up appointment within one week.  If the affected cellulitic area increases in redness, warmth, pain or swelling call your Health Care Provider.  If you develop fever, chills, and/or malaise, call your Health Care Provider.        SECONDARY DISCHARGE DIAGNOSES  Diagnosis: Lymphedema  Assessment and Plan of Treatment: advised upon discharge to restart lymphedema therapy and wear a sleeve, can wrap with ace bandage during hospitalization when cellulitis resolves, keep Left upper extremity  elevated       PRINCIPAL DISCHARGE DIAGNOSIS  Diagnosis: Cellulitis of left upper extremity  Assessment and Plan of Treatment: You were brought to the hospital with a skin infection of your left arm. It is important that you Take all of your antibiotics as ordered. You should continue taking your antibotic 5/26  Call your Health Care Provider within two days of arriving home to make a follow up appointment within one week.  If the affected cellulitic area increases in redness, warmth, pain or swelling call your Health Care Provider.  If you develop fever, chills, and/or malaise, call your Health Care Provider.      SECONDARY DISCHARGE DIAGNOSES  Diagnosis: Lymphedema  Assessment and Plan of Treatment: You have chronic Lymphedema from your bilateral mastectomies.  This puts you at an increased risk for reccurent skin infections. There is no cure for lymphedema. Treatment can relieve symptoms and prevent lymphedema from getting worse. You may need any of the following:  Therapeutic massage helps move lymphatic fluid through your body. It is done by a specialist, who may also teach you how to perform the massage yourself.  Compression devices are sleeves, gloves, boots, or bandages. These devices use pressure to help move lymphatic fluid and prevent fluid buildup.  Physical therapy may help decrease swelling and pain. It may also help improve strength.  Surgery may be needed if other treatments do not work. Surgery may be done to remove tissue, drain fluid, or create a path for lymphatic fluid to flow.  How can I manage my symptoms?  Elevate your arm or leg above the level of your heart as often as you can. This will help decrease swelling and pain. Prop your arm or leg on pillows or blankets to keep it elevated comfortably.  Wear compression socks, sleeves, or bandages as directed. Compression devices must be fitted by a healthcare provider. Compression devices may need to be replaced every 3 to 6 months.  Exercise can help you maintain or regain function of your arm or leg. Ask your healthcare provider what type of exercise to do and how often to do it. Start slow, take breaks, and gradually do more each day. Do not do vigorous, repeated exercises. Watch for changes in your arm or leg during exercise. Stop and rest if you have swelling, increased pain, or heaviness. Elevate your arm or leg above the level of your heart.  Change your position often to help move lymphatic fluid through your body. Do not sit or  one position for more than 30 minutes. Do not cross your legs when you sit. These actions can cause lymphatic fluid to buildup.  Maintain a healthy weight. Ask your healthcare provider what you should weigh. Weight loss may improve your symptoms. If you need to    Diagnosis: Hypertension  Assessment and Plan of Treatment: You were found to have highblood pressure. Normal blood pressure is 119/79 or lower. Your healthcare provider may only check your blood pressure each year if it stays at a normal level.  Elevated blood pressure is 120/79 to 129/79. This is sometimes called prehypertension. Your healthcare provider may suggest lifestyle changes to help lower your blood pressure to a normal level. He or she may then check it again in 3 to 6 months.  Stage 1 hypertension is 130/80 to 139/89. Your provider may recommend lifestyle changes, medication, and checks every 3 to 6 months until your blood pressure is controlled.  Stage 2 hypertension is 140/90 or higher. Your provider will recommend lifestyle changes and have you take 2 kinds of hypertension medicines. You will also need to have your blood pressure checked monthly until it is controlled.  What changes may my healthcare provider make to the medicines used to treat chronic hypertension? Your healthcare provider may add, remove, or change any of the following medicines. Do not change or stop taking any of your current medicines without talking to your provider.  Antihypertensives may be used to help lower your blood pressure. Several kinds of medicines are available. Your healthcare provider may change the medicine or medicines you currently take. This may be needed if your blood pressure is often high when you check it at home or you are having other problems with blood pressure control.  Diuretics help decrease extra fluid that collects in your body. This can help lower your blood pressure. You may urinate more often while you take this medicine.  Cholesterol medicine helps lower your cholesterol level. A low cholesterol level helps prevent heart disease and makes it easier to control your blood pressure.  What can I do to manage chronic hypertension?  Check your blood pressure at home. Avoid smoking, caffeine, and exercise at least 30 minutes before checking your blood pressure. Sit and rest for 5 minutes before you take your blood pre

## 2021-05-21 NOTE — DISCHARGE NOTE PROVIDER - NSDCMRMEDTOKEN_GEN_ALL_CORE_FT
ALPRAZolam 0.5 mg oral tablet: 1 tab(s) orally every 12 hours MDD:1mg  ergocalciferol 50,000 intl units (1.25 mg) oral capsule: 1 cap(s) orally once a week  letrozole 2.5 mg oral tablet: 1 tab(s) orally once a day  levothyroxine 50 mcg (0.05 mg) oral tablet: 1 tab(s) orally once a day  simvastatin 10 mg oral tablet: 1 tab(s) orally once a day (at bedtime)   acetaminophen 325 mg oral tablet: 2 tab(s) orally every 6 hours, As needed, Temp greater or equal to 38C (100.4F), Moderate Pain (4 - 6)  ALPRAZolam 0.5 mg oral tablet: 1 tab(s) orally every 12 hours MDD:1mg  ergocalciferol 50,000 intl units (1.25 mg) oral capsule: 1 cap(s) orally once a week  Keflex 500 mg oral capsule: 1 cap(s) orally 2 times a day   letrozole 2.5 mg oral tablet: 1 tab(s) orally once a day  levothyroxine 50 mcg (0.05 mg) oral tablet: 1 tab(s) orally once a day  metoprolol tartrate 25 mg oral tablet: 1 tab(s) orally 2 times a day  palbociclib 125 mg oral capsule: 1 cap(s) orally once a day  simvastatin 10 mg oral tablet: 1 tab(s) orally once a day (at bedtime)

## 2021-05-21 NOTE — PROGRESS NOTE ADULT - SUBJECTIVE AND OBJECTIVE BOX
CHIEF COMPLAINT:Patient is a 66y old  Female who presents with a chief complaint of LUE swelling.Pt still having back pain.    	  REVIEW OF SYSTEMS:  CONSTITUTIONAL: No fever, weight loss, or fatigue  EYES: No eye pain, visual disturbances, or discharge  ENT:  No difficulty hearing, tinnitus, vertigo; No sinus or throat pain  NECK: No pain or stiffness  RESPIRATORY: No cough, wheezing, chills or hemoptysis; No Shortness of Breath  CARDIOVASCULAR: No chest pain, palpitations, passing out, dizziness, or leg swelling  GASTROINTESTINAL: No abdominal or epigastric pain. No nausea, vomiting, or hematemesis; No diarrhea or constipation. No melena or hematochezia.  GENITOURINARY: No dysuria, frequency, hematuria, or incontinence  NEUROLOGICAL: No headaches, memory loss, loss of strength, numbness, or tremors  SKIN: No itching, burning, rashes, or lesions   LYMPH Nodes: No enlarged glands  ENDOCRINE: No heat or cold intolerance; No hair loss  MUSCULOSKELETAL: No joint pain or swelling; No muscle, back, or extremity pain  PSYCHIATRIC: No depression, anxiety, mood swings, or difficulty sleeping  HEME/LYMPH: No easy bruising, or bleeding gums  ALLERGY AND IMMUNOLOGIC: No hives or eczema	        PHYSICAL EXAM:  T(C): 37 (05-21-21 @ 05:00), Max: 37.6 (05-20-21 @ 21:04)  HR: 108 (05-21-21 @ 05:00) (100 - 114)  BP: 118/62 (05-21-21 @ 05:00) (118/62 - 128/62)  RR: 19 (05-21-21 @ 05:00) (17 - 20)  SpO2: 95% (05-21-21 @ 05:00) (95% - 99%)  Wt(kg): --  I&O's Summary      Appearance: Normal	  HEENT:   Normal oral mucosa, PERRL, EOMI	  Lymphatic: No lymphadenopathy  Cardiovascular: Normal S1 S2, No JVD, No murmurs  Respiratory: Lungs clear to auscultation	  Psychiatry: A & O x 3, Mood & affect appropriate  Gastrointestinal:  Soft, Non-tender, + BS	  Skin: No rashes, No ecchymoses, No cyanosis	  Neurologic: Non-focal  Extremities: Normal range of motion, No clubbing, cyanosis ,LUE+3 edema  Vascular: Peripheral pulses palpable 2+ bilaterally    MEDICATIONS  (STANDING):  ALPRAZolam 0.5 milliGRAM(s) Oral every 12 hours  ceFAZolin   IVPB 2000 milliGRAM(s) IV Intermittent every 8 hours  cyanocobalamin Injectable 1000 MICROGram(s) SubCutaneous daily  enoxaparin Injectable 40 milliGRAM(s) SubCutaneous daily  gabapentin 100 milliGRAM(s) Oral every 12 hours  ketorolac 10 milliGRAM(s) Oral every 8 hours  letrozole 2.5 milliGRAM(s) Oral daily  levothyroxine 50 MICROGram(s) Oral daily  lidocaine   Patch 1 Patch Transdermal every 24 hours  palbociclib 125 milliGRAM(s) Oral daily  pantoprazole    Tablet 40 milliGRAM(s) Oral before breakfast  polyethylene glycol 3350 17 Gram(s) Oral daily  senna 2 Tablet(s) Oral at bedtime  simvastatin 10 milliGRAM(s) Oral at bedtime      	  	  LABS:	 	                       11.2   5.40  )-----------( 373      ( 21 May 2021 07:50 )             33.5     05-21    135  |  99  |  10  ----------------------------<  126<H>  3.5   |  28  |  0.70    Ca    9.3      21 May 2021 07:50      Lipid Profile: Cholesterol 177  LDL --  HDL 34      TSH: Thyroid Stimulating Hormone, Serum: 3.35 uU/mL (05-19 @ 08:25)      	    t< from: CT Angio Chest w/ IV Cont (05.20.21 @ 17:56) >  EXAM:  CT ANGIO CHEST (W)AW IC                            PROCEDURE DATE:  05/20/2021          INTERPRETATION:  CLINICAL INFORMATION: 66 years  Female with r/o PE and evaluate for PNA. Fever and chills. History of breast cancer. Chronic left upper extremity lymphedema.    COMPARISON: 6/22/2018    CONTRAST/COMPLICATIONS:  IV Contrast: Omnipaque 350  50 cc administered   50 cc discarded  Oral Contrast: NONE  Complications: None reported at time of study completion    PROCEDURE:  CT Angiography ofthe Chest.  Sagittal and coronal reformats were performed as well as 3D (MIP) reconstructions.    LIMITATIONS: Left lung partially omitted. Delayed timing of injection.    FINDINGS:    LUNGS AND AIRWAYS: Patent central airways.  Lungs are clear.  PLEURA: Stable nodular left pleural thickening status post pleurodesis. No pleural effusions.  MEDIASTINUM AND ARLEY: No lymphadenopathy.  VESSELS: Delayed timing of injection. No pulmonary arterial filling defects identified.  HEART: Mild cardiomegaly. No pericardial effusion.  CHEST WALL AND LOWER NECK: Status post bilateral mastectomy with bilateral breast implants. Fat stranding around the left implant, likely related to chronic lymphedema..  VISUALIZED UPPER ABDOMEN: Hepatosplenomegaly, increasedfrom prior study.  BONES: Degenerative changes.    IMPRESSION:  No pneumonia.    No evidence of pulmonary embolism allowing for delayed timing of injection.    Hepatomegaly, increased from prior study.    Chronic findings as above.      c< from: CT Thoracic Spine No Cont (05.20.21 @ 17:55) >  EXAM:  CT THORACIC SPINE                            PROCEDURE DATE:  05/20/2021          INTERPRETATION:  CT thoracic and lumbar spine without contrast  Comparison CT performed 06/22/18  History metastatic breast carcinoma    There is no compression deformity or acute subluxation or mo osseous destruction or demineralization. There are flowing ventral syndesmophytes in the mid to lower thoracic levels with age typical degenerative disc space narrowing but without significant dorsal bulging or ridging or spinal stenosis. Right-sided facet hypertrophy mildly deforms the dorsal thecal sac at T10-11. There is severe facet arthropathy from L3-L4 down notable for grade 1 spondylolisthesis at L4-5 with spinal stenosis which is mild at L3-L4 andmoderate at L4-L5. The sacral ala are grossly intact. There is no CT evidence of epidural mass or collection.    IMPRESSION:  Age typical spondylosis without acute findings or CT evidence of metastatic process      EXAM:  CT LUMBAR SPINE                            PROCEDURE DATE:  05/20/2021          INTERPRETATION:  CT thoracic and lumbar spine without contrast  Comparison CT performed 06/22/18  History metastatic breast carcinoma    There is no compression deformity or acute subluxation or mo osseous destruction or demineralization. There are flowing ventral syndesmophytes in the mid to lower thoracic levels with age typical degenerative disc space narrowing but without significant dorsal bulging or ridging or spinal stenosis. Right-sided facet hypertrophy mildly deforms the dorsal thecal sac at T10-11. There is severe facet arthropathy from L3-L4 down notable for grade 1 spondylolisthesis at L4-5 with spinal stenosis which is mild at L3-L4 and moderate at L4-L5. The sacral ala are grossly intact. There is no CT evidence of epidural mass or collection.    IMPRESSION:  Age typical spondylosis without acute findings or CT evidence of metastatic process

## 2021-05-21 NOTE — PROGRESS NOTE ADULT - ASSESSMENT
Left upper extremity cellultis  Fever - improved  Unlikely to be COVID pneumonia - patient is asymptomatic and is covid neg x 2    Plan: Continue Cefazolin 2g iv q8  If doing well, will plan to DC on  Saturday or Sunday on po abx  Can DC isolation for COVID     Left upper extremity cellultis  Fever - resolved  Unlikely to be COVID pneumonia - patient is asymptomatic and is covid neg x 2    Plan: Continue Cefazolin 2g iv q8  If doing well, will plan to DC on  Saturday or Sunday on po abx  Can DC isolation for COVID     Left upper extremity cellultis  Fever - resolved  Unlikely to be COVID pneumonia - patient is asymptomatic and is covid neg x 2    Plan: Continue Cefazolin 2g iv q8  If doing well, will plan to DC on Sunday on Keflex 500mg po q6 for 5 more days  Can DC isolation for COVID     Left upper extremity cellultis  Fever - resolved  Unlikely to be COVID pneumonia - patient is asymptomatic and is covid neg x 2    Plan: Continue Cefazolin 2g iv q8  If doing well, will plan to DC on Sunday on Keflex 500mg po q6 for 5 more days  Can DC isolation for COVID    I agree with above

## 2021-05-21 NOTE — PROGRESS NOTE ADULT - PROBLEM SELECTOR PLAN 5
hc of breast CA with mets   pt takes letrozole PO daily plus Ibrance (meds brought in by family and approved by pharmacy)  b/l mastectomy 20 years ago

## 2021-05-21 NOTE — PROGRESS NOTE ADULT - SUBJECTIVE AND OBJECTIVE BOX
Source of information: ANA JAUREGUI, Chart review  Patient language: English  : n/a    HPI:  67 y/o F with PMHx of breast cancer s/p bilateral mastectomy, malignant pleural effusion s/p VATS procedure, chronic LUE lymphedema x 20y, hypothyroidism and HLD presents to the ED with complaint of left upper extremity swelling. Patient reports pain, redness and swelling of LUE since yesterday. She reports she has this intermittently but gets better with antibiotics. Also reports having fever of 101 yesterday and 99 today along with chills. Denies chest pain, cough, dysuria, abdominal pain. Patient is complaining of back pain and feeling cold and chills when seen.    ED course:   Vitals: /112, , Temp 99 Sat normal on RA  Labs: WBC count normal, bili 1.3.  (18 May 2021 16:48)      This is a Patient is a 66y old  Female 67 y/o F with PMHx of breast cancer s/p bilateral mastectomy, malignant pleural effusion s/p VATS procedure, chronic LUE lymphedema x 20y, hypothyroidism and HLD presents to the ED with complaint of left upper extremity swelling. Pt is being treated for cellulitis Pt also with low back pain. CT spine - lumbar and thoracic negative for mets. + mild scoliosis degenerative stenosis + hepatosplenomegaly. Pt seen and examined at bedside. Pt sitting in chair reports lumbar back pain score 4-5/10 and tolerable. States pain has improved compared to yesterday. Pain adequately managed on current pain regimen. Pt describes pain as aching, non- radiating, alleviated by pain medications, exacerbated by movement. Pt tolerating PO diet. Pt denies lethargy, nausea, vomiting, constipation and itchiness. Reports last BM 5/20. Patient stated goal for pain control: to be able to take deep breaths, get out of bed to chair and ambulate with tolerable pain control. PT consult pending.     PAST MEDICAL & SURGICAL HISTORY:  Thyroid condition    Lymphedema of arm  leftarm lymphedema since 20 years , after the breast reconstruction surgery    S/P mastectomy, bilateral    Hypothyroidism    Breast cancer    HLD (hyperlipidemia)    S/P mastectomy, bilateral  s/p implants b/l, no chemo or radiation therapy    S/P bilateral mastectomy    H/O pleural empyema  pleurodesis        FAMILY HISTORY:  No pertinent family history in first degree relatives        Social History:  denies smoking, alcohol, other drugs (18 May 2021 16:48)    Allergies    No Known Allergies    MEDICATIONS  (STANDING):  ALPRAZolam 0.5 milliGRAM(s) Oral every 12 hours  ceFAZolin   IVPB 2000 milliGRAM(s) IV Intermittent every 8 hours  cyanocobalamin Injectable 1000 MICROGram(s) SubCutaneous daily  enoxaparin Injectable 40 milliGRAM(s) SubCutaneous daily  gabapentin 100 milliGRAM(s) Oral every 12 hours  ketorolac 10 milliGRAM(s) Oral every 8 hours  letrozole 2.5 milliGRAM(s) Oral daily  levothyroxine 50 MICROGram(s) Oral daily  lidocaine   Patch 1 Patch Transdermal every 24 hours  palbociclib 125 milliGRAM(s) Oral daily  pantoprazole    Tablet 40 milliGRAM(s) Oral before breakfast  polyethylene glycol 3350 17 Gram(s) Oral daily  senna 2 Tablet(s) Oral at bedtime  simvastatin 10 milliGRAM(s) Oral at bedtime    MEDICATIONS  (PRN):  acetaminophen   Tablet .. 650 milliGRAM(s) Oral every 6 hours PRN Temp greater or equal to 38C (100.4F), Moderate Pain (4 - 6)  oxyCODONE    IR 5 milliGRAM(s) Oral every 4 hours PRN Moderate Pain (4 - 6)  oxyCODONE    IR 10 milliGRAM(s) Oral every 4 hours PRN Severe Pain (7 - 10)      Vital Signs Last 24 Hrs  T(C): 36.8 (21 May 2021 14:18), Max: 37.6 (20 May 2021 21:04)  T(F): 98.2 (21 May 2021 14:18), Max: 99.7 (20 May 2021 21:04)  HR: 99 (21 May 2021 14:18) (99 - 114)  BP: 128/78 (21 May 2021 14:18) (118/62 - 128/78)  BP(mean): --  RR: 18 (21 May 2021 14:18) (17 - 19)  SpO2: 100% (21 May 2021 14:18) (95% - 100%)  COVID-19 PCR: NotDetec (19 May 2021 16:49)  COVID-19 PCR: NotDetec (18 May 2021 15:12)  COVID-19 PCR: NotDetec (17 Apr 2021 15:08)  COVID-19 PCR: NotDetec (27 Jan 2021 15:41)    LABS: Reviewed                          11.2   5.40  )-----------( 373      ( 21 May 2021 07:50 )             33.5     05-21    135  |  99  |  10  ----------------------------<  126<H>  3.5   |  28  |  0.70    Ca    9.3      21 May 2021 07:50            CAPILLARY BLOOD GLUCOSE        COVID-19 PCR: NotDetec (19 May 2021 16:49)  COVID-19 PCR: NotDetec (18 May 2021 15:12)  COVID-19 PCR: NotDetec (17 Apr 2021 15:08)  COVID-19 PCR: NotDetec (27 Jan 2021 15:41)      Radiology: Reviewed  < from: CT Angio Chest w/ IV Cont (05.20.21 @ 17:56) >    EXAM:  CT ANGIO CHEST (W)AW IC                            PROCEDURE DATE:  05/20/2021          INTERPRETATION:  CLINICAL INFORMATION: 66 years  Female with r/o PE and evaluate for PNA. Fever and chills. History of breast cancer. Chronic left upper extremity lymphedema.    COMPARISON: 6/22/2018    CONTRAST/COMPLICATIONS:  IV Contrast: Omnipaque 350  50 cc administered   50 cc discarded  Oral Contrast: NONE  Complications: None reported at time of study completion    PROCEDURE:  CT Angiography ofthe Chest.  Sagittal and coronal reformats were performed as well as 3D (MIP) reconstructions.    LIMITATIONS: Left lung partially omitted. Delayed timing of injection.    FINDINGS:    LUNGS AND AIRWAYS: Patent central airways.  Lungs are clear.  PLEURA: Stable nodular left pleural thickening status post pleurodesis. No pleural effusions.  MEDIASTINUM AND ARLEY: No lymphadenopathy.  VESSELS: Delayed timing of injection. No pulmonary arterial filling defects identified.  HEART: Mild cardiomegaly. No pericardial effusion.  CHEST WALL AND LOWER NECK: Status post bilateral mastectomy with bilateral breast implants. Fat stranding around the left implant, likely related to chronic lymphedema..  VISUALIZED UPPER ABDOMEN: Hepatosplenomegaly, increasedfrom prior study.  BONES: Degenerative changes.    IMPRESSION:  No pneumonia.    No evidence of pulmonary embolism allowing for delayed timing of injection.    Hepatomegaly, increased from prior study.    Chronic findings as above.              CADE HARRISON; Attending Radiologist  This document has been electronically signed. May 20 2021  6:38PM    < end of copied text >    < from: CT Lumbar Spine No Cont (05.20.21 @ 17:55) >    EXAM:  CT LUMBAR SPINE                            PROCEDURE DATE:  05/20/2021          INTERPRETATION:  CT thoracic and lumbar spine without contrast  Comparison CT performed 06/22/18  History metastatic breast carcinoma    There is no compression deformity or acute subluxation or mo osseous destruction or demineralization. There are flowing ventral syndesmophytes in the mid to lower thoracic levels with age typical degenerative disc space narrowing but without significant dorsal bulging or ridging or spinal stenosis. Right-sided facet hypertrophy mildly deforms the dorsal thecal sac at T10-11. There is severe facet arthropathy from L3-L4 down notable for grade 1 spondylolisthesis at L4-5 with spinal stenosis which is mild at L3-L4 and moderate at L4-L5. The sacral ala are grossly intact. There is no CT evidence of epidural mass or collection.    IMPRESSION:  Age typical spondylosis without acute findings or CT evidence of metastatic process            KELSI BERNAL MD; AttendingRadiologist  This document has been electronically signed. May 21 2021 10:08AM    < end of copied text >    < from: CT Thoracic Spine No Cont (05.20.21 @ 17:55) >    EXAM:  CT THORACIC SPINE                            PROCEDURE DATE:  05/20/2021          INTERPRETATION:  CT thoracic and lumbar spine without contrast  Comparison CT performed 06/22/18  History metastatic breast carcinoma    There is no compression deformity or acute subluxation or mo osseous destruction or demineralization. There are flowing ventral syndesmophytes in the mid to lower thoracic levels with age typical degenerative disc space narrowing but without significant dorsal bulging or ridging or spinal stenosis. Right-sided facet hypertrophy mildly deforms the dorsal thecal sac at T10-11. There is severe facet arthropathy from L3-L4 down notable for grade 1 spondylolisthesis at L4-5 with spinal stenosis which is mild at L3-L4 andmoderate at L4-L5. The sacral ala are grossly intact. There is no CT evidence of epidural mass or collection.    IMPRESSION:  Age typical spondylosis without acute findings or CT evidence of metastatic process            KELSI BERNAL MD; Attending Radiologist  This document has been electronically signed. May 21 2021 10:07AM    < end of copied text >      ORT Score -   Family Hx of substance abuse	Female	      Male  Alcohol 	                                           1                     3  Illegal drugs	                                   2                     3  Rx drugs                                           4 	                  4  Personal Hx of substance abuse		  Alcohol 	                                          3	                  3  Illegal drugs                                     4	                  4  Rx drugs                                            5 	                  5  Age between 16- 45 years	           1                     1  hx preadolescent sexual abuse	   3 	                  0  Psychological disease		  ADD, OCD, bipolar, schizophrenia   2	          2  Depression                                           1 	          1  Total: 0    a score of 3 or lower indicates low risk for opioid abuse		  a score of 4-7 indicates moderate risk for opioid abuse		  a score of 8 or higher indicates high risk for opioid abuse    REVIEW OF SYSTEMS:  CONSTITUTIONAL: No fever or fatigue  RESPIRATORY: No cough, wheezing, chills or hemoptysis; No shortness of breath  CARDIOVASCULAR: No chest pain, palpitations, dizziness, or leg swelling  GASTROINTESTINAL: No abdominal or epigastric pain. No nausea, vomiting; No diarrhea or constipation.   GENITOURINARY: No dysuria, frequency, hematuria, retention or incontinence  MUSCULOSKELETAL: + left arm swelling and redness + chronic lymphedema   NEURO: No weakness, no numbness   PSYCHIATRIC: No depression, anxiety, mood swings, or difficulty sleeping    PHYSICAL EXAM:  GENERAL:  Alert & Oriented X3, NAD, Good concentration  CHEST/LUNG: Clear to auscultation bilaterally; No rales, rhonchi, wheezing, or rubs  HEART: Regular rate and rhythm; No murmurs, rubs, or gallops  ABDOMEN: + obese per BMI, Soft, Nontender, Nondistended; Bowel sounds present  EXTREMITIES:  2+ Peripheral Pulses, + left UE edema and erythema + lumbar spine tenderness  MUSCULOSKELETAL: Motor Strength 5/5 B/L upper and lower extremities; moves all extremities equally against gravity; ROM decreased over left arm; negative SRL  SKIN: + severe left arm edema, and mild erythema. + lumbar back lidoderm patch in place        Risk factors associated with adverse outcomes related to opioid treatment  [ ]  Concurrent benzodiazepine use  [ ]  History/ Active substance use or alcohol use disorder  [ ] Psychiatric co-morbidity  [ ] Sleep apnea  [ ] COPD  [ ] BMI> 35  [ ] Liver dysfunction  [ ] Renal dysfunction  [ ] CHF  [ ] Smoker  [X ]  Age > 60 years    [X ]  [place]  Reviewed and Copied to Chart. See below.    Plan of care and goal oriented pain management treatment options were discussed with patient and /or primary care giver; all questions and concerns were addressed and care was aligned with patient's wishes.    Educated patient on goal oriented pain management treatment options     05-21-21 @ 14:46

## 2021-05-21 NOTE — PROGRESS NOTE ADULT - PROBLEM SELECTOR PLAN 4
chronic back pain   no trauma   Pending thoracolumbar CT  pain management consulted  reccs appreciated chronic back pain   improving  no trauma   imaging with no acute findings   pain management consulted  reccs appreciated

## 2021-05-21 NOTE — PROGRESS NOTE ADULT - SUBJECTIVE AND OBJECTIVE BOX
-*-* INCOMPLETE  NP Note discussed with  Primary Attending    Patient is a 66y old  Female who presents with a chief complaint of LUE swelling (21 May 2021 12:30)      INTERVAL HPI/OVERNIGHT EVENTS: no new complaints    MEDICATIONS  (STANDING):  ALPRAZolam 0.5 milliGRAM(s) Oral every 12 hours  ceFAZolin   IVPB 2000 milliGRAM(s) IV Intermittent every 8 hours  cyanocobalamin Injectable 1000 MICROGram(s) SubCutaneous daily  enoxaparin Injectable 40 milliGRAM(s) SubCutaneous daily  gabapentin 100 milliGRAM(s) Oral every 12 hours  ketorolac 10 milliGRAM(s) Oral every 8 hours  letrozole 2.5 milliGRAM(s) Oral daily  levothyroxine 50 MICROGram(s) Oral daily  lidocaine   Patch 1 Patch Transdermal every 24 hours  palbociclib 125 milliGRAM(s) Oral daily  pantoprazole    Tablet 40 milliGRAM(s) Oral before breakfast  polyethylene glycol 3350 17 Gram(s) Oral daily  senna 2 Tablet(s) Oral at bedtime  simvastatin 10 milliGRAM(s) Oral at bedtime    MEDICATIONS  (PRN):  acetaminophen   Tablet .. 650 milliGRAM(s) Oral every 6 hours PRN Temp greater or equal to 38C (100.4F), Moderate Pain (4 - 6)  oxyCODONE    IR 10 milliGRAM(s) Oral every 4 hours PRN Severe Pain (7 - 10)  oxyCODONE    IR 5 milliGRAM(s) Oral every 4 hours PRN Moderate Pain (4 - 6)      __________________________________________________  REVIEW OF SYSTEMS:    CONSTITUTIONAL: No fever,   EYES: no acute visual disturbances  NECK: No pain or stiffness  RESPIRATORY: No cough; No shortness of breath  CARDIOVASCULAR: No chest pain, no palpitations  GASTROINTESTINAL: No pain. No nausea or vomiting; No diarrhea   NEUROLOGICAL: No headache or numbness, no tremors  MUSCULOSKELETAL: No joint pain, no muscle pain  GENITOURINARY: no dysuria, no frequency, no hesitancy  PSYCHIATRY: no depression , no anxiety  ALL OTHER  ROS negative        Vital Signs Last 24 Hrs  T(C): 37 (21 May 2021 05:00), Max: 37.6 (20 May 2021 21:04)  T(F): 98.6 (21 May 2021 05:00), Max: 99.7 (20 May 2021 21:04)  HR: 108 (21 May 2021 05:00) (100 - 114)  BP: 118/62 (21 May 2021 05:00) (118/62 - 128/62)  BP(mean): --  RR: 19 (21 May 2021 05:00) (17 - 20)  SpO2: 95% (21 May 2021 05:00) (95% - 99%)      ________________________________________________  PHYSICAL EXAM:  GENERAL: Morbidly obese female  HEENT: Normocephalic;  conjunctivae and sclerae clear;  NECK : supple  CHEST/LUNG: diminished b/l poor effort    HEART: S1 S2  regular; no murmurs, gallops or rubs  ABDOMEN: Soft, Nontender, distended; Bowel sounds present  EXTREMITIES: + LUE Edema, and erythema no cyanosis; no calf tenderness  SKIN: warm and dry; no rash  NERVOUS SYSTEM:  Awake and alert; Oriented  to place, person and time  _________________________________________________  LABS:                        11.2   5.40  )-----------( 373      ( 21 May 2021 07:50 )             33.5     05-21    135  |  99  |  10  ----------------------------<  126<H>  3.5   |  28  |  0.70    Ca    9.3      21 May 2021 07:50          CAPILLARY BLOOD GLUCOSE    RADIOLOGY & ADDITIONAL TESTS:   < from: CT Angio Chest w/ IV Cont (05.20.21 @ 17:56) >  IMPRESSION:  No pneumonia.    No evidence of pulmonary embolism allowing for delayed timing of injection.    Hepatomegaly, increased from prior study.    Chronic findings as above.        < end of copied text >      < from: CT Lumbar Spine No Cont (05.20.21 @ 17:55) >  IMPRESSION:  Age typical spondylosis without acute findings or CT evidence of metastatic process        < end of copied text >    < from: CT Thoracic Spine No Cont (05.20.21 @ 17:55) >    IMPRESSION:  Age typical spondylosis without acute findings or CT evidence of metastatic process        < end of copied text >  Imaging Personally Reviewed:  YES    Consultant(s) Notes Reviewed:   YES    Plan of care was discussed with patient and /or primary care giver; all questions and concerns were addressed and care was aligned with patient's wishes.

## 2021-05-21 NOTE — PROGRESS NOTE ADULT - SUBJECTIVE AND OBJECTIVE BOX
66y Female is under our care for     REVIEW OF SYSTEMS:  [  ] Not able to elicit  General:	  Chest:	  GI:	  :  Skin:	  Musculoskeletal:	  Neuro:	    MEDS:  ceFAZolin   IVPB 2000 milliGRAM(s) IV Intermittent every 8 hours    ALLERGIES: Allergies    No Known Allergies    Intolerances        VITALS:  Vital Signs Last 24 Hrs  T(C): 37 (21 May 2021 05:00), Max: 37.6 (20 May 2021 21:04)  T(F): 98.6 (21 May 2021 05:00), Max: 99.7 (20 May 2021 21:04)  HR: 108 (21 May 2021 05:00) (100 - 114)  BP: 118/62 (21 May 2021 05:00) (118/62 - 128/62)  BP(mean): --  RR: 19 (21 May 2021 05:00) (17 - 20)  SpO2: 95% (21 May 2021 05:00) (95% - 99%)      PHYSICAL EXAM:  HEENT:  Neck:  Respiratory:  Cardiovascular:  Gastrointestinal:  Extremities:  Skin:  Ortho:  Neuro:    LABS/DIAGNOSTIC TESTS:                        11.2   5.40  )-----------( 373      ( 21 May 2021 07:50 )             33.5     WBC Count: 5.40 K/uL (05-21 @ 07:50)  WBC Count: 7.55 K/uL (05-20 @ 07:53)  WBC Count: 8.46 K/uL (05-19 @ 08:25)  WBC Count: 8.41 K/uL (05-18 @ 14:43)    05-21    135  |  99  |  10  ----------------------------<  126<H>  3.5   |  28  |  0.70    Ca    9.3      21 May 2021 07:50        CULTURES:   .Blood Blood  05-19 @ 18:25   No growth to date.  --  --      .Blood Blood  05-19 @ 18:24   No growth to date.  --  --      .Urine Clean Catch (Midstream)  05-18 @ 21:58   <10,000 CFU/mL Normal Urogenital Wilda  --  --      .Urine Clean Catch (Midstream)  04-17 @ 22:03   <10,000 CFU/mL Normal Urogenital Wilda  --  --        RADIOLOGY:    < from: CT Angio Chest w/ IV Cont (05.20.21 @ 17:56) >    EXAM:  CT ANGIO CHEST (W)AW IC                            PROCEDURE DATE:  05/20/2021          INTERPRETATION:  CLINICAL INFORMATION: 66 years  Female with r/o PE and evaluate for PNA. Fever and chills. History of breast cancer. Chronic left upper extremity lymphedema.    COMPARISON: 6/22/2018    CONTRAST/COMPLICATIONS:  IV Contrast: Omnipaque 350  50 cc administered   50 cc discarded  Oral Contrast: NONE  Complications: None reported at time of study completion    PROCEDURE:  CT Angiography ofthe Chest.  Sagittal and coronal reformats were performed as well as 3D (MIP) reconstructions.    LIMITATIONS: Left lung partially omitted. Delayed timing of injection.    FINDINGS:    LUNGS AND AIRWAYS: Patent central airways.  Lungs are clear.  PLEURA: Stable nodular left pleural thickening status post pleurodesis. No pleural effusions.  MEDIASTINUM AND ARLEY: No lymphadenopathy.  VESSELS: Delayed timing of injection. No pulmonary arterial filling defects identified.  HEART: Mild cardiomegaly. No pericardial effusion.  CHEST WALL AND LOWER NECK: Status post bilateral mastectomy with bilateral breast implants. Fat stranding around the left implant, likely related to chronic lymphedema..  VISUALIZED UPPER ABDOMEN: Hepatosplenomegaly, increasedfrom prior study.  BONES: Degenerative changes.    IMPRESSION:  No pneumonia.    No evidence of pulmonary embolism allowing for delayed timing of injection.    Hepatomegaly, increased from prior study.    Chronic findings as above.          < end of copied text >   66y Female is under our care for left upper extremity cellulitis.  Patient was seen sitting comfortably in the chair with no acute distress.  Patients LUE erythema has significantly improved, she remains afebrile, CTA is negative for PE or pneumonia, and WBC count is WNL.    REVIEW OF SYSTEMS:  [  ] Not able to elicit  General: no fevers no malaise  Chest: no cough, no sob  GI: no nvd  : no urinary sxs   Skin: left arm redness  Musculoskeletal: no trauma no LBP  Neuro: no ha's no dizziness     MEDS:  ceFAZolin   IVPB 2000 milliGRAM(s) IV Intermittent every 8 hours    ALLERGIES: Allergies    No Known Allergies    Intolerances        VITALS:  Vital Signs Last 24 Hrs  T(C): 37 (21 May 2021 05:00), Max: 37.6 (20 May 2021 21:04)  T(F): 98.6 (21 May 2021 05:00), Max: 99.7 (20 May 2021 21:04)  HR: 108 (21 May 2021 05:00) (100 - 114)  BP: 118/62 (21 May 2021 05:00) (118/62 - 128/62)  BP(mean): --  RR: 19 (21 May 2021 05:00) (17 - 20)  SpO2: 95% (21 May 2021 05:00) (95% - 99%)      PHYSICAL EXAM:  HEENT: n/a  Neck: supple no LN's   Respiratory: lungs clear no rales  Cardiovascular: S1 S2 reg no murmurs  Gastrointestinal: +BS with soft, nondistended abdomen; nontender  Extremities: left upper extremity edema +4  Skin: improvement of LUE erythema and warmth, mild tenderness+  Ortho: n/a  Neuro: AAO x 4    LABS/DIAGNOSTIC TESTS:                        11.2   5.40  )-----------( 373      ( 21 May 2021 07:50 )             33.5     WBC Count: 5.40 K/uL (05-21 @ 07:50)  WBC Count: 7.55 K/uL (05-20 @ 07:53)  WBC Count: 8.46 K/uL (05-19 @ 08:25)  WBC Count: 8.41 K/uL (05-18 @ 14:43)    05-21    135  |  99  |  10  ----------------------------<  126<H>  3.5   |  28  |  0.70    Ca    9.3      21 May 2021 07:50        CULTURES:   .Blood Blood  05-19 @ 18:25   No growth to date.  --  --      .Blood Blood  05-19 @ 18:24   No growth to date.  --  --      .Urine Clean Catch (Midstream)  05-18 @ 21:58   <10,000 CFU/mL Normal Urogenital Wilda  --  --      .Urine Clean Catch (Midstream)  04-17 @ 22:03   <10,000 CFU/mL Normal Urogenital Wilda  --  --        RADIOLOGY:    < from: CT Angio Chest w/ IV Cont (05.20.21 @ 17:56) >    EXAM:  CT ANGIO CHEST (W)AW IC                            PROCEDURE DATE:  05/20/2021          INTERPRETATION:  CLINICAL INFORMATION: 66 years  Female with r/o PE and evaluate for PNA. Fever and chills. History of breast cancer. Chronic left upper extremity lymphedema.    COMPARISON: 6/22/2018    CONTRAST/COMPLICATIONS:  IV Contrast: Omnipaque 350  50 cc administered   50 cc discarded  Oral Contrast: NONE  Complications: None reported at time of study completion    PROCEDURE:  CT Angiography ofthe Chest.  Sagittal and coronal reformats were performed as well as 3D (MIP) reconstructions.    LIMITATIONS: Left lung partially omitted. Delayed timing of injection.    FINDINGS:    LUNGS AND AIRWAYS: Patent central airways.  Lungs are clear.  PLEURA: Stable nodular left pleural thickening status post pleurodesis. No pleural effusions.  MEDIASTINUM AND ARLEY: No lymphadenopathy.  VESSELS: Delayed timing of injection. No pulmonary arterial filling defects identified.  HEART: Mild cardiomegaly. No pericardial effusion.  CHEST WALL AND LOWER NECK: Status post bilateral mastectomy with bilateral breast implants. Fat stranding around the left implant, likely related to chronic lymphedema..  VISUALIZED UPPER ABDOMEN: Hepatosplenomegaly, increasedfrom prior study.  BONES: Degenerative changes.    IMPRESSION:  No pneumonia.    No evidence of pulmonary embolism allowing for delayed timing of injection.    Hepatomegaly, increased from prior study.    Chronic findings as above.          < end of copied text >

## 2021-05-21 NOTE — PROGRESS NOTE ADULT - ASSESSMENT
1. Cellulitis of LUE   Antibiotic - may switch to PO if doing well - per ID   ID F/U   US neg for DVT   Monitor labs  Monitor Temp  DVT and GI PPX      2. PNA - ruled out.   CT negative for PE/PNA   On Keflex; Procalcitonin 2.20  D.C Azithromycin   ID F/U   F/U CXR     3. Hx of Malignant Pleural Effusion  S/P VATS   CT noted.   Bronchodilators PRN   O2 supp PRN     4. Hx of Breast CA   S/P B/L Mastectomy   On Letrozole   Heme/onc F/U noted.   Recc Covid vaccine   Management for lymphedema     5. Hypothyroidism  Cont meds   Endo F/U

## 2021-05-21 NOTE — PROGRESS NOTE ADULT - SUBJECTIVE AND OBJECTIVE BOX
Pt is awake, alert, lying in bed in NAD. Afebrile.     INTERVAL HPI/OVERNIGHT EVENTS:      VITAL SIGNS:  T(F): 98.6 (05-21-21 @ 05:00)  HR: 108 (05-21-21 @ 05:00)  BP: 118/62 (05-21-21 @ 05:00)  RR: 19 (05-21-21 @ 05:00)  SpO2: 95% (05-21-21 @ 05:00)  Wt(kg): --  I&O's Detail          REVIEW OF SYSTEMS:    CONSTITUTIONAL:  No fevers, chills, sweats    HEENT:  Eyes:  No diplopia or blurred vision. ENT:  No earache, sore throat or runny nose.    CARDIOVASCULAR:  No pressure, squeezing, tightness, or heaviness about the chest; no palpitations.    RESPIRATORY:  Per HPI    GASTROINTESTINAL:  No abdominal pain, nausea, vomiting or diarrhea.    GENITOURINARY:  No dysuria, frequency or urgency.    NEUROLOGIC:  No paresthesias, fasciculations, seizures or weakness.    PSYCHIATRIC:  No disorder of thought or mood.      PHYSICAL EXAM:    Constitutional: Well developed and nourished  Eyes:Perrla  ENMT: normal  Neck:supple  Respiratory: good air entry  Cardiovascular: S1 S2 regular  Gastrointestinal: Soft, Non tender  Extremities: No edema  Vascular:normal  Neurological:Awake, alert,Ox3  Musculoskeletal:Normal      MEDICATIONS  (STANDING):  ALPRAZolam 0.5 milliGRAM(s) Oral every 12 hours  ceFAZolin   IVPB 2000 milliGRAM(s) IV Intermittent every 8 hours  cyanocobalamin Injectable 1000 MICROGram(s) SubCutaneous daily  enoxaparin Injectable 40 milliGRAM(s) SubCutaneous daily  gabapentin 100 milliGRAM(s) Oral every 12 hours  ketorolac 10 milliGRAM(s) Oral every 8 hours  letrozole 2.5 milliGRAM(s) Oral daily  levothyroxine 50 MICROGram(s) Oral daily  lidocaine   Patch 1 Patch Transdermal every 24 hours  palbociclib 125 milliGRAM(s) Oral daily  pantoprazole    Tablet 40 milliGRAM(s) Oral before breakfast  polyethylene glycol 3350 17 Gram(s) Oral daily  senna 2 Tablet(s) Oral at bedtime  simvastatin 10 milliGRAM(s) Oral at bedtime    MEDICATIONS  (PRN):  acetaminophen   Tablet .. 650 milliGRAM(s) Oral every 6 hours PRN Temp greater or equal to 38C (100.4F), Moderate Pain (4 - 6)  oxyCODONE    IR 5 milliGRAM(s) Oral every 4 hours PRN Moderate Pain (4 - 6)  oxyCODONE    IR 10 milliGRAM(s) Oral every 4 hours PRN Severe Pain (7 - 10)      Allergies    No Known Allergies    Intolerances        LABS:                        11.2   5.40  )-----------( 373      ( 21 May 2021 07:50 )             33.5     05-21    135  |  99  |  10  ----------------------------<  126<H>  3.5   |  28  |  0.70    Ca    9.3      21 May 2021 07:50                CAPILLARY BLOOD GLUCOSE            RADIOLOGY & ADDITIONAL TESTS:    CXR:  IMPRESSION: Bilateral patchy airspace opacities.    Ct scan chest:  IMPRESSION:  No pneumonia.    No evidence of pulmonary embolism allowing for delayed timing of injection.    Hepatomegaly, increased from prior study.    Chronic findings as above.    ekg;    echo:

## 2021-05-21 NOTE — PROGRESS NOTE ADULT - ASSESSMENT
The Drug Utilization Report below displays all of the controlled substance prescriptions, if any, that your patient has filled in the last twelve months. The information displayed on this report is compiled from pharmacy submissions to the Department, and accurately reflects the information as submitted by the pharmacies.    This report was requested by: Xiao Blair | Reference #: [id number]    Others' Prescriptions  Patient Name: Ave Lugo Date: 1955  Address: [address] [place], [place] [address]Sex: Female  Rx Written	Rx Dispensed	Drug	Quantity	Days Supply	Prescriber Name	Prescriber Penelope #	Payment Method  05/06/2021	05/06/2021	alprazolam 0.5 mg tablet	30	15	Chiki-Deejay, [facility]	[id number]	Medicare  Dispenser [facility] #[id number]  04/23/2021	04/23/2021	alprazolam 0.5 mg tablet	30	15	Chiki-Deejay, [facility]	[id number]	Medicare Dispenser [facility] #[id number]  04/09/2021	04/09/2021	alprazolam 0.5 mg tablet	30	15	Chiki-Deejay, [facility]	[id number]	Medicare Dispenser [facility] #[id number]  03/26/2021	03/27/2021	alprazolam 0.5 mg tablet	30	15	[facility]	[id number]	Medicare  Dispenser [facility] #[id number]  03/11/2021	03/12/2021	alprazolam 0.5 mg tablet	30	15	[facility]	[id number]	Medicare Dispenser [facility] #[id number]  02/27/2021	02/28/2021	alprazolam 0.5 mg tablet	30	15	Chiki-Deejay, [facility]	[id number]	Medicare Dispenser [facility] #[id number]  02/11/2021	02/13/2021	alprazolam 0.5 mg tablet	30	15	Chiki-Deejay, [facility]	[id number]	Medicare Dispenser [facility] #[id number]

## 2021-05-21 NOTE — PROGRESS NOTE ADULT - ASSESSMENT
67 y/o F with PMHx of breast cancer s/p bilateral mastectomy, malignant pleural effusion s/p VATS procedure, chronic LUE lymphedema x 20y, hypothyroidism and HLD presents to the ED with complaint of left upper extremity swelling,cellulitis,abnormal CXR-pneumonia and back pain.  1.Cellulitis-ID f/u,ABX.  2.Echocardiogram.  3.Hypothyroidism-synthroid.  4.Breast ca-Letrozole.  5.Replace vit b12.  6.Back pain-CT spine no sig pathology,pain control..  7.GI and DVT prophylaxis.  8.PT. 65 y/o F with PMHx of breast cancer s/p bilateral mastectomy, malignant pleural effusion s/p VATS procedure, chronic LUE lymphedema x 20y, hypothyroidism and HLD presents to the ED with complaint of left upper extremity swelling,cellulitis,abnormal CXR-pneumonia and back pain.  1.Cellulitis-ID f/u,ABX.  2.Echocardiogram.  3.Hypothyroidism-synthroid.  4.Breast ca-Letrozole.  5.Replace vit b12.  6.Back pain-CT spine no sig pathology,pain control..  7.GI and DVT prophylaxis.  8.PT.  9.There was no sepsis present on admission.

## 2021-05-21 NOTE — PHYSICAL THERAPY INITIAL EVALUATION ADULT - GENERAL OBSERVATIONS, REHAB EVAL
awake, alert, NAD; + peripheral IV access on right forearm; + left upper extremity swelling but with noted skin creases, +erythema and warmth

## 2021-05-21 NOTE — PROGRESS NOTE ADULT - PROBLEM SELECTOR PLAN 1
Pt with back pain.  + history of breast ca with mets to lung.  Pt is being treated for cellulitis of left arm - on IV abx. CT spine - lumbar and thoracic negative for mets. + mild scoliosis degenerative stenosis + hepatosplenomegaly   nonopioid recc  - gabapentin 100mg po q 12 hours  - lidocaine patch daily  - toradol 10mg po q8 hours x 4 days   opioid recc  - no iv opioids  - oxycodone 5mg po q 4 hours prn moderate pain  - oxycodone 10mg po q 4 hours prn severe pain   Mild pain   - Non-pharmacological pain treatment recommendations  - Warm/ Cool packs PRN   - Repositioning extremity, elevation, imagery, relaxation, distraction.  - Physical therapy OOB if no contraindications   Recommendations discussed with primary team and RN  bowel regimen  - PPI  - senna  - miralax

## 2021-05-21 NOTE — DISCHARGE NOTE PROVIDER - PROVIDER TOKENS
PROVIDER:[TOKEN:[[id number]],FOLLOWUP:[2 weeks]],PROVIDER:[TOKEN:[[id number]],FOLLOWUP:[2 weeks]] PROVIDER:[TOKEN:[[id number]],FOLLOWUP:[2 weeks]],PROVIDER:[TOKEN:[[id number]],FOLLOWUP:[2 weeks]],PROVIDER:[TOKEN:[[id number]]] PROVIDER:[TOKEN:[[id number]],FOLLOWUP:[2 weeks]],PROVIDER:[TOKEN:[[id number]],FOLLOWUP:[1 week]],PROVIDER:[TOKEN:[[id number]]]

## 2021-05-21 NOTE — PROGRESS NOTE ADULT - ASSESSMENT
67 y/o F with PMHx of breast cancer s/p bilateral mastectomy, malignant pleural effusion s/p VATS procedure, chronic LUE lymphedema x 20y, hypothyroidism and HLD presents to the ED with complaint of left upper extremity swelling. Admitted to medicine for sepsis secondary to LUE cellulitis. Doppler negative for DVT, started on Vanco Zosyn --> changed to Kefazolin. CXR with abnormal findings placed on high suspicion for COVID. Plan for CTA to r/o PE delayed as patient not agreeable and states it hurts her back to lay flat pain medication given as ordered.  67 y/o F with PMHx of breast cancer s/p bilateral mastectomy, malignant pleural effusion s/p VATS procedure, chronic LUE lymphedema x 20y, hypothyroidism and HLD presents to the ED with complaint of left upper extremity swelling. Admitted to medicine for sepsis secondary to LUE cellulitis. Doppler negative for DVT, started on Vanco Zosyn --> changed to Kefazolin. CXR with abnormal findings placed on high suspicion for COVID.  CTA r/o PE and confirmed NO PNA.     Patient seen and evaluated at bedside, clinical improvement of erythema and edema to the LUE,. Endorses improvement of back pain   CT results shared with patient and family. Isolation to be removed by infection prevention.

## 2021-05-22 LAB
ANION GAP SERPL CALC-SCNC: 7 MMOL/L — SIGNIFICANT CHANGE UP (ref 5–17)
BUN SERPL-MCNC: 14 MG/DL — SIGNIFICANT CHANGE UP (ref 7–18)
CALCIUM SERPL-MCNC: 9.2 MG/DL — SIGNIFICANT CHANGE UP (ref 8.4–10.5)
CHLORIDE SERPL-SCNC: 104 MMOL/L — SIGNIFICANT CHANGE UP (ref 96–108)
CO2 SERPL-SCNC: 29 MMOL/L — SIGNIFICANT CHANGE UP (ref 22–31)
CREAT SERPL-MCNC: 0.68 MG/DL — SIGNIFICANT CHANGE UP (ref 0.5–1.3)
GLUCOSE SERPL-MCNC: 209 MG/DL — HIGH (ref 70–99)
HCT VFR BLD CALC: 34.6 % — SIGNIFICANT CHANGE UP (ref 34.5–45)
HGB BLD-MCNC: 11.3 G/DL — LOW (ref 11.5–15.5)
MCHC RBC-ENTMCNC: 32.7 GM/DL — SIGNIFICANT CHANGE UP (ref 32–36)
MCHC RBC-ENTMCNC: 35.5 PG — HIGH (ref 27–34)
MCV RBC AUTO: 108.8 FL — HIGH (ref 80–100)
NRBC # BLD: 0 /100 WBCS — SIGNIFICANT CHANGE UP (ref 0–0)
PLATELET # BLD AUTO: 410 K/UL — HIGH (ref 150–400)
POTASSIUM SERPL-MCNC: 4 MMOL/L — SIGNIFICANT CHANGE UP (ref 3.5–5.3)
POTASSIUM SERPL-SCNC: 4 MMOL/L — SIGNIFICANT CHANGE UP (ref 3.5–5.3)
RBC # BLD: 3.18 M/UL — LOW (ref 3.8–5.2)
RBC # FLD: 13.4 % — SIGNIFICANT CHANGE UP (ref 10.3–14.5)
SODIUM SERPL-SCNC: 140 MMOL/L — SIGNIFICANT CHANGE UP (ref 135–145)
WBC # BLD: 3.61 K/UL — LOW (ref 3.8–10.5)
WBC # FLD AUTO: 3.61 K/UL — LOW (ref 3.8–10.5)

## 2021-05-22 RX ORDER — METOPROLOL TARTRATE 50 MG
12.5 TABLET ORAL
Refills: 0 | Status: DISCONTINUED | OUTPATIENT
Start: 2021-05-22 | End: 2021-05-23

## 2021-05-22 RX ADMIN — OXYCODONE HYDROCHLORIDE 10 MILLIGRAM(S): 5 TABLET ORAL at 13:49

## 2021-05-22 RX ADMIN — LETROZOLE 2.5 MILLIGRAM(S): 2.5 TABLET, FILM COATED ORAL at 11:37

## 2021-05-22 RX ADMIN — Medication 100 MILLIGRAM(S): at 22:45

## 2021-05-22 RX ADMIN — Medication 50 MICROGRAM(S): at 06:33

## 2021-05-22 RX ADMIN — OXYCODONE HYDROCHLORIDE 10 MILLIGRAM(S): 5 TABLET ORAL at 14:40

## 2021-05-22 RX ADMIN — Medication 10 MILLIGRAM(S): at 22:46

## 2021-05-22 RX ADMIN — PREGABALIN 1000 MICROGRAM(S): 225 CAPSULE ORAL at 11:37

## 2021-05-22 RX ADMIN — Medication 12.5 MILLIGRAM(S): at 18:43

## 2021-05-22 RX ADMIN — OXYCODONE HYDROCHLORIDE 10 MILLIGRAM(S): 5 TABLET ORAL at 22:54

## 2021-05-22 RX ADMIN — Medication 100 MILLIGRAM(S): at 13:35

## 2021-05-22 RX ADMIN — Medication 10 MILLIGRAM(S): at 06:32

## 2021-05-22 RX ADMIN — Medication 10 MILLIGRAM(S): at 13:38

## 2021-05-22 RX ADMIN — GABAPENTIN 100 MILLIGRAM(S): 400 CAPSULE ORAL at 18:44

## 2021-05-22 RX ADMIN — OXYCODONE HYDROCHLORIDE 10 MILLIGRAM(S): 5 TABLET ORAL at 23:30

## 2021-05-22 RX ADMIN — Medication 0.5 MILLIGRAM(S): at 20:57

## 2021-05-22 RX ADMIN — Medication 0.5 MILLIGRAM(S): at 06:32

## 2021-05-22 RX ADMIN — PALBOCICLIB 125 MILLIGRAM(S): 100 CAPSULE ORAL at 11:36

## 2021-05-22 RX ADMIN — Medication 12.5 MILLIGRAM(S): at 13:35

## 2021-05-22 RX ADMIN — Medication 10 MILLIGRAM(S): at 06:34

## 2021-05-22 RX ADMIN — GABAPENTIN 100 MILLIGRAM(S): 400 CAPSULE ORAL at 06:32

## 2021-05-22 RX ADMIN — LIDOCAINE 1 PATCH: 4 CREAM TOPICAL at 20:02

## 2021-05-22 RX ADMIN — LIDOCAINE 1 PATCH: 4 CREAM TOPICAL at 09:04

## 2021-05-22 RX ADMIN — Medication 100 MILLIGRAM(S): at 06:33

## 2021-05-22 RX ADMIN — PANTOPRAZOLE SODIUM 40 MILLIGRAM(S): 20 TABLET, DELAYED RELEASE ORAL at 06:33

## 2021-05-22 RX ADMIN — Medication 10 MILLIGRAM(S): at 23:30

## 2021-05-22 RX ADMIN — ENOXAPARIN SODIUM 40 MILLIGRAM(S): 100 INJECTION SUBCUTANEOUS at 11:36

## 2021-05-22 RX ADMIN — LIDOCAINE 1 PATCH: 4 CREAM TOPICAL at 22:44

## 2021-05-22 RX ADMIN — Medication 10 MILLIGRAM(S): at 13:25

## 2021-05-22 NOTE — PROGRESS NOTE ADULT - ASSESSMENT
1. Cellulitis of LUE   Antibiotic -  switch to PO  - per ID   ID F/U   US neg for DVT   Monitor labs  Monitor Temp  DVT and GI PPX   DC planning     2. PNA - ruled out.   CT negative for PE/PNA   On Keflex; Procalcitonin 2.20  D.C Azithromycin   ID F/U   F/U CXR     3. Hx of Malignant Pleural Effusion  S/P VATS   CT noted.   Bronchodilators PRN   O2 supp PRN     4. Hx of Breast CA   S/P B/L Mastectomy   On Letrozole   Heme/onc F/U noted.   Recc Covid vaccine   Management for lymphedema     5. Hypothyroidism  Cont meds   Endo F/U

## 2021-05-22 NOTE — PROGRESS NOTE ADULT - ASSESSMENT
65 y/o F with PMHx of breast cancer s/p bilateral mastectomy, malignant pleural effusion s/p VATS procedure, chronic LUE lymphedema x 20y, hypothyroidism and HLD presents to the ED with complaint of left upper extremity swelling,cellulitis,abnormal CXR-pneumonia and back pain.  1.Cellulitis-ID f/u,ABX.  2.Echocardiogram.  3.Hypothyroidism-synthroid.  4.Breast ca-Letrozole.  5.Replace vit b12.  6.Back pain-CT spine no sig pathology,pain control..  7.GI and DVT prophylaxis.  8.PT.  9.HTN and tachycardia-add lopressor 12.5mg bid.

## 2021-05-22 NOTE — PROGRESS NOTE ADULT - ASSESSMENT
Left upper extremity cellultis  Fever - resolved  Unlikely to be COVID pneumonia - patient is asymptomatic and is covid neg x 2    Plan: Continue Cefazolin 2g iv q8  If doing well, will plan to DC on Sunday on Keflex 500mg po q6 for 5 more days       Left upper extremity cellultis  Fever - resolved  Unlikely to be COVID pneumonia - patient is asymptomatic and is covid neg x 2    Plan: Continue Cefazolin 2g iv q8  If doing well, will plan to DC on Sunday on Keflex 500mg po q6 for 5 more days    I agree with above

## 2021-05-22 NOTE — PROGRESS NOTE ADULT - SUBJECTIVE AND OBJECTIVE BOX
Patient is a 66y old  Female who presents with a chief complaint of LUE swelling (22 May 2021 12:17)  Patient is awake, alert, comfortable out of bed in NAD    INTERVAL HPI/OVERNIGHT EVENTS:      VITAL SIGNS:  T(F): 98.4 (05-22-21 @ 05:44)  HR: 102 (05-22-21 @ 05:44)  BP: 144/56 (05-22-21 @ 05:44)  RR: 18 (05-22-21 @ 05:44)  SpO2: 98% (05-22-21 @ 05:44)  Wt(kg): --  I&O's Detail    21 May 2021 07:01  -  22 May 2021 07:00  --------------------------------------------------------  IN:    IV PiggyBack: 50 mL  Total IN: 50 mL    OUT:  Total OUT: 0 mL    Total NET: 50 mL              REVIEW OF SYSTEMS:    CONSTITUTIONAL:  No fevers, chills, sweats    HEENT:  Eyes:  No diplopia or blurred vision. ENT:  No earache, sore throat or runny nose.    CARDIOVASCULAR:  No pressure, squeezing, tightness, or heaviness about the chest; no palpitations.    RESPIRATORY:  Per HPI    GASTROINTESTINAL:  No abdominal pain, nausea, vomiting or diarrhea.    GENITOURINARY:  No dysuria, frequency or urgency.    NEUROLOGIC:  No paresthesias, fasciculations, seizures or weakness.    PSYCHIATRIC:  No disorder of thought or mood.      PHYSICAL EXAM:    Constitutional: Well developed and nourished  Eyes:Perrla  ENMT: normal  Neck:supple  Respiratory: good air entry  Cardiovascular: S1 S2 regular  Gastrointestinal: Soft, Non tender  Extremities: No edema  Vascular:normal  Neurological:Awake, alert,Ox3  Musculoskeletal:Normal      MEDICATIONS  (STANDING):  ALPRAZolam 0.5 milliGRAM(s) Oral every 12 hours  ceFAZolin   IVPB 2000 milliGRAM(s) IV Intermittent every 8 hours  cyanocobalamin Injectable 1000 MICROGram(s) SubCutaneous daily  enoxaparin Injectable 40 milliGRAM(s) SubCutaneous daily  gabapentin 100 milliGRAM(s) Oral every 12 hours  ketorolac 10 milliGRAM(s) Oral every 8 hours  letrozole 2.5 milliGRAM(s) Oral daily  levothyroxine 50 MICROGram(s) Oral daily  lidocaine   Patch 1 Patch Transdermal every 24 hours  metoprolol tartrate 12.5 milliGRAM(s) Oral two times a day  palbociclib 125 milliGRAM(s) Oral daily  pantoprazole    Tablet 40 milliGRAM(s) Oral before breakfast  polyethylene glycol 3350 17 Gram(s) Oral daily  senna 2 Tablet(s) Oral at bedtime  simvastatin 10 milliGRAM(s) Oral at bedtime    MEDICATIONS  (PRN):  acetaminophen   Tablet .. 650 milliGRAM(s) Oral every 6 hours PRN Temp greater or equal to 38C (100.4F), Moderate Pain (4 - 6)  oxyCODONE    IR 5 milliGRAM(s) Oral every 4 hours PRN Moderate Pain (4 - 6)  oxyCODONE    IR 10 milliGRAM(s) Oral every 4 hours PRN Severe Pain (7 - 10)      Allergies    No Known Allergies    Intolerances        LABS:                        11.3   3.61  )-----------( 410      ( 22 May 2021 06:15 )             34.6     05-22    140  |  104  |  14  ----------------------------<  209<H>  4.0   |  29  |  0.68    Ca    9.2      22 May 2021 06:15                CAPILLARY BLOOD GLUCOSE            RADIOLOGY & ADDITIONAL TESTS:    CXR:    Ct scan chest:    ekg;    echo:

## 2021-05-22 NOTE — PROGRESS NOTE ADULT - SUBJECTIVE AND OBJECTIVE BOX
66y Female is under our care for     REVIEW OF SYSTEMS:  [  ] Not able to elicit  General:	  Chest:	  GI:	  :  Skin:	  Musculoskeletal:	  Neuro:	    MEDS:  ceFAZolin   IVPB 2000 milliGRAM(s) IV Intermittent every 8 hours    ALLERGIES: Allergies    No Known Allergies    Intolerances        VITALS:  Vital Signs Last 24 Hrs  T(C): 36.9 (22 May 2021 05:44), Max: 37.2 (21 May 2021 20:12)  T(F): 98.4 (22 May 2021 05:44), Max: 98.9 (21 May 2021 20:12)  HR: 102 (22 May 2021 05:44) (88 - 102)  BP: 144/56 (22 May 2021 05:44) (118/61 - 146/82)  BP(mean): 103 (21 May 2021 12:29) (103 - 103)  RR: 18 (22 May 2021 05:44) (17 - 18)  SpO2: 98% (22 May 2021 05:44) (98% - 100%)      PHYSICAL EXAM:  HEENT:  Neck:  Respiratory:  Cardiovascular:  Gastrointestinal:  Extremities:  Skin:  Ortho:  Neuro:    LABS/DIAGNOSTIC TESTS:                        11.3   3.61  )-----------( 410      ( 22 May 2021 06:15 )             34.6     WBC Count: 3.61 K/uL (05-22 @ 06:15)  WBC Count: 5.40 K/uL (05-21 @ 07:50)  WBC Count: 7.55 K/uL (05-20 @ 07:53)  WBC Count: 8.46 K/uL (05-19 @ 08:25)  WBC Count: 8.41 K/uL (05-18 @ 14:43)    05-22    140  |  104  |  14  ----------------------------<  209<H>  4.0   |  29  |  0.68    Ca    9.2      22 May 2021 06:15        CULTURES:   .Blood Blood  05-19 @ 18:25   No growth to date.  --  --      .Blood Blood  05-19 @ 18:24   No growth to date.  --  --      .Urine Clean Catch (Midstream)  05-18 @ 21:58   <10,000 CFU/mL Normal Urogenital Wilda  --  --      .Urine Clean Catch (Midstream)  04-17 @ 22:03   <10,000 CFU/mL Normal Urogenital Wilda  --  --        RADIOLOGY:  no new studies 66y Female is under our care for left upper extremity cellulitis.  Patient was seen sitting comfortably in the chair with no acute distress.  Patients isolation was MI'ed, remains afebrile, denies any cough or LUE pain.    REVIEW OF SYSTEMS:  [  ] Not able to elicit  General: no fevers no malaise  Chest: no cough, no sob  GI: no nvd  : no urinary sxs   Skin: left arm redness  Musculoskeletal: no trauma no LBP  Neuro: no ha's no dizziness     MEDS:  ceFAZolin   IVPB 2000 milliGRAM(s) IV Intermittent every 8 hours    ALLERGIES: Allergies    No Known Allergies    Intolerances        VITALS:  Vital Signs Last 24 Hrs  T(C): 36.9 (22 May 2021 05:44), Max: 37.2 (21 May 2021 20:12)  T(F): 98.4 (22 May 2021 05:44), Max: 98.9 (21 May 2021 20:12)  HR: 102 (22 May 2021 05:44) (88 - 102)  BP: 144/56 (22 May 2021 05:44) (118/61 - 146/82)  BP(mean): 103 (21 May 2021 12:29) (103 - 103)  RR: 18 (22 May 2021 05:44) (17 - 18)  SpO2: 98% (22 May 2021 05:44) (98% - 100%)      PHYSICAL EXAM:  HEENT: n/a  Neck: supple no LN's   Respiratory: lungs clear no rales  Cardiovascular: S1 S2 reg no murmurs  Gastrointestinal: +BS with soft, nondistended abdomen; nontender  Extremities: left upper extremity edema +4  Skin: near resolution of LUE erythema and warmth, no tenderness   Ortho: n/a  Neuro: AAO x 4    LABS/DIAGNOSTIC TESTS:                        11.3   3.61  )-----------( 410      ( 22 May 2021 06:15 )             34.6     WBC Count: 3.61 K/uL (05-22 @ 06:15)  WBC Count: 5.40 K/uL (05-21 @ 07:50)  WBC Count: 7.55 K/uL (05-20 @ 07:53)  WBC Count: 8.46 K/uL (05-19 @ 08:25)  WBC Count: 8.41 K/uL (05-18 @ 14:43)    05-22    140  |  104  |  14  ----------------------------<  209<H>  4.0   |  29  |  0.68    Ca    9.2      22 May 2021 06:15        CULTURES:   .Blood Blood  05-19 @ 18:25   No growth to date.  --  --      .Blood Blood  05-19 @ 18:24   No growth to date.  --  --      .Urine Clean Catch (Midstream)  05-18 @ 21:58   <10,000 CFU/mL Normal Urogenital Wilda  --  --      .Urine Clean Catch (Midstream)  04-17 @ 22:03   <10,000 CFU/mL Normal Urogenital Wilda  --  --        RADIOLOGY:  no new studies

## 2021-05-22 NOTE — PROGRESS NOTE ADULT - SUBJECTIVE AND OBJECTIVE BOX
CARDIOLOGY/MEDICAL ATTENDING    CHIEF COMPLAINT:Patient is a 66y old  Female who presents with a chief complaint of LUE swelling.Pt appears comfortable.    	  REVIEW OF SYSTEMS:  CONSTITUTIONAL: No fever, weight loss, or fatigue  EYES: No eye pain, visual disturbances, or discharge  ENT:  No difficulty hearing, tinnitus, vertigo; No sinus or throat pain  NECK: No pain or stiffness  RESPIRATORY: No cough, wheezing, chills or hemoptysis; No Shortness of Breath  CARDIOVASCULAR: No chest pain, palpitations, passing out, dizziness, or leg swelling  GASTROINTESTINAL: No abdominal or epigastric pain. No nausea, vomiting, or hematemesis; No diarrhea or constipation. No melena or hematochezia.  GENITOURINARY: No dysuria, frequency, hematuria, or incontinence  NEUROLOGICAL: No headaches, memory loss, loss of strength, numbness, or tremors  SKIN: No itching, burning, rashes, or lesions   LYMPH Nodes: No enlarged glands  ENDOCRINE: No heat or cold intolerance; No hair loss  MUSCULOSKELETAL: No joint pain or swelling; No muscle, back, or extremity pain  PSYCHIATRIC: No depression, anxiety, mood swings, or difficulty sleeping  HEME/LYMPH: No easy bruising, or bleeding gums  ALLERGY AND IMMUNOLOGIC: No hives or eczema	        PHYSICAL EXAM:  T(C): 36.9 (05-22-21 @ 05:44), Max: 37.2 (05-21-21 @ 20:12)  HR: 102 (05-22-21 @ 05:44) (88 - 102)  BP: 144/56 (05-22-21 @ 05:44) (118/61 - 146/82)  RR: 18 (05-22-21 @ 05:44) (17 - 18)  SpO2: 98% (05-22-21 @ 05:44) (98% - 100%)  Wt(kg): --  I&O's Summary    21 May 2021 07:01  -  22 May 2021 07:00  --------------------------------------------------------  IN: 50 mL / OUT: 0 mL / NET: 50 mL        Appearance: Normal	  HEENT:   Normal oral mucosa, PERRL, EOMI	  Lymphatic: No lymphadenopathy  Cardiovascular: Normal S1 S2, No JVD, No murmurs, No edema  Respiratory: Lungs clear to auscultation	  Psychiatry: A & O x 3, Mood & affect appropriate  Gastrointestinal:  Soft, Non-tender, + BS	  Skin: No rashes, No ecchymoses, No cyanosis	  Neurologic: Non-focal  Extremities: Normal range of motion, No clubbing, cyanosis or edema  Vascular: Peripheral pulses palpable 2+ bilaterally    MEDICATIONS  (STANDING):  ALPRAZolam 0.5 milliGRAM(s) Oral every 12 hours  ceFAZolin   IVPB 2000 milliGRAM(s) IV Intermittent every 8 hours  cyanocobalamin Injectable 1000 MICROGram(s) SubCutaneous daily  enoxaparin Injectable 40 milliGRAM(s) SubCutaneous daily  gabapentin 100 milliGRAM(s) Oral every 12 hours  ketorolac 10 milliGRAM(s) Oral every 8 hours  letrozole 2.5 milliGRAM(s) Oral daily  levothyroxine 50 MICROGram(s) Oral daily  lidocaine   Patch 1 Patch Transdermal every 24 hours  palbociclib 125 milliGRAM(s) Oral daily  pantoprazole    Tablet 40 milliGRAM(s) Oral before breakfast  polyethylene glycol 3350 17 Gram(s) Oral daily  senna 2 Tablet(s) Oral at bedtime  simvastatin 10 milliGRAM(s) Oral at bedtime      	  	  LABS:	 	                        11.3   3.61  )-----------( 410      ( 22 May 2021 06:15 )             34.6     05-22    140  |  104  |  14  ----------------------------<  209<H>  4.0   |  29  |  0.68    Ca    9.2      22 May 2021 06:15      proBNP:   Lipid Profile: Cholesterol 177  LDL --  HDL 34      HgA1c:   TSH: Thyroid Stimulating Hormone, Serum: 3.35 uU/mL (05-19 @ 08:25)

## 2021-05-23 LAB
ANION GAP SERPL CALC-SCNC: 5 MMOL/L — SIGNIFICANT CHANGE UP (ref 5–17)
BUN SERPL-MCNC: 15 MG/DL — SIGNIFICANT CHANGE UP (ref 7–18)
CALCIUM SERPL-MCNC: 9.1 MG/DL — SIGNIFICANT CHANGE UP (ref 8.4–10.5)
CHLORIDE SERPL-SCNC: 101 MMOL/L — SIGNIFICANT CHANGE UP (ref 96–108)
CO2 SERPL-SCNC: 32 MMOL/L — HIGH (ref 22–31)
CREAT SERPL-MCNC: 0.7 MG/DL — SIGNIFICANT CHANGE UP (ref 0.5–1.3)
GLUCOSE SERPL-MCNC: 144 MG/DL — HIGH (ref 70–99)
HCT VFR BLD CALC: 33.6 % — LOW (ref 34.5–45)
HGB BLD-MCNC: 11 G/DL — LOW (ref 11.5–15.5)
MCHC RBC-ENTMCNC: 32.7 GM/DL — SIGNIFICANT CHANGE UP (ref 32–36)
MCHC RBC-ENTMCNC: 35.4 PG — HIGH (ref 27–34)
MCV RBC AUTO: 108 FL — HIGH (ref 80–100)
NRBC # BLD: 0 /100 WBCS — SIGNIFICANT CHANGE UP (ref 0–0)
PLATELET # BLD AUTO: 427 K/UL — HIGH (ref 150–400)
POTASSIUM SERPL-MCNC: 4.2 MMOL/L — SIGNIFICANT CHANGE UP (ref 3.5–5.3)
POTASSIUM SERPL-SCNC: 4.2 MMOL/L — SIGNIFICANT CHANGE UP (ref 3.5–5.3)
RBC # BLD: 3.11 M/UL — LOW (ref 3.8–5.2)
RBC # FLD: 13.2 % — SIGNIFICANT CHANGE UP (ref 10.3–14.5)
SODIUM SERPL-SCNC: 138 MMOL/L — SIGNIFICANT CHANGE UP (ref 135–145)
WBC # BLD: 3.33 K/UL — LOW (ref 3.8–10.5)
WBC # FLD AUTO: 3.33 K/UL — LOW (ref 3.8–10.5)

## 2021-05-23 RX ORDER — OXYCODONE HYDROCHLORIDE 5 MG/1
10 TABLET ORAL ONCE
Refills: 0 | Status: DISCONTINUED | OUTPATIENT
Start: 2021-05-23 | End: 2021-05-23

## 2021-05-23 RX ORDER — METOPROLOL TARTRATE 50 MG
25 TABLET ORAL
Refills: 0 | Status: DISCONTINUED | OUTPATIENT
Start: 2021-05-23 | End: 2021-05-24

## 2021-05-23 RX ORDER — ERGOCALCIFEROL 1.25 MG/1
50000 CAPSULE ORAL ONCE
Refills: 0 | Status: COMPLETED | OUTPATIENT
Start: 2021-05-23 | End: 2021-05-23

## 2021-05-23 RX ADMIN — ENOXAPARIN SODIUM 40 MILLIGRAM(S): 100 INJECTION SUBCUTANEOUS at 11:34

## 2021-05-23 RX ADMIN — LIDOCAINE 1 PATCH: 4 CREAM TOPICAL at 08:20

## 2021-05-23 RX ADMIN — Medication 50 MICROGRAM(S): at 06:20

## 2021-05-23 RX ADMIN — Medication 100 MILLIGRAM(S): at 06:19

## 2021-05-23 RX ADMIN — Medication 100 MILLIGRAM(S): at 22:28

## 2021-05-23 RX ADMIN — Medication 10 MILLIGRAM(S): at 13:09

## 2021-05-23 RX ADMIN — Medication 10 MILLIGRAM(S): at 23:35

## 2021-05-23 RX ADMIN — Medication 10 MILLIGRAM(S): at 06:30

## 2021-05-23 RX ADMIN — Medication 12.5 MILLIGRAM(S): at 06:20

## 2021-05-23 RX ADMIN — PANTOPRAZOLE SODIUM 40 MILLIGRAM(S): 20 TABLET, DELAYED RELEASE ORAL at 06:19

## 2021-05-23 RX ADMIN — GABAPENTIN 100 MILLIGRAM(S): 400 CAPSULE ORAL at 06:22

## 2021-05-23 RX ADMIN — LIDOCAINE 1 PATCH: 4 CREAM TOPICAL at 22:13

## 2021-05-23 RX ADMIN — OXYCODONE HYDROCHLORIDE 10 MILLIGRAM(S): 5 TABLET ORAL at 16:26

## 2021-05-23 RX ADMIN — PALBOCICLIB 125 MILLIGRAM(S): 100 CAPSULE ORAL at 11:34

## 2021-05-23 RX ADMIN — Medication 0.5 MILLIGRAM(S): at 06:19

## 2021-05-23 RX ADMIN — Medication 100 MILLIGRAM(S): at 13:09

## 2021-05-23 RX ADMIN — OXYCODONE HYDROCHLORIDE 10 MILLIGRAM(S): 5 TABLET ORAL at 17:43

## 2021-05-23 RX ADMIN — OXYCODONE HYDROCHLORIDE 10 MILLIGRAM(S): 5 TABLET ORAL at 15:26

## 2021-05-23 RX ADMIN — LETROZOLE 2.5 MILLIGRAM(S): 2.5 TABLET, FILM COATED ORAL at 11:33

## 2021-05-23 RX ADMIN — GABAPENTIN 100 MILLIGRAM(S): 400 CAPSULE ORAL at 17:22

## 2021-05-23 RX ADMIN — Medication 0.5 MILLIGRAM(S): at 17:22

## 2021-05-23 RX ADMIN — Medication 10 MILLIGRAM(S): at 22:27

## 2021-05-23 RX ADMIN — Medication 10 MILLIGRAM(S): at 14:09

## 2021-05-23 RX ADMIN — OXYCODONE HYDROCHLORIDE 10 MILLIGRAM(S): 5 TABLET ORAL at 18:43

## 2021-05-23 RX ADMIN — ERGOCALCIFEROL 50000 UNIT(S): 1.25 CAPSULE ORAL at 12:46

## 2021-05-23 RX ADMIN — LIDOCAINE 1 PATCH: 4 CREAM TOPICAL at 18:57

## 2021-05-23 RX ADMIN — PREGABALIN 1000 MICROGRAM(S): 225 CAPSULE ORAL at 11:34

## 2021-05-23 RX ADMIN — Medication 25 MILLIGRAM(S): at 17:22

## 2021-05-23 RX ADMIN — Medication 10 MILLIGRAM(S): at 06:19

## 2021-05-23 NOTE — PROGRESS NOTE ADULT - SUBJECTIVE AND OBJECTIVE BOX
CARDIOLOGY/MEDICAL ATTENDING    CHIEF COMPLAINT:Patient is a 66y old  Female who presents with a chief complaint of LUE swelling.Pt appears comfortable.    	  REVIEW OF SYSTEMS:  CONSTITUTIONAL: No fever, weight loss, or fatigue  EYES: No eye pain, visual disturbances, or discharge  ENT:  No difficulty hearing, tinnitus, vertigo; No sinus or throat pain  NECK: No pain or stiffness  RESPIRATORY: No cough, wheezing, chills or hemoptysis; No Shortness of Breath  CARDIOVASCULAR: No chest pain, palpitations, passing out, dizziness, or leg swelling  GASTROINTESTINAL: No abdominal or epigastric pain. No nausea, vomiting, or hematemesis; No diarrhea or constipation. No melena or hematochezia.  GENITOURINARY: No dysuria, frequency, hematuria, or incontinence  NEUROLOGICAL: No headaches, memory loss, loss of strength, numbness, or tremors  SKIN: No itching, burning, rashes, or lesions   LYMPH Nodes: No enlarged glands  ENDOCRINE: No heat or cold intolerance; No hair loss  MUSCULOSKELETAL: No joint pain or swelling; No muscle, back, or extremity pain  PSYCHIATRIC: No depression, anxiety, mood swings, or difficulty sleeping  HEME/LYMPH: No easy bruising, or bleeding gums  ALLERGY AND IMMUNOLOGIC: No hives or eczema	      PHYSICAL EXAM:  T(C): 37 (05-23-21 @ 05:39), Max: 37 (05-22-21 @ 14:50)  HR: 102 (05-23-21 @ 05:39) (96 - 107)  BP: 146/81 (05-23-21 @ 05:39) (140/61 - 155/82)  RR: 18 (05-23-21 @ 05:39) (16 - 18)  SpO2: 98% (05-23-21 @ 05:39) (98% - 100%)        Appearance: Normal	  HEENT:   Normal oral mucosa, PERRL, EOMI	  Lymphatic: No lymphadenopathy  Cardiovascular: Normal S1 S2, No JVD, No murmurs, No edema  Respiratory: Lungs clear to auscultation	  Psychiatry: A & O x 3, Mood & affect appropriate  Gastrointestinal:  Soft, Non-tender, + BS	  Skin: No rashes, No ecchymoses, No cyanosis	  Neurologic: Non-focal  Extremities: Normal range of motion, No clubbing, cyanosis or edema  Vascular: Peripheral pulses palpable 2+ bilaterally    MEDICATIONS  (STANDING):  ALPRAZolam 0.5 milliGRAM(s) Oral every 12 hours  ceFAZolin   IVPB 2000 milliGRAM(s) IV Intermittent every 8 hours  cyanocobalamin Injectable 1000 MICROGram(s) SubCutaneous daily  enoxaparin Injectable 40 milliGRAM(s) SubCutaneous daily  ergocalciferol 43238 Unit(s) Oral once  gabapentin 100 milliGRAM(s) Oral every 12 hours  ketorolac 10 milliGRAM(s) Oral every 8 hours  letrozole 2.5 milliGRAM(s) Oral daily  levothyroxine 50 MICROGram(s) Oral daily  lidocaine   Patch 1 Patch Transdermal every 24 hours  metoprolol tartrate 12.5 milliGRAM(s) Oral two times a day  palbociclib 125 milliGRAM(s) Oral daily  pantoprazole    Tablet 40 milliGRAM(s) Oral before breakfast  polyethylene glycol 3350 17 Gram(s) Oral daily  senna 2 Tablet(s) Oral at bedtime  simvastatin 10 milliGRAM(s) Oral at bedtime      	  LABS:	 	                          11.0   3.33  )-----------( 427      ( 23 May 2021 06:53 )             33.6     05-23    138  |  101  |  15  ----------------------------<  144<H>  4.2   |  32<H>  |  0.70    Ca    9.1      23 May 2021 06:53      Lipid Profile: Cholesterol 177  LDL --  HDL 34        TSH: Thyroid Stimulating Hormone, Serum: 3.35 uU/mL (05-19 @ 08:25)      	    Transthoracic Echocardiogram (05.22.21 @ 14:11) >  OBSERVATIONS:  Mitral Valve: Mitral annular calcification. Mild mitral  regurgitation.  Aortic Root: Aortic Root: 3.4 cm.    Aortic Valve: Normal trileaflet aortic valve. .  Left Atrium: Normal left atrium.  LA volume index = 22  cc/m2.  Left Ventricle: Normal Left Ventricular Systolic Function,  (EF = 55 to 60%) Mild concentric left ventricular  hypertrophy. Normal diastolic function.  Right Heart: Normal right atrium. Normal right ventricular  size and systolic function (TAPSE  2.5cm). Normal tricuspid  valve. Normal pulmonic valve.  Pericardium/PleuraNormal pericardium with no pericardial  effusion.  Hemodynamic: RV systolic pressure is normal at  35 mm Hg.

## 2021-05-23 NOTE — PROGRESS NOTE ADULT - ASSESSMENT
65 y/o F with PMHx of breast cancer s/p bilateral mastectomy, malignant pleural effusion s/p VATS procedure, chronic LUE lymphedema x 20y, hypothyroidism and HLD presents to the ED with complaint of left upper extremity swelling,cellulitis,abnormal CXR-pneumonia and back pain.  1.Cellulitis-ID f/u,ABX.  2.HTN and tachycardia-inc lopressor 25mg bid.  3.Hypothyroidism-synthroid.  4.Breast ca-Letrozole.  5.Replace vit b12.  6.Back pain-CT spine no sig pathology,pain control..  7.GI and DVT prophylaxis.  8.PT.

## 2021-05-23 NOTE — PROGRESS NOTE ADULT - SUBJECTIVE AND OBJECTIVE BOX
Patient is a 66y old  Female who presents with a chief complaint of LUE swelling (22 May 2021 12:33)  Patient is awake, alert, comfortable in bed in NAD. No cough or sob. Cellulitis improved  INTERVAL HPI/OVERNIGHT EVENTS:      VITAL SIGNS:  T(F): 98.6 (05-23-21 @ 05:39)  HR: 102 (05-23-21 @ 05:39)  BP: 146/81 (05-23-21 @ 05:39)  RR: 18 (05-23-21 @ 05:39)  SpO2: 98% (05-23-21 @ 05:39)  Wt(kg): --  I&O's Detail          REVIEW OF SYSTEMS:    CONSTITUTIONAL:  No fevers, chills, sweats    HEENT:  Eyes:  No diplopia or blurred vision. ENT:  No earache, sore throat or runny nose.    CARDIOVASCULAR:  No pressure, squeezing, tightness, or heaviness about the chest; no palpitations.    RESPIRATORY:  Per HPI    GASTROINTESTINAL:  No abdominal pain, nausea, vomiting or diarrhea.    GENITOURINARY:  No dysuria, frequency or urgency.    NEUROLOGIC:  No paresthesias, fasciculations, seizures or weakness.    PSYCHIATRIC:  No disorder of thought or mood.      PHYSICAL EXAM:    Constitutional: Well developed and nourished  Eyes:Perrla  ENMT: normal  Neck:supple  Respiratory: good air entry  Cardiovascular: S1 S2 regular  Gastrointestinal: Soft, Non tender  Extremities: No edema  Vascular:normal  Neurological:Awake, alert,Ox3  Musculoskeletal:Normal      MEDICATIONS  (STANDING):  ALPRAZolam 0.5 milliGRAM(s) Oral every 12 hours  ceFAZolin   IVPB 2000 milliGRAM(s) IV Intermittent every 8 hours  cyanocobalamin Injectable 1000 MICROGram(s) SubCutaneous daily  enoxaparin Injectable 40 milliGRAM(s) SubCutaneous daily  gabapentin 100 milliGRAM(s) Oral every 12 hours  ketorolac 10 milliGRAM(s) Oral every 8 hours  letrozole 2.5 milliGRAM(s) Oral daily  levothyroxine 50 MICROGram(s) Oral daily  lidocaine   Patch 1 Patch Transdermal every 24 hours  metoprolol tartrate 12.5 milliGRAM(s) Oral two times a day  palbociclib 125 milliGRAM(s) Oral daily  pantoprazole    Tablet 40 milliGRAM(s) Oral before breakfast  polyethylene glycol 3350 17 Gram(s) Oral daily  senna 2 Tablet(s) Oral at bedtime  simvastatin 10 milliGRAM(s) Oral at bedtime    MEDICATIONS  (PRN):  acetaminophen   Tablet .. 650 milliGRAM(s) Oral every 6 hours PRN Temp greater or equal to 38C (100.4F), Moderate Pain (4 - 6)  oxyCODONE    IR 5 milliGRAM(s) Oral every 4 hours PRN Moderate Pain (4 - 6)  oxyCODONE    IR 10 milliGRAM(s) Oral every 4 hours PRN Severe Pain (7 - 10)      Allergies    No Known Allergies    Intolerances        LABS:                        11.0   3.33  )-----------( 427      ( 23 May 2021 06:53 )             33.6     05-23    138  |  101  |  15  ----------------------------<  144<H>  4.2   |  32<H>  |  0.70    Ca    9.1      23 May 2021 06:53                CAPILLARY BLOOD GLUCOSE            RADIOLOGY & ADDITIONAL TESTS:    CXR:    Ct scan chest:    ekg;    echo:

## 2021-05-24 ENCOUNTER — TRANSCRIPTION ENCOUNTER (OUTPATIENT)
Age: 66
End: 2021-05-24

## 2021-05-24 VITALS — WEIGHT: 259.93 LBS

## 2021-05-24 LAB
ANION GAP SERPL CALC-SCNC: 7 MMOL/L — SIGNIFICANT CHANGE UP (ref 5–17)
BUN SERPL-MCNC: 13 MG/DL — SIGNIFICANT CHANGE UP (ref 7–18)
CALCIUM SERPL-MCNC: 9.2 MG/DL — SIGNIFICANT CHANGE UP (ref 8.4–10.5)
CHLORIDE SERPL-SCNC: 97 MMOL/L — SIGNIFICANT CHANGE UP (ref 96–108)
CO2 SERPL-SCNC: 31 MMOL/L — SIGNIFICANT CHANGE UP (ref 22–31)
CREAT SERPL-MCNC: 0.64 MG/DL — SIGNIFICANT CHANGE UP (ref 0.5–1.3)
CULTURE RESULTS: SIGNIFICANT CHANGE UP
CULTURE RESULTS: SIGNIFICANT CHANGE UP
GLUCOSE SERPL-MCNC: 184 MG/DL — HIGH (ref 70–99)
HCT VFR BLD CALC: 34.2 % — LOW (ref 34.5–45)
HGB BLD-MCNC: 11.1 G/DL — LOW (ref 11.5–15.5)
MCHC RBC-ENTMCNC: 32.5 GM/DL — SIGNIFICANT CHANGE UP (ref 32–36)
MCHC RBC-ENTMCNC: 34.4 PG — HIGH (ref 27–34)
MCV RBC AUTO: 105.9 FL — HIGH (ref 80–100)
NRBC # BLD: 0 /100 WBCS — SIGNIFICANT CHANGE UP (ref 0–0)
PLATELET # BLD AUTO: 508 K/UL — HIGH (ref 150–400)
POTASSIUM SERPL-MCNC: 4.1 MMOL/L — SIGNIFICANT CHANGE UP (ref 3.5–5.3)
POTASSIUM SERPL-SCNC: 4.1 MMOL/L — SIGNIFICANT CHANGE UP (ref 3.5–5.3)
RBC # BLD: 3.23 M/UL — LOW (ref 3.8–5.2)
RBC # FLD: 12.9 % — SIGNIFICANT CHANGE UP (ref 10.3–14.5)
SODIUM SERPL-SCNC: 135 MMOL/L — SIGNIFICANT CHANGE UP (ref 135–145)
SPECIMEN SOURCE: SIGNIFICANT CHANGE UP
SPECIMEN SOURCE: SIGNIFICANT CHANGE UP
WBC # BLD: 3.92 K/UL — SIGNIFICANT CHANGE UP (ref 3.8–10.5)
WBC # FLD AUTO: 3.92 K/UL — SIGNIFICANT CHANGE UP (ref 3.8–10.5)

## 2021-05-24 PROCEDURE — 72128 CT CHEST SPINE W/O DYE: CPT

## 2021-05-24 PROCEDURE — 87086 URINE CULTURE/COLONY COUNT: CPT

## 2021-05-24 PROCEDURE — 85730 THROMBOPLASTIN TIME PARTIAL: CPT

## 2021-05-24 PROCEDURE — 96375 TX/PRO/DX INJ NEW DRUG ADDON: CPT

## 2021-05-24 PROCEDURE — 93306 TTE W/DOPPLER COMPLETE: CPT

## 2021-05-24 PROCEDURE — 85025 COMPLETE CBC W/AUTO DIFF WBC: CPT

## 2021-05-24 PROCEDURE — 93005 ELECTROCARDIOGRAM TRACING: CPT

## 2021-05-24 PROCEDURE — 82607 VITAMIN B-12: CPT

## 2021-05-24 PROCEDURE — 96374 THER/PROPH/DIAG INJ IV PUSH: CPT

## 2021-05-24 PROCEDURE — 97161 PT EVAL LOW COMPLEX 20 MIN: CPT

## 2021-05-24 PROCEDURE — 86769 SARS-COV-2 COVID-19 ANTIBODY: CPT

## 2021-05-24 PROCEDURE — 99231 SBSQ HOSP IP/OBS SF/LOW 25: CPT

## 2021-05-24 PROCEDURE — 87040 BLOOD CULTURE FOR BACTERIA: CPT

## 2021-05-24 PROCEDURE — 93971 EXTREMITY STUDY: CPT

## 2021-05-24 PROCEDURE — 82248 BILIRUBIN DIRECT: CPT

## 2021-05-24 PROCEDURE — 85027 COMPLETE CBC AUTOMATED: CPT

## 2021-05-24 PROCEDURE — 84145 PROCALCITONIN (PCT): CPT

## 2021-05-24 PROCEDURE — 99285 EMERGENCY DEPT VISIT HI MDM: CPT | Mod: 25

## 2021-05-24 PROCEDURE — 80061 LIPID PANEL: CPT

## 2021-05-24 PROCEDURE — 83036 HEMOGLOBIN GLYCOSYLATED A1C: CPT

## 2021-05-24 PROCEDURE — 71275 CT ANGIOGRAPHY CHEST: CPT

## 2021-05-24 PROCEDURE — 85652 RBC SED RATE AUTOMATED: CPT

## 2021-05-24 PROCEDURE — 85610 PROTHROMBIN TIME: CPT

## 2021-05-24 PROCEDURE — 72131 CT LUMBAR SPINE W/O DYE: CPT

## 2021-05-24 PROCEDURE — 83735 ASSAY OF MAGNESIUM: CPT

## 2021-05-24 PROCEDURE — 87635 SARS-COV-2 COVID-19 AMP PRB: CPT

## 2021-05-24 PROCEDURE — 84443 ASSAY THYROID STIM HORMONE: CPT

## 2021-05-24 PROCEDURE — 84100 ASSAY OF PHOSPHORUS: CPT

## 2021-05-24 PROCEDURE — 87641 MR-STAPH DNA AMP PROBE: CPT

## 2021-05-24 PROCEDURE — 80048 BASIC METABOLIC PNL TOTAL CA: CPT

## 2021-05-24 PROCEDURE — 76705 ECHO EXAM OF ABDOMEN: CPT

## 2021-05-24 PROCEDURE — 80053 COMPREHEN METABOLIC PANEL: CPT

## 2021-05-24 PROCEDURE — 71045 X-RAY EXAM CHEST 1 VIEW: CPT

## 2021-05-24 PROCEDURE — 81001 URINALYSIS AUTO W/SCOPE: CPT

## 2021-05-24 PROCEDURE — 87640 STAPH A DNA AMP PROBE: CPT

## 2021-05-24 PROCEDURE — 82746 ASSAY OF FOLIC ACID SERUM: CPT

## 2021-05-24 PROCEDURE — 84484 ASSAY OF TROPONIN QUANT: CPT

## 2021-05-24 PROCEDURE — 83605 ASSAY OF LACTIC ACID: CPT

## 2021-05-24 PROCEDURE — 36415 COLL VENOUS BLD VENIPUNCTURE: CPT

## 2021-05-24 RX ORDER — LIDOCAINE 4 G/100G
1 CREAM TOPICAL
Qty: 30 | Refills: 0
Start: 2021-05-24 | End: 2021-06-22

## 2021-05-24 RX ORDER — OXYCODONE HYDROCHLORIDE 5 MG/1
1 TABLET ORAL
Qty: 20 | Refills: 0
Start: 2021-05-24 | End: 2021-05-28

## 2021-05-24 RX ORDER — SENNA PLUS 8.6 MG/1
2 TABLET ORAL
Qty: 60 | Refills: 0
Start: 2021-05-24 | End: 2021-06-22

## 2021-05-24 RX ORDER — GABAPENTIN 400 MG/1
1 CAPSULE ORAL
Qty: 28 | Refills: 0
Start: 2021-05-24 | End: 2021-06-06

## 2021-05-24 RX ORDER — CEPHALEXIN 500 MG
1 CAPSULE ORAL
Qty: 4 | Refills: 0
Start: 2021-05-24 | End: 2021-05-25

## 2021-05-24 RX ORDER — METOPROLOL TARTRATE 50 MG
1 TABLET ORAL
Qty: 14 | Refills: 0
Start: 2021-05-24

## 2021-05-24 RX ORDER — PALBOCICLIB 100 MG/1
1 CAPSULE ORAL
Qty: 0 | Refills: 0 | DISCHARGE
Start: 2021-05-24

## 2021-05-24 RX ORDER — ACETAMINOPHEN 500 MG
2 TABLET ORAL
Qty: 0 | Refills: 0 | DISCHARGE
Start: 2021-05-24

## 2021-05-24 RX ADMIN — LETROZOLE 2.5 MILLIGRAM(S): 2.5 TABLET, FILM COATED ORAL at 12:45

## 2021-05-24 RX ADMIN — LIDOCAINE 1 PATCH: 4 CREAM TOPICAL at 08:14

## 2021-05-24 RX ADMIN — Medication 25 MILLIGRAM(S): at 06:08

## 2021-05-24 RX ADMIN — PREGABALIN 1000 MICROGRAM(S): 225 CAPSULE ORAL at 12:45

## 2021-05-24 RX ADMIN — Medication 100 MILLIGRAM(S): at 13:01

## 2021-05-24 RX ADMIN — Medication 10 MILLIGRAM(S): at 06:08

## 2021-05-24 RX ADMIN — ENOXAPARIN SODIUM 40 MILLIGRAM(S): 100 INJECTION SUBCUTANEOUS at 12:45

## 2021-05-24 RX ADMIN — Medication 10 MILLIGRAM(S): at 06:15

## 2021-05-24 RX ADMIN — POLYETHYLENE GLYCOL 3350 17 GRAM(S): 17 POWDER, FOR SOLUTION ORAL at 12:45

## 2021-05-24 RX ADMIN — Medication 100 MILLIGRAM(S): at 06:07

## 2021-05-24 RX ADMIN — PALBOCICLIB 125 MILLIGRAM(S): 100 CAPSULE ORAL at 12:46

## 2021-05-24 RX ADMIN — PANTOPRAZOLE SODIUM 40 MILLIGRAM(S): 20 TABLET, DELAYED RELEASE ORAL at 06:08

## 2021-05-24 RX ADMIN — Medication 50 MICROGRAM(S): at 06:08

## 2021-05-24 RX ADMIN — GABAPENTIN 100 MILLIGRAM(S): 400 CAPSULE ORAL at 06:08

## 2021-05-24 RX ADMIN — Medication 0.5 MILLIGRAM(S): at 06:07

## 2021-05-24 NOTE — PROGRESS NOTE ADULT - PROBLEM SELECTOR PROBLEM 1
Back pain
SIRS (systemic inflammatory response syndrome)
Back pain
SIRS (systemic inflammatory response syndrome)
SIRS (systemic inflammatory response syndrome)

## 2021-05-24 NOTE — PROGRESS NOTE ADULT - SUBJECTIVE AND OBJECTIVE BOX
CARDIOLOGY/MEDICAL ATTENDING    CHIEF COMPLAINT:Patient is a 66y old  Female who presents with a chief complaint of LUE swelling.Pt appears comfortable.    	  REVIEW OF SYSTEMS:  CONSTITUTIONAL: No fever, weight loss, or fatigue  EYES: No eye pain, visual disturbances, or discharge  ENT:  No difficulty hearing, tinnitus, vertigo; No sinus or throat pain  NECK: No pain or stiffness  RESPIRATORY: No cough, wheezing, chills or hemoptysis; No Shortness of Breath  CARDIOVASCULAR: No chest pain, palpitations, passing out, dizziness, or leg swelling  GASTROINTESTINAL: No abdominal or epigastric pain. No nausea, vomiting, or hematemesis; No diarrhea or constipation. No melena or hematochezia.  GENITOURINARY: No dysuria, frequency, hematuria, or incontinence  NEUROLOGICAL: No headaches, memory loss, loss of strength, numbness, or tremors  SKIN: No itching, burning, rashes, or lesions   LYMPH Nodes: No enlarged glands  ENDOCRINE: No heat or cold intolerance; No hair loss  MUSCULOSKELETAL: No joint pain or swelling; No muscle, back, or extremity pain  PSYCHIATRIC: No depression, anxiety, mood swings, or difficulty sleeping  HEME/LYMPH: No easy bruising, or bleeding gums  ALLERGY AND IMMUNOLOGIC: No hives or eczema	      PHYSICAL EXAM:  T(C): 36.7 (05-24-21 @ 05:37), Max: 37.2 (05-23-21 @ 14:49)  HR: 95 (05-24-21 @ 05:37) (95 - 108)  BP: 124/60 (05-24-21 @ 05:37) (122/72 - 155/97)  RR: 19 (05-24-21 @ 05:37) (18 - 19)  SpO2: 97% (05-24-21 @ 05:37) (96% - 97%)  Wt(kg): --  I&O's Summary      Appearance: Normal	  HEENT:   Normal oral mucosa, PERRL, EOMI	  Lymphatic: No lymphadenopathy  Cardiovascular: Normal S1 S2, No JVD, No murmurs, No edema  Respiratory: Lungs clear to auscultation	  Psychiatry: A & O x 3, Mood & affect appropriate  Gastrointestinal:  Soft, Non-tender, + BS	  Skin: No rashes, No ecchymoses, No cyanosis	  Neurologic: Non-focal  Extremities: Normal range of motion, No clubbing, cyanosis or edema  Vascular: Peripheral pulses palpable 2+ bilaterally    MEDICATIONS  (STANDING):  ALPRAZolam 0.5 milliGRAM(s) Oral every 12 hours  ceFAZolin   IVPB 2000 milliGRAM(s) IV Intermittent every 8 hours  cyanocobalamin Injectable 1000 MICROGram(s) SubCutaneous daily  enoxaparin Injectable 40 milliGRAM(s) SubCutaneous daily  gabapentin 100 milliGRAM(s) Oral every 12 hours  letrozole 2.5 milliGRAM(s) Oral daily  levothyroxine 50 MICROGram(s) Oral daily  lidocaine   Patch 1 Patch Transdermal every 24 hours  metoprolol tartrate 25 milliGRAM(s) Oral two times a day  palbociclib 125 milliGRAM(s) Oral daily  pantoprazole    Tablet 40 milliGRAM(s) Oral before breakfast  polyethylene glycol 3350 17 Gram(s) Oral daily  senna 2 Tablet(s) Oral at bedtime  simvastatin 10 milliGRAM(s) Oral at bedtime      LABS:	 	                       11.1   3.92  )-----------( 508      ( 24 May 2021 10:41 )             34.2     05-24    135  |  97  |  13  ----------------------------<  184<H>  4.1   |  31  |  0.64    Ca    9.2      24 May 2021 10:41        Lipid Profile: Cholesterol 177  HDL 34        TSH: Thyroid Stimulating Hormone, Serum: 3.35 uU/mL (05-19 @ 08:25)      	    Transthoracic Echocardiogram (05.22.21 @ 14:11) >  OBSERVATIONS:  Mitral Valve: Mitral annular calcification. Mild mitral  regurgitation.  Aortic Root: Aortic Root: 3.4 cm.    Aortic Valve: Normal trileaflet aortic valve. .  Left Atrium: Normal left atrium.  LA volume index = 22  cc/m2.  Left Ventricle: Normal Left Ventricular Systolic Function,  (EF = 55 to 60%) Mild concentric left ventricular  hypertrophy. Normal diastolic function.  Right Heart: Normal right atrium. Normal right ventricular  size and systolic function (TAPSE  2.5cm). Normal tricuspid  valve. Normal pulmonic valve.  Pericardium/PleuraNormal pericardium with no pericardial  effusion.  Hemodynamic: RV systolic pressure is normal at  35 mm Hg.  ------------------------------------------------------------------------

## 2021-05-24 NOTE — DIETITIAN INITIAL EVALUATION ADULT. - PERTINENT LABORATORY DATA
05-24 Na135 mmol/L Glu 184 mg/dL<H> K+ 4.1 mmol/L Cr  0.64 mg/dL BUN 13 mg/dL   05-19 Phos 2.8 mg/dL   05-19 Alb 2.6 g/dL<L>       05-19 Chol 177 mg/dL LDL --    HDL 34 mg/dL<L> Trig 117 mg/dL  05-19-21 @ 09:52 HgbA1C 6.8 [4.0 - 5.6]

## 2021-05-24 NOTE — PROGRESS NOTE ADULT - ASSESSMENT
67 y/o F with PMHx of breast cancer s/p bilateral mastectomy, malignant pleural effusion s/p VATS procedure, chronic LUE lymphedema x 20y, hypothyroidism and HLD presents to the ED with complaint of left upper extremity swelling,cellulitis,abnormal CXR-pneumonia and back pain.  1.Cellulitis-ID f/u,ABX to be changed to po.  2.HTN and tachycardia- lopressor 25mg bid.  3.Hypothyroidism-synthroid.  4.Breast ca-Letrozole.  5.Replace vit b12.  6.Back pain-CT spine no sig pathology,pain control..  7.GI and DVT prophylaxis.  8.PT.

## 2021-05-24 NOTE — PROGRESS NOTE ADULT - PROBLEM SELECTOR PLAN 1
Pt with back pain.  + history of breast ca with mets to lung.  Pt is being treated for cellulitis of left arm - on IV abx. CT spine - lumbar and thoracic negative for mets.+ hepatosplenomegaly   nonopioid recc.  Imaging shows Right-sided facet hypertrophy mildly deforms the dorsal thecal sac at T10-11. There is severe facet arthropathy from L3-L4 down notable for grade 1 spondylolisthesis at L4-5 with spinal stenosis which is mild at L3-L4 andmoderate at L4-L5. T  - gabapentin 100mg po q 12 hours  - lidocaine patch daily  - nsaids po prn  opioid recc  - no iv opioids  - oxycodone 5mg po q 4 hours prn moderate pain  - oxycodone 10mg po q 4 hours prn severe pain   Mild pain   - Non-pharmacological pain treatment recommendations  - Warm/ Cool packs PRN   - Repositioning extremity, elevation, imagery, relaxation, distraction.  - Physical therapy OOB if no contraindications   Recommendations discussed with primary team and RN  bowel regimen  - PPI  - senna  - miralax

## 2021-05-24 NOTE — DIETITIAN INITIAL EVALUATION ADULT. - PERTINENT MEDS FT
MEDICATIONS  (STANDING):  ALPRAZolam 0.5 milliGRAM(s) Oral every 12 hours  ceFAZolin   IVPB 2000 milliGRAM(s) IV Intermittent every 8 hours  cyanocobalamin Injectable 1000 MICROGram(s) SubCutaneous daily  enoxaparin Injectable 40 milliGRAM(s) SubCutaneous daily  gabapentin 100 milliGRAM(s) Oral every 12 hours  letrozole 2.5 milliGRAM(s) Oral daily  levothyroxine 50 MICROGram(s) Oral daily  lidocaine   Patch 1 Patch Transdermal every 24 hours  metoprolol tartrate 25 milliGRAM(s) Oral two times a day  palbociclib 125 milliGRAM(s) Oral daily  pantoprazole    Tablet 40 milliGRAM(s) Oral before breakfast  polyethylene glycol 3350 17 Gram(s) Oral daily  senna 2 Tablet(s) Oral at bedtime  simvastatin 10 milliGRAM(s) Oral at bedtime    MEDICATIONS  (PRN):  acetaminophen   Tablet .. 650 milliGRAM(s) Oral every 6 hours PRN Temp greater or equal to 38C (100.4F), Moderate Pain (4 - 6)  oxyCODONE    IR 5 milliGRAM(s) Oral every 4 hours PRN Moderate Pain (4 - 6)  oxyCODONE    IR 10 milliGRAM(s) Oral every 4 hours PRN Severe Pain (7 - 10)

## 2021-05-24 NOTE — PROGRESS NOTE ADULT - REASON FOR ADMISSION
LUE swelling

## 2021-05-24 NOTE — PROGRESS NOTE ADULT - SUBJECTIVE AND OBJECTIVE BOX
Patient is a 66y old  Female who presents with a chief complaint of LUE swelling (24 May 2021 11:18)      SUBJECTIVE / OVERNIGHT EVENTS:    ADDITIONAL REVIEW OF SYSTEMS:    MEDICATIONS  (STANDING):  ALPRAZolam 0.5 milliGRAM(s) Oral every 12 hours  ceFAZolin   IVPB 2000 milliGRAM(s) IV Intermittent every 8 hours  cyanocobalamin Injectable 1000 MICROGram(s) SubCutaneous daily  enoxaparin Injectable 40 milliGRAM(s) SubCutaneous daily  gabapentin 100 milliGRAM(s) Oral every 12 hours  letrozole 2.5 milliGRAM(s) Oral daily  levothyroxine 50 MICROGram(s) Oral daily  lidocaine   Patch 1 Patch Transdermal every 24 hours  metoprolol tartrate 25 milliGRAM(s) Oral two times a day  palbociclib 125 milliGRAM(s) Oral daily  pantoprazole    Tablet 40 milliGRAM(s) Oral before breakfast  polyethylene glycol 3350 17 Gram(s) Oral daily  senna 2 Tablet(s) Oral at bedtime  simvastatin 10 milliGRAM(s) Oral at bedtime    MEDICATIONS  (PRN):  acetaminophen   Tablet .. 650 milliGRAM(s) Oral every 6 hours PRN Temp greater or equal to 38C (100.4F), Moderate Pain (4 - 6)  oxyCODONE    IR 5 milliGRAM(s) Oral every 4 hours PRN Moderate Pain (4 - 6)  oxyCODONE    IR 10 milliGRAM(s) Oral every 4 hours PRN Severe Pain (7 - 10)    CAPILLARY BLOOD GLUCOSE        I&O's Summary      PHYSICAL EXAM:  Vital Signs Last 24 Hrs  T(C): 36.9 (24 May 2021 12:02), Max: 37.2 (23 May 2021 14:49)  T(F): 98.4 (24 May 2021 12:02), Max: 99 (23 May 2021 14:49)  HR: 105 (24 May 2021 12:02) (95 - 108)  BP: 131/60 (24 May 2021 12:02) (122/72 - 155/97)  BP(mean): --  RR: 18 (24 May 2021 12:02) (18 - 19)  SpO2: 97% (24 May 2021 12:02) (96% - 97%)      LABS:                        11.1   3.92  )-----------( 508      ( 24 May 2021 10:41 )             34.2     05-24    135  |  97  |  13  ----------------------------<  184<H>  4.1   |  31  |  0.64    Ca    9.2      24 May 2021 10:41                COVID-19 PCR: NotDetec (19 May 2021 16:49)  COVID-19 PCR: NotDetec (18 May 2021 15:12)  COVID-19 PCR: NotDetec (17 Apr 2021 15:08)  COVID-19 PCR: NotDetec (27 Jan 2021 15:41)      RADIOLOGY & ADDITIONAL TESTS:  Imaging from Last 24 Hours:    Electrocardiogram/QTc Interval:    COORDINATION OF CARE:  Care Discussed with Consultants/Other Providers:

## 2021-05-24 NOTE — DISCHARGE NOTE NURSING/CASE MANAGEMENT/SOCIAL WORK - PATIENT PORTAL LINK FT
You can access the FollowMyHealth Patient Portal offered by [facility] by registering at the following website: [email or web address]. By joining [facility]’s FollowMyHealth portal, you will also be able to view your health information using other applications (apps) compatible with our system.

## 2021-05-24 NOTE — DIETITIAN INITIAL EVALUATION ADULT. - OTHER INFO
Pt visited.  Pt Reports Good appetite. NKFA. PO tolerated., Pt seen for LOS.  Pt with severe  Left Lymphedema. Pt with H/O Breast  ca. Labs noted Alb 2.6, A1c 6.8. NO H/O DM Reported.  Meds Noted . Per pt HT 5 feet 3 inches,   LB . Dosing wt 260 lb on 5/18 BMI 46. Pt provided with Diet education  on MY plate . Pt receptive. Per pt she uses Regular sugar and Whole milk at Home. Pt was Encouraged  to use High Fiber Diet and Low fat .

## 2021-05-24 NOTE — PROGRESS NOTE ADULT - SUBJECTIVE AND OBJECTIVE BOX
Source of information: , Chart review  Patient language: English  : n/a    CC:  back pain    This is a 66yFemale patient who presents to the hospital for Patient is a 66y old  Female who presents with a chief complaint of LUE swelling (24 May 2021 12:27)    Pt with history of breast cancer, complaining of back pain.  Pain lying in bed, nad. + left arm cellulitis which is better.  Pt is for discharge home today.  No n/v, or cp.      PAIN SCORE:     5/10    SCALE USED: (1-10 NRS)      PAST MEDICAL & SURGICAL HISTORY:  Thyroid condition    Lymphedema of arm  leftarm lymphedema since 20 years , after the breast reconstruction surgery    S/P mastectomy, bilateral    Hypothyroidism    Breast cancer    HLD (hyperlipidemia)    Hepatosplenomegaly    Morbid obesity    S/P mastectomy, bilateral  s/p implants b/l, no chemo or radiation therapy    S/P bilateral mastectomy    H/O pleural empyema  pleurodesis        FAMILY HISTORY:  No pertinent family history in first degree relatives          SOCIAL HISTORY:  [x ] Denies Smoking, Alcohol, or Drug Use    Allergies    No Known Allergies    Intolerances        MEDICATIONS:    MEDICATIONS  (STANDING):  ALPRAZolam 0.5 milliGRAM(s) Oral every 12 hours  ceFAZolin   IVPB 2000 milliGRAM(s) IV Intermittent every 8 hours  cyanocobalamin Injectable 1000 MICROGram(s) SubCutaneous daily  enoxaparin Injectable 40 milliGRAM(s) SubCutaneous daily  gabapentin 100 milliGRAM(s) Oral every 12 hours  letrozole 2.5 milliGRAM(s) Oral daily  levothyroxine 50 MICROGram(s) Oral daily  lidocaine   Patch 1 Patch Transdermal every 24 hours  metoprolol tartrate 25 milliGRAM(s) Oral two times a day  palbociclib 125 milliGRAM(s) Oral daily  pantoprazole    Tablet 40 milliGRAM(s) Oral before breakfast  polyethylene glycol 3350 17 Gram(s) Oral daily  senna 2 Tablet(s) Oral at bedtime  simvastatin 10 milliGRAM(s) Oral at bedtime    MEDICATIONS  (PRN):  acetaminophen   Tablet .. 650 milliGRAM(s) Oral every 6 hours PRN Temp greater or equal to 38C (100.4F), Moderate Pain (4 - 6)  oxyCODONE    IR 5 milliGRAM(s) Oral every 4 hours PRN Moderate Pain (4 - 6)  oxyCODONE    IR 10 milliGRAM(s) Oral every 4 hours PRN Severe Pain (7 - 10)      Vital Signs Last 24 Hrs  T(C): 36.9 (24 May 2021 12:02), Max: 37.2 (23 May 2021 14:49)  T(F): 98.4 (24 May 2021 12:02), Max: 99 (23 May 2021 14:49)  HR: 105 (24 May 2021 12:02) (95 - 108)  BP: 131/60 (24 May 2021 12:02) (122/72 - 155/97)  BP(mean): --  RR: 18 (24 May 2021 12:02) (18 - 19)  SpO2: 97% (24 May 2021 12:02) (96% - 97%)    LABS:                          11.1   3.92  )-----------( 508      ( 24 May 2021 10:41 )             34.2     05-24    135  |  97  |  13  ----------------------------<  184<H>  4.1   |  31  |  0.64    Ca    9.2      24 May 2021 10:41            CAPILLARY BLOOD GLUCOSE          Radiology:  < from: CT Angio Chest w/ IV Cont (05.20.21 @ 17:56) >    EXAM:  CT ANGIO CHEST (W)AW IC                            PROCEDURE DATE:  05/20/2021          INTERPRETATION:  CLINICAL INFORMATION: 66 years  Female with r/o PE and evaluate for PNA. Fever and chills. History of breast cancer. Chronic left upper extremity lymphedema.    COMPARISON: 6/22/2018    CONTRAST/COMPLICATIONS:  IV Contrast: Omnipaque 350  50 cc administered   50 cc discarded  Oral Contrast: NONE  Complications: None reported at time of study completion    PROCEDURE:  CT Angiography ofthe Chest.  Sagittal and coronal reformats were performed as well as 3D (MIP) reconstructions.    LIMITATIONS: Left lung partially omitted. Delayed timing of injection.    FINDINGS:    LUNGS AND AIRWAYS: Patent central airways.  Lungs are clear.  PLEURA: Stable nodular left pleural thickening status post pleurodesis. No pleural effusions.  MEDIASTINUM AND ARLEY: No lymphadenopathy.  VESSELS: Delayed timing of injection. No pulmonary arterial filling defects identified.  HEART: Mild cardiomegaly. No pericardial effusion.  CHEST WALL AND LOWER NECK: Status post bilateral mastectomy with bilateral breast implants. Fat stranding around the left implant, likely related to chronic lymphedema..  VISUALIZED UPPER ABDOMEN: Hepatosplenomegaly, increasedfrom prior study.  BONES: Degenerative changes.    IMPRESSION:  No pneumonia.    No evidence of pulmonary embolism allowing for delayed timing of injection.    Hepatomegaly, increased from prior study.    Chronic findings as above.    < end of copied text >  < from: CT Lumbar Spine No Cont (05.20.21 @ 17:55) >    EXAM:  CT LUMBAR SPINE                            PROCEDURE DATE:  05/20/2021          INTERPRETATION:  CT thoracic and lumbar spine without contrast  Comparison CT performed 06/22/18  History metastatic breast carcinoma    There is no compression deformity or acute subluxation or mo osseous destruction or demineralization. There are flowing ventral syndesmophytes in the mid to lower thoracic levels with age typical degenerative disc space narrowing but without significant dorsal bulging or ridging or spinal stenosis. Right-sided facet hypertrophy mildly deforms the dorsal thecal sac at T10-11. There is severe facet arthropathy from L3-L4 down notable for grade 1 spondylolisthesis at L4-5 with spinal stenosis which is mild at L3-L4 and moderate at L4-L5. The sacral ala are grossly intact. There is no CT evidence of epidural mass or collection.    IMPRESSION:  Age typical spondylosis without acute findings or CT evidence of metastatic process    < end of copied text >      Drug Screen:        REVIEW OF SYSTEMS:  CONSTITUTIONAL: No fever or fatigue  RESPIRATORY: No cough, wheezing, chills or hemoptysis; No shortness of breath  CARDIOVASCULAR: No chest pain, palpitations, dizziness, or leg swelling  GASTROINTESTINAL: + obese.  No abdominal or epigastric pain. No nausea, vomiting; No diarrhea or constipation.   GENITOURINARY: No dysuria, frequency, hematuria, retention or incontinence  MUSCULOSKELETAL: + back pain + left arm pain - chronic edema   NEURO: No headaches, No numbness/tingling b/l LE, No weakness  PSYCHIATRIC: No depression, anxiety, mood swings, or difficulty sleeping    PHYSICAL EXAM:  GENERAL:  Alert & Oriented X3, NAD, Good concentration  CHEST/LUNG: Clear to auscultation bilaterally; No rales, rhonchi, wheezing, or rubs  HEART: Regular rate and rhythm; No murmurs, rubs, or gallops  ABDOMEN: Soft, Nontender, Nondistended; Bowel sounds present  EXTREMITIES:  2+ Peripheral Pulses, No cyanosis, or edema  MUSCULOSKELETAL: + decreased rom + left arm - erythema resolved.  no tendernes + lumbar spine tenderness   SKIN: No rashes or lesions    Risk factors associated with adverse outcomes related to opioid treatment  [ ]  Concurrent benzodiazepine use  [ ]  History/ Active substance use or alcohol use disorder  [ ] Psychiatric co-morbidity  [ ] Sleep apnea  [ ] COPD  [ ] BMI> 35  [ ] Liver dysfunction  [ ] Renal dysfunction  [ ] CHF  [ ] Smoker  [x]  Age > 60 years    [x ]  [place]  Reviewed and Copied to Chart. See below.    Plan of care and goal oriented pain management treatment options were discussed with patient and /or primary care giver; all questions and concerns were addressed and care was aligned with patient's wishes.    Educated patient on goal oriented pain management treatment options     05-24-21 @ 12:38

## 2021-05-24 NOTE — PROGRESS NOTE ADULT - PROVIDER SPECIALTY LIST ADULT
Internal Medicine
Pulmonology
Heme/Onc
Internal Medicine
Heme/Onc
Infectious Disease
Infectious Disease
Internal Medicine
Pulmonology
Infectious Disease
Infectious Disease
Pulmonology
Internal Medicine
Internal Medicine
Pain Medicine
Pain Medicine
Internal Medicine
Internal Medicine

## 2021-05-24 NOTE — PROGRESS NOTE ADULT - PROBLEM SELECTOR PLAN 2
DC home on gabapentin, lidocaine patches, nsaids po prn, oxycodone 10mg po q 6 hours prn, stool softeners.    Pt can follow up with dr. Zaman for pain mgt if needed.  [phone] no JVD/supple

## 2021-05-24 NOTE — PROGRESS NOTE ADULT - ASSESSMENT
Left upper extremity cellultis  Fever - resolved  Unlikely to be COVID pneumonia - patient is asymptomatic and is covid neg x 2    Plan: Continue Cefazolin 2g iv q8  If ready for discharge, can DC on Keflex 500mg po q6 for 4 more days             Left upper extremity cellultis  Fever - resolved  Unlikely to be COVID pneumonia - patient is asymptomatic and is covid neg x 2    Plan: Continue Cefazolin 2g iv q8  If ready for discharge, can DC on Keflex 500mg po q6 for 4 more days    I agree with above

## 2021-05-24 NOTE — PROGRESS NOTE ADULT - SUBJECTIVE AND OBJECTIVE BOX
Pt is awake, alert, lying in bed in NAD.     INTERVAL HPI/OVERNIGHT EVENTS:      VITAL SIGNS:  T(F): 98 (05-24-21 @ 05:37)  HR: 95 (05-24-21 @ 05:37)  BP: 124/60 (05-24-21 @ 05:37)  RR: 19 (05-24-21 @ 05:37)  SpO2: 97% (05-24-21 @ 05:37)  Wt(kg): --  I&O's Detail          REVIEW OF SYSTEMS:    CONSTITUTIONAL:  No fevers, chills, sweats    HEENT:  Eyes:  No diplopia or blurred vision. ENT:  No earache, sore throat or runny nose.    CARDIOVASCULAR:  No pressure, squeezing, tightness, or heaviness about the chest; no palpitations.    RESPIRATORY:  Per HPI    GASTROINTESTINAL:  No abdominal pain, nausea, vomiting or diarrhea.    GENITOURINARY:  No dysuria, frequency or urgency.    NEUROLOGIC:  No paresthesias, fasciculations, seizures or weakness.    PSYCHIATRIC:  No disorder of thought or mood.      PHYSICAL EXAM:    Constitutional: Well developed and nourished  Eyes: Perrla  ENMT: normal  Neck: supple  Respiratory: good air entry  Cardiovascular: S1 S2 regular  Gastrointestinal: Soft, Non tender  Extremities: No edema  Vascular: normal  Neurological: Awake, alert,Ox3  Musculoskeletal: Normal      MEDICATIONS  (STANDING):  ALPRAZolam 0.5 milliGRAM(s) Oral every 12 hours  ceFAZolin   IVPB 2000 milliGRAM(s) IV Intermittent every 8 hours  cyanocobalamin Injectable 1000 MICROGram(s) SubCutaneous daily  enoxaparin Injectable 40 milliGRAM(s) SubCutaneous daily  gabapentin 100 milliGRAM(s) Oral every 12 hours  letrozole 2.5 milliGRAM(s) Oral daily  levothyroxine 50 MICROGram(s) Oral daily  lidocaine   Patch 1 Patch Transdermal every 24 hours  metoprolol tartrate 25 milliGRAM(s) Oral two times a day  palbociclib 125 milliGRAM(s) Oral daily  pantoprazole    Tablet 40 milliGRAM(s) Oral before breakfast  polyethylene glycol 3350 17 Gram(s) Oral daily  senna 2 Tablet(s) Oral at bedtime  simvastatin 10 milliGRAM(s) Oral at bedtime    MEDICATIONS  (PRN):  acetaminophen   Tablet .. 650 milliGRAM(s) Oral every 6 hours PRN Temp greater or equal to 38C (100.4F), Moderate Pain (4 - 6)  oxyCODONE    IR 5 milliGRAM(s) Oral every 4 hours PRN Moderate Pain (4 - 6)  oxyCODONE    IR 10 milliGRAM(s) Oral every 4 hours PRN Severe Pain (7 - 10)      Allergies    No Known Allergies    Intolerances        LABS:                        11.1   3.92  )-----------( 508      ( 24 May 2021 10:41 )             34.2     05-24    135  |  97  |  13  ----------------------------<  184<H>  4.1   |  31  |  0.64    Ca    9.2      24 May 2021 10:41                CAPILLARY BLOOD GLUCOSE            RADIOLOGY & ADDITIONAL TESTS:    CXR:    Ct scan chest:    ekg;    echo:

## 2021-05-24 NOTE — PROGRESS NOTE ADULT - SUBJECTIVE AND OBJECTIVE BOX
66y Female is under our care for     REVIEW OF SYSTEMS:  [  ] Not able to elicit  General:	  Chest:	  GI:	  :  Skin:	  Musculoskeletal:	  Neuro:	    MEDS:  ceFAZolin   IVPB 2000 milliGRAM(s) IV Intermittent every 8 hours    ALLERGIES: Allergies    No Known Allergies    Intolerances        VITALS:  Vital Signs Last 24 Hrs  T(C): 36.9 (24 May 2021 12:02), Max: 37.2 (23 May 2021 14:49)  T(F): 98.4 (24 May 2021 12:02), Max: 99 (23 May 2021 14:49)  HR: 105 (24 May 2021 12:02) (95 - 108)  BP: 131/60 (24 May 2021 12:02) (122/72 - 155/97)  BP(mean): --  RR: 18 (24 May 2021 12:02) (18 - 19)  SpO2: 97% (24 May 2021 12:02) (96% - 97%)      PHYSICAL EXAM:  HEENT:  Neck:  Respiratory:  Cardiovascular:  Gastrointestinal:  Extremities:  Skin:  Ortho:  Neuro:    LABS/DIAGNOSTIC TESTS:                        11.1   3.92  )-----------( 508      ( 24 May 2021 10:41 )             34.2     WBC Count: 3.92 K/uL (05-24 @ 10:41)  WBC Count: 3.33 K/uL (05-23 @ 06:53)  WBC Count: 3.61 K/uL (05-22 @ 06:15)  WBC Count: 5.40 K/uL (05-21 @ 07:50)  WBC Count: 7.55 K/uL (05-20 @ 07:53)    05-24    135  |  97  |  13  ----------------------------<  184<H>  4.1   |  31  |  0.64    Ca    9.2      24 May 2021 10:41        CULTURES:   .Blood Blood  05-19 @ 18:25   No growth to date.  --  --      .Blood Blood  05-19 @ 18:24   No growth to date.  --  --      .Urine Clean Catch (Midstream)  05-18 @ 21:58   <10,000 CFU/mL Normal Urogenital Wilda  --  --      .Urine Clean Catch (Midstream)  04-17 @ 22:03   <10,000 CFU/mL Normal Urogenital Wilda  --  --        RADIOLOGY:  no new studies 66y Female is under our care for left upper extremity cellulitis.  Patient was seen laying comfortably in bed with no acute distress.  Patients erythema and warmth almost resolved, denies any tenderness and she remains afebrile.    REVIEW OF SYSTEMS:  [  ] Not able to elicit  General: no fevers no malaise  Chest: no cough, no sob  GI: no nvd  : no urinary sxs   Skin: no rashes  Musculoskeletal: no trauma no LBP  Neuro: no ha's no dizziness     MEDS:  ceFAZolin   IVPB 2000 milliGRAM(s) IV Intermittent every 8 hours    ALLERGIES: Allergies    No Known Allergies    Intolerances        VITALS:  Vital Signs Last 24 Hrs  T(C): 36.9 (24 May 2021 12:02), Max: 37.2 (23 May 2021 14:49)  T(F): 98.4 (24 May 2021 12:02), Max: 99 (23 May 2021 14:49)  HR: 105 (24 May 2021 12:02) (95 - 108)  BP: 131/60 (24 May 2021 12:02) (122/72 - 155/97)  BP(mean): --  RR: 18 (24 May 2021 12:02) (18 - 19)  SpO2: 97% (24 May 2021 12:02) (96% - 97%)      PHYSICAL EXAM:  HEENT: n/a  Neck: supple no LN's   Respiratory: lungs clear no rales  Cardiovascular: S1 S2 reg no murmurs  Gastrointestinal: +BS with soft, nondistended abdomen; nontender  Extremities: left upper extremity edema +4  Skin: near resolution of LUE erythema and warmth, no tenderness   Ortho: n/a  Neuro: AAO x 4    LABS/DIAGNOSTIC TESTS:                        11.1   3.92  )-----------( 508      ( 24 May 2021 10:41 )             34.2     WBC Count: 3.92 K/uL (05-24 @ 10:41)  WBC Count: 3.33 K/uL (05-23 @ 06:53)  WBC Count: 3.61 K/uL (05-22 @ 06:15)  WBC Count: 5.40 K/uL (05-21 @ 07:50)  WBC Count: 7.55 K/uL (05-20 @ 07:53)    05-24    135  |  97  |  13  ----------------------------<  184<H>  4.1   |  31  |  0.64    Ca    9.2      24 May 2021 10:41        CULTURES:   .Blood Blood  05-19 @ 18:25   No growth to date.  --  --      .Blood Blood  05-19 @ 18:24   No growth to date.  --  --      .Urine Clean Catch (Midstream)  05-18 @ 21:58   <10,000 CFU/mL Normal Urogenital Wilda  --  --      .Urine Clean Catch (Midstream)  04-17 @ 22:03   <10,000 CFU/mL Normal Urogenital Wilda  --  --        RADIOLOGY:  no new studies

## 2021-10-28 ENCOUNTER — EMERGENCY (EMERGENCY)
Facility: HOSPITAL | Age: 66
LOS: 1 days | Discharge: HOME | End: 2021-10-28
Attending: EMERGENCY MEDICINE
Payer: MEDICARE

## 2021-10-28 VITALS
RESPIRATION RATE: 18 BRPM | SYSTOLIC BLOOD PRESSURE: 130 MMHG | HEIGHT: 63 IN | OXYGEN SATURATION: 98 % | DIASTOLIC BLOOD PRESSURE: 71 MMHG | WEIGHT: 190.04 LBS | HEART RATE: 98 BPM | TEMPERATURE: 98 F

## 2021-10-28 DIAGNOSIS — Z90.13 ACQUIRED ABSENCE OF BILATERAL BREASTS AND NIPPLES: Chronic | ICD-10-CM

## 2021-10-28 DIAGNOSIS — Z87.09 PERSONAL HISTORY OF OTHER DISEASES OF THE RESPIRATORY SYSTEM: Chronic | ICD-10-CM

## 2021-10-28 PROBLEM — R16.2 HEPATOMEGALY WITH SPLENOMEGALY, NOT ELSEWHERE CLASSIFIED: Chronic | Status: ACTIVE | Noted: 2021-05-21

## 2021-10-28 PROBLEM — E66.01 MORBID (SEVERE) OBESITY DUE TO EXCESS CALORIES: Chronic | Status: ACTIVE | Noted: 2021-05-21

## 2021-10-28 LAB
ALBUMIN SERPL ELPH-MCNC: 3.9 G/DL — SIGNIFICANT CHANGE UP (ref 3.5–5)
ALP SERPL-CCNC: 70 U/L — SIGNIFICANT CHANGE UP (ref 40–120)
ALT FLD-CCNC: 23 U/L DA — SIGNIFICANT CHANGE UP (ref 10–60)
ANION GAP SERPL CALC-SCNC: 6 MMOL/L — SIGNIFICANT CHANGE UP (ref 5–17)
APPEARANCE UR: CLEAR — SIGNIFICANT CHANGE UP
AST SERPL-CCNC: 15 U/L — SIGNIFICANT CHANGE UP (ref 10–40)
BASOPHILS # BLD AUTO: 0 K/UL — SIGNIFICANT CHANGE UP (ref 0–0.2)
BASOPHILS NFR BLD AUTO: 0 % — SIGNIFICANT CHANGE UP (ref 0–2)
BILIRUB SERPL-MCNC: 1 MG/DL — SIGNIFICANT CHANGE UP (ref 0.2–1.2)
BILIRUB UR-MCNC: NEGATIVE — SIGNIFICANT CHANGE UP
BUN SERPL-MCNC: 14 MG/DL — SIGNIFICANT CHANGE UP (ref 7–18)
CALCIUM SERPL-MCNC: 9.2 MG/DL — SIGNIFICANT CHANGE UP (ref 8.4–10.5)
CHLORIDE SERPL-SCNC: 105 MMOL/L — SIGNIFICANT CHANGE UP (ref 96–108)
CO2 SERPL-SCNC: 29 MMOL/L — SIGNIFICANT CHANGE UP (ref 22–31)
COLOR SPEC: YELLOW — SIGNIFICANT CHANGE UP
CREAT SERPL-MCNC: 0.72 MG/DL — SIGNIFICANT CHANGE UP (ref 0.5–1.3)
DIFF PNL FLD: ABNORMAL
EOSINOPHIL # BLD AUTO: 0 K/UL — SIGNIFICANT CHANGE UP (ref 0–0.5)
EOSINOPHIL NFR BLD AUTO: 0 % — SIGNIFICANT CHANGE UP (ref 0–6)
GLUCOSE SERPL-MCNC: 121 MG/DL — HIGH (ref 70–99)
GLUCOSE UR QL: NEGATIVE — SIGNIFICANT CHANGE UP
HCT VFR BLD CALC: 37.6 % — SIGNIFICANT CHANGE UP (ref 34.5–45)
HGB BLD-MCNC: 12.8 G/DL — SIGNIFICANT CHANGE UP (ref 11.5–15.5)
KETONES UR-MCNC: NEGATIVE — SIGNIFICANT CHANGE UP
LEUKOCYTE ESTERASE UR-ACNC: ABNORMAL
LYMPHOCYTES # BLD AUTO: 0.82 K/UL — LOW (ref 1–3.3)
LYMPHOCYTES # BLD AUTO: 23 % — SIGNIFICANT CHANGE UP (ref 13–44)
MCHC RBC-ENTMCNC: 34 GM/DL — SIGNIFICANT CHANGE UP (ref 32–36)
MCHC RBC-ENTMCNC: 35.6 PG — HIGH (ref 27–34)
MCV RBC AUTO: 104.4 FL — HIGH (ref 80–100)
MONOCYTES # BLD AUTO: 0.25 K/UL — SIGNIFICANT CHANGE UP (ref 0–0.9)
MONOCYTES NFR BLD AUTO: 7 % — SIGNIFICANT CHANGE UP (ref 2–14)
NEUTROPHILS # BLD AUTO: 2.49 K/UL — SIGNIFICANT CHANGE UP (ref 1.8–7.4)
NEUTROPHILS NFR BLD AUTO: 70 % — SIGNIFICANT CHANGE UP (ref 43–77)
NITRITE UR-MCNC: NEGATIVE — SIGNIFICANT CHANGE UP
PH UR: 5 — SIGNIFICANT CHANGE UP (ref 5–8)
PLATELET # BLD AUTO: 232 K/UL — SIGNIFICANT CHANGE UP (ref 150–400)
POTASSIUM SERPL-MCNC: 4.7 MMOL/L — SIGNIFICANT CHANGE UP (ref 3.5–5.3)
POTASSIUM SERPL-SCNC: 4.7 MMOL/L — SIGNIFICANT CHANGE UP (ref 3.5–5.3)
PROT SERPL-MCNC: 8.4 G/DL — HIGH (ref 6–8.3)
PROT UR-MCNC: 15
RBC # BLD: 3.6 M/UL — LOW (ref 3.8–5.2)
RBC # FLD: 13.1 % — SIGNIFICANT CHANGE UP (ref 10.3–14.5)
SODIUM SERPL-SCNC: 140 MMOL/L — SIGNIFICANT CHANGE UP (ref 135–145)
SP GR SPEC: 1.02 — SIGNIFICANT CHANGE UP (ref 1.01–1.02)
UROBILINOGEN FLD QL: NEGATIVE — SIGNIFICANT CHANGE UP
WBC # BLD: 3.55 K/UL — LOW (ref 3.8–10.5)
WBC # FLD AUTO: 3.55 K/UL — LOW (ref 3.8–10.5)

## 2021-10-28 PROCEDURE — 99285 EMERGENCY DEPT VISIT HI MDM: CPT

## 2021-10-28 PROCEDURE — 96374 THER/PROPH/DIAG INJ IV PUSH: CPT | Mod: XU

## 2021-10-28 PROCEDURE — G1004: CPT

## 2021-10-28 PROCEDURE — 99284 EMERGENCY DEPT VISIT MOD MDM: CPT | Mod: 25

## 2021-10-28 PROCEDURE — 36415 COLL VENOUS BLD VENIPUNCTURE: CPT

## 2021-10-28 PROCEDURE — 74177 CT ABD & PELVIS W/CONTRAST: CPT | Mod: 26,MG

## 2021-10-28 PROCEDURE — 87086 URINE CULTURE/COLONY COUNT: CPT

## 2021-10-28 PROCEDURE — 80053 COMPREHEN METABOLIC PANEL: CPT

## 2021-10-28 PROCEDURE — 74177 CT ABD & PELVIS W/CONTRAST: CPT | Mod: MG

## 2021-10-28 PROCEDURE — 85025 COMPLETE CBC W/AUTO DIFF WBC: CPT

## 2021-10-28 PROCEDURE — 81001 URINALYSIS AUTO W/SCOPE: CPT

## 2021-10-28 RX ORDER — KETOROLAC TROMETHAMINE 30 MG/ML
15 SYRINGE (ML) INJECTION ONCE
Refills: 0 | Status: DISCONTINUED | OUTPATIENT
Start: 2021-10-28 | End: 2021-10-28

## 2021-10-28 RX ADMIN — Medication 15 MILLIGRAM(S): at 13:08

## 2021-10-28 NOTE — ED PROVIDER NOTE - PATIENT PORTAL LINK FT
You can access the FollowMyHealth Patient Portal offered by [facility] by registering at the following website: [email or web address]. By joining [employer]’s FollowMyHealth portal, you will also be able to view your health information using other applications (apps) compatible with our system.

## 2021-10-28 NOTE — ED PROVIDER NOTE - OBJECTIVE STATEMENT
66 y.o female with breast cancer (active treatment) and history of bilateral mastectomy and reconstruction, left upper extremity lymphedema, and hypothyroidism presents with cc of trouble urinating and abdominal pain for the past 3 days. Patient has appointment to see GYN in a few days regarding vaginal cyst, but is concerned she is developed a UTI because she has recurrent UTI. Patient is also endorsing back pain. No shortness of breath, chest pain, nausea, vomiting, fever, or chills. Decreased PO intake due to pain. Patient has been taking Ibuprofen without relief and her most recent dose was last night. No travel or sick contact. 66 y.o female with breast cancer (active treatment) and history of bilateral mastectomy and reconstruction, left upper extremity lymphedema, and hypothyroidism presents with cc of painful urinating and abdominal pain for the past 3 days. Patient has appointment to see GYN in a few days regarding vaginal cyst, but is concerned she is developed a UTI because she has had recurrent UTI. Patient is also endorsing back pain. No shortness of breath, chest pain, nausea, vomiting, fever, or chills. Decreased PO intake due to pain. Patient has been taking Ibuprofen without relief and her most recent dose was last night. No travel or sick contact. No N/V/D. No fever/chills

## 2021-10-28 NOTE — ED PROVIDER NOTE - NSFOLLOWUPINSTRUCTIONS_ED_ALL_ED_FT
A cause for your pain was not found. Please see your doctors for follow up as scheduled.     Continue taking all prescribed medications and return to the ER if you have uncontrolled pain, fevers, vomiting, or other concerning signs.

## 2021-10-28 NOTE — ED PROVIDER NOTE - NSICDXPASTMEDICALHX_GEN_ALL_CORE_FT
PAST MEDICAL HISTORY:  Breast cancer     Hepatosplenomegaly     HLD (hyperlipidemia)     Hypothyroidism     Lymphedema of arm leftarm lymphedema since 20 years , after the breast reconstruction surgery    Morbid obesity     S/P mastectomy, bilateral     Thyroid condition

## 2021-10-28 NOTE — ED PROVIDER NOTE - CLINICAL SUMMARY MEDICAL DECISION MAKING FREE TEXT BOX
66 y.o female with complex medical history presenting with abdominal pain and burning urination. Tenderness and guarding in the Left Lower Quadrant, unclear etiology. Assess for UTI. CAT scan to rule out diverticulitis vs. abdominal masses. Get labs, give pain control, and reassess. 66 y.o female with complex medical history presenting with abdominal pain and burning urination. Tenderness and guarding in the Left Lower Quadrant, unclear etiology. Assess for UTI. CT scan to rule out diverticulitis vs. abdominal masses. Get labs, give pain control, and reassess.

## 2021-10-29 LAB
CULTURE RESULTS: SIGNIFICANT CHANGE UP
SPECIMEN SOURCE: SIGNIFICANT CHANGE UP

## 2022-05-12 NOTE — ED ADULT TRIAGE NOTE - CCCP TRG CHIEF CMPLNT
chills/x 2 days [FreeTextEntry1] : patient here for a follow up and med refill [de-identified] :  Will be seeing therapist\par Using less xanax

## 2022-07-30 NOTE — ED ADULT NURSE NOTE - CAS DISCH ACCOMP BY
Diet: Minced and moist (In case Thin liquids via straw only)    DVT Ppx: Subq heparin (may switch to lovenox depending on patient kidney function on repeat labs)    PT&OT eval pending Diet: Minced and moist (In case Thin liquids via straw only)    DVT Ppx: Subq heparin (may switch to lovenox depending on patient kidney function on repeat labs)    PT & OT eval recommand rehabilitation Elevated enzymes, likely secondary to physiologic stress from herpes zoster/htn, Is patient's baseline according to daughter    - Improved   - Monitor labs and physical exam for now; moderate RUQ pain on physical not concerning at this time  - Hepatitis panel negative Transport

## 2023-02-22 NOTE — PROGRESS NOTE ADULT - PROBLEM SELECTOR PLAN 2
Chronic Lymphedema   erythema, swelling and pain  chronic lymphedema from B/L mastectomy 20y ago  Leidy Seay D2  ID Aiden Chronic Lymphedema   erythema, swelling and pain  chronic lymphedema from B/L mastectomy 20y ago  abx changed to Kefazolin 5/20   ALEXANDER Wolfe [Negative] : Genitourinary

## 2023-05-05 ENCOUNTER — EMERGENCY (EMERGENCY)
Facility: HOSPITAL | Age: 68
LOS: 1 days | Discharge: HOME | End: 2023-05-05
Attending: STUDENT IN AN ORGANIZED HEALTH CARE EDUCATION/TRAINING PROGRAM
Payer: MEDICARE

## 2023-05-05 VITALS
OXYGEN SATURATION: 97 % | RESPIRATION RATE: 18 BRPM | SYSTOLIC BLOOD PRESSURE: 142 MMHG | HEART RATE: 79 BPM | WEIGHT: 240.08 LBS | HEIGHT: 63 IN | DIASTOLIC BLOOD PRESSURE: 85 MMHG | TEMPERATURE: 99 F

## 2023-05-05 DIAGNOSIS — Z90.13 ACQUIRED ABSENCE OF BILATERAL BREASTS AND NIPPLES: Chronic | ICD-10-CM

## 2023-05-05 DIAGNOSIS — Z87.09 PERSONAL HISTORY OF OTHER DISEASES OF THE RESPIRATORY SYSTEM: Chronic | ICD-10-CM

## 2023-05-05 LAB
ALBUMIN SERPL ELPH-MCNC: 3.7 G/DL — SIGNIFICANT CHANGE UP (ref 3.5–5)
ALP SERPL-CCNC: 71 U/L — SIGNIFICANT CHANGE UP (ref 40–120)
ALT FLD-CCNC: 25 U/L DA — SIGNIFICANT CHANGE UP (ref 10–60)
ANION GAP SERPL CALC-SCNC: 4 MMOL/L — LOW (ref 5–17)
APPEARANCE UR: CLEAR — SIGNIFICANT CHANGE UP
AST SERPL-CCNC: 14 U/L — SIGNIFICANT CHANGE UP (ref 10–40)
BACTERIA # UR AUTO: ABNORMAL /HPF
BASOPHILS # BLD AUTO: 0.08 K/UL — SIGNIFICANT CHANGE UP (ref 0–0.2)
BASOPHILS NFR BLD AUTO: 2 % — SIGNIFICANT CHANGE UP (ref 0–2)
BILIRUB SERPL-MCNC: 0.9 MG/DL — SIGNIFICANT CHANGE UP (ref 0.2–1.2)
BILIRUB UR-MCNC: NEGATIVE — SIGNIFICANT CHANGE UP
BUN SERPL-MCNC: 15 MG/DL — SIGNIFICANT CHANGE UP (ref 7–18)
CALCIUM SERPL-MCNC: 9.9 MG/DL — SIGNIFICANT CHANGE UP (ref 8.4–10.5)
CHLORIDE SERPL-SCNC: 104 MMOL/L — SIGNIFICANT CHANGE UP (ref 96–108)
CO2 SERPL-SCNC: 29 MMOL/L — SIGNIFICANT CHANGE UP (ref 22–31)
COLOR SPEC: YELLOW — SIGNIFICANT CHANGE UP
CREAT SERPL-MCNC: 0.77 MG/DL — SIGNIFICANT CHANGE UP (ref 0.5–1.3)
DIFF PNL FLD: ABNORMAL
EGFR: 84 ML/MIN/1.73M2 — SIGNIFICANT CHANGE UP
EOSINOPHIL # BLD AUTO: 0.06 K/UL — SIGNIFICANT CHANGE UP (ref 0–0.5)
EOSINOPHIL NFR BLD AUTO: 1.5 % — SIGNIFICANT CHANGE UP (ref 0–6)
EPI CELLS # UR: ABNORMAL /HPF
GLUCOSE SERPL-MCNC: 146 MG/DL — HIGH (ref 70–99)
GLUCOSE UR QL: NEGATIVE — SIGNIFICANT CHANGE UP
HCT VFR BLD CALC: 39.7 % — SIGNIFICANT CHANGE UP (ref 34.5–45)
HGB BLD-MCNC: 13.3 G/DL — SIGNIFICANT CHANGE UP (ref 11.5–15.5)
HYALINE CASTS # UR AUTO: ABNORMAL /LPF
IMM GRANULOCYTES NFR BLD AUTO: 0.2 % — SIGNIFICANT CHANGE UP (ref 0–0.9)
KETONES UR-MCNC: NEGATIVE — SIGNIFICANT CHANGE UP
LEUKOCYTE ESTERASE UR-ACNC: ABNORMAL
LYMPHOCYTES # BLD AUTO: 1.2 K/UL — SIGNIFICANT CHANGE UP (ref 1–3.3)
LYMPHOCYTES # BLD AUTO: 29.3 % — SIGNIFICANT CHANGE UP (ref 13–44)
MACROCYTES BLD QL: SLIGHT — SIGNIFICANT CHANGE UP
MAGNESIUM SERPL-MCNC: 2.6 MG/DL — SIGNIFICANT CHANGE UP (ref 1.6–2.6)
MANUAL SMEAR VERIFICATION: SIGNIFICANT CHANGE UP
MCHC RBC-ENTMCNC: 33.5 GM/DL — SIGNIFICANT CHANGE UP (ref 32–36)
MCHC RBC-ENTMCNC: 35.3 PG — HIGH (ref 27–34)
MCV RBC AUTO: 105.3 FL — HIGH (ref 80–100)
MONOCYTES # BLD AUTO: 0.44 K/UL — SIGNIFICANT CHANGE UP (ref 0–0.9)
MONOCYTES NFR BLD AUTO: 10.8 % — SIGNIFICANT CHANGE UP (ref 2–14)
NEUTROPHILS # BLD AUTO: 2.3 K/UL — SIGNIFICANT CHANGE UP (ref 1.8–7.4)
NEUTROPHILS NFR BLD AUTO: 56.2 % — SIGNIFICANT CHANGE UP (ref 43–77)
NITRITE UR-MCNC: NEGATIVE — SIGNIFICANT CHANGE UP
NRBC # BLD: 0 /100 WBCS — SIGNIFICANT CHANGE UP (ref 0–0)
PH UR: 5 — SIGNIFICANT CHANGE UP (ref 5–8)
PLAT MORPH BLD: NORMAL — SIGNIFICANT CHANGE UP
PLATELET # BLD AUTO: 241 K/UL — SIGNIFICANT CHANGE UP (ref 150–400)
POTASSIUM SERPL-MCNC: 4 MMOL/L — SIGNIFICANT CHANGE UP (ref 3.5–5.3)
POTASSIUM SERPL-SCNC: 4 MMOL/L — SIGNIFICANT CHANGE UP (ref 3.5–5.3)
PROT SERPL-MCNC: 8.5 G/DL — HIGH (ref 6–8.3)
PROT UR-MCNC: 30 MG/DL
RBC # BLD: 3.77 M/UL — LOW (ref 3.8–5.2)
RBC # FLD: 12.7 % — SIGNIFICANT CHANGE UP (ref 10.3–14.5)
RBC BLD AUTO: ABNORMAL
RBC CASTS # UR COMP ASSIST: ABNORMAL /HPF (ref 0–2)
SODIUM SERPL-SCNC: 137 MMOL/L — SIGNIFICANT CHANGE UP (ref 135–145)
SP GR SPEC: 1.02 — SIGNIFICANT CHANGE UP (ref 1.01–1.02)
UROBILINOGEN FLD QL: NEGATIVE — SIGNIFICANT CHANGE UP
WBC # BLD: 4.09 K/UL — SIGNIFICANT CHANGE UP (ref 3.8–10.5)
WBC # FLD AUTO: 4.09 K/UL — SIGNIFICANT CHANGE UP (ref 3.8–10.5)
WBC UR QL: ABNORMAL /HPF (ref 0–5)

## 2023-05-05 PROCEDURE — 99284 EMERGENCY DEPT VISIT MOD MDM: CPT | Mod: 25

## 2023-05-05 PROCEDURE — 73030 X-RAY EXAM OF SHOULDER: CPT | Mod: 26,LT

## 2023-05-05 PROCEDURE — 73090 X-RAY EXAM OF FOREARM: CPT | Mod: 26,LT

## 2023-05-05 PROCEDURE — 87086 URINE CULTURE/COLONY COUNT: CPT

## 2023-05-05 PROCEDURE — 73120 X-RAY EXAM OF HAND: CPT

## 2023-05-05 PROCEDURE — 73090 X-RAY EXAM OF FOREARM: CPT

## 2023-05-05 PROCEDURE — 81001 URINALYSIS AUTO W/SCOPE: CPT

## 2023-05-05 PROCEDURE — 73030 X-RAY EXAM OF SHOULDER: CPT

## 2023-05-05 PROCEDURE — 36415 COLL VENOUS BLD VENIPUNCTURE: CPT

## 2023-05-05 PROCEDURE — 83735 ASSAY OF MAGNESIUM: CPT

## 2023-05-05 PROCEDURE — 73060 X-RAY EXAM OF HUMERUS: CPT

## 2023-05-05 PROCEDURE — 99284 EMERGENCY DEPT VISIT MOD MDM: CPT

## 2023-05-05 PROCEDURE — 80053 COMPREHEN METABOLIC PANEL: CPT

## 2023-05-05 PROCEDURE — 73060 X-RAY EXAM OF HUMERUS: CPT | Mod: 26,RT

## 2023-05-05 PROCEDURE — 73120 X-RAY EXAM OF HAND: CPT | Mod: 26,RT

## 2023-05-05 PROCEDURE — 85025 COMPLETE CBC W/AUTO DIFF WBC: CPT

## 2023-05-05 RX ORDER — CEFPODOXIME PROXETIL 100 MG
1 TABLET ORAL
Qty: 14 | Refills: 0
Start: 2023-05-05 | End: 2023-05-11

## 2023-05-05 NOTE — ED PROVIDER NOTE - CLINICAL SUMMARY MEDICAL DECISION MAKING FREE TEXT BOX
68-year-old female hx of chronic LUE lymphedema presenting with LUE pain after a fall 2 days ago. Labs and urine ordered per patient request. XR LUE without acute findings on my read. Anticipate discharge if workup normal.

## 2023-05-05 NOTE — ED PROVIDER NOTE - MUSCULOSKELETAL, MLM
Spine appears normal, range of motion is not limited, no muscle or joint tenderness. LUE lymphedema (chronic)

## 2023-05-05 NOTE — ED PROVIDER NOTE - DISPOSITION TYPE
----- Message from Richar Díaz sent at 1/6/2022  3:43 PM EST -----  Subject: Message to Provider    QUESTIONS  Information for Provider? Tested Positive to Covid-19. Pt wants to know if   an antibiotic is needed. ---------------------------------------------------------------------------  --------------  Anna NINA  What is the best way for the office to contact you? Do not leave any   message, patient will call back for answer, OK to respond with electronic   message via [employer] portal (only for patients who have registered [employer]   account)  Preferred Call Back Phone Number? [phone]  ---------------------------------------------------------------------------  --------------  SCRIPT ANSWERS  Relationship to Patient?  Self
Cough is pretty bad but not often. Also very deep. She was planning to get the vaccine. He had diarrhea last weak. COVID-19 virus infection  -     benzonatate (TESSALON) 200 MG capsule; Take 1 capsule by mouth 3 times daily as needed for Cough  Schedule a video visit in 1 week. Call if not doing better.
DISCHARGE

## 2023-05-05 NOTE — ED ADULT NURSE NOTE - OBJECTIVE STATEMENT
pt presented to the ED with c/o slipped / fall 2 days ago c/o pain Left arm no redness no swelling . H/O Lymphedema Left arm and breast CA.

## 2023-05-05 NOTE — ED PROVIDER NOTE - PATIENT PORTAL LINK FT
You can access the FollowMyHealth Patient Portal offered by [facility] by registering at the following website: [email or web address]. By joining [employer]’s FollowMyHealth portal, you will also be able to view your health information using other applications (apps) compatible with our system.

## 2023-05-05 NOTE — ED PROVIDER NOTE - WR ORDER STATUS 1
78yo F severe bilateral knee arthritis, scoliosis, chronic LE edema x 1 year, p/w bilateral LE redness and worsening swelling x 2 weeks. Pt has chronic LE edema on lasix, dopplers in Feb 2017 without DVT, seen by Vascular Sx Dr. Burgos at the time. Pt states etiology of LE edema unknown, was told it is likely from severe knee arthritis. States LE redness has been progressing for the past 2 weeks. Has been unable to ambulate independently x 1 year, now uses walker and wheelchair. Denies fevers, chills, nausea, vomiting.  Sent by PMD today Performed 76yo F bilateral knee arthritis,  severe scoliosis resulting in LLE short leg, chronic LE edema x 1 year, HTN, remote breast ca s/p mastectomy and LN resection, provoked PE 1993 p/w worsening bilateral LE redness, swelling and pain admitted with cellulitis.

## 2023-05-05 NOTE — ED PROVIDER NOTE - NSFOLLOWUPINSTRUCTIONS_ED_ALL_ED_FT
You were seen in the emergency department for: arm pain after a fall  Your results report is attached.   We recommend you follow up with: your primary care doctor.    Please return to the Emergency Department if you experience any of the following symptoms:   - Shortness of breath or trouble breathing  - Pressure, pain or tightness in the chest  - Face drooping, arm weakness or speech difficulty  - Persistence of severe vomiting  - Head injury or loss of consciousness  - Nonstop bleeding or an open wound    (1) Follow up with your primary care physician within the next 24-48 hours as discussed. In addition, we did not find evidence of a life threatening illness on your testing here today, but listed below are the specialists that will be necessary to see as an outpatient to continue the workup.  Please call the numbers listed below or [phone] to set up the necessary appointments.  (2) Take Tylenol (up to 1000mg or 1 g)  and/or Motrin (up to 600mg) up to every 6 hours as needed for pain.   (3) If you had an IV (intravenous) line placed, it was removed. Sometimes, after IV removal, that area can be tender for a few days; if it develops redness and swelling, those could be signs of infection; in which case, return to the Emergency Department for assessment.  (4) Please continue taking all of your home medications as directed. You were seen in the emergency department for: arm pain after a fall  Your results report is attached. You were found to have a urinary tract infection - please take antibiotics as directed.   We recommend you follow up with: your primary care doctor.    Please return to the Emergency Department if you experience any of the following symptoms:   - Shortness of breath or trouble breathing  - Pressure, pain or tightness in the chest  - Face drooping, arm weakness or speech difficulty  - Persistence of severe vomiting  - Head injury or loss of consciousness  - Nonstop bleeding or an open wound    (1) Follow up with your primary care physician within the next 24-48 hours as discussed. In addition, we did not find evidence of a life threatening illness on your testing here today, but listed below are the specialists that will be necessary to see as an outpatient to continue the workup.  Please call the numbers listed below or [phone] to set up the necessary appointments.  (2) Take Tylenol (up to 1000mg or 1 g)  and/or Motrin (up to 600mg) up to every 6 hours as needed for pain.   (3) If you had an IV (intravenous) line placed, it was removed. Sometimes, after IV removal, that area can be tender for a few days; if it develops redness and swelling, those could be signs of infection; in which case, return to the Emergency Department for assessment.  (4) Please continue taking all of your home medications as directed.

## 2023-05-05 NOTE — ED ADULT NURSE NOTE - CHIEF COMPLAINT QUOTE
Beena Gomez MD  P Edw Alexey Chopra Rns  I need to see him before surgery. Pt informed and scheduled. c/o slipped / fall 2 days ago c/o pain Left arm no redness no swelling . H/O Lymphedema Left arm

## 2023-05-05 NOTE — ED PROVIDER NOTE - OBJECTIVE STATEMENT
68-year-old female hx of chronic LUE lymphedema presenting with LUE pain after a fall 2 days ago. Able to move it but it hurts. Slipped over a crack and landed on her arm and was fine at first, but over the past 2 days the pain has worsened. She is also requesting to get her labs and urine tested "just for a medical checkup," denies any new recent symptoms.

## 2023-05-06 LAB
CULTURE RESULTS: SIGNIFICANT CHANGE UP
SPECIMEN SOURCE: SIGNIFICANT CHANGE UP

## 2023-08-29 ENCOUNTER — EMERGENCY (EMERGENCY)
Facility: HOSPITAL | Age: 68
LOS: 1 days | Discharge: HOME | End: 2023-08-29
Attending: STUDENT IN AN ORGANIZED HEALTH CARE EDUCATION/TRAINING PROGRAM
Payer: MEDICARE

## 2023-08-29 VITALS
RESPIRATION RATE: 18 BRPM | DIASTOLIC BLOOD PRESSURE: 82 MMHG | OXYGEN SATURATION: 97 % | WEIGHT: 235.01 LBS | HEART RATE: 98 BPM | TEMPERATURE: 98 F | SYSTOLIC BLOOD PRESSURE: 123 MMHG | HEIGHT: 63 IN

## 2023-08-29 DIAGNOSIS — Z90.13 ACQUIRED ABSENCE OF BILATERAL BREASTS AND NIPPLES: Chronic | ICD-10-CM

## 2023-08-29 DIAGNOSIS — Z87.09 PERSONAL HISTORY OF OTHER DISEASES OF THE RESPIRATORY SYSTEM: Chronic | ICD-10-CM

## 2023-08-29 LAB
ALBUMIN SERPL ELPH-MCNC: 4 G/DL — SIGNIFICANT CHANGE UP (ref 3.5–5)
ALP SERPL-CCNC: 75 U/L — SIGNIFICANT CHANGE UP (ref 40–120)
ALT FLD-CCNC: 31 U/L DA — SIGNIFICANT CHANGE UP (ref 10–60)
ANION GAP SERPL CALC-SCNC: 5 MMOL/L — SIGNIFICANT CHANGE UP (ref 5–17)
APPEARANCE UR: CLEAR — SIGNIFICANT CHANGE UP
APTT BLD: 28.5 SEC — SIGNIFICANT CHANGE UP (ref 24.5–35.6)
AST SERPL-CCNC: 17 U/L — SIGNIFICANT CHANGE UP (ref 10–40)
BACTERIA # UR AUTO: ABNORMAL /HPF
BASOPHILS # BLD AUTO: 0.06 K/UL — SIGNIFICANT CHANGE UP (ref 0–0.2)
BASOPHILS NFR BLD AUTO: 1.4 % — SIGNIFICANT CHANGE UP (ref 0–2)
BILIRUB SERPL-MCNC: 0.7 MG/DL — SIGNIFICANT CHANGE UP (ref 0.2–1.2)
BILIRUB UR-MCNC: ABNORMAL
BUN SERPL-MCNC: 13 MG/DL — SIGNIFICANT CHANGE UP (ref 7–18)
CALCIUM SERPL-MCNC: 9.6 MG/DL — SIGNIFICANT CHANGE UP (ref 8.4–10.5)
CHLORIDE SERPL-SCNC: 100 MMOL/L — SIGNIFICANT CHANGE UP (ref 96–108)
CO2 SERPL-SCNC: 29 MMOL/L — SIGNIFICANT CHANGE UP (ref 22–31)
COD CRY URNS QL: PRESENT
COLOR SPEC: SIGNIFICANT CHANGE UP
CREAT SERPL-MCNC: 0.83 MG/DL — SIGNIFICANT CHANGE UP (ref 0.5–1.3)
DIFF PNL FLD: NEGATIVE — SIGNIFICANT CHANGE UP
EGFR: 77 ML/MIN/1.73M2 — SIGNIFICANT CHANGE UP
EOSINOPHIL # BLD AUTO: 0.05 K/UL — SIGNIFICANT CHANGE UP (ref 0–0.5)
EOSINOPHIL NFR BLD AUTO: 1.2 % — SIGNIFICANT CHANGE UP (ref 0–6)
EPI CELLS # UR: PRESENT
GLUCOSE SERPL-MCNC: 154 MG/DL — HIGH (ref 70–99)
GLUCOSE UR QL: NEGATIVE MG/DL — SIGNIFICANT CHANGE UP
GRAN CASTS # UR COMP ASSIST: PRESENT
HCT VFR BLD CALC: 39.8 % — SIGNIFICANT CHANGE UP (ref 34.5–45)
HGB BLD-MCNC: 13.4 G/DL — SIGNIFICANT CHANGE UP (ref 11.5–15.5)
HYALINE CASTS # UR AUTO: PRESENT
IMM GRANULOCYTES NFR BLD AUTO: 0.5 % — SIGNIFICANT CHANGE UP (ref 0–0.9)
INR BLD: 0.91 RATIO — SIGNIFICANT CHANGE UP (ref 0.85–1.18)
KETONES UR-MCNC: ABNORMAL MG/DL
LACTATE SERPL-SCNC: 0.8 MMOL/L — SIGNIFICANT CHANGE UP (ref 0.7–2)
LEUKOCYTE ESTERASE UR-ACNC: ABNORMAL
LYMPHOCYTES # BLD AUTO: 1.06 K/UL — SIGNIFICANT CHANGE UP (ref 1–3.3)
LYMPHOCYTES # BLD AUTO: 25.2 % — SIGNIFICANT CHANGE UP (ref 13–44)
MCHC RBC-ENTMCNC: 33.7 GM/DL — SIGNIFICANT CHANGE UP (ref 32–36)
MCHC RBC-ENTMCNC: 36 PG — HIGH (ref 27–34)
MCV RBC AUTO: 107 FL — HIGH (ref 80–100)
MONOCYTES # BLD AUTO: 0.66 K/UL — SIGNIFICANT CHANGE UP (ref 0–0.9)
MONOCYTES NFR BLD AUTO: 15.7 % — HIGH (ref 2–14)
NEUTROPHILS # BLD AUTO: 2.35 K/UL — SIGNIFICANT CHANGE UP (ref 1.8–7.4)
NEUTROPHILS NFR BLD AUTO: 56 % — SIGNIFICANT CHANGE UP (ref 43–77)
NITRITE UR-MCNC: NEGATIVE — SIGNIFICANT CHANGE UP
NRBC # BLD: 0 /100 WBCS — SIGNIFICANT CHANGE UP (ref 0–0)
PH UR: 5.5 — SIGNIFICANT CHANGE UP (ref 5–8)
PLATELET # BLD AUTO: 245 K/UL — SIGNIFICANT CHANGE UP (ref 150–400)
POTASSIUM SERPL-MCNC: 4 MMOL/L — SIGNIFICANT CHANGE UP (ref 3.5–5.3)
POTASSIUM SERPL-SCNC: 4 MMOL/L — SIGNIFICANT CHANGE UP (ref 3.5–5.3)
PROT SERPL-MCNC: 8.9 G/DL — HIGH (ref 6–8.3)
PROT UR-MCNC: 30 MG/DL
PROTHROM AB SERPL-ACNC: 10.4 SEC — SIGNIFICANT CHANGE UP (ref 9.5–13)
RBC # BLD: 3.72 M/UL — LOW (ref 3.8–5.2)
RBC # FLD: 13 % — SIGNIFICANT CHANGE UP (ref 10.3–14.5)
RBC CASTS # UR COMP ASSIST: 2 /HPF — SIGNIFICANT CHANGE UP (ref 0–4)
SODIUM SERPL-SCNC: 134 MMOL/L — LOW (ref 135–145)
SP GR SPEC: 1.03 — HIGH (ref 1–1.03)
UROBILINOGEN FLD QL: 1 MG/DL — SIGNIFICANT CHANGE UP (ref 0.2–1)
WBC # BLD: 4.2 K/UL — SIGNIFICANT CHANGE UP (ref 3.8–10.5)
WBC # FLD AUTO: 4.2 K/UL — SIGNIFICANT CHANGE UP (ref 3.8–10.5)
WBC UR QL: 10 /HPF — HIGH (ref 0–5)

## 2023-08-29 PROCEDURE — 85025 COMPLETE CBC W/AUTO DIFF WBC: CPT

## 2023-08-29 PROCEDURE — 81001 URINALYSIS AUTO W/SCOPE: CPT

## 2023-08-29 PROCEDURE — 99285 EMERGENCY DEPT VISIT HI MDM: CPT | Mod: 25

## 2023-08-29 PROCEDURE — 85730 THROMBOPLASTIN TIME PARTIAL: CPT

## 2023-08-29 PROCEDURE — 36415 COLL VENOUS BLD VENIPUNCTURE: CPT

## 2023-08-29 PROCEDURE — 83605 ASSAY OF LACTIC ACID: CPT

## 2023-08-29 PROCEDURE — 87086 URINE CULTURE/COLONY COUNT: CPT

## 2023-08-29 PROCEDURE — 85610 PROTHROMBIN TIME: CPT

## 2023-08-29 PROCEDURE — 99284 EMERGENCY DEPT VISIT MOD MDM: CPT

## 2023-08-29 PROCEDURE — 87040 BLOOD CULTURE FOR BACTERIA: CPT

## 2023-08-29 PROCEDURE — 80053 COMPREHEN METABOLIC PANEL: CPT

## 2023-08-29 PROCEDURE — 74176 CT ABD & PELVIS W/O CONTRAST: CPT | Mod: MA

## 2023-08-29 PROCEDURE — 74176 CT ABD & PELVIS W/O CONTRAST: CPT | Mod: 26,MA

## 2023-08-29 RX ORDER — CEFPODOXIME PROXETIL 100 MG
200 TABLET ORAL ONCE
Refills: 0 | Status: COMPLETED | OUTPATIENT
Start: 2023-08-29 | End: 2023-08-29

## 2023-08-29 RX ORDER — CEFPODOXIME PROXETIL 100 MG
1 TABLET ORAL
Qty: 28 | Refills: 0
Start: 2023-08-29

## 2023-08-29 RX ADMIN — Medication 200 MILLIGRAM(S): at 18:23

## 2023-08-29 NOTE — ED PROVIDER NOTE - NSFOLLOWUPINSTRUCTIONS_ED_ALL_ED_FT
1) Follow up with your doctor as discussed  2) Return to the ED immediately for new or worsening symptoms like worsening abdominal pain, difficulty urinating, fevers, chills.   3) Please continue to take any home medications as prescribed  4) Your test results from your ED visit were discussed with you prior to discharge  5) You were provided with a copy of your test results  6) Please take your antibiotics cefpodoxime (Vantin) 200mg PO twice daily for 14 days.    Urinary Tract Infection, Adult    A urinary tract infection (UTI) is an infection of any part of the urinary tract. The urinary tract includes the kidneys, ureters, bladder, and urethra. These organs make, store, and get rid of urine in the body.    An upper UTI affects the ureters and kidneys. A lower UTI affects the bladder and urethra.    What are the causes?  Most urinary tract infections are caused by bacteria in your genital area around your urethra, where urine leaves your body. These bacteria grow and cause inflammation of your urinary tract.    What increases the risk?  You are more likely to develop this condition if:  You have a urinary catheter that stays in place.  You are not able to control when you urinate or have a bowel movement (incontinence).  You are female and you:  Use a spermicide or diaphragm for birth control.  Have low estrogen levels.  Are pregnant.  You have certain genes that increase your risk.  You are sexually active.  You take antibiotic medicines.  You have a condition that causes your flow of urine to slow down, such as:  An enlarged prostate, if you are male.  Blockage in your urethra.  A kidney stone.  A nerve condition that affects your bladder control (neurogenic bladder).  Not getting enough to drink, or not urinating often.  You have certain medical conditions, such as:  Diabetes.  A weak disease-fighting system (immunesystem).  Sickle cell disease.  Gout.  Spinal cord injury.  What are the signs or symptoms?  Symptoms of this condition include:  Needing to urinate right away (urgency).  Frequent urination. This may include small amounts of urine each time you urinate.  Pain or burning with urination.  Blood in the urine.  Urine that smells bad or unusual.  Trouble urinating.  Cloudy urine.  Vaginal discharge, if you are female.  Pain in the abdomen or the lower back.  You may also have:  Vomiting or a decreased appetite.  Confusion.  Irritability or tiredness.  A fever or chills.  Diarrhea.  The first symptom in older adults may be confusion. In some cases, they may not have any symptoms until the infection has worsened.    How is this diagnosed?  This condition is diagnosed based on your medical history and a physical exam. You may also have other tests, including:  Urine tests.  Blood tests.  Tests for STIs (sexually transmitted infections).  If you have had more than one UTI, a cystoscopy or imaging studies may be done to determine the cause of the infections.    How is this treated?  Treatment for this condition includes:  Antibiotic medicine.  Over-the-counter medicines to treat discomfort.  Drinking enough water to stay hydrated.  If you have frequent infections or have other conditions such as a kidney stone, you may need to see a health care provider who specializes in the urinary tract (urologist).    In rare cases, urinary tract infections can cause sepsis. Sepsis is a life-threatening condition that occurs when the body responds to an infection. Sepsis is treated in the hospital with IV antibiotics, fluids, and other medicines.    Follow these instructions at home:    Medicines    Take over-the-counter and prescription medicines only as told by your health care provider.  If you were prescribed an antibiotic medicine, take it as told by your health care provider. Do not stop using the antibiotic even if you start to feel better.  General instructions    Make sure you:  Empty your bladder often and completely. Do not hold urine for long periods of time.  Empty your bladder after sex.  Wipe from front to back after urinating or having a bowel movement if you are female. Use each tissue only one time when you wipe.  Drink enough fluid to keep your urine pale yellow.  Keep all follow-up visits. This is important.  Contact a health care provider if:  Your symptoms do not get better after 1–2 days.  Your symptoms go away and then return.  Get help right away if:  You have severe pain in your back or your lower abdomen.  You have a fever or chills.  You have nausea or vomiting.  Summary  A urinary tract infection (UTI) is an infection of any part of the urinary tract, which includes the kidneys, ureters, bladder, and urethra.  Most urinary tract infections are caused by bacteria in your genital area.  Treatment for this condition often includes antibiotic medicines.  If you were prescribed an antibiotic medicine, take it as told by your health care provider. Do not stop using the antibiotic even if you start to feel better.  Keep all follow-up visits. This is important.  This information is not intended to replace advice given to you by your health care provider. Make sure you discuss any questions you have with your health care provider.

## 2023-08-29 NOTE — ED ADULT TRIAGE NOTE - CHIEF COMPLAINT QUOTE
c/o low back pain with occasional burning on urination , frequency for few days , low back pain today

## 2023-08-29 NOTE — ED PROVIDER NOTE - NS ED ROS FT
GENERAL: No fever or chills  EYES: No change in vision  HEENT: No trouble swallowing or speaking  CARDIAC: No chest pain  PULMONARY: No cough or SOB  GI: no nausea or no vomiting, no diarrhea or constipation  : +URINARY FREQUENCY, +BURNING  SKIN: No rashes  NEURO: No headache, no numbness  MSK: +RT LOWER BACK PAIN, RT FLANK PAIN,   Otherwise as HPI or negative. GENERAL: No fever or chills  EYES: No change in vision  HEENT: No trouble swallowing or speaking  CARDIAC: No chest pain  PULMONARY: No cough or SOB  GI: no nausea or no vomiting, no diarrhea or constipation  : +URINARY FREQUENCY, +BURNING  SKIN: No rashes  NEURO: No headache, no numbness  MSK: +RT LOWER BACK PAIN,   Otherwise as HPI or negative.

## 2023-08-29 NOTE — ED ADULT NURSE NOTE - NSFALLOOBATTEMPT_ED_ALL_ED
Hypertension:  Patient is here for follow up chronic hypertension. This is  generally controlled on current medication regimen. Takes meds as directed and tolerates them well. Most recent labs reviewed with patient and are not remarkable. No symptoms from htn standpoint per ROS. Patient is  compliant with lifestyle modifications. Patient does not smoke. Comorbid conditions include none. She would like her flu vaccine. She states that she would do a FIT test for colon cancer screening. She had a mammogram done and is going to be getting a repeat US in the next few months. Patient's past medical, surgical, social and/or family history reviewed, updated in chart, and are non-contributory (unless otherwise stated). Medications and allergies also reviewed and updated in chart. Review of Systems:  Constitutional:  No fever, no fatigue, no chills, no headaches, no weight change  Dermatology:  No rash, no mole, no dry or sensitive skin  ENT:  No cough, no sore throat, no sinus pain, no runny nose, no ear pain  Cardiology:  No chest pain, no palpitations, no leg edema, no shortness of breath, no PND  Gastroenterology:  No dysphagia, no abdominal pain, no nausea, no vomiting, no constipation, no diarrhea, no heartburn  Musculoskeletal:  No joint pain, no leg cramps, no back pain, no muscle aches  Respiratory:  No shortness of breath, no orthopnea, no wheezing, no ZAMORA, no hemoptysis  Urology:  No blood in the urine, no urinary frequency, no urinary incontinence, no urinary urgency, no nocturia, no dysuria      Vitals:    12/07/21 1031   BP: 128/82   Pulse: 69   Resp: 18   Temp: 96.6 °F (35.9 °C)   SpO2: 99%   Weight: 185 lb 9.6 oz (84.2 kg)   Height: 5' 4\" (1.626 m)       Physical Exam  Vitals and nursing note reviewed. Constitutional:       Appearance: She is well-developed. HENT:      Head: Normocephalic and atraumatic.       Right Ear: External ear normal.      Left Ear: External ear normal.      Nose: Nose normal.   Eyes:      Conjunctiva/sclera: Conjunctivae normal.      Pupils: Pupils are equal, round, and reactive to light. Neck:      Thyroid: No thyromegaly. Cardiovascular:      Rate and Rhythm: Normal rate and regular rhythm. Heart sounds: Normal heart sounds. Pulmonary:      Effort: Pulmonary effort is normal.      Breath sounds: Normal breath sounds. No wheezing. Abdominal:      General: Bowel sounds are normal.      Palpations: Abdomen is soft. Tenderness: There is no abdominal tenderness. Musculoskeletal:         General: Normal range of motion. Cervical back: Normal range of motion and neck supple. Skin:     General: Skin is warm and dry. Findings: No rash. Neurological:      Mental Status: She is alert and oriented to person, place, and time. Deep Tendon Reflexes: Reflexes are normal and symmetric. Psychiatric:         Behavior: Behavior normal.         Assessment/Plan:      Luis Coronado was seen today for hypertension. Diagnoses and all orders for this visit:    Essential hypertension  -     Comprehensive Metabolic Panel; Future  -     CBC Auto Differential; Future    Flu vaccine need  -     INFLUENZA, MDCK QUADV, 2 YRS AND OLDER, IM, PF, PREFILL SYR OR SDV, 0.5ML (FLUCELVAX QUADV, PF)    Colon cancer screening  -     POCT Fecal Immunochemical Test (FIT); Future      As above. Call or go to ED immediately if symptoms worsen or persist.  Return in about 6 months (around 6/7/2022) for htn., or sooner if necessary. Educational materials and/or home exercises printed for patient's review and were included in patient instructions on his/her After Visit Summary and given to patient at the end of visit. Counseled regarding above diagnosis, including possible risks and complications,  especially if left uncontrolled.     Counseled regarding the possible side effects, risks, benefits and alternatives to treatment; patient and/or guardian verbalizes understanding, agrees, feels comfortable with and wishes to proceed with above treatment plan. Advised patient to call with any new medication issues, and read all Rx info from pharmacy to assure aware of all possible risks and side effects of medication before taking. Reviewed age and gender appropriate health screening exams and vaccinations. Advised patient regarding importance of keeping up with recommended health maintenance and to schedule as soon as possible if overdue, as this is important in assessing for undiagnosed pathology, especially cancer, as well as protecting against potentially harmful/life threatening disease. Patient and/or guardian verbalizes understanding and agrees with above counseling, assessment and plan. All questions answered. Gale Pan,   12/7/2021    I have personally reviewed and updated the chief complaint, HPI, Past Medical, Family and Social History, as well as the above Review of Systems. No

## 2023-08-29 NOTE — ED PROVIDER NOTE - CLINICAL SUMMARY MEDICAL DECISION MAKING FREE TEXT BOX
69 y/o F PMH HLD, hepatosplenomegaly, breast ca currently on oral chemo, b/l mastectomy, chronic LUE, hypothyroidism presenting for intermittent rt lower back pain x 2 weeks worsening over past 3 days. States she has UTI, currently has been taking Macrobid x 1 day (given to her by Urgent Care) but pain has not subsided. Endorsing urinary frequency and burning upon urination. Denies chest pain, shortness of breath, nausea, vomiting, diarrhea, dec po intake, fevers and or chills. PE benign, given immunocompromised status, concern for urosepsis, septic stone, pyelo, UTI, will obtain labs, urine, CT, mediate, reassess, dispo accordingly. 67 y/o F PMH HLD, hepatosplenomegaly, breast ca currently on oral chemo, b/l mastectomy, chronic LUE, hypothyroidism presenting for intermittent rt lower back pain x 2 weeks worsening over past 3 days. States she has UTI, currently has been taking Macrobid x 1 day (given to her by Urgent Care) but pain has not subsided. Endorsing urinary frequency and burning upon urination. Denies chest pain, shortness of breath, nausea, vomiting, diarrhea, dec po intake, fevers and or chills. PE benign, given immunocompromised status, concern for urosepsis, kidney stone, septic stone, pyelo, UTI, will obtain labs, urine, CT, mediate, reassess, dispo accordingly.

## 2023-08-29 NOTE — ED PROVIDER NOTE - PATIENT PORTAL LINK FT
You can access the FollowMyHealth Patient Portal offered by [facility] by registering at the following website: [email or web address]. By joining [facility]’s FollowMyHealth portal, you will also be able to view your health information using other applications (apps) compatible with our system.

## 2023-08-29 NOTE — ED PROVIDER NOTE - ATTENDING APP SHARED VISIT CONTRIBUTION OF CARE
69 y/o F PMH HLD, hepatosplenomegaly, breast ca currently on oral chemo, b/l mastectomy, chronic LUE, hypothyroidism presenting for intermittent rt lower back pain x 2 weeks worsening over past 3 days. States she has UTI, currently has been taking Macrobid x 1 day (given to her by Urgent Care) but pain has not subsided. Endorsing urinary frequency and burning upon urination. Reports the pain is sharp and intermittent, no hx of kidney stone. Denies chest pain, shortness of breath, nausea, vomiting, diarrhea, dec po intake, fevers and or chills. Pt does not have pain at this time. Non toxic appearing. Abd soft non tender. No spinal ttp, no paraspinal ttp. no cva ttp. Ddx includes but not limited kidney stone, pyelonephritis.

## 2023-08-29 NOTE — ED PROVIDER NOTE - OBJECTIVE STATEMENT
69 y/o F PMH breast ca currently on oral chemo, b/l mastectomy, chronic LUE, hypothyroidism 69 y/o F PMH HLD, hepatosplenomegaly, breast ca currently on oral chemo, b/l mastectomy, chronic LUE, hypothyroidism presenting for intermittent rt lower back pain x 2 weeks worsening over past 3 days. States she has UTI, currently has been taking Macrobid x 1 day (given to her by Urgent Care) but pain has not subsided. Endorsing urinary frequency and burning upon urination. Denies chest pain, shortness of breath, nausea, vomiting, diarrhea, dec po intake, fevers and or chills.

## 2023-08-29 NOTE — ED PROVIDER NOTE - PROGRESS NOTE DETAILS
Ua with leukocytes and WBC, will give abx and send to pharmacy. CT without acute path. Pt aware to f/u with doctor, return precautions given.

## 2023-08-29 NOTE — ED PROVIDER NOTE - TOBACCO USE
Discussed elevated AST/ALT with primary team who attributed it to medications administered during this admission, likely tylenol.  No contraindications to anesthesia. Unknown if ever smoked

## 2023-08-29 NOTE — ED PROVIDER NOTE - PHYSICAL EXAMINATION
Gen: NAD, AOx3, able to make needs known, non-toxic  Head: NCAT  HEENT: EOMI, oral mucosa moist, normal conjunctiva  Lung: CTAB, no respiratory distress, no wheezes/rhonchi/rales B/L, speaking in full sentences  CV: RRR, no murmurs  Abd: obese, soft, nontender, no guarding, no CVA tenderness  MSK: no visible deformities  Neuro: Appears non focal  Skin: Warm, well perfused, no rash  Psych: normal affect

## 2023-08-31 LAB
CULTURE RESULTS: SIGNIFICANT CHANGE UP
SPECIMEN SOURCE: SIGNIFICANT CHANGE UP

## 2023-12-21 NOTE — PROGRESS NOTE ADULT - GENERAL
Dr. Ramirez- Patient is scheduled for pre-op clearance with you on 1/8/23 at 2:45 pm. Patient stated that his next surgery is due to be scheduled for near the end of January.   details…

## 2024-04-08 NOTE — ED PROVIDER NOTE - CROS ED RESP ALL NEG
HISTORY OF PRESENT ILLNESS  Jennifer Rob is a 80 y.o. female     HPI NEW (former) patient consult referred by  Dr. Loco for RIGHT breast lump. Noticed a hard lump in the RIGHT breast a few weeks ago. Denies pain or other changes to the breast.     1992: LEFT lumpectomy and ALND for \"walnut sized tumor.\" 0/32 LN positive. S/P radiation and chemotherapy (for possibly 6 months). Tamoxifen for 2 years.     02/10/2015: RIGHT lumpectomy and SNBx of gr1 invasive ductal carcinoma with DCIS. 0/2 LN involved. ER+100%/GA+30% Her2-. Clear margins.     03/24/2015 - 04/24/2015: S/p XRT  04/2015 - 04/19/2016: Anastrozole (stopped due to joint pain)  05/01/2016 - 06/13/2016: Restarted anastrozole (stopped due to joint pain)  06/27/2016 - 07/18/206: Letrozole   08/01/2016 - : Tamoxifen       Family History:      Breast imaging-   Mammogram Result (most recent):  [facility] MADI DIGITAL SCREEN BILATERAL 11/02/2023    Narrative  STUDY: Bilateral digital screening mammogram with 3-D tomosynthesis    INDICATION:  Screening.    COMPARISON: Prior mammograms dating back to 2016    BREAST COMPOSITION: There are scattered areas of fibroglandular density.    FINDINGS: Bilateral digital screening mammography was performed and is  interpreted in conjunction with a computer assisted detection (CAD) system.  Additionally, tomosynthesis of both breasts in the CC and MLO projections was  performed. There are chronic lumpectomy changes of both breasts. The RIGHT  breast upper outer quadrant fat necrosis/seroma is unchanged. No new masses or  suspicious calcifications are identified. There has been no significant change.    Impression  BI-RADS Category 2 - Benign findings.  No mammographic evidence of malignancy.    RECOMMENDATIONS:  Next screening mammogram is recommended in one year.    The patient will be notified of these results.            Review of Systems      Physical Exam       ASSESSMENT and PLAN  {Assessment and Plan Chronic  - - -

## 2024-04-14 ENCOUNTER — EMERGENCY (EMERGENCY)
Facility: HOSPITAL | Age: 69
LOS: 1 days | Discharge: HOME | End: 2024-04-14
Attending: STUDENT IN AN ORGANIZED HEALTH CARE EDUCATION/TRAINING PROGRAM
Payer: MEDICARE

## 2024-04-14 VITALS
DIASTOLIC BLOOD PRESSURE: 66 MMHG | TEMPERATURE: 98 F | HEART RATE: 85 BPM | SYSTOLIC BLOOD PRESSURE: 121 MMHG | OXYGEN SATURATION: 97 % | HEIGHT: 63 IN | RESPIRATION RATE: 18 BRPM | WEIGHT: 233.69 LBS

## 2024-04-14 DIAGNOSIS — Z90.13 ACQUIRED ABSENCE OF BILATERAL BREASTS AND NIPPLES: Chronic | ICD-10-CM

## 2024-04-14 DIAGNOSIS — Z87.09 PERSONAL HISTORY OF OTHER DISEASES OF THE RESPIRATORY SYSTEM: Chronic | ICD-10-CM

## 2024-04-14 LAB
ALBUMIN SERPL ELPH-MCNC: 3.4 G/DL — LOW (ref 3.5–5)
ALP SERPL-CCNC: 66 U/L — SIGNIFICANT CHANGE UP (ref 40–120)
ALT FLD-CCNC: 26 U/L DA — SIGNIFICANT CHANGE UP (ref 10–60)
ANION GAP SERPL CALC-SCNC: 6 MMOL/L — SIGNIFICANT CHANGE UP (ref 5–17)
AST SERPL-CCNC: 37 U/L — SIGNIFICANT CHANGE UP (ref 10–40)
BASOPHILS # BLD AUTO: 0.07 K/UL — SIGNIFICANT CHANGE UP (ref 0–0.2)
BASOPHILS NFR BLD AUTO: 1.5 % — SIGNIFICANT CHANGE UP (ref 0–2)
BILIRUB SERPL-MCNC: 0.7 MG/DL — SIGNIFICANT CHANGE UP (ref 0.2–1.2)
BUN SERPL-MCNC: 19 MG/DL — HIGH (ref 7–18)
CALCIUM SERPL-MCNC: 9.3 MG/DL — SIGNIFICANT CHANGE UP (ref 8.4–10.5)
CHLORIDE SERPL-SCNC: 105 MMOL/L — SIGNIFICANT CHANGE UP (ref 96–108)
CO2 SERPL-SCNC: 28 MMOL/L — SIGNIFICANT CHANGE UP (ref 22–31)
CREAT SERPL-MCNC: 1.1 MG/DL — SIGNIFICANT CHANGE UP (ref 0.5–1.3)
EGFR: 54 ML/MIN/1.73M2 — LOW
EOSINOPHIL # BLD AUTO: 0.07 K/UL — SIGNIFICANT CHANGE UP (ref 0–0.5)
EOSINOPHIL NFR BLD AUTO: 1.5 % — SIGNIFICANT CHANGE UP (ref 0–6)
GLUCOSE SERPL-MCNC: 177 MG/DL — HIGH (ref 70–99)
HCT VFR BLD CALC: 36.2 % — SIGNIFICANT CHANGE UP (ref 34.5–45)
HGB BLD-MCNC: 12.2 G/DL — SIGNIFICANT CHANGE UP (ref 11.5–15.5)
IMM GRANULOCYTES NFR BLD AUTO: 0.4 % — SIGNIFICANT CHANGE UP (ref 0–0.9)
LYMPHOCYTES # BLD AUTO: 1.26 K/UL — SIGNIFICANT CHANGE UP (ref 1–3.3)
LYMPHOCYTES # BLD AUTO: 27.6 % — SIGNIFICANT CHANGE UP (ref 13–44)
MCHC RBC-ENTMCNC: 33.7 GM/DL — SIGNIFICANT CHANGE UP (ref 32–36)
MCHC RBC-ENTMCNC: 36.2 PG — HIGH (ref 27–34)
MCV RBC AUTO: 107.4 FL — HIGH (ref 80–100)
MONOCYTES # BLD AUTO: 0.78 K/UL — SIGNIFICANT CHANGE UP (ref 0–0.9)
MONOCYTES NFR BLD AUTO: 17.1 % — HIGH (ref 2–14)
NEUTROPHILS # BLD AUTO: 2.37 K/UL — SIGNIFICANT CHANGE UP (ref 1.8–7.4)
NEUTROPHILS NFR BLD AUTO: 51.9 % — SIGNIFICANT CHANGE UP (ref 43–77)
NRBC # BLD: 0 /100 WBCS — SIGNIFICANT CHANGE UP (ref 0–0)
PLATELET # BLD AUTO: 200 K/UL — SIGNIFICANT CHANGE UP (ref 150–400)
POTASSIUM SERPL-MCNC: 4.8 MMOL/L — SIGNIFICANT CHANGE UP (ref 3.5–5.3)
POTASSIUM SERPL-SCNC: 4.8 MMOL/L — SIGNIFICANT CHANGE UP (ref 3.5–5.3)
PROT SERPL-MCNC: 7.8 G/DL — SIGNIFICANT CHANGE UP (ref 6–8.3)
RBC # BLD: 3.37 M/UL — LOW (ref 3.8–5.2)
RBC # FLD: 13.1 % — SIGNIFICANT CHANGE UP (ref 10.3–14.5)
SODIUM SERPL-SCNC: 139 MMOL/L — SIGNIFICANT CHANGE UP (ref 135–145)
WBC # BLD: 4.57 K/UL — SIGNIFICANT CHANGE UP (ref 3.8–10.5)
WBC # FLD AUTO: 4.57 K/UL — SIGNIFICANT CHANGE UP (ref 3.8–10.5)

## 2024-04-14 PROCEDURE — 36415 COLL VENOUS BLD VENIPUNCTURE: CPT

## 2024-04-14 PROCEDURE — 80053 COMPREHEN METABOLIC PANEL: CPT

## 2024-04-14 PROCEDURE — 99283 EMERGENCY DEPT VISIT LOW MDM: CPT | Mod: 25

## 2024-04-14 PROCEDURE — 86140 C-REACTIVE PROTEIN: CPT

## 2024-04-14 PROCEDURE — 73630 X-RAY EXAM OF FOOT: CPT | Mod: 26,LT

## 2024-04-14 PROCEDURE — 73630 X-RAY EXAM OF FOOT: CPT

## 2024-04-14 PROCEDURE — 99285 EMERGENCY DEPT VISIT HI MDM: CPT

## 2024-04-14 PROCEDURE — 85025 COMPLETE CBC W/AUTO DIFF WBC: CPT

## 2024-04-14 RX ORDER — ACETAMINOPHEN 500 MG
650 TABLET ORAL ONCE
Refills: 0 | Status: COMPLETED | OUTPATIENT
Start: 2024-04-14 | End: 2024-04-14

## 2024-04-14 RX ADMIN — Medication 650 MILLIGRAM(S): at 19:04

## 2024-04-14 RX ADMIN — Medication 450 MILLIGRAM(S): at 20:23

## 2024-04-14 NOTE — ED PROVIDER NOTE - PATIENT PORTAL LINK FT
You can access the FollowMyHealth Patient Portal offered by [facility] by registering at the following website: [email or web address]. By joining [facility]’s FollowMyHealth portal, you will also be able to view your health information using other applications (apps) compatible with our system.

## 2024-04-14 NOTE — ED ADULT NURSE NOTE - CHPI ED NUR AGGRAVATING FX
Pt instructed of all future appointment dates & times, including radiology, labs, procedures & referrals. If procedures were scheduled preparation instructions provided. Instructions on future appointments with [facility] were given. MA Communication: The following orders are received by verbal communication from Sammi Hector [facility]    Orders include:  [id number] will call to schedule. Dr. Kyle Buenrostro will call you with the results     Your home sleep test is scheduled to be picked up at the Sleep center located at [facility]. Address to Sleep Center: The [facility] at [address] [place] [address], [address] [place] [address]            Phone: [phone] Fax: [phone]    If you should need to cancel or reschedule your appointment, please call the Sleep Center at [phone] as soon as possible. We ask that you please phone the [facility] Patient Pre-Services ([phone]) at least 3-5 days prior to your sleep study to pre-register. Failing to pre-register may ultimately cause your insurance to not pay for this procedure. Please refrain from or reduce the use of caffeine and/or alcohol prior to your sleep study and avoid napping the day of your study as these will affect the accuracy of your test results.
none

## 2024-04-14 NOTE — ED ADULT NURSE NOTE - NSFALLHARMRISKINTERV_ED_ALL_ED

## 2024-04-14 NOTE — ED PROVIDER NOTE - NSFOLLOWUPINSTRUCTIONS_ED_ALL_ED_FT
You were seen today for pain and swelling of your left third and fourth toes.  You have an infection.  Please take antibiotics as prescribed.  Please follow-up with podiatrist in 2 days for continued care.  If you start developing fevers, chills, nausea, vomiting or any other concerning symptoms please seek medical assistance.

## 2024-04-14 NOTE — CONSULT NOTE ADULT - SUBJECTIVE AND OBJECTIVE BOX
Patient is a 69y old  Female who presents with a chief complaint of left painful nails     HPI:69-year-old female with history of breast cancer and left upper extremity lymphedema, hypothyroidism, hyperlipidemia coming in with pain in her third and fourth toes started noticing a day ago.      Podiatry HPI: 69-year-old female with history of breast cancer and left upper extremity lymphedema, hypothyroidism, hyperlipidemia coming in with pain in her third and fourth toes started noticing a day ago. Patient states that she has not had a chance to go to a podiatrist for her nail debridement and as a result her nails have become so long that they are starting to york her skin. Patient states that as she continues to walk the nail digs into her skin. Patient denies any trauma to the toes however admits she is unable to cut her nails herself as she can not reac her feet. Patient denies any other pedal complaitns at this time.     PMH:Thyroid condition    Lymphedema of arm    S/P mastectomy, bilateral    Hypothyroidism    Breast cancer    HLD (hyperlipidemia)    Hepatosplenomegaly    Morbid obesity      Allergies: No Known Allergies    Medications: clindamycin   Capsule 450 milliGRAM(s) Oral Once    FH:No pertinent family history in first degree relatives      PSX: S/P mastectomy, bilateral    S/P bilateral mastectomy    H/O pleural empyema      SH: clindamycin   Capsule 450 milliGRAM(s) Oral Once      Vital Signs Last 24 Hrs  T(C): 36.8 (14 Apr 2024 17:56), Max: 36.8 (14 Apr 2024 17:56)  T(F): 98.2 (14 Apr 2024 17:56), Max: 98.2 (14 Apr 2024 17:56)  HR: 85 (14 Apr 2024 17:56) (85 - 85)  BP: 121/66 (14 Apr 2024 17:56) (121/66 - 121/66)  BP(mean): --  RR: 18 (14 Apr 2024 17:56) (18 - 18)  SpO2: 97% (14 Apr 2024 17:56) (97% - 97%)    Parameters below as of 14 Apr 2024 17:56  Patient On (Oxygen Delivery Method): room air        LABS                        12.2   4.57  )-----------( 200      ( 14 Apr 2024 18:54 )             36.2               04-14    139  |  105  |  19<H>  ----------------------------<  177<H>  4.8   |  28  |  1.10    Ca    9.3      14 Apr 2024 18:54    TPro  7.8  /  Alb  3.4<L>  /  TBili  0.7  /  DBili  x   /  AST  37  /  ALT  26  /  AlkPhos  66  04-14      ROS  REVIEW OF SYSTEMS:    CONSTITUTIONAL: No fever, weight loss, or fatigue  EYES: No eye pain, visual disturbances, or discharge  ENMT:  No difficulty hearing, tinnitus, vertigo; No sinus or throat pain  NECK: No pain or stiffness  BREASTS: No pain, masses, or nipple discharge  RESPIRATORY: No cough, wheezing, chills or hemoptysis; No shortness of breath  CARDIOVASCULAR: No chest pain, palpitations, dizziness, or leg swelling  GASTROINTESTINAL: No abdominal or epigastric pain. No nausea, vomiting, or hematemesis; No diarrhea or constipation. No melena or hematochezia.  GENITOURINARY: No dysuria, frequency, hematuria, or incontinence  NEUROLOGICAL: No headaches, memory loss, loss of strength, numbness, or tremors  SKIN: No itching, burning, rashes, or lesions   LYMPH NODES: No enlarged glands  ENDOCRINE: No heat or cold intolerance; No hair loss  MUSCULOSKELETAL: No joint pain or swelling; No muscle, back, or extremity pain  PSYCHIATRIC: No depression, anxiety, mood swings, or difficulty sleeping  HEME/LYMPH: No easy bruising, or bleeding gums  ALLERY AND IMMUNOLOGIC: No hives or eczema      PHYSICAL EXAM  LE Focused:    Vasc:  DP & PT palpable 2/4 b/l. CFT <3 seconds to all 10 digits b/l. TG warm to warm b/l  Derm: nails 1-5 are thickened elongated and dystrophic. Left foot nails 3-4 are enlongated and piercing the adjacent toes skin. No drainage noted, no fluctuance, no soft tissue crepitus, wound does not probe to bone. Small superficial abrasion/ fissue noted on the lateral aspect of the 3rd toe.   Neuro: Gross and light touch sensation intact b/l.  MSK: PTp to the left 3rd toe .      IMAGING: ?xray        CULTURES:     A:  Left foot pain, elongated toes    P:   Patient evaluated and Chart reviewed   Discussed diagnosis and treatment with patient  Wound flushed with normal saline  Debrided nails to full thickeness using suture scissors of nails 1-5 b/l. Without incident  Applied mupiricon and toe spacer for left foot 3rd and 4th toe.   X-rays evaluated, shows no soft tissue gas pending final read  rec follow up at  [facility] for routine nail care and callus debridement   rec abx   patient to follow up at podiatry clinic   Discussed with Attending Dr. Walters

## 2024-04-14 NOTE — ED PROVIDER NOTE - CLINICAL SUMMARY MEDICAL DECISION MAKING FREE TEXT BOX
69-year-old female with history of breast cancer and left upper extremity lymphedema, hypothyroidism, hyperlipidemia coming in with pain in her third and fourth toes started noticing a day ago.  No fevers, chills, nausea, vomiting.  No chest pain, shortness of breath, abdominal pain.  Patient is nontoxic-appearing.  Noted a splint on her left upper extremity that patient reports is applied for her lymphedema.  Noted patient has very long toenails.  Toenail of the fourth toe is embedding into third toe.  Noted erythema swelling, tenderness, increased warmth to touch of the third toe with mild streaking down to the foot.  Differential diagnoses include but not limited to cellulitis.  Patient is immunocompromised–concern for osteomyelitis.  Podiatry is consulted. 69-year-old female with history of breast cancer and left upper extremity lymphedema, hypothyroidism, hyperlipidemia coming in with pain in her third and fourth toes started noticing a day ago.  No fevers, chills, nausea, vomiting.  No chest pain, shortness of breath, abdominal pain.  Patient is nontoxic-appearing.  Noted a splint on her left upper extremity that patient reports is applied for her lymphedema.  Noted patient has very long toenails.  Toenail of the fourth toe is embedding into third toe.  Noted erythema swelling, tenderness, increased warmth to touch of the third toe with mild streaking down to the foot.  Differential diagnoses include but not limited to cellulitis.  Patient is immunocompromised–concern for osteomyelitis.  Podiatry is consulted.  ESR and CRP are still pending.  Patient does not want to wait any longer.

## 2024-04-14 NOTE — ED ADULT NURSE NOTE - TEMPLATE LIST FOR HEAD TO TOE ASSESSMENT
Called and informed pt that Dr. Mayo would like to admit her to the hospital today due to symptomatic UTI. PT was told to check in at the 1st floor admissions dept before 7pm but as soon as possible. Pt verbalized understanding.   Orthopedic

## 2024-04-15 LAB — CRP SERPL-MCNC: 6 MG/L — HIGH

## 2024-04-16 ENCOUNTER — OUTPATIENT (OUTPATIENT)
Dept: OUTPATIENT SERVICES | Facility: HOSPITAL | Age: 69
LOS: 1 days | End: 2024-04-16
Payer: MEDICARE

## 2024-04-16 ENCOUNTER — APPOINTMENT (OUTPATIENT)
Dept: PODIATRY | Facility: CLINIC | Age: 69
End: 2024-04-16

## 2024-04-16 VITALS
DIASTOLIC BLOOD PRESSURE: 65 MMHG | OXYGEN SATURATION: 98 % | TEMPERATURE: 97.8 F | BODY MASS INDEX: 42.88 KG/M2 | HEIGHT: 63 IN | SYSTOLIC BLOOD PRESSURE: 114 MMHG | HEART RATE: 85 BPM | RESPIRATION RATE: 18 BRPM | WEIGHT: 242 LBS

## 2024-04-16 DIAGNOSIS — Z00.00 ENCOUNTER FOR GENERAL ADULT MEDICAL EXAMINATION WITHOUT ABNORMAL FINDINGS: ICD-10-CM

## 2024-04-16 DIAGNOSIS — Z87.09 PERSONAL HISTORY OF OTHER DISEASES OF THE RESPIRATORY SYSTEM: Chronic | ICD-10-CM

## 2024-04-16 DIAGNOSIS — Z90.13 ACQUIRED ABSENCE OF BILATERAL BREASTS AND NIPPLES: Chronic | ICD-10-CM

## 2024-04-16 DIAGNOSIS — B35.1 TINEA UNGUIUM: ICD-10-CM

## 2024-04-16 PROCEDURE — G0463: CPT

## 2024-04-16 PROCEDURE — 11721 DEBRIDE NAIL 6 OR MORE: CPT

## 2024-04-17 DIAGNOSIS — B35.1 TINEA UNGUIUM: ICD-10-CM

## 2024-04-17 DIAGNOSIS — L60.0 INGROWING NAIL: ICD-10-CM

## 2024-04-23 NOTE — ASSESSMENT
[FreeTextEntry1] : PHYSICAL EXAM LE Focused:   Vasc:  DP & PT palpable 2/4 b/l. CFT <3 seconds to all 10 digits b/l. TG warm to warm b/l Derm: nails 1-5 are thickened elongated and dystrophic. Left foot nails 3-4 are elongated and piercing the adjacent toes skin. No drainage noted, no fluctuance, no soft tissue crepitus, wound does not probe to bone. Small superficial abrasion/ fissue noted on the lateral aspect of the 3rd toe.  Neuro: Gross and light touch sensation intact b/l. MSK: PTP to the left 3rd digit   A: Onychomycosis   P:  Patient evaluated and Chart reviewed  Discussed diagnosis and treatment with patient Debrided nails full thickness using nail clippers nails 1-5 b/l without incident Educated pt to wear supportive shoe gear Rx penlac to apply to toenails daily  RTC 6 months

## 2024-04-23 NOTE — HISTORY OF PRESENT ILLNESS
[FreeTextEntry1] : 69-year-old female with history of breast cancer and left upper extremity lymphedema, hypothyroidism, hyperlipidemia coming in for initial visit of painful toenails. Pt follow up from  ED hospital 4/14 and was given clindamycin for toe infection. Patient states that she has not had a chance to go to a podiatrist for her nail debridement and as a result her nails have become so long that they are starting to york her skin. Patient states that as she continues to walk the nail digs into her skin. Patient denies any trauma to the toes however admits she is unable to cut her nails herself as she can not reach her feet. Patient denies any other pedal complaints at this time.

## 2024-06-12 NOTE — H&P ADULT - ASSESSMENT
Treat and Release
66 y/o F with PMHx of breast cancer s/p bilateral mastectomy on Ibrance, malignant pleural effusion s/p VATS procedure, chronic left upper extremity lymphedema, hypothyroidism presents to ED complaining of clogged right sided extended dwell catheter line since this morning. Pt was originally recently admitted for suspicious pneumonia and left arm cellulitis with positive blood cultures Streptococcus Mitis. SEAN was negative for endocarditis. Pt was discharged on Ceftriaxone 2gm daily via PICC line. pt is due for  another 10d.   Pt denies any nausea vomiting, skin discoloration, fever, chest pain, bruises.    On assessment, pt is anxious about the fact the she will have to get IV access in her neck. She is being admitted for a new PICC line and IV antibiotics

## 2024-09-13 NOTE — ED PROVIDER NOTE - PHYSICAL EXAMINATION
Gen: Alert, NAD  Head: NC, AT   Eyes: PERRL, EOMI, normal lids/conjunctiva  ENT: normal hearing, patent oropharynx without erythema/exudate, uvula midline  Neck: supple, no tenderness, Trachea midline  Pulm: Bilateral BS, normal resp effort, no wheeze/stridor/retractions  CV: RRR, no M/R/G, 2+ radial and dp pulses bl, left upper ext lymphedema  Abd: soft, NT/ND, +BS, no hepatosplenomegaly  Mskel: extremities x4 with normal ROM and no joint effusions. no ctl spine ttp.   Skin: no rash, no bruising   Neuro: AAOx3, no sensory/motor deficits, CN 2-12 intact Him/He

## 2024-11-04 NOTE — ED PROVIDER NOTE - NS ED MD DISPO DISCHARGE
Pt resting in bed. Ice water given. Aware of plan of treatment. No needs at this time. Call light within reach.    Home

## 2024-11-21 NOTE — ED ADULT NURSE NOTE - ATTEMPT TO OOB
Detail Level: Detailed
Patient Specific Counseling (Will Not Stick From Patient To Patient): .\\n\\n- Pt requests treatment for treatment for angiomas on right brow and chest\\n- Referred to [facility] for cosmetic treatment
Patient Specific Counseling (Will Not Stick From Patient To Patient): .\\n\\n- Ddx includes IDN vs PEN vs cyst\\n- Reassurance\\n- Pt requests removal; discussed shave removal and expectation of scar and potential for recurrence\\n- Pt would like to proceed
no

## 2025-01-23 NOTE — PATIENT PROFILE ADULT. - PATIENT REPRESENTATIVE PHONE
Lab Results   Component Value Date    EGFR 38 01/20/2025    EGFR 35 01/19/2025    EGFR 39 01/18/2025    CREATININE 1.29 01/20/2025    CREATININE 1.38 (H) 01/19/2025    CREATININE 1.27 01/18/2025     Baseline creatinine around 1.1-1.3  Monitor BMP's at rehab  Encourage fluids  Avoid hypotension, Avoid NSAIDs  Renally dose medications as appropriate    [id number]

## 2025-02-25 NOTE — ED PROVIDER NOTE - WR INTERPRETATION DATE TIME  1
Pt informed and verbalized understanding with no further questions at this time.     05-May-2023 18:11

## 2025-04-28 ENCOUNTER — EMERGENCY (EMERGENCY)
Facility: HOSPITAL | Age: 70
LOS: 1 days | End: 2025-04-28
Attending: EMERGENCY MEDICINE
Payer: MEDICARE

## 2025-04-28 VITALS
TEMPERATURE: 98 F | HEART RATE: 81 BPM | RESPIRATION RATE: 17 BRPM | OXYGEN SATURATION: 98 % | DIASTOLIC BLOOD PRESSURE: 63 MMHG | SYSTOLIC BLOOD PRESSURE: 112 MMHG

## 2025-04-28 VITALS
HEART RATE: 86 BPM | HEIGHT: 63 IN | SYSTOLIC BLOOD PRESSURE: 127 MMHG | OXYGEN SATURATION: 97 % | RESPIRATION RATE: 18 BRPM | WEIGHT: 222.67 LBS | DIASTOLIC BLOOD PRESSURE: 64 MMHG | TEMPERATURE: 98 F

## 2025-04-28 DIAGNOSIS — Z90.13 ACQUIRED ABSENCE OF BILATERAL BREASTS AND NIPPLES: Chronic | ICD-10-CM

## 2025-04-28 DIAGNOSIS — Z87.09 PERSONAL HISTORY OF OTHER DISEASES OF THE RESPIRATORY SYSTEM: Chronic | ICD-10-CM

## 2025-04-28 LAB
APPEARANCE UR: CLEAR — SIGNIFICANT CHANGE UP
BACTERIA # UR AUTO: ABNORMAL /HPF
BILIRUB UR-MCNC: NEGATIVE — SIGNIFICANT CHANGE UP
COLOR SPEC: YELLOW — SIGNIFICANT CHANGE UP
COMMENT - URINE 2: SIGNIFICANT CHANGE UP
COMMENT - URINE: SIGNIFICANT CHANGE UP
DIFF PNL FLD: NEGATIVE — SIGNIFICANT CHANGE UP
EPI CELLS # UR: PRESENT
FLUAV AG NPH QL: SIGNIFICANT CHANGE UP
FLUBV AG NPH QL: SIGNIFICANT CHANGE UP
GLUCOSE UR QL: NEGATIVE MG/DL — SIGNIFICANT CHANGE UP
KETONES UR-MCNC: NEGATIVE MG/DL — SIGNIFICANT CHANGE UP
LEUKOCYTE ESTERASE UR-ACNC: ABNORMAL
NITRITE UR-MCNC: NEGATIVE — SIGNIFICANT CHANGE UP
PH UR: 6 — SIGNIFICANT CHANGE UP (ref 5–8)
PROT UR-MCNC: 30 MG/DL
RBC CASTS # UR COMP ASSIST: 0 /HPF — SIGNIFICANT CHANGE UP (ref 0–4)
RSV RNA NPH QL NAA+NON-PROBE: SIGNIFICANT CHANGE UP
SARS-COV-2 RNA SPEC QL NAA+PROBE: SIGNIFICANT CHANGE UP
SOURCE RESPIRATORY: SIGNIFICANT CHANGE UP
SP GR SPEC: 1.02 — SIGNIFICANT CHANGE UP (ref 1–1.03)
UROBILINOGEN FLD QL: 0.2 MG/DL — SIGNIFICANT CHANGE UP (ref 0.2–1)
WBC UR QL: 2 /HPF — SIGNIFICANT CHANGE UP (ref 0–5)

## 2025-04-28 PROCEDURE — 99283 EMERGENCY DEPT VISIT LOW MDM: CPT | Mod: 25

## 2025-04-28 PROCEDURE — 99284 EMERGENCY DEPT VISIT MOD MDM: CPT

## 2025-04-28 PROCEDURE — 71046 X-RAY EXAM CHEST 2 VIEWS: CPT | Mod: 26

## 2025-04-28 PROCEDURE — 71046 X-RAY EXAM CHEST 2 VIEWS: CPT

## 2025-04-28 PROCEDURE — 81001 URINALYSIS AUTO W/SCOPE: CPT

## 2025-04-28 PROCEDURE — 87637 SARSCOV2&INF A&B&RSV AMP PRB: CPT

## 2025-04-28 PROCEDURE — 87086 URINE CULTURE/COLONY COUNT: CPT

## 2025-04-28 RX ORDER — ACETAMINOPHEN 500 MG/5ML
650 LIQUID (ML) ORAL ONCE
Refills: 0 | Status: COMPLETED | OUTPATIENT
Start: 2025-04-28 | End: 2025-04-28

## 2025-04-28 RX ADMIN — Medication 10 MILLIGRAM(S): at 11:52

## 2025-04-28 RX ADMIN — Medication 650 MILLIGRAM(S): at 11:56

## 2025-04-28 NOTE — ED PROVIDER NOTE - ENMT, MLM
Airway patent, Nasal mucosa clear. Mouth with normal mucosa. Throat has no vesicles, no oropharyngeal exudates and uvula is midline. no stridor. maintaining oral secretions, no swelling.

## 2025-04-28 NOTE — ED PROVIDER NOTE - CARDIAC, MLM
Normal rate, regular rhythm.  Heart sounds S1, S2.  No murmurs, rubs or gallops. left chronic arm swelling unchanged

## 2025-04-28 NOTE — ED PROVIDER NOTE - OBJECTIVE STATEMENT
70 yr old female with hx of breast cancer and left upper extremity lymphedema, hypothyroidism, hyperlipidemia presents to ed c/o cough congestion, choking episode over 1 month, mild chronic back pain and urinating little like a uti but no dysuria. no sob, no cp. normal BM.

## 2025-04-28 NOTE — ED PROVIDER NOTE - CLINICAL SUMMARY MEDICAL DECISION MAKING FREE TEXT BOX
70 yr old female with hx of breast cancer and left upper extremity lymphedema, hypothyroidism, hyperlipidemia presents to ed c/o cough congestion, choking episode over 1 month, mild chronic back pain and urinating little like a uti but no dysuria. no sob, no cp. normal BM.    likely viral uri vs allergy vs pna vs mass- xr urinary sx- r/o uti vs overactive bladder, back pain chronic- tylenol, neuro vascular intact

## 2025-04-28 NOTE — ED PROVIDER NOTE - PATIENT PORTAL LINK FT
You can access the FollowMyHealth Patient Portal offered by [facility] by registering at the following website: [email or web address]. By joining [facility]’s FollowMyHealth portal, you will also be able to view your health information using other applications (apps) compatible with our system.

## 2025-04-28 NOTE — ED PROVIDER NOTE - NSFOLLOWUPINSTRUCTIONS_ED_ALL_ED_FT
urinary frequency possibly due to overactive bladder. please see your MD or continue with medication given to you by your PCP or urologist. no active infection noted    congestion- possibly from allergic reaction. take zyrtec and monitor symptoms. return if worsens Detail Level: Detailed

## 2025-04-29 LAB
CULTURE RESULTS: SIGNIFICANT CHANGE UP
SPECIMEN SOURCE: SIGNIFICANT CHANGE UP

## 2025-05-05 ENCOUNTER — APPOINTMENT (OUTPATIENT)
Dept: CARDIOLOGY | Facility: CLINIC | Age: 70
End: 2025-05-05

## 2025-05-19 ENCOUNTER — NON-APPOINTMENT (OUTPATIENT)
Age: 70
End: 2025-05-19

## 2025-05-19 ENCOUNTER — APPOINTMENT (OUTPATIENT)
Dept: CARDIOLOGY | Facility: CLINIC | Age: 70
End: 2025-05-19
Payer: MEDICARE

## 2025-05-19 VITALS
DIASTOLIC BLOOD PRESSURE: 85 MMHG | BODY MASS INDEX: 38.62 KG/M2 | OXYGEN SATURATION: 97 % | SYSTOLIC BLOOD PRESSURE: 145 MMHG | HEART RATE: 94 BPM | WEIGHT: 218 LBS | TEMPERATURE: 97.2 F

## 2025-05-19 DIAGNOSIS — I51.7 CARDIOMEGALY: ICD-10-CM

## 2025-05-19 PROCEDURE — G2211 COMPLEX E/M VISIT ADD ON: CPT

## 2025-05-19 PROCEDURE — 93000 ELECTROCARDIOGRAM COMPLETE: CPT

## 2025-05-19 PROCEDURE — 99204 OFFICE O/P NEW MOD 45 MIN: CPT

## 2025-06-12 ENCOUNTER — RESULT REVIEW (OUTPATIENT)
Age: 70
End: 2025-06-12

## 2025-06-12 ENCOUNTER — OUTPATIENT (OUTPATIENT)
Dept: OUTPATIENT SERVICES | Facility: HOSPITAL | Age: 70
LOS: 1 days | End: 2025-06-12
Payer: MEDICARE

## 2025-06-12 DIAGNOSIS — Z90.13 ACQUIRED ABSENCE OF BILATERAL BREASTS AND NIPPLES: Chronic | ICD-10-CM

## 2025-06-12 DIAGNOSIS — Z87.09 PERSONAL HISTORY OF OTHER DISEASES OF THE RESPIRATORY SYSTEM: Chronic | ICD-10-CM

## 2025-06-12 DIAGNOSIS — I51.7 CARDIOMEGALY: ICD-10-CM

## 2025-06-12 PROCEDURE — 93306 TTE W/DOPPLER COMPLETE: CPT

## 2025-06-12 PROCEDURE — 93306 TTE W/DOPPLER COMPLETE: CPT | Mod: 26

## 2025-07-30 ENCOUNTER — OUTPATIENT (OUTPATIENT)
Dept: OUTPATIENT SERVICES | Facility: HOSPITAL | Age: 70
LOS: 1 days | End: 2025-07-30
Payer: MEDICARE

## 2025-07-30 ENCOUNTER — APPOINTMENT (OUTPATIENT)
Dept: INTERVENTIONAL RADIOLOGY/VASCULAR | Facility: HOSPITAL | Age: 70
End: 2025-07-30
Payer: MEDICARE

## 2025-07-30 ENCOUNTER — TRANSCRIPTION ENCOUNTER (OUTPATIENT)
Age: 70
End: 2025-07-30

## 2025-07-30 VITALS
SYSTOLIC BLOOD PRESSURE: 113 MMHG | OXYGEN SATURATION: 95 % | HEART RATE: 75 BPM | TEMPERATURE: 98 F | RESPIRATION RATE: 22 BRPM | DIASTOLIC BLOOD PRESSURE: 70 MMHG

## 2025-07-30 VITALS
DIASTOLIC BLOOD PRESSURE: 78 MMHG | SYSTOLIC BLOOD PRESSURE: 130 MMHG | TEMPERATURE: 98 F | OXYGEN SATURATION: 100 % | RESPIRATION RATE: 17 BRPM | HEART RATE: 66 BPM

## 2025-07-30 DIAGNOSIS — E03.9 HYPOTHYROIDISM, UNSPECIFIED: ICD-10-CM

## 2025-07-30 DIAGNOSIS — Z90.13 ACQUIRED ABSENCE OF BILATERAL BREASTS AND NIPPLES: Chronic | ICD-10-CM

## 2025-07-30 DIAGNOSIS — Z87.09 PERSONAL HISTORY OF OTHER DISEASES OF THE RESPIRATORY SYSTEM: Chronic | ICD-10-CM

## 2025-07-30 DIAGNOSIS — E53.9 VITAMIN B DEFICIENCY, UNSPECIFIED: ICD-10-CM

## 2025-07-30 PROCEDURE — 88189 FLOWCYTOMETRY/READ 16 & >: CPT | Mod: 59

## 2025-07-30 PROCEDURE — 38222 DX BONE MARROW BX & ASPIR: CPT

## 2025-07-30 PROCEDURE — 88185 FLOWCYTOMETRY/TC ADD-ON: CPT

## 2025-07-30 PROCEDURE — 88184 FLOWCYTOMETRY/ TC 1 MARKER: CPT

## 2025-07-30 PROCEDURE — 38222 DX BONE MARROW BX & ASPIR: CPT | Mod: RT

## 2025-07-30 PROCEDURE — 88313 SPECIAL STAINS GROUP 2: CPT | Mod: 26

## 2025-07-30 PROCEDURE — 88291 CYTO/MOLECULAR REPORT: CPT

## 2025-07-30 PROCEDURE — 77012 CT SCAN FOR NEEDLE BIOPSY: CPT | Mod: 26

## 2025-07-30 PROCEDURE — 85097 BONE MARROW INTERPRETATION: CPT

## 2025-07-30 PROCEDURE — 77012 CT SCAN FOR NEEDLE BIOPSY: CPT

## 2025-07-30 PROCEDURE — 88108 CYTOPATH CONCENTRATE TECH: CPT | Mod: 26,59

## 2025-07-30 PROCEDURE — 88305 TISSUE EXAM BY PATHOLOGIST: CPT | Mod: 26

## 2025-07-30 RX ORDER — FENTANYL CITRATE-0.9 % NACL/PF 100MCG/2ML
25 SYRINGE (ML) INTRAVENOUS
Refills: 0 | Status: DISCONTINUED | OUTPATIENT
Start: 2025-07-30 | End: 2025-07-30

## 2025-07-30 RX ORDER — PALBOCICLIB 100 MG/1
1 TABLET, FILM COATED ORAL
Refills: 0 | DISCHARGE

## 2025-07-30 RX ORDER — SODIUM CHLORIDE 9 G/1000ML
1000 INJECTION, SOLUTION INTRAVENOUS
Refills: 0 | Status: DISCONTINUED | OUTPATIENT
Start: 2025-07-30 | End: 2025-07-30

## 2025-07-30 RX ORDER — FENTANYL CITRATE-0.9 % NACL/PF 100MCG/2ML
50 SYRINGE (ML) INTRAVENOUS
Refills: 0 | Status: DISCONTINUED | OUTPATIENT
Start: 2025-07-30 | End: 2025-07-30

## 2025-07-31 LAB — FLOW CYTOMETRY FINAL REPORT: SIGNIFICANT CHANGE UP

## 2025-08-01 LAB — SURGICAL PATHOLOGY STUDY: SIGNIFICANT CHANGE UP

## 2025-08-12 LAB — CHROM ANALY OVERALL INTERP SPEC-IMP: SIGNIFICANT CHANGE UP

## 2025-09-02 ENCOUNTER — APPOINTMENT (OUTPATIENT)
Dept: UROLOGY | Facility: CLINIC | Age: 70
End: 2025-09-02